# Patient Record
Sex: FEMALE | Race: WHITE | NOT HISPANIC OR LATINO | Employment: OTHER | URBAN - METROPOLITAN AREA
[De-identification: names, ages, dates, MRNs, and addresses within clinical notes are randomized per-mention and may not be internally consistent; named-entity substitution may affect disease eponyms.]

---

## 2022-08-02 DIAGNOSIS — D75.839 THROMBOCYTOSIS: ICD-10-CM

## 2022-08-02 DIAGNOSIS — D50.0 ANEMIA DUE TO CHRONIC BLOOD LOSS: Primary | ICD-10-CM

## 2022-08-05 DIAGNOSIS — D50.0 ANEMIA DUE TO CHRONIC BLOOD LOSS: Primary | ICD-10-CM

## 2022-08-05 NOTE — PROGRESS NOTES
Subjective:       Patient ID: Ashley Calvillo is a 76 y.o. female.    Chief Complaint: Consult (Anemia, Thrombocytosis )      Diagnosis:   - Anemia   - thrombocytosis     Current Treatment:     Treatment History:      HPI    Ashley Calvillo 76 y.o. female with PMH of HTN, Hypothyroidism, CVA, Depression, OA, Tonsil cancer s/p tonsillectomy and CXRT in 2007, now being referred here for evaluation of anemia and thrombocytosis.     According to the pt, she has previously taken iron tablets for anemia when young but denies being told lately of any blood disorders. She had hysterectomy done in the past. Denies any blood or dark color bowel movements. She has not had colonoscopy in long time. Denies any weight loss.     Interval History:     Past Medical History:   Diagnosis Date    CVA (cerebral vascular accident)     HTN (hypertension)     Hypothyroidism, unspecified     Squamous cell carcinoma in situ       Past Surgical History:   Procedure Laterality Date    HYSTERECTOMY      TONSILLECTOMY      TOTAL SHOULDER ARTHROPLASTY       Social History     Socioeconomic History    Marital status: Single   Tobacco Use    Smoking status: Current Some Day Smoker     Types: Cigarettes    Smokeless tobacco: Never Used    Tobacco comment: patient stated that she smokes on some days.    Substance and Sexual Activity    Alcohol use: Yes     Comment: wine, occasionally    Drug use: Never      Family History   Problem Relation Age of Onset    Leukemia Mother     Stroke Mother     Alzheimer's disease Father     Leukemia Sister       Review of patient's allergies indicates:   Allergen Reactions    Oxycodone-aspirin       Review of Systems   Constitutional: Negative.  Negative for activity change, chills, fatigue and fever.   HENT: Negative.    Eyes: Negative.    Respiratory: Negative for cough and shortness of breath.    Cardiovascular: Negative for chest pain and leg swelling.   Gastrointestinal: Negative for abdominal  pain, anal bleeding, blood in stool, change in bowel habit, constipation, diarrhea, nausea, vomiting and change in bowel habit.   Endocrine: Negative.    Genitourinary: Negative.    Musculoskeletal: Positive for arthralgias. Negative for myalgias.   Integumentary:  Negative.   Allergic/Immunologic: Negative.    Neurological: Negative for light-headedness, numbness and headaches.   Hematological: Negative.    Psychiatric/Behavioral: Negative.          Objective:     Vitals:    08/10/22 1057   BP: (!) 178/74   Pulse:    Resp:    Temp:         Physical Exam  Constitutional:       Appearance: She is well-developed.   HENT:      Head: Normocephalic and atraumatic.   Eyes:      Conjunctiva/sclera: Conjunctivae normal.   Cardiovascular:      Rate and Rhythm: Normal rate and regular rhythm.      Heart sounds: Normal heart sounds.   Pulmonary:      Effort: Pulmonary effort is normal. No respiratory distress.      Breath sounds: Normal breath sounds. No wheezing.   Abdominal:      General: Bowel sounds are normal. There is no distension.      Palpations: Abdomen is soft.      Tenderness: There is no abdominal tenderness.   Musculoskeletal:         General: Normal range of motion.      Cervical back: Normal range of motion and neck supple.   Skin:     General: Skin is warm.   Neurological:      Mental Status: She is alert and oriented to person, place, and time.      Cranial Nerves: No cranial nerve deficit.         LABS AND IMAGING REVIEWED IN EPIC    - 7/28/22: H/H 8.0/27.5, MCV 72, Plt 546  - 7/22/22: .9, T4 6.5,      Assessment:             Plan:     Microcytic anemia  - 7/28/22: H/H 8.0/27.5, MCV 72, Plt 546  - Workup ordered , initial workup with iron deficiency Iron 13, ferritin 18.58  - Vit B12 436, folate 11.6  - No evidence of hemolysis with , Haptoglobin 228  - could be due to malnutrition or GIB   - Start with Ferrous sulfate 325 mg daily   - Will treat with IV iron if cant tolerate or no improvement  with oral iron   - GI referral placed for colonoscopy   - RTC in 4 weeks for follow up     Thrombocytosis  - could be due to CIIC   - If no improvement with iron treatment, then will have workup done to rule out MPN.     Hypothyroidism  - 7/22/22: .9, T4 6.5,    - c/w levothyroxine and follow up with PCP     HTN:  - uncontrolled   - f/u PCP       The patient was seen, interviewed and examined. Pertinent lab and radiology studies were reviewed.     Nkechi Garcia MD  Hematology / Oncology

## 2022-08-10 ENCOUNTER — OFFICE VISIT (OUTPATIENT)
Dept: HEMATOLOGY/ONCOLOGY | Facility: CLINIC | Age: 77
End: 2022-08-10
Payer: MEDICARE

## 2022-08-10 VITALS
BODY MASS INDEX: 25.07 KG/M2 | RESPIRATION RATE: 18 BRPM | SYSTOLIC BLOOD PRESSURE: 178 MMHG | OXYGEN SATURATION: 99 % | WEIGHT: 136.25 LBS | TEMPERATURE: 98 F | DIASTOLIC BLOOD PRESSURE: 74 MMHG | HEART RATE: 77 BPM | HEIGHT: 62 IN

## 2022-08-10 DIAGNOSIS — D75.839 THROMBOCYTOSIS: ICD-10-CM

## 2022-08-10 DIAGNOSIS — E03.9 ACQUIRED HYPOTHYROIDISM: ICD-10-CM

## 2022-08-10 DIAGNOSIS — I10 HYPERTENSION, UNSPECIFIED TYPE: ICD-10-CM

## 2022-08-10 DIAGNOSIS — D50.0 ANEMIA DUE TO CHRONIC BLOOD LOSS: ICD-10-CM

## 2022-08-10 DIAGNOSIS — D50.9 IRON DEFICIENCY ANEMIA, UNSPECIFIED IRON DEFICIENCY ANEMIA TYPE: Primary | ICD-10-CM

## 2022-08-10 PROCEDURE — 3077F PR MOST RECENT SYSTOLIC BLOOD PRESSURE >= 140 MM HG: ICD-10-PCS | Mod: CPTII,S$GLB,, | Performed by: INTERNAL MEDICINE

## 2022-08-10 PROCEDURE — 3077F SYST BP >= 140 MM HG: CPT | Mod: CPTII,S$GLB,, | Performed by: INTERNAL MEDICINE

## 2022-08-10 PROCEDURE — 99999 PR PBB SHADOW E&M-EST. PATIENT-LVL V: ICD-10-PCS | Mod: PBBFAC,,, | Performed by: INTERNAL MEDICINE

## 2022-08-10 PROCEDURE — 3078F DIAST BP <80 MM HG: CPT | Mod: CPTII,S$GLB,, | Performed by: INTERNAL MEDICINE

## 2022-08-10 PROCEDURE — 99999 PR PBB SHADOW E&M-EST. PATIENT-LVL V: CPT | Mod: PBBFAC,,, | Performed by: INTERNAL MEDICINE

## 2022-08-10 PROCEDURE — 1159F PR MEDICATION LIST DOCUMENTED IN MEDICAL RECORD: ICD-10-PCS | Mod: CPTII,S$GLB,, | Performed by: INTERNAL MEDICINE

## 2022-08-10 PROCEDURE — 99204 OFFICE O/P NEW MOD 45 MIN: CPT | Mod: S$GLB,,, | Performed by: INTERNAL MEDICINE

## 2022-08-10 PROCEDURE — 1101F PT FALLS ASSESS-DOCD LE1/YR: CPT | Mod: CPTII,S$GLB,, | Performed by: INTERNAL MEDICINE

## 2022-08-10 PROCEDURE — 3288F PR FALLS RISK ASSESSMENT DOCUMENTED: ICD-10-PCS | Mod: CPTII,S$GLB,, | Performed by: INTERNAL MEDICINE

## 2022-08-10 PROCEDURE — 1101F PR PT FALLS ASSESS DOC 0-1 FALLS W/OUT INJ PAST YR: ICD-10-PCS | Mod: CPTII,S$GLB,, | Performed by: INTERNAL MEDICINE

## 2022-08-10 PROCEDURE — 1126F AMNT PAIN NOTED NONE PRSNT: CPT | Mod: CPTII,S$GLB,, | Performed by: INTERNAL MEDICINE

## 2022-08-10 PROCEDURE — 3078F PR MOST RECENT DIASTOLIC BLOOD PRESSURE < 80 MM HG: ICD-10-PCS | Mod: CPTII,S$GLB,, | Performed by: INTERNAL MEDICINE

## 2022-08-10 PROCEDURE — 1159F MED LIST DOCD IN RCRD: CPT | Mod: CPTII,S$GLB,, | Performed by: INTERNAL MEDICINE

## 2022-08-10 PROCEDURE — 1126F PR PAIN SEVERITY QUANTIFIED, NO PAIN PRESENT: ICD-10-PCS | Mod: CPTII,S$GLB,, | Performed by: INTERNAL MEDICINE

## 2022-08-10 PROCEDURE — 3288F FALL RISK ASSESSMENT DOCD: CPT | Mod: CPTII,S$GLB,, | Performed by: INTERNAL MEDICINE

## 2022-08-10 PROCEDURE — 99204 PR OFFICE/OUTPT VISIT, NEW, LEVL IV, 45-59 MIN: ICD-10-PCS | Mod: S$GLB,,, | Performed by: INTERNAL MEDICINE

## 2022-08-10 RX ORDER — AMLODIPINE BESYLATE 5 MG/1
5 TABLET ORAL DAILY
COMMUNITY
Start: 2022-08-08 | End: 2022-11-07

## 2022-08-10 RX ORDER — LEVOTHYROXINE SODIUM 100 UG/1
100 TABLET ORAL EVERY MORNING
Status: ON HOLD | COMMUNITY
Start: 2022-08-08 | End: 2022-11-15 | Stop reason: HOSPADM

## 2022-08-10 RX ORDER — CITALOPRAM 40 MG/1
40 TABLET, FILM COATED ORAL DAILY
COMMUNITY
Start: 2022-05-17

## 2022-08-10 RX ORDER — SIMVASTATIN 10 MG/1
10 TABLET, FILM COATED ORAL DAILY
COMMUNITY
Start: 2022-08-08

## 2022-08-10 RX ORDER — FERROUS SULFATE 325(65) MG
325 TABLET ORAL
Qty: 90 TABLET | Refills: 3 | Status: SHIPPED | OUTPATIENT
Start: 2022-08-10 | End: 2022-11-07

## 2022-08-10 RX ORDER — FLUTICASONE PROPIONATE 50 MCG
SPRAY, SUSPENSION (ML) NASAL
COMMUNITY
Start: 2022-06-21 | End: 2022-11-07

## 2022-08-10 RX ORDER — ARIPIPRAZOLE 5 MG/1
5 TABLET ORAL DAILY
COMMUNITY
Start: 2022-08-08 | End: 2022-11-07

## 2022-08-10 RX ORDER — TRAMADOL HYDROCHLORIDE 50 MG/1
TABLET ORAL
COMMUNITY
Start: 2022-07-28

## 2022-08-22 ENCOUNTER — HOSPITAL ENCOUNTER (INPATIENT)
Facility: HOSPITAL | Age: 77
LOS: 2 days | Discharge: HOME | DRG: 178 | End: 2022-08-24
Attending: FAMILY MEDICINE | Admitting: FAMILY MEDICINE
Payer: MEDICARE

## 2022-08-22 DIAGNOSIS — U07.1 COVID-19: ICD-10-CM

## 2022-08-22 DIAGNOSIS — E87.6 HYPOKALEMIA: ICD-10-CM

## 2022-08-22 DIAGNOSIS — D50.9 IRON DEFICIENCY ANEMIA, UNSPECIFIED IRON DEFICIENCY ANEMIA TYPE: Primary | ICD-10-CM

## 2022-08-22 LAB
ALBUMIN SERPL-MCNC: 3.6 GM/DL (ref 3.4–4.8)
ALBUMIN/GLOB SERPL: 1.2 RATIO (ref 1.1–2)
ALP SERPL-CCNC: 71 UNIT/L (ref 40–150)
ALT SERPL-CCNC: 15 UNIT/L (ref 0–55)
ANISOCYTOSIS BLD QL SMEAR: SLIGHT
AST SERPL-CCNC: 25 UNIT/L (ref 5–34)
BASOPHILS # BLD AUTO: 0.01 X10(3)/MCL (ref 0–0.2)
BASOPHILS NFR BLD AUTO: 0.2 %
BILIRUBIN DIRECT+TOT PNL SERPL-MCNC: 0.3 MG/DL
BUN SERPL-MCNC: 28 MG/DL (ref 9.8–20.1)
CALCIUM SERPL-MCNC: 9.6 MG/DL (ref 8.4–10.2)
CHLORIDE SERPL-SCNC: 105 MMOL/L (ref 98–107)
CO2 SERPL-SCNC: 24 MMOL/L (ref 23–31)
CREAT SERPL-MCNC: 1.58 MG/DL (ref 0.55–1.02)
D DIMER PPP IA.FEU-MCNC: 0.82 UG/ML FEU (ref 0–0.5)
EOSINOPHIL # BLD AUTO: 0.03 X10(3)/MCL (ref 0–0.9)
EOSINOPHIL NFR BLD AUTO: 0.6 %
ERYTHROCYTE [DISTWIDTH] IN BLOOD BY AUTOMATED COUNT: 20 % (ref 11.5–17)
FERRITIN SERPL-MCNC: 29.16 NG/ML (ref 4.63–204)
GFR SERPLBLD CREATININE-BSD FMLA CKD-EPI: 34 MLS/MIN/1.73/M2
GLOBULIN SER-MCNC: 2.9 GM/DL (ref 2.4–3.5)
GLUCOSE SERPL-MCNC: 86 MG/DL (ref 82–115)
HCT VFR BLD AUTO: 29.1 % (ref 37–47)
HGB BLD-MCNC: 8.6 GM/DL (ref 12–16)
HYPOCHROMIA BLD QL SMEAR: SLIGHT
IMM GRANULOCYTES # BLD AUTO: 0.02 X10(3)/MCL (ref 0–0.04)
IMM GRANULOCYTES NFR BLD AUTO: 0.4 %
LYMPHOCYTES # BLD AUTO: 0.99 X10(3)/MCL (ref 0.6–4.6)
LYMPHOCYTES NFR BLD AUTO: 18.2 %
MCH RBC QN AUTO: 19.7 PG (ref 27–31)
MCHC RBC AUTO-ENTMCNC: 29.6 MG/DL (ref 33–36)
MCV RBC AUTO: 66.7 FL (ref 80–94)
MICROCYTES BLD QL SMEAR: SLIGHT
MONOCYTES # BLD AUTO: 0.5 X10(3)/MCL (ref 0.1–1.3)
MONOCYTES NFR BLD AUTO: 9.2 %
NEUTROPHILS # BLD AUTO: 3.9 X10(3)/MCL (ref 2.1–9.2)
NEUTROPHILS NFR BLD AUTO: 71.4 %
PLATELET # BLD AUTO: 268 X10(3)/MCL (ref 130–400)
PLATELET # BLD EST: ADEQUATE 10*3/UL
PMV BLD AUTO: 9.5 FL (ref 7.4–10.4)
POTASSIUM SERPL-SCNC: 3.7 MMOL/L (ref 3.5–5.1)
PROT SERPL-MCNC: 6.5 GM/DL (ref 5.8–7.6)
RBC # BLD AUTO: 4.36 X10(6)/MCL (ref 4.2–5.4)
RBC MORPH BLD: ABNORMAL
SODIUM SERPL-SCNC: 141 MMOL/L (ref 136–145)
TSH SERPL-ACNC: 0.66 UIU/ML (ref 0.35–4.94)
WBC # SPEC AUTO: 5.4 X10(3)/MCL (ref 4.5–11.5)

## 2022-08-22 PROCEDURE — 11000001 HC ACUTE MED/SURG PRIVATE ROOM

## 2022-08-22 PROCEDURE — 84443 ASSAY THYROID STIM HORMONE: CPT | Performed by: FAMILY MEDICINE

## 2022-08-22 PROCEDURE — 25000003 PHARM REV CODE 250: Performed by: FAMILY MEDICINE

## 2022-08-22 PROCEDURE — 82728 ASSAY OF FERRITIN: CPT | Performed by: FAMILY MEDICINE

## 2022-08-22 PROCEDURE — 85025 COMPLETE CBC W/AUTO DIFF WBC: CPT | Performed by: FAMILY MEDICINE

## 2022-08-22 PROCEDURE — 36415 COLL VENOUS BLD VENIPUNCTURE: CPT | Performed by: FAMILY MEDICINE

## 2022-08-22 PROCEDURE — 25000242 PHARM REV CODE 250 ALT 637 W/ HCPCS: Performed by: FAMILY MEDICINE

## 2022-08-22 PROCEDURE — 80053 COMPREHEN METABOLIC PANEL: CPT | Performed by: FAMILY MEDICINE

## 2022-08-22 PROCEDURE — 85379 FIBRIN DEGRADATION QUANT: CPT | Performed by: FAMILY MEDICINE

## 2022-08-22 PROCEDURE — 63600175 PHARM REV CODE 636 W HCPCS: Mod: TB | Performed by: FAMILY MEDICINE

## 2022-08-22 PROCEDURE — 94640 AIRWAY INHALATION TREATMENT: CPT

## 2022-08-22 PROCEDURE — 27000221 HC OXYGEN, UP TO 24 HOURS

## 2022-08-22 PROCEDURE — 94761 N-INVAS EAR/PLS OXIMETRY MLT: CPT

## 2022-08-22 RX ORDER — TRAMADOL HYDROCHLORIDE 50 MG/1
50 TABLET ORAL DAILY PRN
Status: DISCONTINUED | OUTPATIENT
Start: 2022-08-22 | End: 2022-08-24 | Stop reason: HOSPADM

## 2022-08-22 RX ORDER — ARIPIPRAZOLE 5 MG/1
5 TABLET ORAL DAILY
Status: DISCONTINUED | OUTPATIENT
Start: 2022-08-23 | End: 2022-08-24 | Stop reason: HOSPADM

## 2022-08-22 RX ORDER — TALC
6 POWDER (GRAM) TOPICAL NIGHTLY PRN
Status: DISCONTINUED | OUTPATIENT
Start: 2022-08-22 | End: 2022-08-24 | Stop reason: HOSPADM

## 2022-08-22 RX ORDER — FLUTICASONE PROPIONATE 50 MCG
1 SPRAY, SUSPENSION (ML) NASAL DAILY
Status: DISCONTINUED | OUTPATIENT
Start: 2022-08-23 | End: 2022-08-24 | Stop reason: HOSPADM

## 2022-08-22 RX ORDER — SODIUM CHLORIDE 9 MG/ML
INJECTION, SOLUTION INTRAVENOUS CONTINUOUS
Status: DISCONTINUED | OUTPATIENT
Start: 2022-08-22 | End: 2022-08-24 | Stop reason: HOSPADM

## 2022-08-22 RX ORDER — DEXAMETHASONE SODIUM PHOSPHATE 4 MG/ML
4 INJECTION, SOLUTION INTRA-ARTICULAR; INTRALESIONAL; INTRAMUSCULAR; INTRAVENOUS; SOFT TISSUE EVERY 12 HOURS
Status: DISCONTINUED | OUTPATIENT
Start: 2022-08-22 | End: 2022-08-24 | Stop reason: HOSPADM

## 2022-08-22 RX ORDER — ONDANSETRON 4 MG/1
8 TABLET, ORALLY DISINTEGRATING ORAL EVERY 8 HOURS PRN
Status: DISCONTINUED | OUTPATIENT
Start: 2022-08-22 | End: 2022-08-24 | Stop reason: HOSPADM

## 2022-08-22 RX ORDER — ACETAMINOPHEN 325 MG/1
650 TABLET ORAL EVERY 4 HOURS PRN
Status: DISCONTINUED | OUTPATIENT
Start: 2022-08-22 | End: 2022-08-24 | Stop reason: HOSPADM

## 2022-08-22 RX ORDER — SODIUM CHLORIDE 0.9 % (FLUSH) 0.9 %
10 SYRINGE (ML) INJECTION EVERY 12 HOURS PRN
Status: DISCONTINUED | OUTPATIENT
Start: 2022-08-22 | End: 2022-08-24 | Stop reason: HOSPADM

## 2022-08-22 RX ORDER — ENOXAPARIN SODIUM 100 MG/ML
40 INJECTION SUBCUTANEOUS EVERY 24 HOURS
Status: DISCONTINUED | OUTPATIENT
Start: 2022-08-22 | End: 2022-08-24 | Stop reason: HOSPADM

## 2022-08-22 RX ORDER — IPRATROPIUM BROMIDE AND ALBUTEROL SULFATE 2.5; .5 MG/3ML; MG/3ML
3 SOLUTION RESPIRATORY (INHALATION)
Status: DISCONTINUED | OUTPATIENT
Start: 2022-08-22 | End: 2022-08-24 | Stop reason: HOSPADM

## 2022-08-22 RX ORDER — AMLODIPINE BESYLATE 5 MG/1
5 TABLET ORAL DAILY
Status: DISCONTINUED | OUTPATIENT
Start: 2022-08-23 | End: 2022-08-24 | Stop reason: HOSPADM

## 2022-08-22 RX ORDER — LEVOTHYROXINE SODIUM 100 UG/1
100 TABLET ORAL EVERY MORNING
Status: DISCONTINUED | OUTPATIENT
Start: 2022-08-23 | End: 2022-08-24 | Stop reason: HOSPADM

## 2022-08-22 RX ORDER — CITALOPRAM 20 MG/1
40 TABLET, FILM COATED ORAL DAILY
Status: DISCONTINUED | OUTPATIENT
Start: 2022-08-23 | End: 2022-08-24 | Stop reason: HOSPADM

## 2022-08-22 RX ADMIN — IPRATROPIUM BROMIDE AND ALBUTEROL SULFATE 3 ML: .5; 3 SOLUTION RESPIRATORY (INHALATION) at 08:08

## 2022-08-22 RX ADMIN — DEXAMETHASONE SODIUM PHOSPHATE 4 MG: 4 INJECTION, SOLUTION INTRA-ARTICULAR; INTRALESIONAL; INTRAMUSCULAR; INTRAVENOUS; SOFT TISSUE at 09:08

## 2022-08-22 RX ADMIN — SODIUM CHLORIDE: 9 INJECTION, SOLUTION INTRAVENOUS at 06:08

## 2022-08-22 RX ADMIN — ENOXAPARIN SODIUM 40 MG: 100 INJECTION SUBCUTANEOUS at 09:08

## 2022-08-22 RX ADMIN — REMDESIVIR 200 MG: 100 INJECTION, POWDER, LYOPHILIZED, FOR SOLUTION INTRAVENOUS at 09:08

## 2022-08-22 RX ADMIN — AZITHROMYCIN 500 MG: 500 INJECTION, POWDER, LYOPHILIZED, FOR SOLUTION INTRAVENOUS at 06:08

## 2022-08-22 NOTE — SUBJECTIVE & OBJECTIVE
Past Medical History:   Diagnosis Date    CVA (cerebral vascular accident)     HTN (hypertension)     Hypothyroidism, unspecified     Squamous cell carcinoma in situ        Past Surgical History:   Procedure Laterality Date    HYSTERECTOMY      TONSILLECTOMY      TOTAL SHOULDER ARTHROPLASTY         Review of patient's allergies indicates:   Allergen Reactions    Oxycodone-aspirin        No current facility-administered medications on file prior to encounter.     Current Outpatient Medications on File Prior to Encounter   Medication Sig    amLODIPine (NORVASC) 5 MG tablet Take 5 mg by mouth once daily.    ARIPiprazole (ABILIFY) 5 MG Tab Take 5 mg by mouth once daily.    citalopram (CELEXA) 40 MG tablet Take 40 mg by mouth once daily.    ferrous sulfate (FEOSOL) 325 mg (65 mg iron) Tab tablet Take 1 tablet (325 mg total) by mouth daily with breakfast.    fluticasone propionate (FLONASE) 50 mcg/actuation nasal spray ONE SPRAY IN EACH NOSTRIL ONCE DAILY    levothyroxine (SYNTHROID) 100 MCG tablet Take 100 mcg by mouth every morning.    simvastatin (ZOCOR) 10 MG tablet Take 10 mg by mouth once daily.    traMADoL (ULTRAM) 50 mg tablet TAKE ONE TABLET BY MOUTH ONCE DAILY AS NEEDED PAIN MAY CAUSE DROWSINESS     Family History       Problem Relation (Age of Onset)    Alzheimer's disease Father    Leukemia Mother, Sister    Stroke Mother          Tobacco Use    Smoking status: Current Some Day Smoker     Types: Cigarettes    Smokeless tobacco: Never Used    Tobacco comment: patient stated that she smokes on some days.    Substance and Sexual Activity    Alcohol use: Yes     Comment: wine, occasionally    Drug use: Never    Sexual activity: Not on file     Review of Systems   Constitutional:  Negative for fever.   HENT:  Negative for ear pain.    Respiratory:  Positive for cough and shortness of breath.    Cardiovascular:  Negative for chest pain.   Genitourinary:  Negative for dysuria and hematuria.   Musculoskeletal:   Negative for myalgias.   Skin:  Negative for rash.   Neurological:  Positive for weakness. Negative for headaches.   Psychiatric/Behavioral:  Negative for confusion.    Objective:     Vital Signs (Most Recent):    Vital Signs (24h Range):           There is no height or weight on file to calculate BMI.    Physical Exam  Constitutional:       Appearance: She is ill-appearing.   HENT:      Head: Normocephalic and atraumatic.   Eyes:      Extraocular Movements: Extraocular movements intact.      Pupils: Pupils are equal, round, and reactive to light.   Cardiovascular:      Rate and Rhythm: Normal rate and regular rhythm.   Pulmonary:      Effort: Respiratory distress present.      Breath sounds: Wheezing and rhonchi present.   Abdominal:      General: Abdomen is flat. Bowel sounds are normal.      Palpations: Abdomen is soft.      Tenderness: There is no abdominal tenderness.   Musculoskeletal:         General: Normal range of motion.      Cervical back: Normal range of motion and neck supple.   Skin:     General: Skin is warm and dry.   Neurological:      General: No focal deficit present.      Mental Status: She is alert.   Psychiatric:         Mood and Affect: Mood normal.         Behavior: Behavior normal.         CRANIAL NERVES     CN III, IV, VI   Pupils are equal, round, and reactive to light.     Significant Labs: All pertinent labs within the past 24 hours have been reviewed.  pending    Significant Imaging:  pending

## 2022-08-22 NOTE — H&P
[facility] Medicine  History & Physical    Patient Name: Ashley Calivllo  MRN: [id number]  Patient Class: IP- Inpatient  Admission Date: 8/22/2022  Attending Physician: Leeanna De Leon MD   Primary Care Provider: Leeanna De Leon MD         Patient information was obtained from patient and relative(s).     Subjective:     Principal Problem:COVID-19    Chief Complaint:   Chief Complaint   Patient presents with    Cough        HPI: 77yo F with PMHx of HTN, hypothyroidism, h/o CVA presented to the office with one week of upper respiratory symptoms. Daughter states weakness and cough became worse over the weekend. Patient evaluated in office, significant inspiratory and expiratory wheezing, hypoxia at 90%, rapid COVID positive.    Direct admission for IVF, antibiotics, antivirals and O2.       Past Medical History:   Diagnosis Date    CVA (cerebral vascular accident)     HTN (hypertension)     Hypothyroidism, unspecified     Squamous cell carcinoma in situ        Past Surgical History:   Procedure Laterality Date    HYSTERECTOMY      TONSILLECTOMY      TOTAL SHOULDER ARTHROPLASTY         Review of patient's allergies indicates:   Allergen Reactions    Oxycodone-aspirin        No current facility-administered medications on file prior to encounter.     Current Outpatient Medications on File Prior to Encounter   Medication Sig    amLODIPine (NORVASC) 5 MG tablet Take 5 mg by mouth once daily.    ARIPiprazole (ABILIFY) 5 MG Tab Take 5 mg by mouth once daily.    citalopram (CELEXA) 40 MG tablet Take 40 mg by mouth once daily.    ferrous sulfate (FEOSOL) 325 mg (65 mg iron) Tab tablet Take 1 tablet (325 mg total) by mouth daily with breakfast.    fluticasone propionate (FLONASE) 50 mcg/actuation nasal spray ONE SPRAY IN EACH NOSTRIL ONCE DAILY    levothyroxine (SYNTHROID) 100 MCG tablet Take 100 mcg by mouth every morning.    simvastatin (ZOCOR) 10 MG tablet  Take 10 mg by mouth once daily.    traMADoL (ULTRAM) 50 mg tablet TAKE ONE TABLET BY MOUTH ONCE DAILY AS NEEDED PAIN MAY CAUSE DROWSINESS     Family History       Problem Relation (Age of Onset)    Alzheimer's disease Father    Leukemia Mother, Sister    Stroke Mother          Tobacco Use    Smoking status: Current Some Day Smoker     Types: Cigarettes    Smokeless tobacco: Never Used    Tobacco comment: patient stated that she smokes on some days.    Substance and Sexual Activity    Alcohol use: Yes     Comment: wine, occasionally    Drug use: Never    Sexual activity: Not on file     Review of Systems   Constitutional:  Negative for fever.   HENT:  Negative for ear pain.    Respiratory:  Positive for cough and shortness of breath.    Cardiovascular:  Negative for chest pain.   Genitourinary:  Negative for dysuria and hematuria.   Musculoskeletal:  Negative for myalgias.   Skin:  Negative for rash.   Neurological:  Positive for weakness. Negative for headaches.   Psychiatric/Behavioral:  Negative for confusion.    Objective:     Vital Signs (Most Recent):    Vital Signs (24h Range):           There is no height or weight on file to calculate BMI.    Physical Exam  Constitutional:       Appearance: She is ill-appearing.   HENT:      Head: Normocephalic and atraumatic.   Eyes:      Extraocular Movements: Extraocular movements intact.      Pupils: Pupils are equal, round, and reactive to light.   Cardiovascular:      Rate and Rhythm: Normal rate and regular rhythm.   Pulmonary:      Effort: Respiratory distress present.      Breath sounds: Wheezing and rhonchi present.   Abdominal:      General: Abdomen is flat. Bowel sounds are normal.      Palpations: Abdomen is soft.      Tenderness: There is no abdominal tenderness.   Musculoskeletal:         General: Normal range of motion.      Cervical back: Normal range of motion and neck supple.   Skin:     General: Skin is warm and dry.   Neurological:      General:  No focal deficit present.      Mental Status: She is alert.   Psychiatric:         Mood and Affect: Mood normal.         Behavior: Behavior normal.         CRANIAL NERVES     CN III, IV, VI   Pupils are equal, round, and reactive to light.     Significant Labs: All pertinent labs within the past 24 hours have been reviewed.  pending    Significant Imaging:  pending    Assessment/Plan:     * COVID-19  Supplemental O2 PRN  CXR  F/u d-dimer for potential CT chest; continue lovenox  Remdesivir, dexamethasone  duonebs  azithromycin    VTE Risk Mitigation (From admission, onward)         Ordered     enoxaparin injection 40 mg  Daily         08/22/22 1821     IP VTE HIGH RISK PATIENT  Once         08/22/22 1821     Place sequential compression device  Until discontinued         08/22/22 1821                   Leeanna De Leon MD  Department of Hospital Medicine   [facility]

## 2022-08-22 NOTE — HPI
77yo F with PMHx of HTN, hypothyroidism, h/o CVA presented to the office with one week of upper respiratory symptoms. Daughter states weakness and cough became worse over the weekend. Patient evaluated in office, significant inspiratory and expiratory wheezing, hypoxia at 90%, rapid COVID positive.    Direct admission for IVF, antibiotics, antivirals and O2.

## 2022-08-22 NOTE — ASSESSMENT & PLAN NOTE
Supplemental O2 PRN  CXR  F/u d-dimer for potential CT chest; continue lovenox  Remdesivir, dexamethasone  duonebs  azithromycin

## 2022-08-23 LAB
ALBUMIN SERPL-MCNC: 3.5 GM/DL (ref 3.4–4.8)
ALBUMIN/GLOB SERPL: 1.4 RATIO (ref 1.1–2)
ALP SERPL-CCNC: 63 UNIT/L (ref 40–150)
ALT SERPL-CCNC: 14 UNIT/L (ref 0–55)
AST SERPL-CCNC: 25 UNIT/L (ref 5–34)
BASOPHILS # BLD AUTO: 0.01 X10(3)/MCL (ref 0–0.2)
BASOPHILS NFR BLD AUTO: 0.2 %
BILIRUBIN DIRECT+TOT PNL SERPL-MCNC: 0.2 MG/DL
BUN SERPL-MCNC: 23 MG/DL (ref 9.8–20.1)
CALCIUM SERPL-MCNC: 8.8 MG/DL (ref 8.4–10.2)
CHLORIDE SERPL-SCNC: 106 MMOL/L (ref 98–107)
CO2 SERPL-SCNC: 19 MMOL/L (ref 23–31)
CREAT SERPL-MCNC: 1.08 MG/DL (ref 0.55–1.02)
EOSINOPHIL # BLD AUTO: 0 X10(3)/MCL (ref 0–0.9)
EOSINOPHIL NFR BLD AUTO: 0 %
ERYTHROCYTE [DISTWIDTH] IN BLOOD BY AUTOMATED COUNT: 19.7 % (ref 11.5–17)
GFR SERPLBLD CREATININE-BSD FMLA CKD-EPI: 54 MLS/MIN/1.73/M2
GLOBULIN SER-MCNC: 2.5 GM/DL (ref 2.4–3.5)
GLUCOSE SERPL-MCNC: 146 MG/DL (ref 82–115)
HCT VFR BLD AUTO: 26.6 % (ref 37–47)
HGB BLD-MCNC: 7.9 GM/DL (ref 12–16)
IMM GRANULOCYTES # BLD AUTO: 0.03 X10(3)/MCL (ref 0–0.04)
IMM GRANULOCYTES NFR BLD AUTO: 0.7 %
LYMPHOCYTES # BLD AUTO: 0.33 X10(3)/MCL (ref 0.6–4.6)
LYMPHOCYTES NFR BLD AUTO: 8.2 %
MCH RBC QN AUTO: 19.6 PG (ref 27–31)
MCHC RBC AUTO-ENTMCNC: 29.7 MG/DL (ref 33–36)
MCV RBC AUTO: 66 FL (ref 80–94)
MONOCYTES # BLD AUTO: 0.07 X10(3)/MCL (ref 0.1–1.3)
MONOCYTES NFR BLD AUTO: 1.7 %
NEUTROPHILS # BLD AUTO: 3.6 X10(3)/MCL (ref 2.1–9.2)
NEUTROPHILS NFR BLD AUTO: 89.2 %
PLATELET # BLD AUTO: 260 X10(3)/MCL (ref 130–400)
PMV BLD AUTO: 9.8 FL (ref 7.4–10.4)
POTASSIUM SERPL-SCNC: 3.5 MMOL/L (ref 3.5–5.1)
PROT SERPL-MCNC: 6 GM/DL (ref 5.8–7.6)
RBC # BLD AUTO: 4.03 X10(6)/MCL (ref 4.2–5.4)
SODIUM SERPL-SCNC: 140 MMOL/L (ref 136–145)
WBC # SPEC AUTO: 4 X10(3)/MCL (ref 4.5–11.5)

## 2022-08-23 PROCEDURE — 25000003 PHARM REV CODE 250: Performed by: FAMILY MEDICINE

## 2022-08-23 PROCEDURE — 96375 TX/PRO/DX INJ NEW DRUG ADDON: CPT

## 2022-08-23 PROCEDURE — 96372 THER/PROPH/DIAG INJ SC/IM: CPT | Performed by: FAMILY MEDICINE

## 2022-08-23 PROCEDURE — 36415 COLL VENOUS BLD VENIPUNCTURE: CPT | Performed by: FAMILY MEDICINE

## 2022-08-23 PROCEDURE — G0378 HOSPITAL OBSERVATION PER HR: HCPCS

## 2022-08-23 PROCEDURE — 63600175 PHARM REV CODE 636 W HCPCS: Performed by: FAMILY MEDICINE

## 2022-08-23 PROCEDURE — 96365 THER/PROPH/DIAG IV INF INIT: CPT

## 2022-08-23 PROCEDURE — 25000242 PHARM REV CODE 250 ALT 637 W/ HCPCS: Performed by: FAMILY MEDICINE

## 2022-08-23 PROCEDURE — 94761 N-INVAS EAR/PLS OXIMETRY MLT: CPT

## 2022-08-23 PROCEDURE — 94640 AIRWAY INHALATION TREATMENT: CPT | Mod: XB

## 2022-08-23 PROCEDURE — 11000001 HC ACUTE MED/SURG PRIVATE ROOM

## 2022-08-23 PROCEDURE — 85025 COMPLETE CBC W/AUTO DIFF WBC: CPT | Performed by: FAMILY MEDICINE

## 2022-08-23 PROCEDURE — 80053 COMPREHEN METABOLIC PANEL: CPT | Performed by: FAMILY MEDICINE

## 2022-08-23 PROCEDURE — 27000221 HC OXYGEN, UP TO 24 HOURS

## 2022-08-23 RX ADMIN — DEXAMETHASONE SODIUM PHOSPHATE 4 MG: 4 INJECTION, SOLUTION INTRA-ARTICULAR; INTRALESIONAL; INTRAMUSCULAR; INTRAVENOUS; SOFT TISSUE at 09:08

## 2022-08-23 RX ADMIN — ENOXAPARIN SODIUM 40 MG: 100 INJECTION SUBCUTANEOUS at 04:08

## 2022-08-23 RX ADMIN — REMDESIVIR 100 MG: 100 INJECTION, POWDER, LYOPHILIZED, FOR SOLUTION INTRAVENOUS at 08:08

## 2022-08-23 RX ADMIN — IPRATROPIUM BROMIDE AND ALBUTEROL SULFATE 3 ML: .5; 3 SOLUTION RESPIRATORY (INHALATION) at 07:08

## 2022-08-23 RX ADMIN — LEVOTHYROXINE SODIUM 100 MCG: 100 TABLET ORAL at 09:08

## 2022-08-23 RX ADMIN — IPRATROPIUM BROMIDE AND ALBUTEROL SULFATE 3 ML: .5; 3 SOLUTION RESPIRATORY (INHALATION) at 01:08

## 2022-08-23 RX ADMIN — AMLODIPINE BESYLATE 5 MG: 5 TABLET ORAL at 09:08

## 2022-08-23 RX ADMIN — CITALOPRAM HYDROBROMIDE 40 MG: 20 TABLET ORAL at 09:08

## 2022-08-23 RX ADMIN — DEXAMETHASONE SODIUM PHOSPHATE 4 MG: 4 INJECTION, SOLUTION INTRA-ARTICULAR; INTRALESIONAL; INTRAMUSCULAR; INTRAVENOUS; SOFT TISSUE at 08:08

## 2022-08-23 RX ADMIN — FLUTICASONE PROPIONATE 50 MCG: 50 SPRAY, METERED NASAL at 09:08

## 2022-08-23 RX ADMIN — ARIPIPRAZOLE 5 MG: 5 TABLET ORAL at 09:08

## 2022-08-23 RX ADMIN — AZITHROMYCIN 500 MG: 500 INJECTION, POWDER, LYOPHILIZED, FOR SOLUTION INTRAVENOUS at 06:08

## 2022-08-23 NOTE — HOSPITAL COURSE
Hospital Day #2  No acute events overnight. Patient doing well on supplemental O2. Reports continued cough with more sputum today. HARDEEP improving with IVF. Anemia stable, will continue to monitor.    Hospital Day #3  Patient off O2 this AM, has removed IV. Breathing well, less coughing. AM labs stable - anemia noted, will defer transfusion at this time as patient is asymptomatic. Hypokalemia - will start po potassium. Recheck labs outpatient.

## 2022-08-23 NOTE — PROGRESS NOTES
[facility] Medicine  Progress Note    Patient Name: Ashley Calvillo  MRN: [id number]  Patient Class: IP- Inpatient   Admission Date: 8/22/2022  Length of Stay: 1 days  Attending Physician: Leeanna De Leon MD  Primary Care Provider: Leeanna De Leon MD        Subjective:     Principal Problem:COVID-19        HPI:  77yo F with PMHx of HTN, hypothyroidism, h/o CVA presented to the office with one week of upper respiratory symptoms. Daughter states weakness and cough became worse over the weekend. Patient evaluated in office, significant inspiratory and expiratory wheezing, hypoxia at 90%, rapid COVID positive.    Direct admission for IVF, antibiotics, antivirals and O2.       Overview/Hospital Course:  Hospital Day #2  No acute events overnight. Patient doing well on supplemental O2. Reports continued cough with more sputum today. HARDEEP improving with IVF. Anemia stable, will continue to monitor.      Interval History: see hospital course    Review of Systems   Constitutional:  Negative for fever.   HENT:  Negative for ear pain.    Respiratory:  Positive for cough and shortness of breath.    Cardiovascular:  Negative for chest pain.   Genitourinary:  Negative for dysuria and hematuria.   Musculoskeletal:  Negative for myalgias.   Skin:  Negative for rash.   Neurological:  Positive for weakness. Negative for headaches.   Psychiatric/Behavioral:  Negative for confusion.    Objective:     Vital Signs (Most Recent):  Temp: 97.5 °F (36.4 °C) (08/23/22 0500)  Pulse: 75 (08/23/22 0717)  Resp: 20 (08/23/22 0717)  BP: 139/62 (08/23/22 0500)  SpO2: 98 % (08/23/22 0717) Vital Signs (24h Range):  Temp:  [96.3 °F (35.7 °C)-98.4 °F (36.9 °C)] 97.5 °F (36.4 °C)  Pulse:  [68-82] 75  Resp:  [16-22] 20  SpO2:  [96 %-99 %] 98 %  BP: (132-141)/(58-73) 139/62     Weight: 62.6 kg (138 lb)  Body mass index is 25.24 kg/m².    Intake/Output Summary (Last 24 hours) at 8/23/2022 0747  Last data  filed at 8/23/2022 0500  Gross per 24 hour   Intake --   Output 700 ml   Net -700 ml      Physical Exam  Constitutional:       General: She is not in acute distress.  HENT:      Head: Normocephalic and atraumatic.   Eyes:      Extraocular Movements: Extraocular movements intact.      Pupils: Pupils are equal, round, and reactive to light.   Cardiovascular:      Rate and Rhythm: Normal rate and regular rhythm.   Pulmonary:      Breath sounds: Wheezing and rhonchi present.   Abdominal:      General: Abdomen is flat. Bowel sounds are normal.      Palpations: Abdomen is soft.      Tenderness: There is no abdominal tenderness.   Genitourinary:     Comments: Purewik cath in place, clear yellow urine  Musculoskeletal:         General: Normal range of motion.      Cervical back: Normal range of motion and neck supple.   Skin:     General: Skin is warm and dry.   Neurological:      General: No focal deficit present.      Mental Status: She is alert.   Psychiatric:         Mood and Affect: Mood normal.         Behavior: Behavior normal.       Significant Labs: All pertinent labs within the past 24 hours have been reviewed.  Recent Lab Results         08/23/22  0444   08/22/22  1832        Albumin/Globulin Ratio 1.4   1.2       Albumin 3.5   3.6       Alkaline Phosphatase 63   71       ALT 14   15       Aniso   Slight       AST 25   25       Baso # 0.01   0.01       Basophil % 0.2   0.2       BILIRUBIN TOTAL 0.2   0.3       BUN 23.0   28.0       Calcium 8.8   9.6       Chloride 106   105       CO2 19   24       Creatinine 1.08   1.58       D-Dimer   0.82  Comment: D-dimer values of less than 0.5ug/mL FEU in adult patients with a clinically low pre-test probability of developing a DVT yield  a 99% negative predictive value.  D-dimer increases naturally with age, therefore the negative predictive value in patients older than 80 is 21 - 31%.    D-Dimer testing at [facility] and [facility] is  used as an aid in the diagnosis of VTE/PE. The D-Dimer test must be used with one or more additional tests such as imaging studies to evaluate VTE/PE       eGFR 54   34       Eos # 0.00   0.03       Eosinophil % 0.0   0.6       Ferritin   29.16       Globulin, Total 2.5   2.9       Glucose 146   86       Hematocrit 26.6   29.1       Hemoglobin 7.9   8.6       Hypo   Slight       Immature Grans (Abs) 0.03   0.02       Immature Granulocytes 0.7   0.4       Lymph # 0.33   0.99       LYMPH % 8.2   18.2       MCH 19.6   19.7       MCHC 29.7   29.6       MCV 66.0   66.7       Microcyte   Slight       Mono # 0.07   0.50       Mono % 1.7   9.2       MPV 9.8   9.5       Neut # 3.6   3.9       Neut % 89.2   71.4       Platelet Estimate   Adequate       Platelets 260   268       Potassium 3.5   3.7       PROTEIN TOTAL 6.0   6.5       RBC 4.03   4.36       RBC Morph   Abnormal       RDW 19.7   20.0       Sodium 140   141       Thyroid Stimulating Hormone   0.6642       WBC 4.0   5.4               Significant Imaging: I have reviewed all pertinent imaging results/findings within the past 24 hours.    CXR final results pending      Assessment/Plan:      * COVID-19  Supplemental O2, wean as tolerated  D-dimer elevated,  continue lovenox  Remdesivir, dexamethasone  duonebs  Azithromycin  Continue IVF - dehydration improving      VTE Risk Mitigation (From admission, onward)         Ordered     enoxaparin injection 40 mg  Daily         08/22/22 1821     IP VTE HIGH RISK PATIENT  Once         08/22/22 1821     Place sequential compression device  Until discontinued         08/22/22 1821                Discharge Planning   PHIL:      Code Status: Full Code   Is the patient medically ready for discharge?:     Reason for patient still in hospital (select all that apply): Treatment                     Leeanna De Leon MD  Department of Hospital Medicine   [facility]

## 2022-08-23 NOTE — SUBJECTIVE & OBJECTIVE
Interval History: see hospital course    Review of Systems   Constitutional:  Negative for fever.   HENT:  Negative for ear pain.    Respiratory:  Positive for cough and shortness of breath.    Cardiovascular:  Negative for chest pain.   Genitourinary:  Negative for dysuria and hematuria.   Musculoskeletal:  Negative for myalgias.   Skin:  Negative for rash.   Neurological:  Positive for weakness. Negative for headaches.   Psychiatric/Behavioral:  Negative for confusion.    Objective:     Vital Signs (Most Recent):  Temp: 97.5 °F (36.4 °C) (08/23/22 0500)  Pulse: 75 (08/23/22 0717)  Resp: 20 (08/23/22 0717)  BP: 139/62 (08/23/22 0500)  SpO2: 98 % (08/23/22 0717) Vital Signs (24h Range):  Temp:  [96.3 °F (35.7 °C)-98.4 °F (36.9 °C)] 97.5 °F (36.4 °C)  Pulse:  [68-82] 75  Resp:  [16-22] 20  SpO2:  [96 %-99 %] 98 %  BP: (132-141)/(58-73) 139/62     Weight: 62.6 kg (138 lb)  Body mass index is 25.24 kg/m².    Intake/Output Summary (Last 24 hours) at 8/23/2022 0747  Last data filed at 8/23/2022 0500  Gross per 24 hour   Intake --   Output 700 ml   Net -700 ml      Physical Exam  Constitutional:       General: She is not in acute distress.  HENT:      Head: Normocephalic and atraumatic.   Eyes:      Extraocular Movements: Extraocular movements intact.      Pupils: Pupils are equal, round, and reactive to light.   Cardiovascular:      Rate and Rhythm: Normal rate and regular rhythm.   Pulmonary:      Breath sounds: Wheezing and rhonchi present.   Abdominal:      General: Abdomen is flat. Bowel sounds are normal.      Palpations: Abdomen is soft.      Tenderness: There is no abdominal tenderness.   Genitourinary:     Comments: Purewik cath in place, clear yellow urine  Musculoskeletal:         General: Normal range of motion.      Cervical back: Normal range of motion and neck supple.   Skin:     General: Skin is warm and dry.   Neurological:      General: No focal deficit present.      Mental Status: She is alert.    Psychiatric:         Mood and Affect: Mood normal.         Behavior: Behavior normal.       Significant Labs: All pertinent labs within the past 24 hours have been reviewed.  Recent Lab Results         08/23/22  0444   08/22/22  1832        Albumin/Globulin Ratio 1.4   1.2       Albumin 3.5   3.6       Alkaline Phosphatase 63   71       ALT 14   15       Aniso   Slight       AST 25   25       Baso # 0.01   0.01       Basophil % 0.2   0.2       BILIRUBIN TOTAL 0.2   0.3       BUN 23.0   28.0       Calcium 8.8   9.6       Chloride 106   105       CO2 19   24       Creatinine 1.08   1.58       D-Dimer   0.82  Comment: D-dimer values of less than 0.5ug/mL FEU in adult patients with a clinically low pre-test probability of developing a DVT yield  a 99% negative predictive value.  D-dimer increases naturally with age, therefore the negative predictive value in patients older than 80 is 21 - 31%.    D-Dimer testing at [facility] and [facility] is used as an aid in the diagnosis of VTE/PE. The D-Dimer test must be used with one or more additional tests such as imaging studies to evaluate VTE/PE       eGFR 54   34       Eos # 0.00   0.03       Eosinophil % 0.0   0.6       Ferritin   29.16       Globulin, Total 2.5   2.9       Glucose 146   86       Hematocrit 26.6   29.1       Hemoglobin 7.9   8.6       Hypo   Slight       Immature Grans (Abs) 0.03   0.02       Immature Granulocytes 0.7   0.4       Lymph # 0.33   0.99       LYMPH % 8.2   18.2       MCH 19.6   19.7       MCHC 29.7   29.6       MCV 66.0   66.7       Microcyte   Slight       Mono # 0.07   0.50       Mono % 1.7   9.2       MPV 9.8   9.5       Neut # 3.6   3.9       Neut % 89.2   71.4       Platelet Estimate   Adequate       Platelets 260   268       Potassium 3.5   3.7       PROTEIN TOTAL 6.0   6.5       RBC 4.03   4.36       RBC Morph   Abnormal       RDW 19.7   20.0       Sodium 140   141       Thyroid Stimulating Hormone    0.6642       WBC 4.0   5.4               Significant Imaging: I have reviewed all pertinent imaging results/findings within the past 24 hours.    CXR final results pending   0

## 2022-08-23 NOTE — ASSESSMENT & PLAN NOTE
Supplemental O2, wean as tolerated  D-dimer elevated,  continue lovenox  Remdesivir, dexamethasone  duonebs  Azithromycin  Continue IVF - dehydration improving

## 2022-08-24 VITALS
WEIGHT: 138.88 LBS | HEIGHT: 62 IN | OXYGEN SATURATION: 96 % | HEART RATE: 77 BPM | TEMPERATURE: 98 F | RESPIRATION RATE: 18 BRPM | DIASTOLIC BLOOD PRESSURE: 68 MMHG | SYSTOLIC BLOOD PRESSURE: 142 MMHG | BODY MASS INDEX: 25.55 KG/M2

## 2022-08-24 PROBLEM — E87.6 HYPOKALEMIA: Status: ACTIVE | Noted: 2022-08-24

## 2022-08-24 LAB
ALBUMIN SERPL-MCNC: 3.4 GM/DL (ref 3.4–4.8)
ALBUMIN/GLOB SERPL: 1.5 RATIO (ref 1.1–2)
ALP SERPL-CCNC: 62 UNIT/L (ref 40–150)
ALT SERPL-CCNC: 13 UNIT/L (ref 0–55)
ANISOCYTOSIS BLD QL SMEAR: ABNORMAL
AST SERPL-CCNC: 21 UNIT/L (ref 5–34)
BASOPHILS # BLD AUTO: 0.01 X10(3)/MCL (ref 0–0.2)
BASOPHILS NFR BLD AUTO: 0.2 %
BILIRUBIN DIRECT+TOT PNL SERPL-MCNC: 0.3 MG/DL
BUN SERPL-MCNC: 24 MG/DL (ref 9.8–20.1)
CALCIUM SERPL-MCNC: 9.2 MG/DL (ref 8.4–10.2)
CHLORIDE SERPL-SCNC: 108 MMOL/L (ref 98–107)
CO2 SERPL-SCNC: 20 MMOL/L (ref 23–31)
CREAT SERPL-MCNC: 0.86 MG/DL (ref 0.55–1.02)
EOSINOPHIL # BLD AUTO: 0 X10(3)/MCL (ref 0–0.9)
EOSINOPHIL NFR BLD AUTO: 0 %
ERYTHROCYTE [DISTWIDTH] IN BLOOD BY AUTOMATED COUNT: 20.5 % (ref 11.5–17)
GFR SERPLBLD CREATININE-BSD FMLA CKD-EPI: >60 MLS/MIN/1.73/M2
GLOBULIN SER-MCNC: 2.2 GM/DL (ref 2.4–3.5)
GLUCOSE SERPL-MCNC: 128 MG/DL (ref 82–115)
HCT VFR BLD AUTO: 23.1 % (ref 37–47)
HGB BLD-MCNC: 7.2 GM/DL (ref 12–16)
IMM GRANULOCYTES # BLD AUTO: 0.05 X10(3)/MCL (ref 0–0.04)
IMM GRANULOCYTES NFR BLD AUTO: 0.8 %
LYMPHOCYTES # BLD AUTO: 0.4 X10(3)/MCL (ref 0.6–4.6)
LYMPHOCYTES NFR BLD AUTO: 6.3 %
MCH RBC QN AUTO: 19.9 PG (ref 27–31)
MCHC RBC AUTO-ENTMCNC: 31.2 MG/DL (ref 33–36)
MCV RBC AUTO: 64 FL (ref 80–94)
MICROCYTES BLD QL SMEAR: SLIGHT
MONOCYTES # BLD AUTO: 0.19 X10(3)/MCL (ref 0.1–1.3)
MONOCYTES NFR BLD AUTO: 3 %
NEUTROPHILS # BLD AUTO: 5.7 X10(3)/MCL (ref 2.1–9.2)
NEUTROPHILS NFR BLD AUTO: 89.7 %
PLATELET # BLD AUTO: 270 X10(3)/MCL (ref 130–400)
PLATELET # BLD EST: ADEQUATE 10*3/UL
PMV BLD AUTO: 9.5 FL (ref 7.4–10.4)
POTASSIUM SERPL-SCNC: 3.3 MMOL/L (ref 3.5–5.1)
PROT SERPL-MCNC: 5.6 GM/DL (ref 5.8–7.6)
RBC # BLD AUTO: 3.61 X10(6)/MCL (ref 4.2–5.4)
RBC MORPH BLD: ABNORMAL
SODIUM SERPL-SCNC: 140 MMOL/L (ref 136–145)
WBC # SPEC AUTO: 6.4 X10(3)/MCL (ref 4.5–11.5)

## 2022-08-24 PROCEDURE — 25000003 PHARM REV CODE 250: Performed by: FAMILY MEDICINE

## 2022-08-24 PROCEDURE — 27000221 HC OXYGEN, UP TO 24 HOURS

## 2022-08-24 PROCEDURE — 80053 COMPREHEN METABOLIC PANEL: CPT | Performed by: FAMILY MEDICINE

## 2022-08-24 PROCEDURE — 36415 COLL VENOUS BLD VENIPUNCTURE: CPT | Performed by: FAMILY MEDICINE

## 2022-08-24 PROCEDURE — 94640 AIRWAY INHALATION TREATMENT: CPT

## 2022-08-24 PROCEDURE — 85025 COMPLETE CBC W/AUTO DIFF WBC: CPT | Performed by: FAMILY MEDICINE

## 2022-08-24 PROCEDURE — G0378 HOSPITAL OBSERVATION PER HR: HCPCS

## 2022-08-24 PROCEDURE — 94761 N-INVAS EAR/PLS OXIMETRY MLT: CPT

## 2022-08-24 PROCEDURE — 25000242 PHARM REV CODE 250 ALT 637 W/ HCPCS: Performed by: FAMILY MEDICINE

## 2022-08-24 RX ORDER — POTASSIUM CHLORIDE 20 MEQ/1
40 TABLET, EXTENDED RELEASE ORAL 2 TIMES DAILY
Status: DISCONTINUED | OUTPATIENT
Start: 2022-08-24 | End: 2022-08-24 | Stop reason: HOSPADM

## 2022-08-24 RX ORDER — AZITHROMYCIN 250 MG/1
250 TABLET, FILM COATED ORAL DAILY
Qty: 4 TABLET | Refills: 0 | Status: SHIPPED | OUTPATIENT
Start: 2022-08-24 | End: 2022-08-28

## 2022-08-24 RX ORDER — IPRATROPIUM BROMIDE AND ALBUTEROL SULFATE 2.5; .5 MG/3ML; MG/3ML
3 SOLUTION RESPIRATORY (INHALATION)
Qty: 270 ML | Refills: 0 | Status: ON HOLD | OUTPATIENT
Start: 2022-08-24 | End: 2022-11-15 | Stop reason: HOSPADM

## 2022-08-24 RX ORDER — POTASSIUM CHLORIDE 20 MEQ/1
40 TABLET, EXTENDED RELEASE ORAL DAILY
Qty: 14 TABLET | Refills: 0 | Status: SHIPPED | OUTPATIENT
Start: 2022-08-24 | End: 2022-08-31

## 2022-08-24 RX ADMIN — ARIPIPRAZOLE 5 MG: 5 TABLET ORAL at 09:08

## 2022-08-24 RX ADMIN — IPRATROPIUM BROMIDE AND ALBUTEROL SULFATE 3 ML: .5; 3 SOLUTION RESPIRATORY (INHALATION) at 07:08

## 2022-08-24 RX ADMIN — AMLODIPINE BESYLATE 5 MG: 5 TABLET ORAL at 09:08

## 2022-08-24 RX ADMIN — SODIUM CHLORIDE: 9 INJECTION, SOLUTION INTRAVENOUS at 06:08

## 2022-08-24 RX ADMIN — POTASSIUM CHLORIDE 40 MEQ: 1500 TABLET, EXTENDED RELEASE ORAL at 09:08

## 2022-08-24 RX ADMIN — LEVOTHYROXINE SODIUM 100 MCG: 100 TABLET ORAL at 06:08

## 2022-08-24 RX ADMIN — CITALOPRAM HYDROBROMIDE 40 MG: 20 TABLET ORAL at 09:08

## 2022-08-24 NOTE — PLAN OF CARE
Spoke to nurse Domingo and pt is not on any O2 anymore and O2 sats are normal.  Spoke to pt daughter and informed her of pt d/c home today and that Domingo will call her when pt is ready.

## 2022-08-24 NOTE — DISCHARGE SUMMARY
[facility]  Hospital Medicine  Discharge Summary      Patient Name: Ashley Calvillo  MRN: [id number]  Patient Class: OP- Observation  Admission Date: 8/22/2022  Hospital Length of Stay: 1 days  Discharge Date and Time:  08/24/2022 8:04 AM  Attending Physician: Leeanna De Leon MD   Discharging Provider: Leeanna De Leon MD  Primary Care Provider: Leeanna De Leon MD      HPI:   77yo F with PMHx of HTN, hypothyroidism, h/o CVA presented to the office with one week of upper respiratory symptoms. Daughter states weakness and cough became worse over the weekend. Patient evaluated in office, significant inspiratory and expiratory wheezing, hypoxia at 90%, rapid COVID positive.    Direct admission for IVF, antibiotics, antivirals and O2.       * No surgery found *      Hospital Course:   Hospital Day #2  No acute events overnight. Patient doing well on supplemental O2. Reports continued cough with more sputum today. HARDEEP improving with IVF. Anemia stable, will continue to monitor.    Hospital Day #3  Patient off O2 this AM, has removed IV. Breathing well, less coughing. AM labs stable - anemia noted, will defer transfusion at this time as patient is asymptomatic. Hypokalemia - will start po potassium. Recheck labs outpatient.        Vitals:    08/24/22 0736   BP:    Pulse: 72   Resp: 16   Temp:        Physical Exam   Constitutional: alert & oriented, no acute distress  HENT:   Head: Normocephalic and atraumatic.   Eyes: Conjunctivae normal and EOM are normal. Pupils are equal, round, and reactive to light.   Neck: Normal range of motion. Neck supple.   Cardiovascular: Normal rate, regular rhythm, normal heart sounds   Pulmonary/Chest: CTAB, nonlabored respiration  Abdominal: Soft, nontender, bowel sounds normal  Neurological: nonfocal  Skin: warm, dry intact  Psychiatric: normal mood and affect, cooperative          Goals of Care Treatment Preferences:  Code Status: Full  Code      Consults:   Consults (From admission, onward)        Status Ordering Provider     Inpatient consult to Social Work/Case Management  Once        Provider:  (Not yet assigned)    LEEANNA Trejo          * COVID-19  Continue duonebs & azithromycin  Aspirin allergy noted  Complete quarantine at home    Hypokalemia  PO potassium replacement  Recheck CMP in 1 week      Iron deficiency anemia  Recheck CBC in 1 week        Final Active Diagnoses:    Diagnosis Date Noted POA    PRINCIPAL PROBLEM:  COVID-19 [U07.1] 08/22/2022 Yes    Hypokalemia [E87.6] 08/24/2022 No    Iron deficiency anemia [D50.9] 08/10/2022 Yes      Problems Resolved During this Admission:       Discharged Condition: stable    Disposition: Home or Self Care    Follow Up:   Follow-up Information     Leeanna De Leon MD Follow up in 1 week(s).    Specialty: Family Medicine  Why: with labs  Contact information:  [address]  [facility] [address]  [phone]                       Patient Instructions:      CBC auto differential   Standing Status: Future Standing Exp. Date: 10/23/23     Comprehensive metabolic panel   Standing Status: Future Standing Exp. Date: 10/23/23     Diet Adult Regular     Activity as tolerated       Significant Diagnostic Studies: Labs: All labs within the past 24 hours have been reviewed    Laboratory:  Recent Results (from the past 24 hour(s))   Comprehensive Metabolic Panel (CMP)    Collection Time: 08/24/22  5:13 AM   Result Value Ref Range    Sodium Level 140 136 - 145 mmol/L    Potassium Level 3.3 (L) 3.5 - 5.1 mmol/L    Chloride 108 (H) 98 - 107 mmol/L    Carbon Dioxide 20 (L) 23 - 31 mmol/L    Glucose Level 128 (H) 82 - 115 mg/dL    Blood Urea Nitrogen 24.0 (H) 9.8 - 20.1 mg/dL    Creatinine 0.86 0.55 - 1.02 mg/dL    Calcium Level Total 9.2 8.4 - 10.2 mg/dL    Protein Total 5.6 (L) 5.8 - 7.6 gm/dL    Albumin Level 3.4 3.4 - 4.8 gm/dL    Globulin 2.2 (L) 2.4 -  3.5 gm/dL    Albumin/Globulin Ratio 1.5 1.1 - 2.0 ratio    Bilirubin Total 0.3 <=1.5 mg/dL    Alkaline Phosphatase 62 40 - 150 unit/L    Alanine Aminotransferase 13 0 - 55 unit/L    Aspartate Aminotransferase 21 5 - 34 unit/L    eGFR >60 mls/min/1.73/m2   CBC with Differential    Collection Time: 08/24/22  5:13 AM   Result Value Ref Range    WBC 6.4 4.5 - 11.5 x10(3)/mcL    RBC 3.61 (L) 4.20 - 5.40 x10(6)/mcL    Hgb 7.2 (L) 12.0 - 16.0 gm/dL    Hct 23.1 (L) 37.0 - 47.0 %    MCV 64.0 (L) 80.0 - 94.0 fL    MCH 19.9 (L) 27.0 - 31.0 pg    MCHC 31.2 (L) 33.0 - 36.0 mg/dL    RDW 20.5 (H) 11.5 - 17.0 %    Platelet 270 130 - 400 x10(3)/mcL    MPV 9.5 7.4 - 10.4 fL    Neut % 89.7 %    Lymph % 6.3 %    Mono % 3.0 %    Eos % 0.0 %    Basophil % 0.2 %    Lymph # 0.40 (L) 0.6 - 4.6 x10(3)/mcL    Neut # 5.7 2.1 - 9.2 x10(3)/mcL    Mono # 0.19 0.1 - 1.3 x10(3)/mcL    Eos # 0.00 0 - 0.9 x10(3)/mcL    Baso # 0.01 0 - 0.2 x10(3)/mcL    IG# 0.05 (H) 0 - 0.04 x10(3)/mcL    IG% 0.8 %   Blood Smear Microscopic Exam    Collection Time: 08/24/22  5:13 AM   Result Value Ref Range    RBC Morph Abnormal (A) Normal    Anisocyte 1+ (A) (none)    Microcyte Slight (A) (none)    Platelet Est Adequate Normal, Adequate        Diagnostic Results:  X-Ray Chest PA And Lateral    Result Date: 8/23/2022  EXAMINATION: XR CHEST PA AND LATERAL CLINICAL HISTORY: COVID 19;, . COMPARISON: None available FINDINGS: PA/AP and lateral views reveal the heart to be borderline enlarged.  The trachea is to the right of midline.  Atherosclerosis is seen within the aorta.  No definite infiltrate or effusion is seen.  A left shoulder prosthetic is identified.  Bony structures are osteopenic.  Degenerative changes and curvature are noted to the thoracic spine.     1. No active cardiopulmonary disease identified Electronically signed by: Kevin Vasquez Date:    08/23/2022 Time:    09:30         Pending Diagnostic Studies:     None         Medications:  Reconciled Home  Medications:      Medication List      START taking these medications    albuterol-ipratropium 2.5 mg-0.5 mg/3 mL nebulizer solution  Commonly known as: DUO-NEB  Take 3 mLs by nebulization every 6 (six) hours while awake. Rescue     azithromycin 250 MG tablet  Commonly known as: Z-BRANDON  Take 1 tablet (250 mg total) by mouth once daily. for 4 days     potassium chloride SA 20 MEQ tablet  Commonly known as: K-DUR,KLOR-CON  Take 2 tablets (40 mEq total) by mouth once daily. for 7 doses        CONTINUE taking these medications    amLODIPine 5 MG tablet  Commonly known as: NORVASC  Take 5 mg by mouth once daily.     ARIPiprazole 5 MG Tab  Commonly known as: ABILIFY  Take 5 mg by mouth once daily.     citalopram 40 MG tablet  Commonly known as: CeleXA  Take 40 mg by mouth once daily.     ferrous sulfate 325 mg (65 mg iron) Tab tablet  Commonly known as: FEOSOL  Take 1 tablet (325 mg total) by mouth daily with breakfast.     fluticasone propionate 50 mcg/actuation nasal spray  Commonly known as: FLONASE  ONE SPRAY IN EACH NOSTRIL ONCE DAILY     levothyroxine 100 MCG tablet  Commonly known as: SYNTHROID  Take 100 mcg by mouth every morning.     simvastatin 10 MG tablet  Commonly known as: ZOCOR  Take 10 mg by mouth once daily.     traMADoL 50 mg tablet  Commonly known as: ULTRAM  TAKE ONE TABLET BY MOUTH ONCE DAILY AS NEEDED PAIN MAY CAUSE DROWSINESS            Indwelling Lines/Drains at time of discharge:   Lines/Drains/Airways     None                 Time spent on the discharge of patient: 30 minutes         Leeanna De Leon MD  Department of Hospital Medicine  [facility]

## 2022-09-06 DIAGNOSIS — D50.9 IRON DEFICIENCY ANEMIA, UNSPECIFIED IRON DEFICIENCY ANEMIA TYPE: Primary | ICD-10-CM

## 2022-11-07 ENCOUNTER — HOSPITAL ENCOUNTER (EMERGENCY)
Facility: HOSPITAL | Age: 77
Discharge: HOME | End: 2022-11-07
Attending: INTERNAL MEDICINE
Payer: MEDICARE

## 2022-11-07 VITALS
SYSTOLIC BLOOD PRESSURE: 169 MMHG | DIASTOLIC BLOOD PRESSURE: 80 MMHG | HEART RATE: 60 BPM | WEIGHT: 162 LBS | BODY MASS INDEX: 29.81 KG/M2 | RESPIRATION RATE: 16 BRPM | TEMPERATURE: 99 F | HEIGHT: 62 IN | OXYGEN SATURATION: 96 %

## 2022-11-07 DIAGNOSIS — R53.1 WEAKNESS: ICD-10-CM

## 2022-11-07 DIAGNOSIS — N30.00 ACUTE CYSTITIS WITHOUT HEMATURIA: Primary | ICD-10-CM

## 2022-11-07 LAB
ALBUMIN SERPL-MCNC: 3.2 GM/DL (ref 3.4–4.8)
ALBUMIN/GLOB SERPL: 1.2 RATIO (ref 1.1–2)
ALP SERPL-CCNC: 66 UNIT/L (ref 40–150)
ALT SERPL-CCNC: 9 UNIT/L (ref 0–55)
APPEARANCE UR: ABNORMAL
AST SERPL-CCNC: 14 UNIT/L (ref 5–34)
BACTERIA #/AREA URNS AUTO: ABNORMAL /HPF
BASOPHILS # BLD AUTO: 0.08 X10(3)/MCL (ref 0–0.2)
BASOPHILS NFR BLD AUTO: 1 %
BILIRUB UR QL STRIP.AUTO: NEGATIVE MG/DL
BILIRUBIN DIRECT+TOT PNL SERPL-MCNC: 0.3 MG/DL
BUN SERPL-MCNC: 11 MG/DL (ref 9.8–20.1)
CALCIUM SERPL-MCNC: 9.2 MG/DL (ref 8.4–10.2)
CHLORIDE SERPL-SCNC: 106 MMOL/L (ref 98–107)
CO2 SERPL-SCNC: 26 MMOL/L (ref 23–31)
COLOR UR AUTO: YELLOW
CREAT SERPL-MCNC: 1.06 MG/DL (ref 0.55–1.02)
EOSINOPHIL # BLD AUTO: 0.25 X10(3)/MCL (ref 0–0.9)
EOSINOPHIL NFR BLD AUTO: 3.2 %
ERYTHROCYTE [DISTWIDTH] IN BLOOD BY AUTOMATED COUNT: 23.9 % (ref 11.5–17)
GFR SERPLBLD CREATININE-BSD FMLA CKD-EPI: 55 MLS/MIN/1.73/M2
GLOBULIN SER-MCNC: 2.7 GM/DL (ref 2.4–3.5)
GLUCOSE SERPL-MCNC: 87 MG/DL (ref 82–115)
GLUCOSE UR QL STRIP.AUTO: NEGATIVE MG/DL
HCT VFR BLD AUTO: 28.2 % (ref 37–47)
HGB BLD-MCNC: 8.7 GM/DL (ref 12–16)
IMM GRANULOCYTES # BLD AUTO: 0.03 X10(3)/MCL (ref 0–0.04)
IMM GRANULOCYTES NFR BLD AUTO: 0.4 %
KETONES UR QL STRIP.AUTO: ABNORMAL MG/DL
LEUKOCYTE ESTERASE UR QL STRIP.AUTO: ABNORMAL UNIT/L
LYMPHOCYTES # BLD AUTO: 1.04 X10(3)/MCL (ref 0.6–4.6)
LYMPHOCYTES NFR BLD AUTO: 13.4 %
MCH RBC QN AUTO: 22.4 PG (ref 27–31)
MCHC RBC AUTO-ENTMCNC: 30.9 MG/DL (ref 33–36)
MCV RBC AUTO: 72.7 FL (ref 80–94)
MONOCYTES # BLD AUTO: 0.59 X10(3)/MCL (ref 0.1–1.3)
MONOCYTES NFR BLD AUTO: 7.6 %
NEUTROPHILS # BLD AUTO: 5.8 X10(3)/MCL (ref 2.1–9.2)
NEUTROPHILS NFR BLD AUTO: 74.4 %
NITRITE UR QL STRIP.AUTO: NEGATIVE
PH UR STRIP.AUTO: 7 [PH]
PLATELET # BLD AUTO: 467 X10(3)/MCL (ref 130–400)
PMV BLD AUTO: 8.9 FL (ref 7.4–10.4)
POTASSIUM SERPL-SCNC: 3.5 MMOL/L (ref 3.5–5.1)
PROT SERPL-MCNC: 5.9 GM/DL (ref 5.8–7.6)
PROT UR QL STRIP.AUTO: NEGATIVE MG/DL
RBC # BLD AUTO: 3.88 X10(6)/MCL (ref 4.2–5.4)
RBC #/AREA URNS AUTO: ABNORMAL /HPF
RBC UR QL AUTO: NEGATIVE UNIT/L
SODIUM SERPL-SCNC: 139 MMOL/L (ref 136–145)
SP GR UR STRIP.AUTO: 1.02
SQUAMOUS #/AREA URNS AUTO: ABNORMAL /HPF
UROBILINOGEN UR STRIP-ACNC: 2 MG/DL
WBC # SPEC AUTO: 7.8 X10(3)/MCL (ref 4.5–11.5)
WBC #/AREA URNS AUTO: ABNORMAL /HPF

## 2022-11-07 PROCEDURE — 80053 COMPREHEN METABOLIC PANEL: CPT | Performed by: INTERNAL MEDICINE

## 2022-11-07 PROCEDURE — 99285 EMERGENCY DEPT VISIT HI MDM: CPT | Mod: 25

## 2022-11-07 PROCEDURE — 85025 COMPLETE CBC W/AUTO DIFF WBC: CPT | Performed by: INTERNAL MEDICINE

## 2022-11-07 PROCEDURE — 81001 URINALYSIS AUTO W/SCOPE: CPT | Performed by: INTERNAL MEDICINE

## 2022-11-07 PROCEDURE — 81003 URINALYSIS AUTO W/O SCOPE: CPT | Performed by: INTERNAL MEDICINE

## 2022-11-07 RX ORDER — NITROFURANTOIN 25; 75 MG/1; MG/1
100 CAPSULE ORAL 2 TIMES DAILY
Qty: 20 CAPSULE | Refills: 0 | Status: ON HOLD | OUTPATIENT
Start: 2022-11-07 | End: 2022-11-15 | Stop reason: HOSPADM

## 2022-11-07 RX ORDER — MELOXICAM 15 MG/1
15 TABLET ORAL DAILY
Status: ON HOLD | COMMUNITY
Start: 2022-09-22 | End: 2022-11-15 | Stop reason: HOSPADM

## 2022-11-07 NOTE — ED PROVIDER NOTES
11/07/2022         11:38 AM    Source of History:  History obtained from the patient.       Chief complaint:  From Nurse Triage:  Leg Pain (Brought per [employer] for bilateral leg pain)    HPI:  Ashley Calvillo is a 76 y.o. female presenting with Leg Pain (Brought per [employer] for bilateral leg pain)       Patient with complaint of the weakness in bilateral lower extremities for the last 3 weeks or so getting worse over the last 2-3 days, patient says that the she had COVID-19 infection last month, and then she developed weakness in lower extremities and is gradually getting worse so she talked to her family doctor who recommended for her to come to the emergency room to get checked.    Review of Systems   Constitutional symptoms:  No Fever. No Chills    Skin symptoms:  No Rash.    Eye symptoms:  No Visual disturbance reported.   ENMT symptoms:  No Sore throat,    Respiratory symptoms:  No Shortness of Breath, no Cough, no Wheezing.    Cardiovascular symptoms:  No Chest Pain, No Palpitations, No Tachycardia.    Gastrointestinal symptoms:  No Abdominal Pain, No Nausea, No Vomiting, No Diarrhea, No Constipation.    Genitourinary symptoms:  No Dysuria,    Musculoskeletal symptoms:  No Back pain,    Neurologic symptoms:  No Headache, No Dizziness.  Bilateral lower extremity weakness   Psychiatric symptoms:  No Anxiety, No Depression, No Substance Abuse.              Additional review of systems information: Patient Denies Any Other Complaints.  All Other Systems Reviewed With Patient And Negative.    ALLEGIES:  Review of patient's allergies indicates:   Allergen Reactions    Oxycodone-aspirin        MEDICINE LIST:  No current facility-administered medications for this encounter.    Current Outpatient Medications:     albuterol-ipratropium (DUO-NEB) 2.5 mg-0.5 mg/3 mL nebulizer solution, Take 3 mLs by nebulization every 6 (six) hours while awake. Rescue, Disp: 270 mL, Rfl: 0    citalopram (CELEXA) 40 MG tablet, Take 40  mg by mouth once daily., Disp: , Rfl:     levothyroxine (SYNTHROID) 100 MCG tablet, Take 100 mcg by mouth every morning., Disp: , Rfl:     meloxicam (MOBIC) 15 MG tablet, Take 15 mg by mouth once daily., Disp: , Rfl:     nitrofurantoin, macrocrystal-monohydrate, (MACROBID) 100 MG capsule, Take 1 capsule (100 mg total) by mouth 2 (two) times daily. for 10 days, Disp: 20 capsule, Rfl: 0    simvastatin (ZOCOR) 10 MG tablet, Take 10 mg by mouth once daily., Disp: , Rfl:     traMADoL (ULTRAM) 50 mg tablet, TAKE ONE TABLET BY MOUTH ONCE DAILY AS NEEDED PAIN MAY CAUSE DROWSINESS, Disp: , Rfl:     PMH:  As per HPI and below:    Reviewed and updated in chart.    PAST MEDICAL HISTORY:  Past Medical History:   Diagnosis Date    CVA (cerebral vascular accident)     Depression     HTN (hypertension)     Hypothyroidism, unspecified     Squamous cell carcinoma in situ     Tonsillar cancer         PAST SURGICAL HISTORY:  Past Surgical History:   Procedure Laterality Date    HYSTERECTOMY      TONSILLECTOMY      TOTAL SHOULDER ARTHROPLASTY         SOCIAL HISTORY:  Social History     Tobacco Use    Smoking status: Some Days     Types: Cigarettes    Smokeless tobacco: Never    Tobacco comments:     patient stated that she smokes on some days.    Substance Use Topics    Alcohol use: Yes     Comment: wine, occasionally    Drug use: Never       FAMILY HISTORY:  Family History   Problem Relation Age of Onset    Cancer Mother     Leukemia Mother     Stroke Mother     Alzheimer's disease Father     Cancer Sister     Leukemia Sister         PROBLEM LIST:  Patient Active Problem List   Diagnosis    Iron deficiency anemia    Thrombocytosis    HTN (hypertension)    Acquired hypothyroidism    COVID-19    Hypokalemia        PHYSICAL EXAM:      ED Triage Vitals [11/07/22 1115]   BP (!) 176/87   Pulse 73   Resp 16   Temp 98.5 °F (36.9 °C)   SpO2 99 %        Vital Signs: Reviewed As In Chart.  General:  Alert, No Cardiorespiratory Distress Noted.    Skin: Normal For Ethnic Origin  Eye:  Extraocular Movements Are Intact.   ENT: Mucus membranes are moist.   Cardiovascular:  Regular Rate And Rhythm, No Murmur, No Pedal Edema.    Respiratory:  Respirations Nonlabored, No Respiratory Distress, Good Bilateral Air Entry, No Rales, No Rhonchi.    Musculoskeletal:  No Gross Deformity Noted.   Gastrointestinal:  Soft, Non Distended, Non Tenderness, Normal Bowel Sounds.    Neurological:  Alert And Oriented To Person, Place, Time, And Situation, Normal Motor Observed, Normal Speech Observed.  NIH is 0, VAN negative  Psychiatric:  Cooperative, Appropriate Mood & Affect.    INITIAL IMPRESSION/ DIFFERENTIAL DX:      MEDICAL DECISION MAKING:      Reviewed Nurses Note. Reviewed Vital Signs.     Reviewed Pertinent old records, History and updated as necessary.    76 y.o. female with Leg Pain (Brought per [employer] for bilateral leg pain)    Patient has bilateral lower extremity weakness, which apparently got exaggerated after she had a COVID-19 infection, I am going to do workup on her and I will also do a CT scan of the lumbar spine and decide further.    ED WORKUP AND COURSE:  ED ORDERS:  Orders Placed This Encounter   Procedures    CT Lumbar Spine Without Contrast    X-Ray Chest 1 View    CBC auto differential    Comprehensive metabolic panel    Urinalysis, Reflex to Urine Culture Urine, Clean Catch    CBC with Differential    Urinalysis, Microscopic       Medications - No data to display             ED LABS ORDERED AND REVIEWED:  Admission on 11/07/2022, Discharged on 11/07/2022   Component Date Value Ref Range Status    Sodium Level 11/07/2022 139  136 - 145 mmol/L Final    Potassium Level 11/07/2022 3.5  3.5 - 5.1 mmol/L Final    Chloride 11/07/2022 106  98 - 107 mmol/L Final    Carbon Dioxide 11/07/2022 26  23 - 31 mmol/L Final    Glucose Level 11/07/2022 87  82 - 115 mg/dL Final    Blood Urea Nitrogen 11/07/2022 11.0  9.8 - 20.1 mg/dL Final    Creatinine 11/07/2022  1.06 (H)  0.55 - 1.02 mg/dL Final    Calcium Level Total 11/07/2022 9.2  8.4 - 10.2 mg/dL Final    Protein Total 11/07/2022 5.9  5.8 - 7.6 gm/dL Final    Albumin Level 11/07/2022 3.2 (L)  3.4 - 4.8 gm/dL Final    Globulin 11/07/2022 2.7  2.4 - 3.5 gm/dL Final    Albumin/Globulin Ratio 11/07/2022 1.2  1.1 - 2.0 ratio Final    Bilirubin Total 11/07/2022 0.3  <=1.5 mg/dL Final    Alkaline Phosphatase 11/07/2022 66  40 - 150 unit/L Final    Alanine Aminotransferase 11/07/2022 9  0 - 55 unit/L Final    Aspartate Aminotransferase 11/07/2022 14  5 - 34 unit/L Final    eGFR 11/07/2022 55  mls/min/1.73/m2 Final    Color, UA 11/07/2022 Yellow  Yellow, Light-Yellow, Dark Yellow, Alina, Straw Final    Appearance, UA 11/07/2022 Cloudy (A)  Clear Final    Specific Gravity, UA 11/07/2022 1.020   Final    pH, UA 11/07/2022 7.0  5.0 - 8.5 Final    Protein, UA 11/07/2022 Negative  Negative mg/dL Final    Glucose, UA 11/07/2022 Negative  Negative, Normal mg/dL Final    Ketones, UA 11/07/2022 Trace (A)  Negative mg/dL Final    Blood, UA 11/07/2022 Negative  Negative unit/L Final    Bilirubin, UA 11/07/2022 Negative  Negative mg/dL Final    Urobilinogen, UA 11/07/2022 2.0 (A)  0.2, 1.0, Normal mg/dL Final    Nitrites, UA 11/07/2022 Negative  Negative Final    Leukocyte Esterase, UA 11/07/2022 Trace (A)  Negative unit/L Final    WBC 11/07/2022 7.8  4.5 - 11.5 x10(3)/mcL Final    RBC 11/07/2022 3.88 (L)  4.20 - 5.40 x10(6)/mcL Final    Hgb 11/07/2022 8.7 (L)  12.0 - 16.0 gm/dL Final    Hct 11/07/2022 28.2 (L)  37.0 - 47.0 % Final    MCV 11/07/2022 72.7 (L)  80.0 - 94.0 fL Final    MCH 11/07/2022 22.4 (L)  27.0 - 31.0 pg Final    MCHC 11/07/2022 30.9 (L)  33.0 - 36.0 mg/dL Final    RDW 11/07/2022 23.9 (H)  11.5 - 17.0 % Final    Platelet 11/07/2022 467 (H)  130 - 400 x10(3)/mcL Final    MPV 11/07/2022 8.9  7.4 - 10.4 fL Final    Neut % 11/07/2022 74.4  % Final    Lymph % 11/07/2022 13.4  % Final    Mono % 11/07/2022 7.6  % Final    Eos %  11/07/2022 3.2  % Final    Basophil % 11/07/2022 1.0  % Final    Lymph # 11/07/2022 1.04  0.6 - 4.6 x10(3)/mcL Final    Neut # 11/07/2022 5.8  2.1 - 9.2 x10(3)/mcL Final    Mono # 11/07/2022 0.59  0.1 - 1.3 x10(3)/mcL Final    Eos # 11/07/2022 0.25  0 - 0.9 x10(3)/mcL Final    Baso # 11/07/2022 0.08  0 - 0.2 x10(3)/mcL Final    IG# 11/07/2022 0.03  0 - 0.04 x10(3)/mcL Final    IG% 11/07/2022 0.4  % Final    Bacteria, UA 11/07/2022 4+ (A)  None Seen, Rare, Occasional /HPF Final    RBC, UA 11/07/2022 0-2  None Seen, 0-2, 3-5, 0-5, >100 /HPF Final    WBC, UA 11/07/2022 6-10 (A)  None Seen, 0-2, 3-5, 0-5 /HPF Final    Squamous Epithelial Cells, UA 11/07/2022 Few (A)  None Seen, Rare, Occasional, Occ /HPF Final       RADIOLOGY STUDIES ORDERED AND REVIEWED:  Imaging Results              X-Ray Chest 1 View (Final result)  Result time 11/07/22 12:49:35      Final result by Kevin Vasquez MD (11/07/22 12:49:35)                   Impression:      1. Borderline cardiomegaly  2. Atherosclerosis  3. Thoracic spondylosis, scoliosis, and osteopenia      Electronically signed by: Kevin Vasquez  Date:    11/07/2022  Time:    12:49               Narrative:    EXAMINATION:  XR CHEST 1 VIEW    CLINICAL HISTORY:  , Weakness.    COMPARISON:  08/23/2022    FINDINGS:  An AP view or more reveals the heart to be borderline enlarged.  The trachea is midline.  Atherosclerosis is seen within the aorta.  No definite infiltrate or effusion is seen.  Degenerative changes and curvature are noted to the thoracic spine.  Bony structures are osteopenic.  A left shoulder prosthetic is present.                                       CT Lumbar Spine Without Contrast (Final result)  Result time 11/07/22 12:08:40      Final result by Kevin Vasquez MD (11/07/22 12:08:40)                   Impression:      1. No acute osseous defect identified  2. Mild-to-moderate encroachment into the central spinal canal and neural foramina at the lower lumbar spine as  described.  3. Lumbar spondylosis  4. Osteopenia  5. Atherosclerosis with suspect chronic intimal elevation and subintimal thrombus formation at the abdominal aorta  6. Transitional lumbar vertebral body  7. MR examination would allow further evaluation if clinically imdicated      Electronically signed by: Kevin Vasquez  Date:    11/07/2022  Time:    12:08               Narrative:    EXAMINATION:  CT LUMBAR SPINE WITHOUT CONTRAST    CLINICAL HISTORY:  , Spinal stenosis, lumbar;Decrease Ability to walk with legs;    TECHNIQUE,:  PATIENT RADIATION DOSE: DLP(mGycm) 501    As per PQRS measures, all CT scans at this facility used dose modulation, iterative reconstruction, and/or weight based dose adjustment when appropriate to reduce radiation dose to as low as reasonably achievable.    COMPARISON:  None available    FINDINGS:  Serial axial images were obtained of the lumbar spine with the administration of intravenous contrast.  Additional coronal and sagittal images were performed.  Both soft tissue and bone windows were obtained. Vertebral body height and alignment grossly intact.  No fracture or subluxation is seen.  A transitional lumbar vertebral body is identified with lumbarization of S1.  There is no loss of integrity to the bony spinal canal.  No abnormal paraspinal mass is identified without the administration of IV contrast.  Atherosclerosis is seen within the aorta and branching vessels.  There is suspect chronic intimal elevation and subintimal thrombus formation at the abdominal aorta.  Bony structures are osteopenic.  There is vacuum phenomena at the sacroiliac joints bilaterally.  A fecal bolus is present within the rectum.  The bladder is distended with fluid.    L3-4: Mild encroachment into the neural foramina bilaterally secondary to mild posterior bulge, mild posterior osteophyte formation, and facet hypertrophic changes    L4-5: Moderate encroachment into the central spinal canal and mild to  moderate encroachment into the neural foramina bilaterally secondary to mild posterior bulge, mild posterior osteophyte formation, facet hypertrophic changes, and ligament hypertrophy.    L5-S1: Mild encroachment into the neural foramina bilaterally secondary to mild posterior bulge, mild posterior osteophyte formation, and facet hypertrophic changes                                      ED COURSE AND REEVALUATIONS:  Vitals:    11/07/22 1430   BP: (!) 169/80   Pulse: 60   Resp: 16   Temp:        PROCEDURES PERFORMED IN ED:  Procedures    ED Course as of 11/07/22 1807   Mon Nov 07, 2022   1359 Patient has some anemia and also has a urinary tract infection, I will put her on antibiotic to let her go home and I advised her that she must talk to her family doctor for further workup and management as needed.  She probably can get some physical therapy to see if that improves her ambulation problem. [GQ]      ED Course User Index  [GQ] Albert Gonzalez MD              DIAGNOSTIC IMPRESSION:      1. Acute cystitis without hematuria    2. Weakness         ED Disposition Condition    Discharge Stable               Medication List        START taking these medications      nitrofurantoin (macrocrystal-monohydrate) 100 MG capsule  Commonly known as: MACROBID  Take 1 capsule (100 mg total) by mouth 2 (two) times daily. for 10 days            CONTINUE taking these medications      albuterol-ipratropium 2.5 mg-0.5 mg/3 mL nebulizer solution  Commonly known as: DUO-NEB  Take 3 mLs by nebulization every 6 (six) hours while awake. Rescue     citalopram 40 MG tablet  Commonly known as: CeleXA     levothyroxine 100 MCG tablet  Commonly known as: SYNTHROID     meloxicam 15 MG tablet  Commonly known as: MOBIC     simvastatin 10 MG tablet  Commonly known as: ZOCOR     traMADoL 50 mg tablet  Commonly known as: ULTRAM               Where to Get Your Medications        These medications were sent to [facility] - [facility] -  [address] [address]      Phone: [phone]   nitrofurantoin (macrocrystal-monohydrate) 100 MG capsule           Follow-up Information       Leeanna De Leon MD In 2 days.    Specialty: Family Medicine  Contact information:  [address]  [facility] [address]  [phone]                              ED Prescriptions       Medication Sig Dispense Start Date End Date Auth. Provider    nitrofurantoin, macrocrystal-monohydrate, (MACROBID) 100 MG capsule Take 1 capsule (100 mg total) by mouth 2 (two) times daily. for 10 days 20 capsule 11/7/2022 11/17/2022 Albert Gonzalez MD          Follow-up Information       Follow up With Specialties Details Why Contact Info    Leeanna De Leon MD Family Medicine In 2 days  [address] [facility] [address]  [phone]                 Albert Gonzalez MD  11/07/22 4945

## 2022-11-10 ENCOUNTER — HOSPITAL ENCOUNTER (OUTPATIENT)
Facility: HOSPITAL | Age: 77
Discharge: NURSING, REHAB OR LONG-TERM CARE | End: 2022-11-15
Attending: FAMILY MEDICINE | Admitting: FAMILY MEDICINE
Payer: MEDICARE

## 2022-11-10 DIAGNOSIS — R29.898 RIGHT LEG WEAKNESS: ICD-10-CM

## 2022-11-10 PROBLEM — U07.1 COVID-19: Status: RESOLVED | Noted: 2022-08-22 | Resolved: 2022-11-10

## 2022-11-10 PROBLEM — R32 URINARY INCONTINENCE: Status: ACTIVE | Noted: 2022-11-10

## 2022-11-10 PROBLEM — R09.89 SUSPECTED CEREBROVASCULAR ACCIDENT (CVA): Status: ACTIVE | Noted: 2022-11-10

## 2022-11-10 LAB
APPEARANCE UR: CLEAR
BILIRUB UR QL STRIP.AUTO: NEGATIVE MG/DL
COLOR UR AUTO: YELLOW
GLUCOSE UR QL STRIP.AUTO: NEGATIVE MG/DL
KETONES UR QL STRIP.AUTO: NEGATIVE MG/DL
LEUKOCYTE ESTERASE UR QL STRIP.AUTO: NEGATIVE UNIT/L
NITRITE UR QL STRIP.AUTO: NEGATIVE
PH UR STRIP.AUTO: 6 [PH]
PROT UR QL STRIP.AUTO: NEGATIVE MG/DL
RBC UR QL AUTO: NEGATIVE UNIT/L
SP GR UR STRIP.AUTO: 1.01
TSH SERPL-ACNC: 9.09 UIU/ML (ref 0.35–4.94)
UROBILINOGEN UR STRIP-ACNC: 0.2 MG/DL

## 2022-11-10 PROCEDURE — A9577 INJ MULTIHANCE: HCPCS | Performed by: FAMILY MEDICINE

## 2022-11-10 PROCEDURE — 84443 ASSAY THYROID STIM HORMONE: CPT | Performed by: FAMILY MEDICINE

## 2022-11-10 PROCEDURE — 25000003 PHARM REV CODE 250: Performed by: FAMILY MEDICINE

## 2022-11-10 PROCEDURE — G0379 DIRECT REFER HOSPITAL OBSERV: HCPCS | Mod: CS

## 2022-11-10 PROCEDURE — 36415 COLL VENOUS BLD VENIPUNCTURE: CPT | Performed by: FAMILY MEDICINE

## 2022-11-10 PROCEDURE — 11000001 HC ACUTE MED/SURG PRIVATE ROOM

## 2022-11-10 PROCEDURE — G0378 HOSPITAL OBSERVATION PER HR: HCPCS

## 2022-11-10 PROCEDURE — 25500020 PHARM REV CODE 255: Performed by: FAMILY MEDICINE

## 2022-11-10 PROCEDURE — 81003 URINALYSIS AUTO W/O SCOPE: CPT | Performed by: FAMILY MEDICINE

## 2022-11-10 RX ORDER — SODIUM CHLORIDE 0.9 % (FLUSH) 0.9 %
10 SYRINGE (ML) INJECTION EVERY 12 HOURS PRN
Status: DISCONTINUED | OUTPATIENT
Start: 2022-11-10 | End: 2022-11-15 | Stop reason: HOSPADM

## 2022-11-10 RX ORDER — TRAMADOL HYDROCHLORIDE 50 MG/1
50 TABLET ORAL DAILY PRN
Status: DISCONTINUED | OUTPATIENT
Start: 2022-11-10 | End: 2022-11-15 | Stop reason: HOSPADM

## 2022-11-10 RX ORDER — AMLODIPINE BESYLATE 5 MG/1
5 TABLET ORAL DAILY
Status: ON HOLD | COMMUNITY
End: 2022-11-15 | Stop reason: HOSPADM

## 2022-11-10 RX ORDER — LEVOTHYROXINE SODIUM 100 UG/1
100 TABLET ORAL EVERY MORNING
Status: DISCONTINUED | OUTPATIENT
Start: 2022-11-10 | End: 2022-11-10

## 2022-11-10 RX ORDER — CITALOPRAM 20 MG/1
40 TABLET, FILM COATED ORAL DAILY
Status: DISCONTINUED | OUTPATIENT
Start: 2022-11-10 | End: 2022-11-15 | Stop reason: HOSPADM

## 2022-11-10 RX ORDER — ATORVASTATIN CALCIUM 10 MG/1
10 TABLET, FILM COATED ORAL DAILY
Status: DISCONTINUED | OUTPATIENT
Start: 2022-11-10 | End: 2022-11-15 | Stop reason: HOSPADM

## 2022-11-10 RX ORDER — GLUCAGON 1 MG
1 KIT INJECTION
Status: DISCONTINUED | OUTPATIENT
Start: 2022-11-10 | End: 2022-11-15 | Stop reason: HOSPADM

## 2022-11-10 RX ORDER — ARIPIPRAZOLE 5 MG/1
5 TABLET ORAL DAILY
Status: ON HOLD | COMMUNITY
End: 2022-11-15 | Stop reason: HOSPADM

## 2022-11-10 RX ADMIN — GADOBENATE DIMEGLUMINE 10 ML: 529 INJECTION, SOLUTION INTRAVENOUS at 04:11

## 2022-11-10 RX ADMIN — CITALOPRAM HYDROBROMIDE 40 MG: 20 TABLET ORAL at 05:11

## 2022-11-10 RX ADMIN — ATORVASTATIN CALCIUM 10 MG: 10 TABLET, FILM COATED ORAL at 05:11

## 2022-11-10 NOTE — SUBJECTIVE & OBJECTIVE
Past Medical History:   Diagnosis Date    CVA (cerebral vascular accident)     Depression     HTN (hypertension)     Hypothyroidism, unspecified     Squamous cell carcinoma in situ     Tonsillar cancer        Past Surgical History:   Procedure Laterality Date    HYSTERECTOMY      TONSILLECTOMY      TOTAL SHOULDER ARTHROPLASTY         Review of patient's allergies indicates:   Allergen Reactions    Oxycodone        No current facility-administered medications on file prior to encounter.     Current Outpatient Medications on File Prior to Encounter   Medication Sig    amLODIPine (NORVASC) 5 MG tablet Take 5 mg by mouth once daily.    ARIPiprazole (ABILIFY) 5 MG Tab Take 5 mg by mouth once daily.    citalopram (CELEXA) 40 MG tablet Take 40 mg by mouth once daily.    levothyroxine (SYNTHROID) 100 MCG tablet Take 100 mcg by mouth every morning.    meloxicam (MOBIC) 15 MG tablet Take 15 mg by mouth once daily.    nitrofurantoin, macrocrystal-monohydrate, (MACROBID) 100 MG capsule Take 1 capsule (100 mg total) by mouth 2 (two) times daily. for 10 days    simvastatin (ZOCOR) 10 MG tablet Take 10 mg by mouth once daily.    traMADoL (ULTRAM) 50 mg tablet TAKE ONE TABLET BY MOUTH ONCE DAILY AS NEEDED PAIN MAY CAUSE DROWSINESS    albuterol-ipratropium (DUO-NEB) 2.5 mg-0.5 mg/3 mL nebulizer solution Take 3 mLs by nebulization every 6 (six) hours while awake. Rescue (Patient taking differently: Take 3 mLs by nebulization every 6 (six) hours as needed. Rescue)     Family History       Problem Relation (Age of Onset)    Alzheimer's disease Father    Cancer Mother, Sister    Leukemia Mother, Sister    Stroke Mother          Tobacco Use    Smoking status: Some Days     Types: Cigarettes    Smokeless tobacco: Never    Tobacco comments:     patient stated that she smokes on some days.    Substance and Sexual Activity    Alcohol use: Yes     Comment: wine, occasionally    Drug use: Never    Sexual activity: Not on file     Review of  Systems   Constitutional:  Negative for chills and fever.   HENT:  Negative for ear pain.    Respiratory:  Negative for cough and shortness of breath.    Cardiovascular:  Negative for chest pain.   Genitourinary:  Negative for dysuria and hematuria.   Musculoskeletal:  Negative for myalgias.   Skin:  Negative for rash.   Neurological:  Positive for weakness. Negative for headaches.   Psychiatric/Behavioral:  Negative for confusion.    Objective:     Vital Signs (Most Recent):  Temp: 98 °F (36.7 °C) (11/10/22 1421)  Pulse: 73 (11/10/22 1421)  Resp: 18 (11/10/22 1421)  BP: (!) 87/53 (11/10/22 1421)  SpO2: 98 % (11/10/22 1421)   Vital Signs (24h Range):  Temp:  [98 °F (36.7 °C)] 98 °F (36.7 °C)  Pulse:  [73] 73  Resp:  [18] 18  SpO2:  [98 %] 98 %  BP: (87)/(53) 87/53     Weight: 73.5 kg (162 lb 0.6 oz)  Body mass index is 29.64 kg/m².    Physical Exam  Constitutional:       General: She is not in acute distress.  HENT:      Head: Normocephalic and atraumatic.   Eyes:      Extraocular Movements: Extraocular movements intact.      Pupils: Pupils are equal, round, and reactive to light.   Cardiovascular:      Rate and Rhythm: Normal rate and regular rhythm.   Pulmonary:      Effort: Pulmonary effort is normal.      Breath sounds: Normal breath sounds.   Abdominal:      General: Abdomen is flat. Bowel sounds are normal.      Palpations: Abdomen is soft.      Tenderness: There is no abdominal tenderness.   Musculoskeletal:      Cervical back: Normal range of motion and neck supple.      Comments: RLE weakness   Skin:     General: Skin is warm and dry.   Neurological:      General: No focal deficit present.      Mental Status: She is alert.   Psychiatric:         Mood and Affect: Mood normal.         Behavior: Behavior normal.         CRANIAL NERVES     CN III, IV, VI   Pupils are equal, round, and reactive to light.     Significant Labs: All pertinent labs within the past 24 hours have been reviewed.  Recent Lab Results          11/10/22  1416        Thyroid Stimulating Hormone 9.0895               Significant Imaging: I have reviewed all pertinent imaging results/findings within the past 24 hours.

## 2022-11-10 NOTE — H&P
[facility] Surgical [facility] Medicine  History & Physical    Patient Name: Ashley Calvillo  MRN: [id number]  Patient Class: IP- Inpatient  Admission Date: 11/10/2022  Attending Physician: Leeanna De Leon MD   Primary Care Provider: Leeanna De Leon MD         Patient information was obtained from patient, relative(s) and ER records.     Subjective:     Principal Problem:Suspected cerebrovascular accident (CVA)    Chief Complaint: No chief complaint on file.       HPI: 75yo F with PMHx of HTN, hypothyroidism, h/o TIA/CVA reports severe headache 2-3 weeks ago causing her to wake from sleep, with fall while trying to get out of bed to go to the bathroom. Since this event, patient has had significant lower extremity weakness requiring full assistance with transfers from bed to chair to toilet. This is a severe decline from her baseline. She was evaluated in ED 11/7/22 with abnormal CT lumbar spine, followed up in clinic today. Direct admission for MRI brain, MRI lumbar spine and abdominal aortic US. She will benefit from skilled nursing/rehab placement to increase mobility and strength.       Past Medical History:   Diagnosis Date    CVA (cerebral vascular accident)     Depression     HTN (hypertension)     Hypothyroidism, unspecified     Squamous cell carcinoma in situ     Tonsillar cancer        Past Surgical History:   Procedure Laterality Date    HYSTERECTOMY      TONSILLECTOMY      TOTAL SHOULDER ARTHROPLASTY         Review of patient's allergies indicates:   Allergen Reactions    Oxycodone        No current facility-administered medications on file prior to encounter.     Current Outpatient Medications on File Prior to Encounter   Medication Sig    amLODIPine (NORVASC) 5 MG tablet Take 5 mg by mouth once daily.    ARIPiprazole (ABILIFY) 5 MG Tab Take 5 mg by mouth once daily.    citalopram (CELEXA) 40 MG tablet Take 40 mg by mouth once daily.    levothyroxine  (SYNTHROID) 100 MCG tablet Take 100 mcg by mouth every morning.    meloxicam (MOBIC) 15 MG tablet Take 15 mg by mouth once daily.    nitrofurantoin, macrocrystal-monohydrate, (MACROBID) 100 MG capsule Take 1 capsule (100 mg total) by mouth 2 (two) times daily. for 10 days    simvastatin (ZOCOR) 10 MG tablet Take 10 mg by mouth once daily.    traMADoL (ULTRAM) 50 mg tablet TAKE ONE TABLET BY MOUTH ONCE DAILY AS NEEDED PAIN MAY CAUSE DROWSINESS    albuterol-ipratropium (DUO-NEB) 2.5 mg-0.5 mg/3 mL nebulizer solution Take 3 mLs by nebulization every 6 (six) hours while awake. Rescue (Patient taking differently: Take 3 mLs by nebulization every 6 (six) hours as needed. Rescue)     Family History       Problem Relation (Age of Onset)    Alzheimer's disease Father    Cancer Mother, Sister    Leukemia Mother, Sister    Stroke Mother          Tobacco Use    Smoking status: Some Days     Types: Cigarettes    Smokeless tobacco: Never    Tobacco comments:     patient stated that she smokes on some days.    Substance and Sexual Activity    Alcohol use: Yes     Comment: wine, occasionally    Drug use: Never    Sexual activity: Not on file     Review of Systems   Constitutional:  Negative for chills and fever.   HENT:  Negative for ear pain.    Respiratory:  Negative for cough and shortness of breath.    Cardiovascular:  Negative for chest pain.   Genitourinary:  Negative for dysuria and hematuria.   Musculoskeletal:  Negative for myalgias.   Skin:  Negative for rash.   Neurological:  Positive for weakness. Negative for headaches.   Psychiatric/Behavioral:  Negative for confusion.    Objective:     Vital Signs (Most Recent):  Temp: 98 °F (36.7 °C) (11/10/22 1421)  Pulse: 73 (11/10/22 1421)  Resp: 18 (11/10/22 1421)  BP: (!) 87/53 (11/10/22 1421)  SpO2: 98 % (11/10/22 1421)   Vital Signs (24h Range):  Temp:  [98 °F (36.7 °C)] 98 °F (36.7 °C)  Pulse:  [73] 73  Resp:  [18] 18  SpO2:  [98 %] 98 %  BP: (87)/(53) 87/53      Weight: 73.5 kg (162 lb 0.6 oz)  Body mass index is 29.64 kg/m².    Physical Exam  Constitutional:       General: She is not in acute distress.  HENT:      Head: Normocephalic and atraumatic.   Eyes:      Extraocular Movements: Extraocular movements intact.      Pupils: Pupils are equal, round, and reactive to light.   Cardiovascular:      Rate and Rhythm: Normal rate and regular rhythm.   Pulmonary:      Effort: Pulmonary effort is normal.      Breath sounds: Normal breath sounds.   Abdominal:      General: Abdomen is flat. Bowel sounds are normal.      Palpations: Abdomen is soft.      Tenderness: There is no abdominal tenderness.   Musculoskeletal:      Cervical back: Normal range of motion and neck supple.      Comments: RLE weakness   Skin:     General: Skin is warm and dry.   Neurological:      General: No focal deficit present.      Mental Status: She is alert.   Psychiatric:         Mood and Affect: Mood normal.         Behavior: Behavior normal.         CRANIAL NERVES     CN III, IV, VI   Pupils are equal, round, and reactive to light.     Significant Labs: All pertinent labs within the past 24 hours have been reviewed.  Recent Lab Results         11/10/22  1416        Thyroid Stimulating Hormone 9.0895               Significant Imaging: I have reviewed all pertinent imaging results/findings within the past 24 hours.    Assessment/Plan:     * Suspected cerebrovascular accident (CVA)  MRI brain pending      Weakness of right lower extremity  MRI lumbar spine pending  PT eval/treat  Case management consult      Acquired hypothyroidism  Increase levothyroxine      HTN (hypertension)  monitor      VTE Risk Mitigation (From admission, onward)         Ordered     IP VTE LOW RISK PATIENT  Once         11/10/22 1345     Place sequential compression device  Until discontinued         11/10/22 1345                   Leeanna De Leon MD  Department of Hospital Medicine   [facility]

## 2022-11-10 NOTE — HPI
75yo F with PMHx of HTN, hypothyroidism, h/o TIA/CVA reports severe headache 2-3 weeks ago causing her to wake from sleep, with fall while trying to get out of bed to go to the bathroom. Since this event, patient has had significant lower extremity weakness requiring full assistance with transfers from bed to chair to toilet. This is a severe decline from her baseline. She was evaluated in ED 11/7/22 with abnormal CT lumbar spine, followed up in clinic today. Direct admission for MRI brain, MRI lumbar spine and abdominal aortic US. She will benefit from skilled nursing/rehab placement to increase mobility and strength.

## 2022-11-11 PROBLEM — N17.9 AKI (ACUTE KIDNEY INJURY): Status: ACTIVE | Noted: 2022-11-11

## 2022-11-11 LAB
ALBUMIN SERPL-MCNC: 2.9 GM/DL (ref 3.4–4.8)
ALBUMIN/GLOB SERPL: 1.2 RATIO (ref 1.1–2)
ALP SERPL-CCNC: 61 UNIT/L (ref 40–150)
ALT SERPL-CCNC: 5 UNIT/L (ref 0–55)
AST SERPL-CCNC: 13 UNIT/L (ref 5–34)
BASOPHILS # BLD AUTO: 0.02 X10(3)/MCL (ref 0–0.2)
BASOPHILS NFR BLD AUTO: 0.3 %
BILIRUBIN DIRECT+TOT PNL SERPL-MCNC: 0.2 MG/DL
BUN SERPL-MCNC: 20 MG/DL (ref 9.8–20.1)
CALCIUM SERPL-MCNC: 9 MG/DL (ref 8.4–10.2)
CHLORIDE SERPL-SCNC: 102 MMOL/L (ref 98–107)
CO2 SERPL-SCNC: 27 MMOL/L (ref 23–31)
CREAT SERPL-MCNC: 1.15 MG/DL (ref 0.55–1.02)
EOSINOPHIL # BLD AUTO: 0.11 X10(3)/MCL (ref 0–0.9)
EOSINOPHIL NFR BLD AUTO: 1.5 %
ERYTHROCYTE [DISTWIDTH] IN BLOOD BY AUTOMATED COUNT: 24.1 % (ref 11.5–17)
GFR SERPLBLD CREATININE-BSD FMLA CKD-EPI: 50 MLS/MIN/1.73/M2
GLOBULIN SER-MCNC: 2.5 GM/DL (ref 2.4–3.5)
GLUCOSE SERPL-MCNC: 93 MG/DL (ref 82–115)
HCT VFR BLD AUTO: 26.4 % (ref 37–47)
HGB BLD-MCNC: 9.2 GM/DL (ref 12–16)
IMM GRANULOCYTES # BLD AUTO: 0.03 X10(3)/MCL (ref 0–0.04)
IMM GRANULOCYTES NFR BLD AUTO: 0.4 %
LYMPHOCYTES # BLD AUTO: 1.44 X10(3)/MCL (ref 0.6–4.6)
LYMPHOCYTES NFR BLD AUTO: 20.3 %
MCH RBC QN AUTO: 25.7 PG (ref 27–31)
MCHC RBC AUTO-ENTMCNC: 34.8 MG/DL (ref 33–36)
MCV RBC AUTO: 73.7 FL (ref 80–94)
MONOCYTES # BLD AUTO: 0.56 X10(3)/MCL (ref 0.1–1.3)
MONOCYTES NFR BLD AUTO: 7.9 %
NEUTROPHILS # BLD AUTO: 5 X10(3)/MCL (ref 2.1–9.2)
NEUTROPHILS NFR BLD AUTO: 69.6 %
PLATELET # BLD AUTO: 375 X10(3)/MCL (ref 130–400)
PMV BLD AUTO: 9 FL (ref 7.4–10.4)
POTASSIUM SERPL-SCNC: 3.5 MMOL/L (ref 3.5–5.1)
PROT SERPL-MCNC: 5.4 GM/DL (ref 5.8–7.6)
RBC # BLD AUTO: 3.58 X10(6)/MCL (ref 4.2–5.4)
SODIUM SERPL-SCNC: 137 MMOL/L (ref 136–145)
WBC # SPEC AUTO: 7.1 X10(3)/MCL (ref 4.5–11.5)

## 2022-11-11 PROCEDURE — 96360 HYDRATION IV INFUSION INIT: CPT

## 2022-11-11 PROCEDURE — 36415 COLL VENOUS BLD VENIPUNCTURE: CPT | Performed by: FAMILY MEDICINE

## 2022-11-11 PROCEDURE — 97161 PT EVAL LOW COMPLEX 20 MIN: CPT

## 2022-11-11 PROCEDURE — 25000003 PHARM REV CODE 250: Performed by: FAMILY MEDICINE

## 2022-11-11 PROCEDURE — 85025 COMPLETE CBC W/AUTO DIFF WBC: CPT | Performed by: FAMILY MEDICINE

## 2022-11-11 PROCEDURE — 96361 HYDRATE IV INFUSION ADD-ON: CPT

## 2022-11-11 PROCEDURE — 80053 COMPREHEN METABOLIC PANEL: CPT | Performed by: FAMILY MEDICINE

## 2022-11-11 PROCEDURE — G0378 HOSPITAL OBSERVATION PER HR: HCPCS

## 2022-11-11 RX ORDER — SODIUM CHLORIDE 9 MG/ML
INJECTION, SOLUTION INTRAVENOUS CONTINUOUS
Status: DISCONTINUED | OUTPATIENT
Start: 2022-11-11 | End: 2022-11-12

## 2022-11-11 RX ADMIN — SODIUM CHLORIDE: 9 INJECTION, SOLUTION INTRAVENOUS at 08:11

## 2022-11-11 RX ADMIN — CITALOPRAM HYDROBROMIDE 40 MG: 20 TABLET ORAL at 09:11

## 2022-11-11 RX ADMIN — ATORVASTATIN CALCIUM 10 MG: 10 TABLET, FILM COATED ORAL at 09:11

## 2022-11-11 RX ADMIN — LEVOTHYROXINE SODIUM 125 MCG: 0.1 TABLET ORAL at 06:11

## 2022-11-11 NOTE — PROGRESS NOTES
[facility]  [facility] Medicine  Progress Note    Patient Name: Ashley Calvillo  MRN: [id number]  Patient Class: IP- Inpatient   Admission Date: 11/10/2022  Length of Stay: 1 days  Attending Physician: Leeanna De Leon MD  Primary Care Provider: Leeanna De Leon MD        Subjective:     Principal Problem:Suspected cerebrovascular accident (CVA)        HPI:  77yo F with PMHx of HTN, hypothyroidism, h/o TIA/CVA reports severe headache 2-3 weeks ago causing her to wake from sleep, with fall while trying to get out of bed to go to the bathroom. Since this event, patient has had significant lower extremity weakness requiring full assistance with transfers from bed to chair to toilet. This is a severe decline from her baseline. She was evaluated in ED 11/7/22 with abnormal CT lumbar spine, followed up in clinic today. Direct admission for MRI brain, MRI lumbar spine and abdominal aortic US. She will benefit from skilled nursing/rehab placement to increase mobility and strength.       Overview/Hospital Course:  Hospital Day #2  No acute events overnight. Imaging reports pending. Mild HARDEEP noted in labs - IVF started. Pending PT eval, Case Management referral for SNF.       Interval History: see hospital course    Review of Systems   Constitutional:  Negative for chills and fever.   HENT:  Negative for ear pain.    Respiratory:  Negative for cough and shortness of breath.    Cardiovascular:  Negative for chest pain.   Genitourinary:  Negative for dysuria and hematuria.   Musculoskeletal:  Negative for myalgias.   Skin:  Negative for rash.   Neurological:  Positive for weakness. Negative for headaches.   Psychiatric/Behavioral:  Negative for confusion.    Objective:     Vital Signs (Most Recent):  Temp: 98.7 °F (37.1 °C) (11/11/22 0730)  Pulse: 63 (11/11/22 0730)  Resp: 18 (11/10/22 1421)  BP: 126/70 (11/11/22 0730)  SpO2: 97 % (11/11/22 0730) Vital Signs (24h Range):  Temp:  [98 °F  (36.7 °C)-98.7 °F (37.1 °C)] 98.7 °F (37.1 °C)  Pulse:  [63-73] 63  Resp:  [18] 18  SpO2:  [95 %-98 %] 97 %  BP: ()/(53-80) 126/70     Weight: 73.5 kg (162 lb 0.6 oz)  Body mass index is 29.64 kg/m².    Intake/Output Summary (Last 24 hours) at 11/11/2022 1226  Last data filed at 11/11/2022 0616  Gross per 24 hour   Intake 75 ml   Output 300 ml   Net -225 ml      Physical Exam  Constitutional:       General: She is not in acute distress.  HENT:      Head: Normocephalic and atraumatic.   Eyes:      Extraocular Movements: Extraocular movements intact.      Pupils: Pupils are equal, round, and reactive to light.   Cardiovascular:      Rate and Rhythm: Normal rate and regular rhythm.   Pulmonary:      Effort: Pulmonary effort is normal.      Breath sounds: Normal breath sounds.   Abdominal:      General: Abdomen is flat. Bowel sounds are normal.      Palpations: Abdomen is soft.      Tenderness: There is no abdominal tenderness.   Musculoskeletal:      Cervical back: Normal range of motion and neck supple.      Comments: RLE weakness   Skin:     General: Skin is warm and dry.   Neurological:      General: No focal deficit present.      Mental Status: She is alert.   Psychiatric:         Mood and Affect: Mood normal.         Behavior: Behavior normal.       Significant Labs: All pertinent labs within the past 24 hours have been reviewed.  Recent Lab Results         11/11/22  0601   11/11/22  0600   11/10/22  2113   11/10/22  1416        Albumin/Globulin Ratio 1.2             Albumin 2.9             Alkaline Phosphatase 61             ALT 5             Appearance, UA     Clear         AST 13             Baso #   0.02           Basophil %   0.3           BILIRUBIN TOTAL 0.2             Bilirubin, UA     Negative         BUN 20.0             Calcium 9.0             Chloride 102             CO2 27             Color, UA     Yellow         Creatinine 1.15             eGFR 50             Eos #   0.11           Eosinophil %    1.5           Globulin, Total 2.5             Glucose 93             Glucose, UA     Negative         Hematocrit   26.4           Hemoglobin   9.2           Immature Grans (Abs)   0.03           Immature Granulocytes   0.4           Ketones, UA     Negative         Leukocytes, UA     Negative         Lymph #   1.44           LYMPH %   20.3           MCH   25.7           MCHC   34.8           MCV   73.7           Mono #   0.56           Mono %   7.9           MPV   9.0           Neut #   5.0           Neut %   69.6           NITRITE UA     Negative         Occult Blood UA     Negative         pH, UA     6.0         Platelets   375           Potassium 3.5             PROTEIN TOTAL 5.4             Protein, UA     Negative         RBC   3.58           RDW   24.1           Sodium 137             Specific Gravity,UA     1.015         Thyroid Stimulating Hormone       9.0895       Urobilinogen, UA     0.2         WBC   7.1                   Significant Imaging: I have reviewed all pertinent imaging results/findings within the past 24 hours.      Assessment/Plan:      * Suspected cerebrovascular accident (CVA)  MRI brain pending      HARDEEP (acute kidney injury)  IVF      Urinary incontinence  purewick      Weakness of right lower extremity  MRI lumbar spine pending  PT eval/treat  Case management consult      Acquired hypothyroidism  Increase levothyroxine      HTN (hypertension)  monitor        VTE Risk Mitigation (From admission, onward)         Ordered     IP VTE LOW RISK PATIENT  Once         11/10/22 1345     Place sequential compression device  Until discontinued         11/10/22 1345                Discharge Planning   PHIL:      Code Status: Full Code   Is the patient medically ready for discharge?:     Reason for patient still in hospital (select all that apply): Treatment, PT / OT recommendations and Pending disposition                     Leeanna De Leon MD  Department of Hospital Medicine   [facility]

## 2022-11-11 NOTE — PLAN OF CARE
Met w/pt.  She uses rolator at home.  She is unable to get in her tub to bathe herself because no help.  She has a grandson that lives w/her.  She lives in [facility] here in [place].  She would like to go to SNF at [facility].  Referral sent and messaged ALDEN Riddle/zach coordinator at [facility] for 142.

## 2022-11-11 NOTE — PLAN OF CARE
ISAIAH explained and signature obtained by Starr Fournier, Patient Access Rep. Document loaded in

## 2022-11-11 NOTE — SUBJECTIVE & OBJECTIVE
Interval History: see hospital course    Review of Systems   Constitutional:  Negative for chills and fever.   HENT:  Negative for ear pain.    Respiratory:  Negative for cough and shortness of breath.    Cardiovascular:  Negative for chest pain.   Genitourinary:  Negative for dysuria and hematuria.   Musculoskeletal:  Negative for myalgias.   Skin:  Negative for rash.   Neurological:  Positive for weakness. Negative for headaches.   Psychiatric/Behavioral:  Negative for confusion.    Objective:     Vital Signs (Most Recent):  Temp: 98.7 °F (37.1 °C) (11/11/22 0730)  Pulse: 63 (11/11/22 0730)  Resp: 18 (11/10/22 1421)  BP: 126/70 (11/11/22 0730)  SpO2: 97 % (11/11/22 0730) Vital Signs (24h Range):  Temp:  [98 °F (36.7 °C)-98.7 °F (37.1 °C)] 98.7 °F (37.1 °C)  Pulse:  [63-73] 63  Resp:  [18] 18  SpO2:  [95 %-98 %] 97 %  BP: ()/(53-80) 126/70     Weight: 73.5 kg (162 lb 0.6 oz)  Body mass index is 29.64 kg/m².    Intake/Output Summary (Last 24 hours) at 11/11/2022 1226  Last data filed at 11/11/2022 0616  Gross per 24 hour   Intake 75 ml   Output 300 ml   Net -225 ml      Physical Exam  Constitutional:       General: She is not in acute distress.  HENT:      Head: Normocephalic and atraumatic.   Eyes:      Extraocular Movements: Extraocular movements intact.      Pupils: Pupils are equal, round, and reactive to light.   Cardiovascular:      Rate and Rhythm: Normal rate and regular rhythm.   Pulmonary:      Effort: Pulmonary effort is normal.      Breath sounds: Normal breath sounds.   Abdominal:      General: Abdomen is flat. Bowel sounds are normal.      Palpations: Abdomen is soft.      Tenderness: There is no abdominal tenderness.   Musculoskeletal:      Cervical back: Normal range of motion and neck supple.      Comments: RLE weakness   Skin:     General: Skin is warm and dry.   Neurological:      General: No focal deficit present.      Mental Status: She is alert.   Psychiatric:         Mood and Affect:  Mood normal.         Behavior: Behavior normal.       Significant Labs: All pertinent labs within the past 24 hours have been reviewed.  Recent Lab Results         11/11/22  0601   11/11/22  0600   11/10/22  2113   11/10/22  1416        Albumin/Globulin Ratio 1.2             Albumin 2.9             Alkaline Phosphatase 61             ALT 5             Appearance, UA     Clear         AST 13             Baso #   0.02           Basophil %   0.3           BILIRUBIN TOTAL 0.2             Bilirubin, UA     Negative         BUN 20.0             Calcium 9.0             Chloride 102             CO2 27             Color, UA     Yellow         Creatinine 1.15             eGFR 50             Eos #   0.11           Eosinophil %   1.5           Globulin, Total 2.5             Glucose 93             Glucose, UA     Negative         Hematocrit   26.4           Hemoglobin   9.2           Immature Grans (Abs)   0.03           Immature Granulocytes   0.4           Ketones, UA     Negative         Leukocytes, UA     Negative         Lymph #   1.44           LYMPH %   20.3           MCH   25.7           MCHC   34.8           MCV   73.7           Mono #   0.56           Mono %   7.9           MPV   9.0           Neut #   5.0           Neut %   69.6           NITRITE UA     Negative         Occult Blood UA     Negative         pH, UA     6.0         Platelets   375           Potassium 3.5             PROTEIN TOTAL 5.4             Protein, UA     Negative         RBC   3.58           RDW   24.1           Sodium 137             Specific Gravity,UA     1.015         Thyroid Stimulating Hormone       9.0895       Urobilinogen, UA     0.2         WBC   7.1                   Significant Imaging: I have reviewed all pertinent imaging results/findings within the past 24 hours.

## 2022-11-11 NOTE — PLAN OF CARE
Problem: Adult Inpatient Plan of Care  Goal: Plan of Care Review  11/11/2022 0522 by Liv Meredith LPN  Outcome: Ongoing, Progressing  11/11/2022 0234 by Liv Meredith LPN  Outcome: Ongoing, Progressing  Goal: Patient-Specific Goal (Individualized)  11/11/2022 0522 by Liv Meredith LPN  Outcome: Ongoing, Progressing  11/11/2022 0234 by Liv Meredith LPN  Outcome: Ongoing, Progressing  Goal: Absence of Hospital-Acquired Illness or Injury  11/11/2022 0522 by Liv Meredith LPN  Outcome: Ongoing, Progressing  11/11/2022 0234 by Liv Meredith LPN  Outcome: Ongoing, Progressing  Goal: Optimal Comfort and Wellbeing  11/11/2022 0522 by Liv Meredith LPN  Outcome: Ongoing, Progressing  11/11/2022 0234 by Liv Meredith LPN  Outcome: Ongoing, Progressing  Goal: Readiness for Transition of Care  11/11/2022 0522 by Liv Meredith LPN  Outcome: Ongoing, Progressing  11/11/2022 0234 by Liv Meredith LPN  Outcome: Ongoing, Progressing

## 2022-11-11 NOTE — HOSPITAL COURSE
Hospital Day #2  No acute events overnight. Imaging reports pending. Mild HARDEEP noted in labs - IVF started. Pending PT eval, Case Management referral for SNF.     Hospital Day #3  No acute events overnight. Mild anemia, mild hypokalemia, HARDEEP resolved. Will DC fluid and replace electrolytes. No acute stroke on MRI brain, MRI lumbar spine with disc bulging, mild to moderate canal stenosis, bilateral foraminal stenosis. PT has evaluated patient and agrees with global weakness, unsafe to return to home independently. Case Management has started referral to SNF at [facility].     Hospital Day #4  No acute events overnight. Anemia stable. Hypokalemia resolved. Continue PT, pending SNF placement    Hospital Day #5   No acute events overnight. Continue PT, pending SNF placement.     Hospital Day #6  No acute events overnight. Patient reports abdominal cramping relieved by BM. No shortness of breath or pain otherwise. Continue PT, DC with SNF placement available. Monitor O2 & BP. Patient highly motivated to go to rehab to get home independently.

## 2022-11-11 NOTE — PT/OT/SLP EVAL
Physical Therapy Evaluation    Patient Name:  Ashley Calvillo   MRN:  [id number]    Recommendations:     Discharge Recommendations:  nursing facility, skilled   Discharge Equipment Recommendations: none   Barriers to discharge: None    Assessment:     Ashley Calvillo is a 76 y.o. female admitted with a medical diagnosis of Suspected cerebrovascular accident (CVA).  She presents with the following impairments/functional limitations:  weakness, impaired endurance, gait instability, impaired balance, impaired functional mobility, decreased safety awareness.    Pt found up in chair. She is from home where she lives alone and PLOF was mod I with a rollator. Today, she had great difficulty with all mobility tasks. She demonstrated significant retropulsion with standing tasks which required Max A from therapist to maintain upright standing balance. She also required Max A to transfer and required verbal cues to step over to bed from chair. During strength assessment she was globally weak, but did seem to be slightly weaker on the R. She was unsafe to attempt ambulation at this time due to poor balance. She is unsafe to return home at this time. She would benefit from further skilled therapy at a SNF in order to improve balance, increase strength, increase independence and decrease risk of falls.     Rehab Prognosis: Good; patient would benefit from acute skilled PT services to address these deficits and reach maximum level of function.    Recent Surgery: * No surgery found *      Plan:     During this hospitalization, patient to be seen daily to address the identified rehab impairments via gait training, therapeutic activities, therapeutic exercises and progress toward the following goals:    Plan of Care Expires:  12/09/22    Subjective     Chief Complaint: fear of falling  Patient/Family Comments/goals: to receive additional therapy before returning home  Pain/Comfort:  Pain Rating 1: 0/10    Patients cultural, spiritual,  [employer] conflicts given the current situation:      Living Environment:  Pt lives at home alone  Prior to admission, patients level of function was Mod I.  Equipment used at home: rollator.      Objective:     Communicated with nurse prior to session.  Patient found up in chair with    upon PT entry to room.    General Precautions: Standard, fall   Orthopedic Precautions:    Braces:    Respiratory Status: Room air    Exams:  RUE Strength: Deficits: 3-/5  LUE Strength: Deficits: 3/5  RLE Strength: Deficits: 3-/5  LLE Strength: Deficits: 3/5    Functional Mobility:  Bed Mobility:     Sit to Supine: contact guard assistance  Transfers:     Sit to Stand:  maximal assistance with hand-held assist  Chair to mat: maximal assistance with  hand-held assist  using  Step Transfer  Balance: Max A static standing HHA      AM-PAC 6 CLICK MOBILITY  Total Score:        Treatment & Education:  See above    Patient left HOB elevated with all lines intact and call button in reach.    GOALS:   Multidisciplinary Problems       Physical Therapy Goals          Problem: Physical Therapy    Goal Priority Disciplines Outcome Goal Variances Interventions   Physical Therapy Goal     PT, PT/OT Ongoing, Progressing     Description: Goals to be met by: 22     Patient will increase functional independence with mobility by performin. Supine to sit with Stand-by Assistance  2. Sit to stand transfer with Contact Guard Assistance  3. Gait  x 100 feet with Contact Guard Assistance using Rolling Walker.                          History:     Past Medical History:   Diagnosis Date    CVA (cerebral vascular accident)     Depression     HTN (hypertension)     Hypothyroidism, unspecified     Squamous cell carcinoma in situ     Tonsillar cancer        Past Surgical History:   Procedure Laterality Date    HYSTERECTOMY      TONSILLECTOMY      TOTAL SHOULDER ARTHROPLASTY         Time Tracking:     PT Received On: 22  PT Start Time: 1118      PT Stop Time: 1143  PT Total Time (min): 25 min     Billable Minutes: Evaluation 25 11/11/2022

## 2022-11-12 PROBLEM — R09.89 SUSPECTED CEREBROVASCULAR ACCIDENT (CVA): Status: RESOLVED | Noted: 2022-11-10 | Resolved: 2022-11-12

## 2022-11-12 PROBLEM — R53.1 WEAKNESS: Status: ACTIVE | Noted: 2022-11-12

## 2022-11-12 LAB
ALBUMIN SERPL-MCNC: 2.7 GM/DL (ref 3.4–4.8)
ALBUMIN/GLOB SERPL: 1.1 RATIO (ref 1.1–2)
ALP SERPL-CCNC: 51 UNIT/L (ref 40–150)
ALT SERPL-CCNC: 5 UNIT/L (ref 0–55)
ANISOCYTOSIS BLD QL SMEAR: ABNORMAL
AST SERPL-CCNC: 10 UNIT/L (ref 5–34)
BASOPHILS # BLD AUTO: 0.05 X10(3)/MCL (ref 0–0.2)
BASOPHILS NFR BLD AUTO: 0.7 %
BILIRUBIN DIRECT+TOT PNL SERPL-MCNC: 0.2 MG/DL
BUN SERPL-MCNC: 17 MG/DL (ref 9.8–20.1)
CALCIUM SERPL-MCNC: 8.5 MG/DL (ref 8.4–10.2)
CHLORIDE SERPL-SCNC: 109 MMOL/L (ref 98–107)
CO2 SERPL-SCNC: 23 MMOL/L (ref 23–31)
CREAT SERPL-MCNC: 0.81 MG/DL (ref 0.55–1.02)
EOSINOPHIL # BLD AUTO: 0.13 X10(3)/MCL (ref 0–0.9)
EOSINOPHIL NFR BLD AUTO: 1.7 %
ERYTHROCYTE [DISTWIDTH] IN BLOOD BY AUTOMATED COUNT: 22.8 % (ref 11.5–17)
GFR SERPLBLD CREATININE-BSD FMLA CKD-EPI: >60 MLS/MIN/1.73/M2
GLOBULIN SER-MCNC: 2.5 GM/DL (ref 2.4–3.5)
GLUCOSE SERPL-MCNC: 91 MG/DL (ref 82–115)
HCT VFR BLD AUTO: 24.8 % (ref 37–47)
HGB BLD-MCNC: 7.4 GM/DL (ref 12–16)
HYPOCHROMIA BLD QL SMEAR: SLIGHT
IMM GRANULOCYTES # BLD AUTO: 0.02 X10(3)/MCL (ref 0–0.04)
IMM GRANULOCYTES NFR BLD AUTO: 0.3 %
LYMPHOCYTES # BLD AUTO: 1.36 X10(3)/MCL (ref 0.6–4.6)
LYMPHOCYTES NFR BLD AUTO: 18.1 %
MCH RBC QN AUTO: 22 PG (ref 27–31)
MCHC RBC AUTO-ENTMCNC: 29.8 MG/DL (ref 33–36)
MCV RBC AUTO: 73.8 FL (ref 80–94)
MICROCYTES BLD QL SMEAR: SLIGHT
MONOCYTES # BLD AUTO: 0.52 X10(3)/MCL (ref 0.1–1.3)
MONOCYTES NFR BLD AUTO: 6.9 %
NEUTROPHILS # BLD AUTO: 5.4 X10(3)/MCL (ref 2.1–9.2)
NEUTROPHILS NFR BLD AUTO: 72.3 %
PLATELET # BLD AUTO: 374 X10(3)/MCL (ref 130–400)
PLATELET # BLD EST: ADEQUATE 10*3/UL
PMV BLD AUTO: 9.4 FL (ref 7.4–10.4)
POTASSIUM SERPL-SCNC: 3.4 MMOL/L (ref 3.5–5.1)
PROT SERPL-MCNC: 5.2 GM/DL (ref 5.8–7.6)
RBC # BLD AUTO: 3.36 X10(6)/MCL (ref 4.2–5.4)
RBC MORPH BLD: ABNORMAL
SODIUM SERPL-SCNC: 138 MMOL/L (ref 136–145)
WBC # SPEC AUTO: 7.5 X10(3)/MCL (ref 4.5–11.5)

## 2022-11-12 PROCEDURE — 97530 THERAPEUTIC ACTIVITIES: CPT

## 2022-11-12 PROCEDURE — 80053 COMPREHEN METABOLIC PANEL: CPT | Performed by: FAMILY MEDICINE

## 2022-11-12 PROCEDURE — 25000003 PHARM REV CODE 250: Performed by: FAMILY MEDICINE

## 2022-11-12 PROCEDURE — 85025 COMPLETE CBC W/AUTO DIFF WBC: CPT | Performed by: FAMILY MEDICINE

## 2022-11-12 PROCEDURE — 96361 HYDRATE IV INFUSION ADD-ON: CPT

## 2022-11-12 PROCEDURE — 36415 COLL VENOUS BLD VENIPUNCTURE: CPT | Performed by: FAMILY MEDICINE

## 2022-11-12 PROCEDURE — G0378 HOSPITAL OBSERVATION PER HR: HCPCS

## 2022-11-12 RX ORDER — POTASSIUM CHLORIDE 20 MEQ/1
40 TABLET, EXTENDED RELEASE ORAL ONCE
Status: COMPLETED | OUTPATIENT
Start: 2022-11-12 | End: 2022-11-12

## 2022-11-12 RX ADMIN — ATORVASTATIN CALCIUM 10 MG: 10 TABLET, FILM COATED ORAL at 08:11

## 2022-11-12 RX ADMIN — CITALOPRAM HYDROBROMIDE 40 MG: 20 TABLET ORAL at 08:11

## 2022-11-12 RX ADMIN — LEVOTHYROXINE SODIUM 125 MCG: 0.1 TABLET ORAL at 06:11

## 2022-11-12 RX ADMIN — SODIUM CHLORIDE: 9 INJECTION, SOLUTION INTRAVENOUS at 01:11

## 2022-11-12 RX ADMIN — POTASSIUM CHLORIDE 40 MEQ: 1500 TABLET, EXTENDED RELEASE ORAL at 11:11

## 2022-11-12 NOTE — PT/OT/SLP PROGRESS
Physical Therapy Treatment    Patient Name:  Ashley Calvillo   MRN:  [id number]    Recommendations:     Discharge Recommendations:  nursing facility, skilled   Discharge Equipment Recommendations: none   Barriers to discharge: None    Assessment:     Ashley Calvillo is a 76 y.o. female admitted with a medical diagnosis of Suspected cerebrovascular accident (CVA).  She presents with the following impairments/functional limitations:  weakness, gait instability, impaired functional mobility, impaired balance, impaired endurance.    Pt was able to stand at EOB x 8 mins requiring mod assist to maintain standing position as she had a tendency to lean backwards. Pt required cueing to try and facilitate weight through her RLE while standing.     Rehab Prognosis: Good; patient would benefit from acute skilled PT services to address these deficits and reach maximum level of function.    Recent Surgery: * No surgery found *      Plan:     During this hospitalization, patient to be seen daily to address the identified rehab impairments via gait training, therapeutic activities, therapeutic exercises and progress toward the following goals:    Plan of Care Expires:  12/09/22    Subjective     Chief Complaint: fear of falling   Patient/Family Comments/goals: to improve her strength and mobility   Pain/Comfort:  Pain Rating 1: 0/10      Objective:     Communicated with patient prior to session.  Patient found HOB elevated with   upon PT entry to room.     General Precautions: Standard, fall   Orthopedic Precautions:    Braces:    Respiratory Status: Room air     Functional Mobility:  Bed Mobility:     Supine to Sit: contact guard assistance  Sit to Supine: contact guard assistance  Transfers:     Sit to Stand:  moderate assistance with 4 wheeled walker  Balance: static supported stand x 8 mins at EOB x mod assist       AM-PAC 6 CLICK MOBILITY          Treatment & Education:  Seated: Aleyad palomo, ankle pumps x 20 reps.     Patient  left HOB elevated with call button in reach..    GOALS:   Multidisciplinary Problems       Physical Therapy Goals          Problem: Physical Therapy    Goal Priority Disciplines Outcome Goal Variances Interventions   Physical Therapy Goal     PT, PT/OT Ongoing, Progressing     Description: Goals to be met by: 22     Patient will increase functional independence with mobility by performin. Supine to sit with Stand-by Assistance  2. Sit to stand transfer with Contact Guard Assistance  3. Gait  x 100 feet with Contact Guard Assistance using Rolling Walker.                          Time Tracking:     PT Received On: 22  PT Start Time: 1000     PT Stop Time: 1015  PT Total Time (min): 15 min     Billable Minutes: Therapeutic Activity 15 mins     Treatment Type: Treatment  PT/PTA: PT           2022

## 2022-11-12 NOTE — PLAN OF CARE
Problem: Adult Inpatient Plan of Care  Goal: Plan of Care Review  Outcome: Ongoing, Progressing  Goal: Patient-Specific Goal (Individualized)  Outcome: Ongoing, Progressing  Goal: Absence of Hospital-Acquired Illness or Injury  Outcome: Ongoing, Progressing  Goal: Optimal Comfort and Wellbeing  Outcome: Ongoing, Progressing  Goal: Readiness for Transition of Care  Outcome: Ongoing, Progressing     Problem: Fluid and Electrolyte Imbalance (Acute Kidney Injury/Impairment)  Goal: Fluid and Electrolyte Balance  Outcome: Ongoing, Progressing     Problem: Oral Intake Inadequate (Acute Kidney Injury/Impairment)  Goal: Optimal Nutrition Intake  Outcome: Ongoing, Progressing     Problem: Renal Function Impairment (Acute Kidney Injury/Impairment)  Goal: Effective Renal Function  Outcome: Ongoing, Progressing     Problem: Balance Impairment (Functional Deficit)  Goal: Improved Balance and Postural Control  Outcome: Ongoing, Progressing     Problem: Muscle Strength Impairment (Functional Deficit)  Goal: Improved Muscle Strength  Outcome: Ongoing, Progressing

## 2022-11-12 NOTE — PROGRESS NOTES
[facility] - Medical Surgical Unit  [facility] Medicine  Progress Note    Patient Name: Ashley Calvillo  MRN: [id number]  Patient Class: OP- Observation   Admission Date: 11/10/2022  Length of Stay: 1 days  Attending Physician: Leeanna De Leon MD  Primary Care Provider: Leeanna De Leon MD        Subjective:     Principal Problem:Suspected cerebrovascular accident (CVA)        HPI:  77yo F with PMHx of HTN, hypothyroidism, h/o TIA/CVA reports severe headache 2-3 weeks ago causing her to wake from sleep, with fall while trying to get out of bed to go to the bathroom. Since this event, patient has had significant lower extremity weakness requiring full assistance with transfers from bed to chair to toilet. This is a severe decline from her baseline. She was evaluated in ED 11/7/22 with abnormal CT lumbar spine, followed up in clinic today. Direct admission for MRI brain, MRI lumbar spine and abdominal aortic US. She will benefit from skilled nursing/rehab placement to increase mobility and strength.       Overview/Hospital Course:  Hospital Day #2  No acute events overnight. Imaging reports pending. Mild HARDEEP noted in labs - IVF started. Pending PT eval, Case Management referral for SNF.     Hospital Day #3  No acute events overnight. Mild anemia, mild hypokalemia, HARDEEP resolved. Will DC fluid and replace electrolytes. No acute stroke on MRI brain, MRI lumbar spine with disc bulging, mild to moderate canal stenosis, bilateral foraminal stenosis. PT has evaluated patient and agrees with global weakness, unsafe to return to home independently. Case Management has started referral to SNF at [facility].       Interval History: see hospital course    Review of Systems   Constitutional:  Negative for chills and fever.   HENT:  Negative for ear pain.    Respiratory:  Negative for cough and shortness of breath.    Cardiovascular:  Negative for chest pain.   Genitourinary:  Negative for dysuria and hematuria.    Musculoskeletal:  Negative for myalgias.   Skin:  Negative for rash.   Neurological:  Positive for weakness. Negative for headaches.   Psychiatric/Behavioral:  Negative for confusion.    Objective:     Vital Signs (Most Recent):  Temp: 98.6 °F (37 °C) (11/12/22 0457)  Pulse: 68 (11/12/22 0457)  Resp: 20 (11/11/22 1600)  BP: (!) 147/79 (11/12/22 0457)  SpO2: (!) 93 % (11/12/22 0457)   Vital Signs (24h Range):  Temp:  [98.4 °F (36.9 °C)-98.8 °F (37.1 °C)] 98.6 °F (37 °C)  Pulse:  [61-68] 68  Resp:  [20] 20  SpO2:  [93 %-96 %] 93 %  BP: (111-170)/(59-84) 147/79     Weight: 73.5 kg (162 lb 0.6 oz)  Body mass index is 29.64 kg/m².    Intake/Output Summary (Last 24 hours) at 11/12/2022 0903  Last data filed at 11/12/2022 0350  Gross per 24 hour   Intake --   Output 1500 ml   Net -1500 ml      Physical Exam  Constitutional:       General: She is not in acute distress.  HENT:      Head: Normocephalic and atraumatic.   Eyes:      Extraocular Movements: Extraocular movements intact.      Pupils: Pupils are equal, round, and reactive to light.   Cardiovascular:      Rate and Rhythm: Normal rate and regular rhythm.   Pulmonary:      Effort: Pulmonary effort is normal.      Breath sounds: Normal breath sounds.   Abdominal:      General: Abdomen is flat. Bowel sounds are normal.      Palpations: Abdomen is soft.      Tenderness: There is no abdominal tenderness.   Musculoskeletal:      Cervical back: Normal range of motion and neck supple.      Comments: Global weakness, worse in RLE   Skin:     General: Skin is warm and dry.   Neurological:      General: No focal deficit present.      Mental Status: She is alert.   Psychiatric:         Mood and Affect: Mood normal.         Behavior: Behavior normal.       Significant Labs: All pertinent labs within the past 24 hours have been reviewed.  Recent Lab Results         11/12/22  0535        Albumin/Globulin Ratio 1.1       Albumin 2.7       Alkaline Phosphatase 51       ALT 5        Aniso 2+       AST 10       Baso # 0.05       Basophil % 0.7       BILIRUBIN TOTAL 0.2       BUN 17.0       Calcium 8.5       Chloride 109       CO2 23       Creatinine 0.81       eGFR >60       Eos # 0.13       Eosinophil % 1.7       Globulin, Total 2.5       Glucose 91       Hematocrit 24.8       Hemoglobin 7.4       Hypo Slight       Immature Grans (Abs) 0.02       Immature Granulocytes 0.3       Lymph # 1.36       LYMPH % 18.1       MCH 22.0       MCHC 29.8       MCV 73.8       Microcyte Slight       Mono # 0.52       Mono % 6.9       MPV 9.4       Neut # 5.4       Neut % 72.3       Platelet Estimate Adequate       Platelets 374       Potassium 3.4       PROTEIN TOTAL 5.2       RBC 3.36       RBC Morph Abnormal       RDW 22.8       Sodium 138       WBC 7.5               Significant Imaging: I have reviewed all pertinent imaging results/findings within the past 24 hours.    Diagnostic Results:  MRI Brain W WO Contrast    Result Date: 11/11/2022  EXAMINATION: MRI BRAIN W WO CONTRAST CLINICAL HISTORY: Neuro deficit, acute, stroke suspected; TECHNIQUE: Routine unenhanced brain MRI. COMPARISON: CT 16 February 2017. FINDINGS: Diffusion imaging is negative for acute infarct.  Moderate to severe patchy increased T2 and FLAIR signal in the cerebral white matter is nonspecific but most commonly associated with chronic small vessel ischemic changes.  Subcentimeter focus of susceptibility artifact left periventricular white matter image 16 series 12 could relate to a remote microhemorrhage or cavernoma.  No convincing enhancement here after contrast.  No significant abnormality of the midline structures.  There is moderate generalized atrophy. Signal voids are visible in the intracranial internal carotid arteries bilaterally and in the basilar artery implying gross patency.     No acute intracranial findings. Electronically signed by: Olvin Patel Date:    11/11/2022 Time:    12:43    MRI Lumbar Spine W WO Cont    Result  Date: 11/11/2022  EXAMINATION: MRI LUMBAR SPINE W WO CONTRAST CLINICAL HISTORY: Low back pain, cauda equina syndrome suspected; TECHNIQUE: Multiplanar, multisequence MR images were acquired from the thoracolumbar junction to the sacrum without and with the administration of contrast. COMPARISON: CT 7 November 2022 FINDINGS: Moderately motion degraded scan. There is a transitional lumbosacral vertebra which will be considered S1.  There is no significant alignment abnormality.  No loss of vertebral body height.  No significant marrow signal abnormality is evident.  The conus is unremarkable ending at L1-L2. At L1-L2 and L2-L3 there are mild degenerative changes without significant spinal canal or neuroforaminal narrowing. At L3-L4 mild disc bulging with posterior element hypertrophy.  Mild spinal canal narrowing.  Mild bilateral neuroforaminal narrowing. At L4-L5 mild disc bulging with more prominent posterior element hypertrophy.  There is mild-to-moderate spinal canal narrowing.  There is moderate bilateral neuroforaminal narrowing worst on the right. At L5-S1 mild disc bulging with posterior element hypertrophy.  Mild spinal canal narrowing.  Moderate bilateral neuroforaminal narrowing worse on the right. Postcontrast images are significantly motion degraded but no gross pathologic enhancement is evident.     Motion degraded scan with degenerative changes of the lumbar spine as discussed. Electronically signed by: Olvin Patel Date:    11/11/2022 Time:    12:58    US Abdominal Aorta    Result Date: 11/11/2022  EXAMINATION: US ABDOMINAL AORTA CLINICAL HISTORY: chronic thrombus; COMPARISON: None FINDINGS: Transabdominal scanning targeting the aorta.  The abdominal aorta measures up to 1.6 cm proximally, 1.5 cm in its midportion and 1.3 cm distally.  There is moderate atherosclerotic disease.     Negative for abdominal aortic aneurysm. Electronically signed by: Olvin Patel Date:    11/11/2022 Time:    08:35            Assessment/Plan:      Weakness  Continue PT  Pending SNF placement      HARDEEP (acute kidney injury)  resolved      Urinary incontinence  purewick      Hypokalemia  PO KCl      Acquired hypothyroidism  Increase levothyroxine  Recheck TSH in 2-4 weeks      HTN (hypertension)  monitor      Iron deficiency anemia    Monitor  Potential dilutional component      VTE Risk Mitigation (From admission, onward)         Ordered     IP VTE LOW RISK PATIENT  Once         11/10/22 1345     Place sequential compression device  Until discontinued         11/10/22 1345                Discharge Planning   PHIL:      Code Status: Full Code   Is the patient medically ready for discharge?:     Reason for patient still in hospital (select all that apply): Pending disposition  Discharge Plan A: Skilled Nursing Facility                  Leeanna De Leon MD  Department of Hospital Medicine   [facility] - [facility]

## 2022-11-13 LAB
ALBUMIN SERPL-MCNC: 2.9 GM/DL (ref 3.4–4.8)
ALBUMIN/GLOB SERPL: 1.1 RATIO (ref 1.1–2)
ALP SERPL-CCNC: 51 UNIT/L (ref 40–150)
ALT SERPL-CCNC: 5 UNIT/L (ref 0–55)
AST SERPL-CCNC: 13 UNIT/L (ref 5–34)
BASOPHILS # BLD AUTO: 0.07 X10(3)/MCL (ref 0–0.2)
BASOPHILS NFR BLD AUTO: 0.8 %
BILIRUBIN DIRECT+TOT PNL SERPL-MCNC: 0.2 MG/DL
BUN SERPL-MCNC: 16 MG/DL (ref 9.8–20.1)
CALCIUM SERPL-MCNC: 8.9 MG/DL (ref 8.4–10.2)
CHLORIDE SERPL-SCNC: 106 MMOL/L (ref 98–107)
CO2 SERPL-SCNC: 23 MMOL/L (ref 23–31)
CREAT SERPL-MCNC: 0.79 MG/DL (ref 0.55–1.02)
EOSINOPHIL # BLD AUTO: 0.17 X10(3)/MCL (ref 0–0.9)
EOSINOPHIL NFR BLD AUTO: 1.8 %
ERYTHROCYTE [DISTWIDTH] IN BLOOD BY AUTOMATED COUNT: 23.4 % (ref 11.5–17)
GFR SERPLBLD CREATININE-BSD FMLA CKD-EPI: >60 MLS/MIN/1.73/M2
GLOBULIN SER-MCNC: 2.6 GM/DL (ref 2.4–3.5)
GLUCOSE SERPL-MCNC: 91 MG/DL (ref 82–115)
HCT VFR BLD AUTO: 25.9 % (ref 37–47)
HGB BLD-MCNC: 7.6 GM/DL (ref 12–16)
IMM GRANULOCYTES # BLD AUTO: 0.03 X10(3)/MCL (ref 0–0.04)
IMM GRANULOCYTES NFR BLD AUTO: 0.3 %
LYMPHOCYTES # BLD AUTO: 1.27 X10(3)/MCL (ref 0.6–4.6)
LYMPHOCYTES NFR BLD AUTO: 13.8 %
MCH RBC QN AUTO: 22.1 PG (ref 27–31)
MCHC RBC AUTO-ENTMCNC: 29.3 MG/DL (ref 33–36)
MCV RBC AUTO: 75.3 FL (ref 80–94)
MONOCYTES # BLD AUTO: 0.7 X10(3)/MCL (ref 0.1–1.3)
MONOCYTES NFR BLD AUTO: 7.6 %
NEUTROPHILS # BLD AUTO: 7 X10(3)/MCL (ref 2.1–9.2)
NEUTROPHILS NFR BLD AUTO: 75.7 %
PLATELET # BLD AUTO: 373 X10(3)/MCL (ref 130–400)
PMV BLD AUTO: 9.2 FL (ref 7.4–10.4)
POTASSIUM SERPL-SCNC: 4.1 MMOL/L (ref 3.5–5.1)
PROT SERPL-MCNC: 5.5 GM/DL (ref 5.8–7.6)
RBC # BLD AUTO: 3.44 X10(6)/MCL (ref 4.2–5.4)
SODIUM SERPL-SCNC: 136 MMOL/L (ref 136–145)
WBC # SPEC AUTO: 9.2 X10(3)/MCL (ref 4.5–11.5)

## 2022-11-13 PROCEDURE — G0378 HOSPITAL OBSERVATION PER HR: HCPCS

## 2022-11-13 PROCEDURE — 25000003 PHARM REV CODE 250: Performed by: FAMILY MEDICINE

## 2022-11-13 PROCEDURE — 85025 COMPLETE CBC W/AUTO DIFF WBC: CPT | Performed by: FAMILY MEDICINE

## 2022-11-13 PROCEDURE — 80053 COMPREHEN METABOLIC PANEL: CPT | Performed by: FAMILY MEDICINE

## 2022-11-13 PROCEDURE — 97530 THERAPEUTIC ACTIVITIES: CPT

## 2022-11-13 PROCEDURE — 36415 COLL VENOUS BLD VENIPUNCTURE: CPT | Performed by: FAMILY MEDICINE

## 2022-11-13 RX ADMIN — LEVOTHYROXINE SODIUM 125 MCG: 0.1 TABLET ORAL at 06:11

## 2022-11-13 RX ADMIN — CITALOPRAM HYDROBROMIDE 40 MG: 20 TABLET ORAL at 09:11

## 2022-11-13 RX ADMIN — ATORVASTATIN CALCIUM 10 MG: 10 TABLET, FILM COATED ORAL at 09:11

## 2022-11-13 NOTE — PROGRESS NOTES
[facility]  [facility] Medicine  Progress Note    Patient Name: Ashley Calvillo  MRN: [id number]  Patient Class: OP- Observation   Admission Date: 11/10/2022  Length of Stay: 1 days  Attending Physician: Leeanna De Leon MD  Primary Care Provider: Leeanna De Leon MD        Subjective:     Principal Problem:Suspected cerebrovascular accident (CVA)        HPI:  77yo F with PMHx of HTN, hypothyroidism, h/o TIA/CVA reports severe headache 2-3 weeks ago causing her to wake from sleep, with fall while trying to get out of bed to go to the bathroom. Since this event, patient has had significant lower extremity weakness requiring full assistance with transfers from bed to chair to toilet. This is a severe decline from her baseline. She was evaluated in ED 11/7/22 with abnormal CT lumbar spine, followed up in clinic today. Direct admission for MRI brain, MRI lumbar spine and abdominal aortic US. She will benefit from skilled nursing/rehab placement to increase mobility and strength.       Overview/Hospital Course:  Hospital Day #2  No acute events overnight. Imaging reports pending. Mild HARDEEP noted in labs - IVF started. Pending PT eval, Case Management referral for SNF.     Hospital Day #3  No acute events overnight. Mild anemia, mild hypokalemia, HARDEEP resolved. Will DC fluid and replace electrolytes. No acute stroke on MRI brain, MRI lumbar spine with disc bulging, mild to moderate canal stenosis, bilateral foraminal stenosis. PT has evaluated patient and agrees with global weakness, unsafe to return to home independently. Case Management has started referral to SNF at [facility].     Hospital Day #4  No acute events overnight. Anemia stable. Hypokalemia resolved. Continue PT, pending SNF placement      Interval History: see hospital course    Review of Systems   Constitutional:  Negative for chills and fever.   HENT:  Negative for ear pain.    Respiratory:  Negative for cough and  shortness of breath.    Cardiovascular:  Negative for chest pain.   Genitourinary:  Negative for dysuria and hematuria.   Musculoskeletal:  Negative for myalgias.   Skin:  Negative for rash.   Neurological:  Positive for weakness. Negative for headaches.   Psychiatric/Behavioral:  Negative for confusion.    Objective:     Vital Signs (Most Recent):  Temp: 98.5 °F (36.9 °C) (11/13/22 0418)  Pulse: 75 (11/13/22 0822)  Resp: 18 (11/12/22 2328)  BP: (!) 170/82 (11/13/22 0822)  SpO2: 95 % (11/13/22 0822)   Vital Signs (24h Range):  Temp:  [97.5 °F (36.4 °C)-99.4 °F (37.4 °C)] 98.5 °F (36.9 °C)  Pulse:  [60-75] 75  Resp:  [18-20] 18  SpO2:  [95 %-96 %] 95 %  BP: (131-174)/(62-82) 170/82     Weight: 73.5 kg (162 lb 0.6 oz)  Body mass index is 29.64 kg/m².    Intake/Output Summary (Last 24 hours) at 11/13/2022 1002  Last data filed at 11/13/2022 0449  Gross per 24 hour   Intake 120 ml   Output 2001 ml   Net -1881 ml      Physical Exam  Constitutional:       General: She is not in acute distress.  HENT:      Head: Normocephalic and atraumatic.   Eyes:      Extraocular Movements: Extraocular movements intact.      Pupils: Pupils are equal, round, and reactive to light.   Cardiovascular:      Rate and Rhythm: Normal rate and regular rhythm.   Pulmonary:      Effort: Pulmonary effort is normal.      Breath sounds: Normal breath sounds.   Abdominal:      General: Abdomen is flat. Bowel sounds are normal.      Palpations: Abdomen is soft.      Tenderness: There is no abdominal tenderness.   Musculoskeletal:      Cervical back: Normal range of motion and neck supple.      Comments: Global weakness, worse in RLE   Skin:     General: Skin is warm and dry.   Neurological:      General: No focal deficit present.      Mental Status: She is alert.   Psychiatric:         Mood and Affect: Mood normal.         Behavior: Behavior normal.       Significant Labs: All pertinent labs within the past 24 hours have been reviewed.  Recent Lab  Results         11/13/22  0608        Albumin/Globulin Ratio 1.1       Albumin 2.9       Alkaline Phosphatase 51       ALT 5       AST 13       Baso # 0.07       Basophil % 0.8       BILIRUBIN TOTAL 0.2       BUN 16.0       Calcium 8.9       Chloride 106       CO2 23       Creatinine 0.79       eGFR >60       Eos # 0.17       Eosinophil % 1.8       Globulin, Total 2.6       Glucose 91       Hematocrit 25.9       Hemoglobin 7.6       Immature Grans (Abs) 0.03       Immature Granulocytes 0.3       Lymph # 1.27       LYMPH % 13.8       MCH 22.1       MCHC 29.3       MCV 75.3       Mono # 0.70       Mono % 7.6       MPV 9.2       Neut # 7.0       Neut % 75.7       Platelets 373       Potassium 4.1       PROTEIN TOTAL 5.5       RBC 3.44       RDW 23.4       Sodium 136       WBC 9.2               Significant Imaging: I have reviewed all pertinent imaging results/findings within the past 24 hours.      Assessment/Plan:      Weakness  Continue PT  Pending SNF placement      HARDEEP (acute kidney injury)  resolved      Urinary incontinence  purewick      Hypokalemia  resolved      Acquired hypothyroidism  Increase levothyroxine  Recheck TSH in 2-4 weeks      HTN (hypertension)  monitor    Iron deficiency anemia    Monitor   stable  Potential dilutional component      VTE Risk Mitigation (From admission, onward)         Ordered     IP VTE LOW RISK PATIENT  Once         11/10/22 1345     Place sequential compression device  Until discontinued         11/10/22 1345                Discharge Planning   PHIL:      Code Status: Full Code   Is the patient medically ready for discharge?:     Reason for patient still in hospital (select all that apply): Pending disposition  Discharge Plan A: Skilled Nursing Facility                  Leeanna De Leon MD  Department of Hospital Medicine   [facility]

## 2022-11-13 NOTE — SUBJECTIVE & OBJECTIVE
Interval History: see hospital course    Review of Systems   Constitutional:  Negative for chills and fever.   HENT:  Negative for ear pain.    Respiratory:  Negative for cough and shortness of breath.    Cardiovascular:  Negative for chest pain.   Genitourinary:  Negative for dysuria and hematuria.   Musculoskeletal:  Negative for myalgias.   Skin:  Negative for rash.   Neurological:  Positive for weakness. Negative for headaches.   Psychiatric/Behavioral:  Negative for confusion.    Objective:     Vital Signs (Most Recent):  Temp: 98.5 °F (36.9 °C) (11/13/22 0418)  Pulse: 75 (11/13/22 0822)  Resp: 18 (11/12/22 2328)  BP: (!) 170/82 (11/13/22 0822)  SpO2: 95 % (11/13/22 0822)   Vital Signs (24h Range):  Temp:  [97.5 °F (36.4 °C)-99.4 °F (37.4 °C)] 98.5 °F (36.9 °C)  Pulse:  [60-75] 75  Resp:  [18-20] 18  SpO2:  [95 %-96 %] 95 %  BP: (131-174)/(62-82) 170/82     Weight: 73.5 kg (162 lb 0.6 oz)  Body mass index is 29.64 kg/m².    Intake/Output Summary (Last 24 hours) at 11/13/2022 1002  Last data filed at 11/13/2022 0449  Gross per 24 hour   Intake 120 ml   Output 2001 ml   Net -1881 ml      Physical Exam  Constitutional:       General: She is not in acute distress.  HENT:      Head: Normocephalic and atraumatic.   Eyes:      Extraocular Movements: Extraocular movements intact.      Pupils: Pupils are equal, round, and reactive to light.   Cardiovascular:      Rate and Rhythm: Normal rate and regular rhythm.   Pulmonary:      Effort: Pulmonary effort is normal.      Breath sounds: Normal breath sounds.   Abdominal:      General: Abdomen is flat. Bowel sounds are normal.      Palpations: Abdomen is soft.      Tenderness: There is no abdominal tenderness.   Musculoskeletal:      Cervical back: Normal range of motion and neck supple.      Comments: Global weakness, worse in RLE   Skin:     General: Skin is warm and dry.   Neurological:      General: No focal deficit present.      Mental Status: She is alert.    Psychiatric:         Mood and Affect: Mood normal.         Behavior: Behavior normal.       Significant Labs: All pertinent labs within the past 24 hours have been reviewed.  Recent Lab Results         11/13/22  0608        Albumin/Globulin Ratio 1.1       Albumin 2.9       Alkaline Phosphatase 51       ALT 5       AST 13       Baso # 0.07       Basophil % 0.8       BILIRUBIN TOTAL 0.2       BUN 16.0       Calcium 8.9       Chloride 106       CO2 23       Creatinine 0.79       eGFR >60       Eos # 0.17       Eosinophil % 1.8       Globulin, Total 2.6       Glucose 91       Hematocrit 25.9       Hemoglobin 7.6       Immature Grans (Abs) 0.03       Immature Granulocytes 0.3       Lymph # 1.27       LYMPH % 13.8       MCH 22.1       MCHC 29.3       MCV 75.3       Mono # 0.70       Mono % 7.6       MPV 9.2       Neut # 7.0       Neut % 75.7       Platelets 373       Potassium 4.1       PROTEIN TOTAL 5.5       RBC 3.44       RDW 23.4       Sodium 136       WBC 9.2               Significant Imaging: I have reviewed all pertinent imaging results/findings within the past 24 hours.

## 2022-11-13 NOTE — PT/OT/SLP PROGRESS
Physical Therapy Treatment    Patient Name:  Ashley Calvillo   MRN:  [id number]    Recommendations:     Discharge Recommendations:  nursing facility, skilled   Discharge Equipment Recommendations: none   Barriers to discharge: None    Assessment:     Ashley Calvillo is a 76 y.o. female admitted with a medical diagnosis of Suspected cerebrovascular accident (CVA).  She presents with the following impairments/functional limitations:  weakness, impaired functional mobility, impaired balance, impaired endurance.    Pt had difficulty standing upright with 4 wheeled walker requiring mod assist at EOB x approx 8 mins.Pt required cueing to lean forward and to distribute weight through her RLE.     Rehab Prognosis: Good; patient would benefit from acute skilled PT services to address these deficits and reach maximum level of function.    Recent Surgery: * No surgery found *      Plan:     During this hospitalization, patient to be seen daily to address the identified rehab impairments via gait training, therapeutic activities, therapeutic exercises and progress toward the following goals:    Plan of Care Expires:  12/09/22    Subjective     Chief Complaint: fear of falling  Patient/Family Comments/goals: get stronger and improve her balance   Pain/Comfort:  Pain Rating 1: 0/10      Objective:     Communicated with patient prior to session.  Patient found HOB elevated with PureWick upon PT entry to room.     General Precautions: Standard, fall   Orthopedic Precautions:    Braces:    Respiratory Status: Room air     Functional Mobility:  Bed Mobility:     Supine to Sit: contact guard assistance  Sit to Supine: contact guard assistance  Transfers:     Sit to Stand:  moderate assistance with 4 wheeled walker  Balance: supported stand x 8 mins using 4 wheeled walker x mod assist.      AM-PAC 6 CLICK MOBILITY          Treatment & Education:  Seated therex: marches, LAQs, ankle pumps x 20 reps each at EOB.     Patient left HOB elevated  with all lines intact and call button in reach..    GOALS:   Multidisciplinary Problems       Physical Therapy Goals          Problem: Physical Therapy    Goal Priority Disciplines Outcome Goal Variances Interventions   Physical Therapy Goal     PT, PT/OT Ongoing, Progressing     Description: Goals to be met by: 22     Patient will increase functional independence with mobility by performin. Supine to sit with Stand-by Assistance  2. Sit to stand transfer with Contact Guard Assistance  3. Gait  x 100 feet with Contact Guard Assistance using Rolling Walker.                          Time Tracking:     PT Received On: 22  PT Start Time: 905     PT Stop Time: 920  PT Total Time (min): 15 min     Billable Minutes: Therapeutic Activity 15 mins    Treatment Type: Treatment  PT/PTA: PT           2022

## 2022-11-14 LAB — SARS-COV-2 RDRP RESP QL NAA+PROBE: NEGATIVE

## 2022-11-14 PROCEDURE — 97530 THERAPEUTIC ACTIVITIES: CPT

## 2022-11-14 PROCEDURE — 94761 N-INVAS EAR/PLS OXIMETRY MLT: CPT

## 2022-11-14 PROCEDURE — 25000003 PHARM REV CODE 250: Performed by: FAMILY MEDICINE

## 2022-11-14 PROCEDURE — 97165 OT EVAL LOW COMPLEX 30 MIN: CPT

## 2022-11-14 PROCEDURE — G0378 HOSPITAL OBSERVATION PER HR: HCPCS

## 2022-11-14 PROCEDURE — 87635 SARS-COV-2 COVID-19 AMP PRB: CPT | Performed by: FAMILY MEDICINE

## 2022-11-14 RX ORDER — FERROUS GLUCONATE 324(37.5)
324 TABLET ORAL
Status: DISCONTINUED | OUTPATIENT
Start: 2022-11-14 | End: 2022-11-15 | Stop reason: HOSPADM

## 2022-11-14 RX ADMIN — CITALOPRAM HYDROBROMIDE 40 MG: 20 TABLET ORAL at 09:11

## 2022-11-14 RX ADMIN — TRAMADOL HYDROCHLORIDE 50 MG: 50 TABLET, COATED ORAL at 08:11

## 2022-11-14 RX ADMIN — ATORVASTATIN CALCIUM 10 MG: 10 TABLET, FILM COATED ORAL at 09:11

## 2022-11-14 RX ADMIN — LEVOTHYROXINE SODIUM 125 MCG: 0.1 TABLET ORAL at 06:11

## 2022-11-14 RX ADMIN — FERROUS GLUCONATE 324 MG: 324 TABLET ORAL at 09:11

## 2022-11-14 NOTE — PLAN OF CARE
[facility].  Order placed for COVID to be done, for possible d/c tomorrow.  [facility] waiting for insurance auth.

## 2022-11-14 NOTE — PLAN OF CARE
Problem: Adult Inpatient Plan of Care  Goal: Plan of Care Review  Outcome: Ongoing, Progressing  Goal: Patient-Specific Goal (Individualized)  Outcome: Ongoing, Progressing  Goal: Absence of Hospital-Acquired Illness or Injury  Outcome: Ongoing, Progressing  Goal: Optimal Comfort and Wellbeing  Outcome: Ongoing, Progressing  Goal: Readiness for Transition of Care  Outcome: Ongoing, Progressing     Problem: Fluid and Electrolyte Imbalance (Acute Kidney Injury/Impairment)  Goal: Fluid and Electrolyte Balance  Outcome: Ongoing, Progressing     Problem: Oral Intake Inadequate (Acute Kidney Injury/Impairment)  Goal: Optimal Nutrition Intake  Outcome: Ongoing, Progressing     Problem: Renal Function Impairment (Acute Kidney Injury/Impairment)  Goal: Effective Renal Function  Outcome: Ongoing, Progressing     Problem: Balance Impairment (Functional Deficit)  Goal: Improved Balance and Postural Control  Outcome: Ongoing, Progressing     Problem: Muscle Strength Impairment (Functional Deficit)  Goal: Improved Muscle Strength  Outcome: Ongoing, Progressing     Problem: Skin Injury Risk Increased  Goal: Skin Health and Integrity  Outcome: Ongoing, Progressing     Problem: Adult Inpatient Plan of Care  Goal: Plan of Care Review  Outcome: Ongoing, Progressing  Goal: Patient-Specific Goal (Individualized)  Outcome: Ongoing, Progressing  Goal: Absence of Hospital-Acquired Illness or Injury  Outcome: Ongoing, Progressing  Goal: Optimal Comfort and Wellbeing  Outcome: Ongoing, Progressing  Goal: Readiness for Transition of Care  Outcome: Ongoing, Progressing     Problem: Fluid and Electrolyte Imbalance (Acute Kidney Injury/Impairment)  Goal: Fluid and Electrolyte Balance  Outcome: Ongoing, Progressing     Problem: Oral Intake Inadequate (Acute Kidney Injury/Impairment)  Goal: Optimal Nutrition Intake  Outcome: Ongoing, Progressing     Problem: Renal Function Impairment (Acute Kidney Injury/Impairment)  Goal: Effective Renal  Function  Outcome: Ongoing, Progressing     Problem: Balance Impairment (Functional Deficit)  Goal: Improved Balance and Postural Control  Outcome: Ongoing, Progressing     Problem: Muscle Strength Impairment (Functional Deficit)  Goal: Improved Muscle Strength  Outcome: Ongoing, Progressing     Problem: Skin Injury Risk Increased  Goal: Skin Health and Integrity  Outcome: Ongoing, Progressing

## 2022-11-14 NOTE — PT/OT/SLP EVAL
"Occupational Therapy   Evaluation    Name: Ashley Calvillo  MRN: [id number]  Admitting Diagnosis:  Suspected cerebrovascular accident (CVA)  Recent Surgery: * No surgery found *      Recommendations:     Discharge Recommendations: nursing facility, skilled, rehabilitation facility  Discharge Equipment Recommendations:     Barriers to discharge:       Assessment:     Ashley Calvillo is a 76 y.o. female with a medical diagnosis of Suspected cerebrovascular accident (CVA).  She presents with performance deficits affecting function: weakness, impaired endurance, impaired self care skills.    Pt is a motivated lady to returning home when able. She lived at home with grandson who she states "does not or can not help me much". Pt was (I) with all aspects of ADLs and home management skills and house hold activities. Pt was ambulating with RW. Currently she is much weaker and having a harder time completing ADLs. She is unable to care for herself at home (I)ly a this time. She fatigues very easily and noted decreased balance with t/fs. She would benefit form OT at SNF or rehab setting to reach maximal potential and (I) with ADLs.     Rehab Prognosis: Good; patient would benefit from acute skilled OT services to address these deficits and reach maximum level of function.       Plan:     Patient to be seen 3 x/week to address the above listed problems via    Plan of Care Expires:    Plan of Care Reviewed with: patient    Subjective     Chief Complaint: weakness and difficulty with ADLs  Patient/Family Comments/goals: to gain strength and return home at (I) level.    Occupational Profile:  Living Environment: lives at home with grandson who does not help her with ADLs  Previous level of function: (I) with ADLs and home management skills; (-) driving    Equipment Used at Home:  walker, rolling      Pain/Comfort:  Pain Rating Post-Intervention 1: 0/10    Patients cultural, spiritual, Tenriism conflicts given the current situation:  "     Objective:     Communicated with: nursing prior to session.  Patient found HOB elevated with   upon OT entry to room.    General Precautions: Standard,     Orthopedic Precautions:    Braces:    Respiratory Status: Room air    Occupational Performance:    Bed Mobility:    Patient completed Rolling/Turning to Left with  modified independence  Patient completed Rolling/Turning to Right with modified independence  Patient completed Supine to Sit with stand by assistance  Patient completed Sit to Supine with contact guard assistance    Functional Mobility/Transfers:  Patient completed Sit <> Stand Transfer with minimum assistance  with  hand-held assist   Patient completed Bed <> Chair Transfer using Stand Pivot technique with minimum assistance with hand-held assist  Decreased balance with t/fs.       Physical Exam:  Upper Extremity Strength:    -       Right Upper Extremity: Deficits: 3/5  -       Left Upper Extremity: Deficits: 3/5    AMPAC 6 Click ADL:  AMPAC Total Score: 16    Treatment & Education:  See above; pt would benefit well from further therapy to increase (I) with ADLs    Patient left HOB elevated with all lines intact and call button in reach    GOALS:   Multidisciplinary Problems       Occupational Therapy Goals          Problem: Occupational Therapy    Goal Priority Disciplines Outcome Interventions   Occupational Therapy Goal     OT, PT/OT Ongoing, Progressing    Description: Goals to be met by: 11/28/22     Patient will increase functional independence with ADLs by performing:    UE Dressing with Set-up Assistance.  Toilet transfer to bedside commode with Contact Guard Assistance.  Increased functional strength to 4/5 for increased (I) with ADLs.                         History:     Past Medical History:   Diagnosis Date    CVA (cerebral vascular accident)     Depression     HTN (hypertension)     Hypothyroidism, unspecified     Squamous cell carcinoma in situ     Tonsillar cancer          Past  Surgical History:   Procedure Laterality Date    HYSTERECTOMY      TONSILLECTOMY      TOTAL SHOULDER ARTHROPLASTY         Time Tracking:     OT Date of Treatment: 11/14/22  OT Start Time: 1445  OT Stop Time: 1515  OT Total Time (min): 30 min    Billable Minutes:Evaluation 30    11/14/2022

## 2022-11-14 NOTE — PROGRESS NOTES
[facility] - [facility] Unit  [facility] Medicine  Progress Note    Patient Name: Ashley Calvillo  MRN: [id number]  Patient Class: OP- Observation   Admission Date: 11/10/2022  Length of Stay: 1 days  Attending Physician: Leeanna De Leon MD  Primary Care Provider: Leeanna De Leon MD        Subjective:     Principal Problem:Suspected cerebrovascular accident (CVA)        HPI:  77yo F with PMHx of HTN, hypothyroidism, h/o TIA/CVA reports severe headache 2-3 weeks ago causing her to wake from sleep, with fall while trying to get out of bed to go to the bathroom. Since this event, patient has had significant lower extremity weakness requiring full assistance with transfers from bed to chair to toilet. This is a severe decline from her baseline. She was evaluated in ED 11/7/22 with abnormal CT lumbar spine, followed up in clinic today. Direct admission for MRI brain, MRI lumbar spine and abdominal aortic US. She will benefit from skilled nursing/rehab placement to increase mobility and strength.       Overview/Hospital Course:  Hospital Day #2  No acute events overnight. Imaging reports pending. Mild HARDEEP noted in labs - IVF started. Pending PT eval, Case Management referral for SNF.     Hospital Day #3  No acute events overnight. Mild anemia, mild hypokalemia, HARDEEP resolved. Will DC fluid and replace electrolytes. No acute stroke on MRI brain, MRI lumbar spine with disc bulging, mild to moderate canal stenosis, bilateral foraminal stenosis. PT has evaluated patient and agrees with global weakness, unsafe to return to home independently. Case Management has started referral to SNF at [facility].     Hospital Day #4  No acute events overnight. Anemia stable. Hypokalemia resolved. Continue PT, pending SNF placement    Hospital Day #5   No acute events overnight. Continue PT, pending SNF placement.       Interval History: see hospital course    Review of Systems   Constitutional:  Negative for chills and  fever.   HENT:  Negative for ear pain.    Respiratory:  Negative for cough and shortness of breath.    Cardiovascular:  Negative for chest pain.   Genitourinary:  Negative for dysuria and hematuria.   Musculoskeletal:  Negative for myalgias.   Skin:  Negative for rash.   Neurological:  Positive for weakness. Negative for headaches.   Psychiatric/Behavioral:  Negative for confusion.    Objective:     Vital Signs (Most Recent):  Temp: 99 °F (37.2 °C) (11/14/22 0407)  Pulse: 66 (11/14/22 0407)  Resp: 18 (11/14/22 0003)  BP: (!) 159/74 (11/14/22 0407)  SpO2: (!) 94 % (11/14/22 0407)   Vital Signs (24h Range):  Temp:  [98.1 °F (36.7 °C)-99.1 °F (37.3 °C)] 99 °F (37.2 °C)  Pulse:  [66-75] 66  Resp:  [18-20] 18  SpO2:  [94 %-97 %] 94 %  BP: (125-176)/(74-84) 159/74     Weight: 73.5 kg (162 lb 0.6 oz)  Body mass index is 29.64 kg/m².    Intake/Output Summary (Last 24 hours) at 11/14/2022 0747  Last data filed at 11/13/2022 1834  Gross per 24 hour   Intake 240 ml   Output 325 ml   Net -85 ml      Physical Exam  Constitutional:       General: She is not in acute distress.  HENT:      Head: Normocephalic and atraumatic.   Eyes:      Extraocular Movements: Extraocular movements intact.      Pupils: Pupils are equal, round, and reactive to light.   Cardiovascular:      Rate and Rhythm: Normal rate and regular rhythm.   Pulmonary:      Effort: Pulmonary effort is normal.      Breath sounds: Normal breath sounds.   Abdominal:      General: Abdomen is flat. Bowel sounds are normal.      Palpations: Abdomen is soft.      Tenderness: There is no abdominal tenderness.   Musculoskeletal:      Cervical back: Normal range of motion and neck supple.      Comments: Global weakness, worse in RLE   Skin:     General: Skin is warm and dry.   Neurological:      General: No focal deficit present.      Mental Status: She is alert.   Psychiatric:         Mood and Affect: Mood normal.         Behavior: Behavior normal.       Significant Labs: All  pertinent labs within the past 24 hours have been reviewed.  None    Significant Imaging: I have reviewed all pertinent imaging results/findings within the past 24 hours.  none      Assessment/Plan:      Weakness  Continue PT  Pending SNF placement      HARDEEP (acute kidney injury)  resolved      Urinary incontinence  purewick      Hypokalemia  resolved      Acquired hypothyroidism  continue levothyroxine 125mcg  Recheck TSH in 2-4 weeks      HTN (hypertension)  monitor    Iron deficiency anemia  Stable  PO iron supplement    VTE Risk Mitigation (From admission, onward)         Ordered     IP VTE LOW RISK PATIENT  Once         11/10/22 1345     Place sequential compression device  Until discontinued         11/10/22 1345                Discharge Planning   PHIL:      Code Status: Full Code   Is the patient medically ready for discharge?:     Reason for patient still in hospital (select all that apply): Treatment  Discharge Plan A: Skilled Nursing Facility                  Leeanna De Leon MD  Department of Hospital Medicine   [facility] Surgical [facility]

## 2022-11-15 VITALS
BODY MASS INDEX: 29.82 KG/M2 | SYSTOLIC BLOOD PRESSURE: 95 MMHG | HEIGHT: 62 IN | WEIGHT: 162.06 LBS | DIASTOLIC BLOOD PRESSURE: 60 MMHG | OXYGEN SATURATION: 96 % | HEART RATE: 74 BPM | TEMPERATURE: 98 F | RESPIRATION RATE: 20 BRPM

## 2022-11-15 PROCEDURE — G0008 ADMIN INFLUENZA VIRUS VAC: HCPCS | Performed by: FAMILY MEDICINE

## 2022-11-15 PROCEDURE — G0378 HOSPITAL OBSERVATION PER HR: HCPCS

## 2022-11-15 PROCEDURE — 63600175 PHARM REV CODE 636 W HCPCS: Performed by: FAMILY MEDICINE

## 2022-11-15 PROCEDURE — 90471 IMMUNIZATION ADMIN: CPT | Performed by: FAMILY MEDICINE

## 2022-11-15 PROCEDURE — 90694 VACC AIIV4 NO PRSRV 0.5ML IM: CPT | Performed by: FAMILY MEDICINE

## 2022-11-15 PROCEDURE — 97110 THERAPEUTIC EXERCISES: CPT

## 2022-11-15 PROCEDURE — 25000003 PHARM REV CODE 250: Performed by: FAMILY MEDICINE

## 2022-11-15 RX ORDER — LEVOTHYROXINE SODIUM 125 UG/1
125 TABLET ORAL EVERY MORNING
Qty: 30 TABLET | Refills: 11 | Status: SHIPPED | OUTPATIENT
Start: 2022-11-15 | End: 2024-01-24

## 2022-11-15 RX ORDER — FERROUS GLUCONATE 324(37.5)
324 TABLET ORAL
Qty: 30 TABLET | Refills: 11 | Status: SHIPPED | OUTPATIENT
Start: 2022-11-15 | End: 2024-01-14

## 2022-11-15 RX ADMIN — CITALOPRAM HYDROBROMIDE 40 MG: 20 TABLET ORAL at 09:11

## 2022-11-15 RX ADMIN — INFLUENZA A VIRUS A/VICTORIA/2570/2019 IVR-215 (H1N1) ANTIGEN (FORMALDEHYDE INACTIVATED), INFLUENZA A VIRUS A/DARWIN/6/2021 IVR-227 (H3N2) ANTIGEN (FORMALDEHYDE INACTIVATED), INFLUENZA B VIRUS B/AUSTRIA/1359417/2021 BVR-26 ANTIGEN (FORMALDEHYDE INACTIVATED), INFLUENZA B VIRUS B/PHUKET/3073/2013 BVR-1B ANTIGEN (FORMALDEHYDE INACTIVATED) 0.5 ML: 15; 15; 15; 15 INJECTION, SUSPENSION INTRAMUSCULAR at 03:11

## 2022-11-15 RX ADMIN — LEVOTHYROXINE SODIUM 125 MCG: 0.1 TABLET ORAL at 09:11

## 2022-11-15 RX ADMIN — ATORVASTATIN CALCIUM 10 MG: 10 TABLET, FILM COATED ORAL at 09:11

## 2022-11-15 RX ADMIN — FERROUS GLUCONATE 324 MG: 324 TABLET ORAL at 09:11

## 2022-11-15 NOTE — ASSESSMENT & PLAN NOTE
Monitor - no medications at this time, working on strength to allow patient to use bedside commode

## 2022-11-15 NOTE — DISCHARGE SUMMARY
[facility] - Medical Surgical Unit  Hospital Medicine  Discharge Summary      Patient Name: Ashley Calvillo  MRN: [id number]  ADALBERTO: [id number]  Patient Class: OP- Observation  Admission Date: 11/10/2022  Hospital Length of Stay: 1 days  Discharge Date and Time:  11/15/2022 7:54 AM  Attending Physician: Leeanna De Leon MD   Discharging Provider: Leeanna De Leon MD  Primary Care Provider: Leeanna De Leon MD    Primary Care Team: Networked reference to record PCT     HPI:   77yo F with PMHx of HTN, hypothyroidism, h/o TIA/CVA reports severe headache 2-3 weeks ago causing her to wake from sleep, with fall while trying to get out of bed to go to the bathroom. Since this event, patient has had significant lower extremity weakness requiring full assistance with transfers from bed to chair to toilet. This is a severe decline from her baseline. She was evaluated in ED 11/7/22 with abnormal CT lumbar spine, followed up in clinic today. Direct admission for MRI brain, MRI lumbar spine and abdominal aortic US. She will benefit from skilled nursing/rehab placement to increase mobility and strength.       * No surgery found *      Hospital Course:   Hospital Day #2  No acute events overnight. Imaging reports pending. Mild HARDEEP noted in labs - IVF started. Pending PT eval, Case Management referral for SNF.     Hospital Day #3  No acute events overnight. Mild anemia, mild hypokalemia, HARDEEP resolved. Will DC fluid and replace electrolytes. No acute stroke on MRI brain, MRI lumbar spine with disc bulging, mild to moderate canal stenosis, bilateral foraminal stenosis. PT has evaluated patient and agrees with global weakness, unsafe to return to home independently. Case Management has started referral to SNF at [facility].     Hospital Day #4  No acute events overnight. Anemia stable. Hypokalemia resolved. Continue PT, pending SNF placement    Hospital Day #5   No acute events overnight. Continue PT, pending SNF  placement.     Hospital Day #6  No acute events overnight. Patient reports abdominal cramping relieved by BM. No shortness of breath or pain otherwise. Continue PT, DC with SNF placement available. Monitor O2 & BP. Patient highly motivated to go to rehab to get home independently.      Vitals:    11/15/22 0500   BP: (!) 147/52   Pulse: 69   Resp: 20   Temp: 98.5 °F (36.9 °C)       Physical Exam   Constitutional: alert & oriented, no acute distress  HENT:   Head: Normocephalic and atraumatic.   Eyes: Conjunctivae normal and EOM are normal. Pupils are equal, round, and reactive to light.   Neck: Normal range of motion. Neck supple.   Cardiovascular: Normal rate, regular rhythm, normal heart sounds   Pulmonary/Chest: CTAB, nonlabored respiration  Abdominal: Soft, nontender, bowel sounds normal  Neurological: nonfocal  Skin: warm, dry intact  Psychiatric: normal mood and affect, cooperative            Goals of Care Treatment Preferences:  Code Status: Full Code      Consults:   Consults (From admission, onward)        Status Ordering Provider     Inpatient consult to Social Work/Case Management  Once        Provider:  (Not yet assigned)    Acknowledged DOLORES VILLA          Weakness  Continue PT/OT  Pending SNF placement      Urinary incontinence  Monitor - no medications at this time, working on strength to allow patient to use bedside commode      Hypokalemia  resolved      Acquired hypothyroidism  continue levothyroxine 125mcg  Recheck TSH in 2-4 weeks      HTN (hypertension)  Monitor  No current medications      Iron deficiency anemia  Stable  PO iron supplement  Add stool softener if necessary      Final Active Diagnoses:    Diagnosis Date Noted POA    Weakness [R53.1] 11/12/2022 Yes    HARDEEP (acute kidney injury) [N17.9] 11/11/2022 Yes    Urinary incontinence [R32] 11/10/2022 Yes    Hypokalemia [E87.6] 08/24/2022 Yes    HTN (hypertension) [I10] 08/10/2022 Yes    Acquired hypothyroidism [E03.9]  08/10/2022 Yes    Iron deficiency anemia [D50.9] 08/10/2022 Yes      Problems Resolved During this Admission:    Diagnosis Date Noted Date Resolved POA    PRINCIPAL PROBLEM:  Suspected cerebrovascular accident (CVA) [R09.89] 11/10/2022 11/12/2022 Yes       Discharged Condition: stable    Disposition: Skilled Nursing Facility    Follow Up:   Follow-up Information     Leeanna De Leon MD Follow up in 1 week(s).    Specialty: Family Medicine  Why: upon DC from SNF  Contact information:  [address]  [facility] [place] [address]  [phone]                       Patient Instructions:   No discharge procedures on file.    Significant Diagnostic Studies: Labs: All labs within the past 24 hours have been reviewed    Pending Diagnostic Studies:     None         Medications:  Reconciled Home Medications:      Medication List      START taking these medications    ferrous gluconate 324 mg (37.5 mg iron) Tab tablet  Take 1 tablet (324 mg total) by mouth daily with breakfast.        CHANGE how you take these medications    levothyroxine 125 MCG tablet  Commonly known as: SYNTHROID  Take 1 tablet (125 mcg total) by mouth every morning.  What changed:   · medication strength  · how much to take        CONTINUE taking these medications    citalopram 40 MG tablet  Commonly known as: CeleXA  Take 40 mg by mouth once daily.     simvastatin 10 MG tablet  Commonly known as: ZOCOR  Take 10 mg by mouth once daily.     traMADoL 50 mg tablet  Commonly known as: ULTRAM  TAKE ONE TABLET BY MOUTH ONCE DAILY AS NEEDED PAIN MAY CAUSE DROWSINESS        STOP taking these medications    albuterol-ipratropium 2.5 mg-0.5 mg/3 mL nebulizer solution  Commonly known as: DUO-NEB     amLODIPine 5 MG tablet  Commonly known as: NORVASC     ARIPiprazole 5 MG Tab  Commonly known as: ABILIFY     meloxicam 15 MG tablet  Commonly known as: MOBIC     nitrofurantoin (macrocrystal-monohydrate) 100 MG capsule  Commonly known as:  MACROBID            Indwelling Lines/Drains at time of discharge:   Lines/Drains/Airways     None                 Time spent on the discharge of patient: 30 minutes         Leeanna De Leon MD  Department of Hospital Medicine  [facility]

## 2022-11-15 NOTE — PT/OT/SLP PROGRESS
Occupational Therapy   Treatment    Name: Ashley Calvillo  MRN: [id number]  Admitting Diagnosis:  Suspected cerebrovascular accident (CVA)       Recommendations:     Discharge Recommendations: nursing facility, skilled, rehabilitation facility  Discharge Equipment Recommendations:     Barriers to discharge:       Assessment:     Ashley Calvillo is a 76 y.o. female with a medical diagnosis of Suspected cerebrovascular accident (CVA).  She presents with performance deficits affecting function are weakness, impaired endurance, impaired self care skills.   Pt good motivation to tasks; requires min tactile assist and verbal cueing to initiate tasks today. T/f EOB to chair with mod/maxA X1 and good ability to support self through BLE. BUE AROM WFL while seated in chair.     Rehab Prognosis:  Good; patient would benefit from acute skilled OT services to address these deficits and reach maximum level of function.       Plan:     Patient to be seen 3 x/week to address the above listed problems via    Plan of Care Expires:    Plan of Care Reviewed with: patient    Subjective     Pain/Comfort:       Objective:     Communicated with: nursing prior to session.  Patient found HOB elevated with   upon OT entry to room.    General Precautions: Standard,     Orthopedic Precautions:    Braces:    Respiratory Status:  supplemental O2     Occupational Performance:     Bed Mobility:    Patient completed Rolling/Turning to Right with stand by assistance     Functional Mobility/Transfers:  Patient completed Bed <> Chair Transfer using Stand Pivot technique with moderate assistance and maximal assistance with hand-held assist      [facility] 6 Click ADL:      Treatment & Education:  See above    Patient left up in chair with all lines intact and call button in reach    GOALS:   Multidisciplinary Problems       Occupational Therapy Goals          Problem: Occupational Therapy    Goal Priority Disciplines Outcome Interventions   Occupational Therapy  Goal     OT, PT/OT Ongoing, Progressing    Description: Goals to be met by: 11/28/22     Patient will increase functional independence with ADLs by performing:    UE Dressing with Set-up Assistance.  Toilet transfer to bedside commode with Contact Guard Assistance.  Increased functional strength to 4/5 for increased (I) with ADLs.                         Time Tracking:     OT Date of Treatment: 11/15/22  OT Start Time: 1045  OT Stop Time: 1100  OT Total Time (min): 15 min    Billable Minutes:Therapeutic Exercise 15    OT/VALDEMAR: OT          11/15/2022

## 2022-11-15 NOTE — PLAN OF CARE
D/c info sent to [facility] and pt is d/c today.  Nurse will call report and their van will come pick pt up.

## 2023-02-13 PROBLEM — N17.9 AKI (ACUTE KIDNEY INJURY): Status: RESOLVED | Noted: 2022-11-11 | Resolved: 2023-02-13

## 2023-04-23 ENCOUNTER — LAB REQUISITION (OUTPATIENT)
Dept: LAB | Facility: HOSPITAL | Age: 78
End: 2023-04-23
Payer: MEDICARE

## 2023-04-23 DIAGNOSIS — N39.0 URINARY TRACT INFECTION, SITE NOT SPECIFIED: ICD-10-CM

## 2023-04-23 LAB
APPEARANCE UR: ABNORMAL
BACTERIA #/AREA URNS AUTO: ABNORMAL /HPF
BILIRUB UR QL STRIP.AUTO: NEGATIVE MG/DL
COLOR UR AUTO: YELLOW
GLUCOSE UR QL STRIP.AUTO: NEGATIVE MG/DL
KETONES UR QL STRIP.AUTO: NEGATIVE MG/DL
LEUKOCYTE ESTERASE UR QL STRIP.AUTO: ABNORMAL UNIT/L
NITRITE UR QL STRIP.AUTO: NEGATIVE
PH UR STRIP.AUTO: 5.5 [PH]
PROT UR QL STRIP.AUTO: ABNORMAL MG/DL
RBC #/AREA URNS AUTO: ABNORMAL /HPF
RBC UR QL AUTO: ABNORMAL UNIT/L
SP GR UR STRIP.AUTO: >=1.03
SQUAMOUS #/AREA URNS AUTO: ABNORMAL /HPF
UROBILINOGEN UR STRIP-ACNC: 0.2 MG/DL
WBC #/AREA URNS AUTO: ABNORMAL /HPF

## 2023-04-23 PROCEDURE — 87077 CULTURE AEROBIC IDENTIFY: CPT | Performed by: FAMILY MEDICINE

## 2023-04-23 PROCEDURE — 81001 URINALYSIS AUTO W/SCOPE: CPT | Performed by: FAMILY MEDICINE

## 2023-04-26 LAB — BACTERIA UR CULT: ABNORMAL

## 2024-01-12 DIAGNOSIS — D64.9 ANEMIA: Primary | ICD-10-CM

## 2024-01-14 ENCOUNTER — HOSPITAL ENCOUNTER (EMERGENCY)
Facility: HOSPITAL | Age: 79
Discharge: OTHER INSTITUTION | End: 2024-01-15
Attending: EMERGENCY MEDICINE
Payer: MEDICARE

## 2024-01-14 DIAGNOSIS — Z46.59 ENCOUNTER FOR NASOGASTRIC (NG) TUBE PLACEMENT: ICD-10-CM

## 2024-01-14 DIAGNOSIS — K56.2 SIGMOID VOLVULUS: Primary | ICD-10-CM

## 2024-01-14 LAB
ABS NEUT CALC (OHS): 20.99 X10(3)/MCL (ref 2.1–9.2)
ALBUMIN SERPL-MCNC: 4.3 G/DL (ref 3.4–4.8)
ALBUMIN/GLOB SERPL: 1 RATIO (ref 1.1–2)
ALP SERPL-CCNC: 93 UNIT/L (ref 40–150)
ALT SERPL-CCNC: 15 UNIT/L (ref 0–55)
APPEARANCE UR: CLEAR
AST SERPL-CCNC: 25 UNIT/L (ref 5–34)
BACTERIA #/AREA URNS AUTO: ABNORMAL /HPF
BILIRUB SERPL-MCNC: 0.7 MG/DL
BILIRUB UR QL STRIP.AUTO: NEGATIVE
BUN SERPL-MCNC: 41 MG/DL (ref 9.8–20.1)
CALCIUM SERPL-MCNC: 10.2 MG/DL (ref 8.4–10.2)
CHLORIDE SERPL-SCNC: 100 MMOL/L (ref 98–107)
CO2 SERPL-SCNC: 20 MMOL/L (ref 23–31)
COLOR UR AUTO: YELLOW
CREAT SERPL-MCNC: 1.36 MG/DL (ref 0.55–1.02)
ERYTHROCYTE [DISTWIDTH] IN BLOOD BY AUTOMATED COUNT: 14 % (ref 11.5–17)
FLUAV AG UPPER RESP QL IA.RAPID: NOT DETECTED
FLUBV AG UPPER RESP QL IA.RAPID: NOT DETECTED
GFR SERPLBLD CREATININE-BSD FMLA CKD-EPI: 40 MLS/MIN/1.73/M2
GLOBULIN SER-MCNC: 4.1 GM/DL (ref 2.4–3.5)
GLUCOSE SERPL-MCNC: 168 MG/DL (ref 82–115)
GLUCOSE UR QL STRIP.AUTO: NEGATIVE
HCT VFR BLD AUTO: 49 % (ref 37–47)
HGB BLD-MCNC: 16.7 G/DL (ref 12–16)
KETONES UR QL STRIP.AUTO: ABNORMAL
LACTATE SERPL-SCNC: 1.4 MMOL/L (ref 0.5–2.2)
LEUKOCYTE ESTERASE UR QL STRIP.AUTO: NEGATIVE
LIPASE SERPL-CCNC: 22 U/L
LYMPHOCYTES NFR BLD MANUAL: 1.15 X10(3)/MCL
LYMPHOCYTES NFR BLD MANUAL: 5 % (ref 13–40)
MAGNESIUM SERPL-MCNC: 3.3 MG/DL (ref 1.6–2.6)
MCH RBC QN AUTO: 29.1 PG (ref 27–31)
MCHC RBC AUTO-ENTMCNC: 34.1 G/DL (ref 33–36)
MCV RBC AUTO: 85.5 FL (ref 80–94)
MONOCYTES NFR BLD MANUAL: 0.92 X10(3)/MCL (ref 0.1–1.3)
MONOCYTES NFR BLD MANUAL: 4 % (ref 2–11)
NEUTROPHILS NFR BLD MANUAL: 91 % (ref 47–80)
NITRITE UR QL STRIP.AUTO: NEGATIVE
PH UR STRIP.AUTO: 6 [PH]
PLATELET # BLD AUTO: 388 X10(3)/MCL (ref 130–400)
PLATELET # BLD EST: NORMAL 10*3/UL
PMV BLD AUTO: 9.6 FL (ref 7.4–10.4)
POTASSIUM SERPL-SCNC: 3.5 MMOL/L (ref 3.5–5.1)
PROT SERPL-MCNC: 8.4 GM/DL (ref 5.8–7.6)
PROT UR QL STRIP.AUTO: ABNORMAL
RBC # BLD AUTO: 5.73 X10(6)/MCL (ref 4.2–5.4)
RBC #/AREA URNS AUTO: ABNORMAL /HPF
RBC MORPH BLD: NORMAL
RBC UR QL AUTO: ABNORMAL
SARS-COV-2 RNA RESP QL NAA+PROBE: NOT DETECTED
SODIUM SERPL-SCNC: 137 MMOL/L (ref 136–145)
SP GR UR STRIP.AUTO: 1.02 (ref 1–1.03)
SQUAMOUS #/AREA URNS AUTO: ABNORMAL /HPF
UROBILINOGEN UR STRIP-ACNC: 0.2
WBC # SPEC AUTO: 23.07 X10(3)/MCL (ref 4.5–11.5)
WBC #/AREA URNS AUTO: ABNORMAL /HPF

## 2024-01-14 PROCEDURE — 0240U COVID/FLU A&B PCR: CPT | Performed by: NURSE PRACTITIONER

## 2024-01-14 PROCEDURE — 25500020 PHARM REV CODE 255: Performed by: NURSE PRACTITIONER

## 2024-01-14 PROCEDURE — 25000003 PHARM REV CODE 250: Performed by: NURSE PRACTITIONER

## 2024-01-14 PROCEDURE — 96376 TX/PRO/DX INJ SAME DRUG ADON: CPT

## 2024-01-14 PROCEDURE — 96365 THER/PROPH/DIAG IV INF INIT: CPT | Mod: 59

## 2024-01-14 PROCEDURE — 80053 COMPREHEN METABOLIC PANEL: CPT | Performed by: NURSE PRACTITIONER

## 2024-01-14 PROCEDURE — 83690 ASSAY OF LIPASE: CPT | Performed by: NURSE PRACTITIONER

## 2024-01-14 PROCEDURE — 83735 ASSAY OF MAGNESIUM: CPT | Performed by: NURSE PRACTITIONER

## 2024-01-14 PROCEDURE — 96375 TX/PRO/DX INJ NEW DRUG ADDON: CPT

## 2024-01-14 PROCEDURE — 63600175 PHARM REV CODE 636 W HCPCS: Performed by: NURSE PRACTITIONER

## 2024-01-14 PROCEDURE — 99285 EMERGENCY DEPT VISIT HI MDM: CPT | Mod: 25

## 2024-01-14 PROCEDURE — 85027 COMPLETE CBC AUTOMATED: CPT | Performed by: NURSE PRACTITIONER

## 2024-01-14 PROCEDURE — 63600175 PHARM REV CODE 636 W HCPCS: Performed by: EMERGENCY MEDICINE

## 2024-01-14 PROCEDURE — 81003 URINALYSIS AUTO W/O SCOPE: CPT | Performed by: NURSE PRACTITIONER

## 2024-01-14 PROCEDURE — 96361 HYDRATE IV INFUSION ADD-ON: CPT

## 2024-01-14 PROCEDURE — 83605 ASSAY OF LACTIC ACID: CPT | Performed by: NURSE PRACTITIONER

## 2024-01-14 PROCEDURE — 87040 BLOOD CULTURE FOR BACTERIA: CPT | Performed by: NURSE PRACTITIONER

## 2024-01-14 RX ORDER — ONDANSETRON HYDROCHLORIDE 2 MG/ML
4 INJECTION, SOLUTION INTRAVENOUS
Status: COMPLETED | OUTPATIENT
Start: 2024-01-14 | End: 2024-01-14

## 2024-01-14 RX ORDER — SODIUM CHLORIDE 9 MG/ML
1000 INJECTION, SOLUTION INTRAVENOUS
Status: COMPLETED | OUTPATIENT
Start: 2024-01-14 | End: 2024-01-14

## 2024-01-14 RX ORDER — SODIUM CHLORIDE 9 MG/ML
500 INJECTION, SOLUTION INTRAVENOUS
Status: COMPLETED | OUTPATIENT
Start: 2024-01-14 | End: 2024-01-14

## 2024-01-14 RX ORDER — HYDRALAZINE HYDROCHLORIDE 20 MG/ML
20 INJECTION INTRAMUSCULAR; INTRAVENOUS
Status: COMPLETED | OUTPATIENT
Start: 2024-01-14 | End: 2024-01-14

## 2024-01-14 RX ORDER — HYDROMORPHONE HYDROCHLORIDE 2 MG/ML
0.5 INJECTION, SOLUTION INTRAMUSCULAR; INTRAVENOUS; SUBCUTANEOUS
Status: COMPLETED | OUTPATIENT
Start: 2024-01-14 | End: 2024-01-14

## 2024-01-14 RX ORDER — HYDROMORPHONE HYDROCHLORIDE 2 MG/ML
1 INJECTION, SOLUTION INTRAMUSCULAR; INTRAVENOUS; SUBCUTANEOUS
Status: COMPLETED | OUTPATIENT
Start: 2024-01-14 | End: 2024-01-14

## 2024-01-14 RX ADMIN — ONDANSETRON 4 MG: 2 INJECTION INTRAMUSCULAR; INTRAVENOUS at 09:01

## 2024-01-14 RX ADMIN — HYDRALAZINE HYDROCHLORIDE 20 MG: 20 INJECTION INTRAMUSCULAR; INTRAVENOUS at 09:01

## 2024-01-14 RX ADMIN — HYDROMORPHONE HYDROCHLORIDE 0.5 MG: 2 INJECTION INTRAMUSCULAR; INTRAVENOUS; SUBCUTANEOUS at 09:01

## 2024-01-14 RX ADMIN — PIPERACILLIN SODIUM AND TAZOBACTAM SODIUM 4.5 G: 4; .5 INJECTION, POWDER, LYOPHILIZED, FOR SOLUTION INTRAVENOUS at 08:01

## 2024-01-14 RX ADMIN — SODIUM CHLORIDE 500 ML: 9 INJECTION, SOLUTION INTRAVENOUS at 06:01

## 2024-01-14 RX ADMIN — SODIUM CHLORIDE 1000 ML: 9 INJECTION, SOLUTION INTRAVENOUS at 09:01

## 2024-01-14 RX ADMIN — HYDROMORPHONE HYDROCHLORIDE 1 MG: 2 INJECTION, SOLUTION INTRAMUSCULAR; INTRAVENOUS; SUBCUTANEOUS at 10:01

## 2024-01-14 RX ADMIN — IOPAMIDOL 100 ML: 755 INJECTION, SOLUTION INTRAVENOUS at 07:01

## 2024-01-15 VITALS
BODY MASS INDEX: 27.6 KG/M2 | HEIGHT: 62 IN | SYSTOLIC BLOOD PRESSURE: 125 MMHG | TEMPERATURE: 98 F | HEART RATE: 84 BPM | OXYGEN SATURATION: 94 % | DIASTOLIC BLOOD PRESSURE: 80 MMHG | RESPIRATION RATE: 18 BRPM | WEIGHT: 150 LBS

## 2024-01-15 NOTE — ED NOTES
Report received and care of pt assumed. Pt resting quietly in bed in NAD. NG tube to right nare draining small amount thick greenish-brown material. O2 on per oxymask at 3l/min with O2 sat of 95%. Pt confused. Responds to name but cannot tell me where she is or why she is here. Hx alzheimer's per nursing home notes. Waiting on acceptance for transfer.

## 2024-01-15 NOTE — ED PROVIDER NOTES
Encounter Date: 1/14/2024       History     Chief Complaint   Patient presents with    Abdominal Pain     From [facility]. C/o abd pain x 3 days with n/v & constipation. Last BM unknown.     HPI  Review of patient's allergies indicates:   Allergen Reactions    Nsaids (non-steroidal anti-inflammatory drug)     Oxycodone      Past Medical History:   Diagnosis Date    CVA (cerebral vascular accident)     Depression     HTN (hypertension)     Hypothyroidism, unspecified     Squamous cell carcinoma in situ     Tonsillar cancer      Past Surgical History:   Procedure Laterality Date    HYSTERECTOMY      TONSILLECTOMY      TOTAL SHOULDER ARTHROPLASTY       Family History   Problem Relation Age of Onset    Cancer Mother     Leukemia Mother     Stroke Mother     Alzheimer's disease Father     Cancer Sister     Leukemia Sister      Social History     Tobacco Use    Smoking status: Some Days     Types: Cigarettes    Smokeless tobacco: Never    Tobacco comments:     patient stated that she smokes on some days.    Substance Use Topics    Alcohol use: Yes     Comment: wine, occasionally    Drug use: Never     Review of Systems    Physical Exam     Initial Vitals [01/14/24 1742]   BP Pulse Resp Temp SpO2   (!) 165/92 85 20 97.7 °F (36.5 °C) (!) 92 %      MAP       --         Physical Exam    ED Course   Procedures  Labs Reviewed   CBC WITH DIFFERENTIAL - Abnormal; Notable for the following components:       Result Value    WBC 23.07 (*)     RBC 5.73 (*)     Hgb 16.7 (*)     Hct 49.0 (*)     All other components within normal limits   COMPREHENSIVE METABOLIC PANEL - Abnormal; Notable for the following components:    Carbon Dioxide 20 (*)     Glucose Level 168 (*)     Blood Urea Nitrogen 41.0 (*)     Creatinine 1.36 (*)     Protein Total 8.4 (*)     Globulin 4.1 (*)     Albumin/Globulin Ratio 1.0 (*)     All other components within normal limits   MAGNESIUM - Abnormal; Notable for the following components:    Magnesium Level 3.30  (*)     All other components within normal limits   URINALYSIS, REFLEX TO URINE CULTURE - Abnormal; Notable for the following components:    Protein, UA 1+ (*)     Ketones, UA Trace (*)     Blood, UA 1+ (*)     All other components within normal limits   MANUAL DIFFERENTIAL - Abnormal; Notable for the following components:    Neutrophils % 91 (*)     Lymphs % 5 (*)     Neutrophils Abs Calc 20.9937 (*)     All other components within normal limits   URINALYSIS, MICROSCOPIC - Abnormal; Notable for the following components:    Squamous Epithelial Cells, UA Moderate (*)     All other components within normal limits   LIPASE - Normal   LACTIC ACID, PLASMA - Normal   COVID/FLU A&B PCR - Normal    Narrative:     The Xpert Xpress SARS-CoV-2/FLU/RSV plus is a rapid, multiplexed real-time PCR test intended for the simultaneous qualitative detection and differentiation of SARS-CoV-2, Influenza A, Influenza B, and respiratory syncytial virus (RSV) viral RNA in either nasopharyngeal swab or nasal swab specimens.         BLOOD CULTURE [facility]   BLOOD CULTURE [facility]          Imaging Results              X-Ray Chest 1 View (In process)    Procedure changed from X-Ray Abdomen AP 1 View (KUB)                    CT Abdomen Pelvis With IV Contrast NO Oral Contrast (Preliminary result)  Result time 01/14/24 19:57:11      Preliminary result by Tyler Pathak MD (01/14/24 19:57:11)                   Narrative:    START OF REPORT:  Technique: CT of the abdomen and pelvis was performed with axial images as well as sagittal and coronal reconstruction images with intravenous contrast.    Comparison: None available.    Clinical History: Abd pain.    Dosage Information: Automated Exposure Control was utilized 359.99 mGy.cm.    Findings:  Lines and Tubes: None.  Thorax:  Lungs: There is mild nonspecific dependent change at the lung bases.  Pleura: No effusions or thickening.  Heart: The heart size is within normal limits. Moderate coronary artery  calcification is seen.  Abdomen:  Abdominal Wall: No abdominal wall pathology is seen.  Liver: The liver appears unremarkable.  Biliary System: No intrahepatic or extrahepatic biliary duct dilatation is seen.  Gallbladder: The gallbladder appears unremarkable.  Pancreas: Mild pancreatic atrophy is seen. The pancreas appears otherwise unremarkable.  Spleen: The spleen appears unremarkable.  Adrenals: The adrenal glands appear unremarkable.  Kidneys: A few cysts are identified in the left kidney the largest of which measures 1.9 cm is on Image 78, Series 2 in the lower pole of the left kidney. The left kidney otherwise appears unremarkable with no stones or hydronephrosis identified. A single cyst measuring 11.3 mm is seen on Image 69, Series 2 in the mid pole of the right kidney. The right kidney otherwise appears unremarkable with no stones or hydronephrosis identified.  Aorta: There is moderate calcification of the abdominal aorta and its branches.  IVC: Unremarkable.  Bowel:  Esophagus: The visualized distal esophagus is mildly distended with gas and fluid but appears otherwise unremarkable.  Stomach: The stomach appears unremarkable.  Duodenum: Unremarkable appearing duodenum.  Small Bowel: The small bowel appears unremarkable.  Colon: There is moderate stool in the colon which could reflect an element of constipation. There is marked dilatation of the sigmoid colon, measuring up to 7.7 cm in diameter, with transition points identified at its proximal and distal segments, located in the same region and are noted to cross over each other (series 2, image 104). There is associated mild dilatation of the more proximal segments of the colon, measuring up to 5.2 cm in diameter.  Appendix: The appendix is not identified but no inflammatory changes are seen in the right lower quadrant to suggest appendicitis.  Peritoneum: No intraperitoneal free air or ascites is seen.    Pelvis:  Bladder: The bladder appears  unremarkable.  Female:  Uterus: The uterus is not identified.  Ovaries: No adnexal masses are seen.    Bony structures:  Dorsal Spine: There is moderate multilevel spondylosis of the visualized dorsal spine. The spinous process of the lower lumbar spine appear enlarged and are closely approximated, with associated sclerosis. This is consistent with Basstrup disease.    Notifications: The results were discussed prior to dictation with the patient's nurse (Layne) at 2024-01-14 19:55:17 CST.      Impression:  1. There is marked dilatation of the sigmoid colon, measuring up to 7.7 cm in diameter, with transition points identified at its proximal and distal segments, located in the same region and are noted to cross over each other (series 2, image 104). This is consistent with sigmoid volvulus. There is associated mild dilatation of the more proximal segments of the colon, measuring up to 5.2 cm in diameter.  2. The results were discussed prior to dictation with the patient's nurse (Layne) at 2024-01-14 19:55:17 CST.  3. Details and other findings as discussed above.                                         X-Ray Chest 1 View (In process)                      Medications   0.9%  NaCl infusion (0 mLs Intravenous Stopped 1/14/24 2132)   iopamidoL (ISOVUE-370) injection 100 mL (100 mLs Intravenous Given 1/14/24 1903)   piperacillin-tazobactam (ZOSYN) 4.5 g in dextrose 5 % in water (D5W) 100 mL IVPB (MB+) (0 g Intravenous Stopped 1/14/24 2113)   0.9%  NaCl infusion (1,000 mLs Intravenous New Bag 1/14/24 2148)   HYDROmorphone (PF) injection 0.5 mg (0.5 mg Intravenous Given 1/14/24 2143)   ondansetron injection 4 mg (4 mg Intravenous Given 1/14/24 2143)   hydrALAZINE injection 20 mg (20 mg Intravenous Given 1/14/24 2143)     Medical Decision Making  This is Dr. Cat and I spoke with Dr. Pickens at [facility] at 11:45 p.m. and he accepted the patient in transfer.    Amount and/or Complexity of Data  Reviewed  Labs: ordered.  Radiology: ordered.    Risk  Prescription drug management.              Attending Attestation:     Physician Attestation Statement for NP/PA:   I personally made/approved the management plan and take responsibility for the patient management.    Other NP/PA Attestation Additions:    History of Present Illness: Abdominal pain for a few days.   Physical Exam: Distended abdomen, TTP.   Medical Decision Making: Patient with sigmoid volvulus.  No beds available at this facility.  Transfer request has been entered.                                    Clinical Impression:  Final diagnoses:  [Z46.59] Encounter for nasogastric (NG) tube placement  [K56.2] Sigmoid volvulus (Primary)          ED Disposition Condition    Transfer to Another Facility Stable                Olvin Cat MD  01/14/24 8918       Olvin Cat MD  01/14/24 2598

## 2024-01-15 NOTE — ED PROVIDER NOTES
Encounter Date: 1/14/2024       History     Chief Complaint   Patient presents with    Abdominal Pain     From [facility]. C/o abd pain x 3 days with n/v & constipation. Last BM unknown.     See MDM    The history is provided by the patient. No  was used.     Review of patient's allergies indicates:   Allergen Reactions    Nsaids (non-steroidal anti-inflammatory drug)     Oxycodone      Past Medical History:   Diagnosis Date    CVA (cerebral vascular accident)     Depression     HTN (hypertension)     Hypothyroidism, unspecified     Squamous cell carcinoma in situ     Tonsillar cancer      Past Surgical History:   Procedure Laterality Date    HYSTERECTOMY      TONSILLECTOMY      TOTAL SHOULDER ARTHROPLASTY       Family History   Problem Relation Age of Onset    Cancer Mother     Leukemia Mother     Stroke Mother     Alzheimer's disease Father     Cancer Sister     Leukemia Sister      Social History     Tobacco Use    Smoking status: Some Days     Types: Cigarettes    Smokeless tobacco: Never    Tobacco comments:     patient stated that she smokes on some days.    Substance Use Topics    Alcohol use: Yes     Comment: wine, occasionally    Drug use: Never     Review of Systems   Gastrointestinal:  Positive for abdominal pain, constipation and nausea. Negative for vomiting.   All other systems reviewed and are negative.      Physical Exam     Initial Vitals [01/14/24 1742]   BP Pulse Resp Temp SpO2   (!) 165/92 85 20 97.7 °F (36.5 °C) (!) 92 %      MAP       --         Physical Exam    Nursing note and vitals reviewed.  Constitutional: She appears well-developed and well-nourished.   Eyes: Conjunctivae are normal.   Cardiovascular:  Normal rate and regular rhythm.           Pulmonary/Chest: Breath sounds normal. No respiratory distress.   Abdominal: She exhibits distension. There is abdominal tenderness (bilateral lower abdominal).     Neurological: She is alert and oriented to person, place, and  time.   Skin: Skin is warm and dry.   Psychiatric: She has a normal mood and affect.         ED Course   Procedures  Labs Reviewed   CBC WITH DIFFERENTIAL - Abnormal; Notable for the following components:       Result Value    WBC 23.07 (*)     RBC 5.73 (*)     Hgb 16.7 (*)     Hct 49.0 (*)     All other components within normal limits   COMPREHENSIVE METABOLIC PANEL - Abnormal; Notable for the following components:    Carbon Dioxide 20 (*)     Glucose Level 168 (*)     Blood Urea Nitrogen 41.0 (*)     Creatinine 1.36 (*)     Protein Total 8.4 (*)     Globulin 4.1 (*)     Albumin/Globulin Ratio 1.0 (*)     All other components within normal limits   MAGNESIUM - Abnormal; Notable for the following components:    Magnesium Level 3.30 (*)     All other components within normal limits   URINALYSIS, REFLEX TO URINE CULTURE - Abnormal; Notable for the following components:    Protein, UA 1+ (*)     Ketones, UA Trace (*)     Blood, UA 1+ (*)     All other components within normal limits   MANUAL DIFFERENTIAL - Abnormal; Notable for the following components:    Neutrophils % 91 (*)     Lymphs % 5 (*)     Neutrophils Abs Calc 20.9937 (*)     All other components within normal limits   URINALYSIS, MICROSCOPIC - Abnormal; Notable for the following components:    Squamous Epithelial Cells, UA Moderate (*)     All other components within normal limits   LIPASE - Normal   LACTIC ACID, PLASMA - Normal   COVID/FLU A&B PCR - Normal    Narrative:     The Xpert Xpress SARS-CoV-2/FLU/RSV plus is a rapid, multiplexed real-time PCR test intended for the simultaneous qualitative detection and differentiation of SARS-CoV-2, Influenza A, Influenza B, and respiratory syncytial virus (RSV) viral RNA in either nasopharyngeal swab or nasal swab specimens.         BLOOD CULTURE [facility]   BLOOD CULTURE [facility]          Imaging Results              X-Ray Chest 1 View (In process)    Procedure changed from X-Ray Abdomen AP 1 View (KUB)                     CT Abdomen Pelvis With IV Contrast NO Oral Contrast (Preliminary result)  Result time 01/14/24 19:57:11      Preliminary result by Tyler Pathak MD (01/14/24 19:57:11)                   Narrative:    START OF REPORT:  Technique: CT of the abdomen and pelvis was performed with axial images as well as sagittal and coronal reconstruction images with intravenous contrast.    Comparison: None available.    Clinical History: Abd pain.    Dosage Information: Automated Exposure Control was utilized 359.99 mGy.cm.    Findings:  Lines and Tubes: None.  Thorax:  Lungs: There is mild nonspecific dependent change at the lung bases.  Pleura: No effusions or thickening.  Heart: The heart size is within normal limits. Moderate coronary artery calcification is seen.  Abdomen:  Abdominal Wall: No abdominal wall pathology is seen.  Liver: The liver appears unremarkable.  Biliary System: No intrahepatic or extrahepatic biliary duct dilatation is seen.  Gallbladder: The gallbladder appears unremarkable.  Pancreas: Mild pancreatic atrophy is seen. The pancreas appears otherwise unremarkable.  Spleen: The spleen appears unremarkable.  Adrenals: The adrenal glands appear unremarkable.  Kidneys: A few cysts are identified in the left kidney the largest of which measures 1.9 cm is on Image 78, Series 2 in the lower pole of the left kidney. The left kidney otherwise appears unremarkable with no stones or hydronephrosis identified. A single cyst measuring 11.3 mm is seen on Image 69, Series 2 in the mid pole of the right kidney. The right kidney otherwise appears unremarkable with no stones or hydronephrosis identified.  Aorta: There is moderate calcification of the abdominal aorta and its branches.  IVC: Unremarkable.  Bowel:  Esophagus: The visualized distal esophagus is mildly distended with gas and fluid but appears otherwise unremarkable.  Stomach: The stomach appears unremarkable.  Duodenum: Unremarkable appearing duodenum.  Small  Bowel: The small bowel appears unremarkable.  Colon: There is moderate stool in the colon which could reflect an element of constipation. There is marked dilatation of the sigmoid colon, measuring up to 7.7 cm in diameter, with transition points identified at its proximal and distal segments, located in the same region and are noted to cross over each other (series 2, image 104). There is associated mild dilatation of the more proximal segments of the colon, measuring up to 5.2 cm in diameter.  Appendix: The appendix is not identified but no inflammatory changes are seen in the right lower quadrant to suggest appendicitis.  Peritoneum: No intraperitoneal free air or ascites is seen.    Pelvis:  Bladder: The bladder appears unremarkable.  Female:  Uterus: The uterus is not identified.  Ovaries: No adnexal masses are seen.    Bony structures:  Dorsal Spine: There is moderate multilevel spondylosis of the visualized dorsal spine. The spinous process of the lower lumbar spine appear enlarged and are closely approximated, with associated sclerosis. This is consistent with Basstrup disease.    Notifications: The results were discussed prior to dictation with the patient's nurse (Layne) at 2024-01-14 19:55:17 CST.      Impression:  1. There is marked dilatation of the sigmoid colon, measuring up to 7.7 cm in diameter, with transition points identified at its proximal and distal segments, located in the same region and are noted to cross over each other (series 2, image 104). This is consistent with sigmoid volvulus. There is associated mild dilatation of the more proximal segments of the colon, measuring up to 5.2 cm in diameter.  2. The results were discussed prior to dictation with the patient's nurse (Layne) at 2024-01-14 19:55:17 CST.  3. Details and other findings as discussed above.                                         X-Ray Chest 1 View (In process)                      Medications   0.9%  NaCl infusion (0 mLs  Intravenous Stopped 1/14/24 2132)   iopamidoL (ISOVUE-370) injection 100 mL (100 mLs Intravenous Given 1/14/24 1903)   piperacillin-tazobactam (ZOSYN) 4.5 g in dextrose 5 % in water (D5W) 100 mL IVPB (MB+) (0 g Intravenous Stopped 1/14/24 2113)   0.9%  NaCl infusion (1,000 mLs Intravenous New Bag 1/14/24 2148)   HYDROmorphone (PF) injection 0.5 mg (0.5 mg Intravenous Given 1/14/24 2143)   ondansetron injection 4 mg (4 mg Intravenous Given 1/14/24 2143)   hydrALAZINE injection 20 mg (20 mg Intravenous Given 1/14/24 2143)     Medical Decision Making  79 y/o female presents from [facility] for abdominal pain for the past 2-3 days with nausea. No BM in 2 days. She does admit to passing some gas. Feels pain more bilateral lower abdominal area. Denies any known fever.     Labs show 23 wbc with shift. Lactate neg. Cultures drawn. Creatinine and bun mildly elevated. Anion gap 17. Co2 20. K normal.     CT shows sigmoid vulvulus. Discussed with dr. REESE Rudd. NG tube, antibiotics. Unfortunately we have no open beds here so we will need to transfer out for surgery. Pfc order entered. Awaiting transfer location. Bp noted to be elevated, medications given for this along with pain meds.     Amount and/or Complexity of Data Reviewed  Labs: ordered. Decision-making details documented in ED Course.  Radiology: ordered. Decision-making details documented in ED Course.  Discussion of management or test interpretation with external provider(s): Discussed with dr. Cat, he had face to face with patient.     Risk  Prescription drug management.      Additional MDM:   Differential Diagnosis:   Other: The following diagnoses were also considered and will be evaluated: UTI, colitis and bowel obstruction.                                   Clinical Impression:  Final diagnoses:  [Z46.59] Encounter for nasogastric (NG) tube placement  [K56.2] Sigmoid volvulus (Primary)          ED Disposition Condition    Transfer to Another  Facility Stable                Layne Amos, [facility]  01/14/24 3834

## 2024-01-20 LAB
BACTERIA BLD CULT: NORMAL
BACTERIA BLD CULT: NORMAL

## 2024-01-24 ENCOUNTER — HOSPITAL ENCOUNTER (EMERGENCY)
Facility: HOSPITAL | Age: 79
Discharge: HOME | End: 2024-01-24
Attending: EMERGENCY MEDICINE
Payer: MEDICARE

## 2024-01-24 VITALS
DIASTOLIC BLOOD PRESSURE: 79 MMHG | HEART RATE: 71 BPM | BODY MASS INDEX: 26.68 KG/M2 | RESPIRATION RATE: 16 BRPM | WEIGHT: 145 LBS | OXYGEN SATURATION: 98 % | SYSTOLIC BLOOD PRESSURE: 128 MMHG | TEMPERATURE: 98 F | HEIGHT: 62 IN

## 2024-01-24 DIAGNOSIS — G45.9 TIA (TRANSIENT ISCHEMIC ATTACK): Primary | ICD-10-CM

## 2024-01-24 LAB
ABORH RETYPE: NORMAL
ALBUMIN SERPL-MCNC: 3.5 G/DL (ref 3.4–4.8)
ALBUMIN/GLOB SERPL: 1.1 RATIO (ref 1.1–2)
ALP SERPL-CCNC: 67 UNIT/L (ref 40–150)
ALT SERPL-CCNC: 9 UNIT/L (ref 0–55)
APTT PPP: 29.6 SECONDS (ref 24.6–37.2)
AST SERPL-CCNC: 13 UNIT/L (ref 5–34)
BASOPHILS # BLD AUTO: 0.07 X10(3)/MCL
BASOPHILS NFR BLD AUTO: 0.7 %
BILIRUB SERPL-MCNC: 0.2 MG/DL
BUN SERPL-MCNC: 17 MG/DL (ref 9.8–20.1)
CALCIUM SERPL-MCNC: 9.3 MG/DL (ref 8.4–10.2)
CHLORIDE SERPL-SCNC: 103 MMOL/L (ref 98–107)
CO2 SERPL-SCNC: 27 MMOL/L (ref 23–31)
CREAT SERPL-MCNC: 1.19 MG/DL (ref 0.55–1.02)
EOSINOPHIL # BLD AUTO: 0.23 X10(3)/MCL (ref 0–0.9)
EOSINOPHIL NFR BLD AUTO: 2.4 %
ERYTHROCYTE [DISTWIDTH] IN BLOOD BY AUTOMATED COUNT: 14.2 % (ref 11.5–17)
FLUAV AG UPPER RESP QL IA.RAPID: NOT DETECTED
FLUBV AG UPPER RESP QL IA.RAPID: NOT DETECTED
GFR SERPLBLD CREATININE-BSD FMLA CKD-EPI: 47 MLS/MIN/1.73/M2
GLOBULIN SER-MCNC: 3.2 GM/DL (ref 2.4–3.5)
GLUCOSE SERPL-MCNC: 99 MG/DL (ref 82–115)
GROUP & RH: NORMAL
HCT VFR BLD AUTO: 40.5 % (ref 37–47)
HGB BLD-MCNC: 13.6 G/DL (ref 12–16)
IMM GRANULOCYTES # BLD AUTO: 0.08 X10(3)/MCL (ref 0–0.04)
IMM GRANULOCYTES NFR BLD AUTO: 0.8 %
INDIRECT COOMBS: NORMAL
INR PPP: 1
LACTATE SERPL-SCNC: 1.5 MMOL/L (ref 0.5–2.2)
LIPASE SERPL-CCNC: 40 U/L
LYMPHOCYTES # BLD AUTO: 1.37 X10(3)/MCL (ref 0.6–4.6)
LYMPHOCYTES NFR BLD AUTO: 14.1 %
MAGNESIUM SERPL-MCNC: 2.2 MG/DL (ref 1.6–2.6)
MCH RBC QN AUTO: 29.7 PG (ref 27–31)
MCHC RBC AUTO-ENTMCNC: 33.6 G/DL (ref 33–36)
MCV RBC AUTO: 88.4 FL (ref 80–94)
MONOCYTES # BLD AUTO: 0.97 X10(3)/MCL (ref 0.1–1.3)
MONOCYTES NFR BLD AUTO: 10 %
NEUTROPHILS # BLD AUTO: 6.97 X10(3)/MCL (ref 2.1–9.2)
NEUTROPHILS NFR BLD AUTO: 72 %
PLATELET # BLD AUTO: 361 X10(3)/MCL (ref 130–400)
PMV BLD AUTO: 9.7 FL (ref 7.4–10.4)
POTASSIUM SERPL-SCNC: 3.4 MMOL/L (ref 3.5–5.1)
PROT SERPL-MCNC: 6.7 GM/DL (ref 5.8–7.6)
PROTHROMBIN TIME: 13.3 SECONDS (ref 12.5–14.5)
RBC # BLD AUTO: 4.58 X10(6)/MCL (ref 4.2–5.4)
RSV A 5' UTR RNA NPH QL NAA+PROBE: NOT DETECTED
SARS-COV-2 RNA RESP QL NAA+PROBE: NOT DETECTED
SODIUM SERPL-SCNC: 138 MMOL/L (ref 136–145)
SPECIMEN OUTDATE: NORMAL
TROPONIN I SERPL-MCNC: 0.02 NG/ML (ref 0–0.04)
TSH SERPL-ACNC: 3.6 UIU/ML (ref 0.35–4.94)
WBC # SPEC AUTO: 9.69 X10(3)/MCL (ref 4.5–11.5)

## 2024-01-24 PROCEDURE — 83605 ASSAY OF LACTIC ACID: CPT | Performed by: EMERGENCY MEDICINE

## 2024-01-24 PROCEDURE — 80053 COMPREHEN METABOLIC PANEL: CPT | Performed by: EMERGENCY MEDICINE

## 2024-01-24 PROCEDURE — 99285 EMERGENCY DEPT VISIT HI MDM: CPT | Mod: 25

## 2024-01-24 PROCEDURE — 93010 ELECTROCARDIOGRAM REPORT: CPT | Mod: ,,, | Performed by: INTERNAL MEDICINE

## 2024-01-24 PROCEDURE — 25000003 PHARM REV CODE 250: Performed by: EMERGENCY MEDICINE

## 2024-01-24 PROCEDURE — 83735 ASSAY OF MAGNESIUM: CPT | Performed by: EMERGENCY MEDICINE

## 2024-01-24 PROCEDURE — 84443 ASSAY THYROID STIM HORMONE: CPT | Performed by: EMERGENCY MEDICINE

## 2024-01-24 PROCEDURE — 93005 ELECTROCARDIOGRAM TRACING: CPT

## 2024-01-24 PROCEDURE — 86850 RBC ANTIBODY SCREEN: CPT | Performed by: EMERGENCY MEDICINE

## 2024-01-24 PROCEDURE — 85025 COMPLETE CBC W/AUTO DIFF WBC: CPT | Performed by: EMERGENCY MEDICINE

## 2024-01-24 PROCEDURE — 85730 THROMBOPLASTIN TIME PARTIAL: CPT | Performed by: EMERGENCY MEDICINE

## 2024-01-24 PROCEDURE — 83690 ASSAY OF LIPASE: CPT | Performed by: EMERGENCY MEDICINE

## 2024-01-24 PROCEDURE — 96360 HYDRATION IV INFUSION INIT: CPT

## 2024-01-24 PROCEDURE — 0241U COVID/RSV/FLU A&B PCR: CPT | Performed by: EMERGENCY MEDICINE

## 2024-01-24 PROCEDURE — 84484 ASSAY OF TROPONIN QUANT: CPT | Performed by: EMERGENCY MEDICINE

## 2024-01-24 PROCEDURE — 85610 PROTHROMBIN TIME: CPT | Performed by: EMERGENCY MEDICINE

## 2024-01-24 RX ORDER — CLOPIDOGREL BISULFATE 75 MG/1
75 TABLET ORAL
Status: COMPLETED | OUTPATIENT
Start: 2024-01-24 | End: 2024-01-24

## 2024-01-24 RX ORDER — LISINOPRIL 5 MG/1
TABLET ORAL
COMMUNITY
Start: 2024-01-18

## 2024-01-24 RX ORDER — AMLODIPINE BESYLATE 5 MG/1
TABLET ORAL
COMMUNITY
Start: 2024-01-18

## 2024-01-24 RX ORDER — DOCUSATE SODIUM 100 MG/1
CAPSULE, LIQUID FILLED ORAL
COMMUNITY

## 2024-01-24 RX ORDER — ARIPIPRAZOLE 5 MG/1
TABLET ORAL
COMMUNITY

## 2024-01-24 RX ORDER — SODIUM CHLORIDE 9 MG/ML
1000 INJECTION, SOLUTION INTRAVENOUS
Status: COMPLETED | OUTPATIENT
Start: 2024-01-24 | End: 2024-01-24

## 2024-01-24 RX ORDER — CLOPIDOGREL BISULFATE 75 MG/1
75 TABLET ORAL DAILY
Qty: 30 TABLET | Refills: 0 | Status: SHIPPED | OUTPATIENT
Start: 2024-01-24 | End: 2024-02-23

## 2024-01-24 RX ADMIN — SODIUM CHLORIDE 1000 ML: 9 INJECTION, SOLUTION INTRAVENOUS at 05:01

## 2024-01-24 RX ADMIN — CLOPIDOGREL BISULFATE 75 MG: 75 TABLET ORAL at 05:01

## 2024-01-24 NOTE — DISCHARGE INSTRUCTIONS
Maximum fall precautions please    Prescription for Plavix 75 mg q.h.s. she has received her 1st dose tonight already    Continue previous medications    Follow-up with her primary care doctor as needed

## 2024-01-24 NOTE — ED PROVIDER NOTES
Encounter Date: 2024       History     Chief Complaint   Patient presents with    Extremity Weakness     [facility] for [facility] arm weakness starting an hour ago which has since resolved. Also reports headache.     Ms. Calvillo comes emergency department she says maybe an hour maybe an hour half ago she reached for coughing right-hand did not want to work right she said it just would not hold the cup of coffee right she did not drop it she can pick it up she did not have any trouble with her speech did not have any trouble with her thought she did not have any trouble moving her leg it was just the arm in the hand that did not want to function right knee dot did not last long but she called the people at the [facility] home said she thought she was having a stroke so they sent her to the emergency department for evaluation.  All of her symptoms have completely resolved she has no complaints she does not hurt she says she has not on any anticoagulation for this prior stroke she had that affected her left side it did get better .  She did tell me that the stroke affected her right-sided to also.    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  She has had COVID flu and pneumonia vaccines she says you know the nursing home gives all those she does not remember her last tetanus booster.  MediPort placement and removed it appears hysterectomy and tonsillectomy adenoidectomy.   3 para 3 no miscarriages she states Dr. Leeanna valentine follows her at the [facility].  She is  she lives at [facility].  Her mother  she had leukemia dad  he had dementia.  She said she is allergic to nonsteroidal anti-inflammatory drugs and oxycodone.      Review of  patient's allergies indicates:   Allergen Reactions    Nsaids (non-steroidal anti-inflammatory drug)     Oxycodone      Past Medical History:   Diagnosis Date    CVA (cerebral vascular accident)     Depression     HTN (hypertension)     Hypothyroidism, unspecified     Squamous cell carcinoma in situ     Tonsillar cancer      Past Surgical History:   Procedure Laterality Date    HYSTERECTOMY      TONSILLECTOMY      TOTAL SHOULDER ARTHROPLASTY       Family History   Problem Relation Age of Onset    Cancer Mother     Leukemia Mother     Stroke Mother     Alzheimer's disease Father     Cancer Sister     Leukemia Sister      Social History     Tobacco Use    Smoking status: Some Days     Types: Cigarettes    Smokeless tobacco: Never    Tobacco comments:     patient stated that she smokes on some days.    Substance Use Topics    Alcohol use: Yes     Comment: wine, occasionally    Drug use: Never     Review of Systems   Constitutional: Negative.  Negative for chills and fever.        Aside from the right hand they did not want to work for a brief time she has no other complaints or problems she feels fine she is in good spirits   HENT:  Positive for dental problem, tinnitus and voice change.    Eyes: Negative.    Respiratory: Negative.     Cardiovascular: Negative.    Gastrointestinal: Negative.    Endocrine: Negative.    Genitourinary: Negative.  Negative for difficulty urinating and urgency.   Musculoskeletal:  Negative for arthralgias, back pain, gait problem, joint swelling, myalgias, neck pain and neck stiffness.   Skin:  Negative for rash.   Allergic/Immunologic: Negative.    Neurological:  Positive for weakness (Right-hand did not want to function when she reached that cup of coffee an hour to an hour and a half ago it is working fine now the entire arm just seem not to cooperate). Negative for dizziness, tremors, seizures, syncope, facial asymmetry, speech difficulty, light-headedness, numbness and headaches.    Hematological:  Positive for adenopathy. Does not bruise/bleed easily.   Psychiatric/Behavioral:  Negative for behavioral problems and confusion.    All other systems reviewed and are negative.      Physical Exam     Initial Vitals [01/24/24 1601]   BP Pulse Resp Temp SpO2   (!) 157/78 63 18 97.7 °F (36.5 °C) 97 %      MAP       --         Physical Exam    Nursing note and vitals reviewed.  Constitutional: She appears well-developed and well-nourished. No distress.   Very happy well-spoken clear voice answers all questions appropriately she knows which hospital she is at.  She gives correct answers to the questions   HENT:   Head: Normocephalic and atraumatic.   Right Ear: Tympanic membrane and external ear normal.   Left Ear: Tympanic membrane and external ear normal.   Nose: Nose normal.   Mouth/Throat: Oropharynx is clear and moist and mucous membranes are normal.   DenturesAre present top and bottom moist oral mucosa is noted   Eyes: EOM are normal. Pupils are equal, round, and reactive to light.   Neck: Neck supple. No thyromegaly present. No tracheal deviation present. No JVD present.   I do not appreciate carotid thrills or bruits on either side   Normal range of motion.  Cardiovascular:  Normal rate, regular rhythm, normal heart sounds and intact distal pulses.     Exam reveals no gallop and no friction rub.       No murmur heard.  Pulmonary/Chest: Breath sounds normal. No stridor. No respiratory distress. She has no wheezes. She has no rhonchi. She has no rales. She exhibits no tenderness.   Abdominal: Abdomen is soft. Bowel sounds are normal. She exhibits no distension and no mass. There is no abdominal tenderness.   Genitourinary:    Genitourinary Comments: She has no CVA tenderness     Musculoskeletal:         General: No tenderness or edema. Normal range of motion.      Cervical back: Normal range of motion and neck supple.     Lymphadenopathy:     She has no cervical adenopathy.   Neurological: She  is alert and oriented to person, place, and time. She has normal strength and normal reflexes. No cranial nerve deficit. GCS score is 15. GCS eye subscore is 4. GCS verbal subscore is 5. GCS motor subscore is 6.   Finger-to-nose is intact briskly with both upper extremities with no trouble.  Both legs have symmetrical strength.  They can both be lifting held off the bed there is no weakness noted upper extremities or lower extremities.  No paresthesias no numbness no tingling noted no facial asymmetry speech is clear and goal oriented.  Tongue protrudes midline  there is no pronator drift on either side.   Skin: Skin is warm and dry. Capillary refill takes less than 2 seconds. No rash noted.   Psychiatric: She has a normal mood and affect. Her behavior is normal. Judgment and thought content normal.         ED Course   Procedures  Labs Reviewed   COMPREHENSIVE METABOLIC PANEL - Abnormal; Notable for the following components:       Result Value    Potassium Level 3.4 (*)     Creatinine 1.19 (*)     All other components within normal limits   CBC WITH DIFFERENTIAL - Abnormal; Notable for the following components:    IG# 0.08 (*)     All other components within normal limits   APTT - Normal   LACTIC ACID, PLASMA - Normal   LIPASE - Normal   MAGNESIUM - Normal   PROTIME-INR - Normal   TROPONIN I - Normal   TSH - Normal   CBC W/ AUTO DIFFERENTIAL    Narrative:     The following orders were created for panel order CBC auto differential.  Procedure                               Abnormality         Status                     ---------                               -----------         ------                     CBC with Differential[[id number]]       Abnormal            Final result                 Please view results for these tests on the individual orders.   COVID/RSV/FLU A&B PCR   URINALYSIS, REFLEX TO URINE CULTURE   TYPE & SCREEN   ABORH RETYPE     EKG Readings: (Independently Interpreted)   Initial Reading: No STEMI.  Previous EKG Date: None here to review. Rhythm: Normal Sinus Rhythm. Heart Rate: 61. Other Findings: Prolonged QT Interval.   EKG is done at 4:23 p.m. normal sinus rhythm with nonspecific T-wave changes prolonged QT interval.  QT is 475 milliseconds.       Imaging Results              CT Head Without Contrast (Final result)  Result time 01/24/24 17:20:53      Final result by Palomo Borjas MD (01/24/24 17:20:53)                   Impression:      1. No acute intracranial abnormality identified.  Consider MRI for further evaluation if acute infarct is suspected.  2. Mucosal thickening and aerated secretions in the right maxillary antrum concerning for sinusitis.      Electronically signed by: Palomo Borjas  Date:    01/24/2024  Time:    17:20               Narrative:    EXAMINATION:  CT HEAD WITHOUT CONTRAST    CLINICAL HISTORY:  Neuro deficit, acute, stroke suspected;    TECHNIQUE:  Low dose axial CT images obtained throughout the head without intravenous contrast. Sagittal and coronal reconstructions were performed.    COMPARISON:  MRI 11/10/2022    FINDINGS:  Intracranial compartment:    Prominence of the ventricles and sulci compatible with generalized cerebral volume loss.  No hydrocephalus.  No extra-axial blood or fluid collections.    Hypoattenuation throughout the supratentorial white matter most commonly reflects chronic microvascular ischemic change.  Small hypoattenuating foci in the bilateral basal ganglia and left thalamus likely reflect remote lacunar type infarcts.  No acute parenchymal hemorrhage or evolving major vascular distribution infarct.  No intracranial mass effect or midline shift.    Skull/extracranial contents (limited evaluation): No fracture. Mastoid effusions.  Mucosal thickening and aerated secretions in the right maxillary antrum.                                       Medications   clopidogreL tablet 75 mg (has no administration in time range)   0.9%  NaCl infusion (1,000 mLs  Intravenous New Bag 1/24/24 1701)     Medical Decision Making    Ms. Calvillo comes emergency department she says maybe an hour maybe an hour half ago she reached for coughing right-hand did not want to work right she said it just would not hold the cup of coffee right she did not drop it she can pick it up she did not have any trouble with her speech did not have any trouble with her thought she did not have any trouble moving her leg it was just the arm in the hand that did not want to function right knee dot did not last long but she called the people at the nursing home said she thought she was having a stroke so they sent her to the emergency department for evaluation.  All of her symptoms have completely resolved she has no complaints she does not hurt she says she has not on any anticoagulation for this prior stroke she had that affected her left side it did get better .  She did tell me that the stroke affected her right-sided to also.      Amount and/or Complexity of Data Reviewed  Independent Historian: EMS     Details: Nursing home record  External Data Reviewed: notes.     Details: Patient was treated for volvulus on the 14th of this month it was resolved with apparently colonoscope.    Labs: ordered. Decision-making details documented in ED Course.  Radiology: ordered and independent interpretation performed.     Details: Brain CT without contrast is ordered  ECG/medicine tests: ordered and independent interpretation performed. Decision-making details documented in ED Course.     Details: No atrial fibrillation benign EKG  Discussion of management or test interpretation with external provider(s): DDX  TIA, small hemorrhagic event, ischemic stroke hemorrhagic stroke unknown discoordination of right arm focal seizure type event electrolyte disturbance    Risk  Prescription drug management.  Risk Details: MDM  Problems addressed  Co-morbidities and/or factors adding to the complexity or risk for the patient:   DNR status advanced age.  Previous history of stroke affecting left side recent volvulus sigmoid volvulus resolve with colonoscopies   Acute problem/illness or progression/exacerbation of chronic problem with potential threat to life/bodily dysfunction?:  Dysfunction of right arm briefly resolved  Differential diagnoses/problems considered: see above     Amount and/or Complexity of Data Reviewed  Independent Historian: EMS (see above for summary)  External Data Reviewed: notes from previous ED visits, nursing home records, and prescription medications  (see above for summary)  Risk and benefits of testing: discussed   Labs: Labs: ordered and reviewed  Radiology:Radiology: ordered and independent interpretation performed (see above or ED course)  ECG/medicine tests:Radiology: ordered and independent interpretation performed (see above or ED course)  discussed with primary care physician    Risk  Diagnosis or treatment significantly impacted by social determinants of health: none   Shared decision making     Critical Care  none     Critical Care  Total time providing critical care: 0 minutes               ED Course as of 01/24/24 1742   Wed Jan 24, 2024   1642 Patient tells me she is not on any form of anticoagulation at this point if she can not take the anti-inflammatory drugs I suppose we could use Plavix I am going to speak with Dr. Leeanna De Leon attending physician if possible regarding this if her workup returned benign      CBC comes back 13.6 and 40.5 hemoglobin hematocrit white count 9.69.  Platelet count 361 K [DM]   1649 I did have a chance to speak to her primary care doctor.  We discussed Plavix she felt that they would be appropriate to give the patient Plavix if her workup returned benign in her symptoms had not reoccurred.  She told me that she has minimum fall risk and she thought it would be worthwhile using the Plavix 1 recorded fall in the past but no frequent falls recently.  We did talk  about her recent volvulus that had to be fixed with the colonoscope and possible surgical fixation to tack the end of the colon down sigmoid volvulus but we can always stop the Plavix prior to surgery [DM]   1654 Lipase is only 40 magnesium is 2.2.  Potassium minimally low at 3.4 sodium 138 creatinine 1.19 otherwise Chem 24 looks normal.  Brain CT is pending PT INR is pending lactic acid troponin TSH all that is pending [DM]   1715 TSH 3.6 PT T 29.6 PT INR 13.31  We are.0 lipase 40 magnesium 2.2 troponin 0.016  We are waiting for a CT brain to be interpreted.  After my discussion with her doctor I feel we will give her 75 mg Plavix here and asked her to take 75 mg of Plavix q.h.s. [DM]   1734 The CT brain has returned negative.  I will allow the patient to go home we can always call [facility] when if any of these other tests come back positive.  Patient is ready for discharge I told her we spoke with Dr. brent valentine and we are going to start this Plavix medication to help prevent TIAs recurrent strokes it is not an anti-inflammatory drug it is not aspirin [DM]   1734 Ms. Calvillo daughter is in the room now.  I spoke to her as I spoke to the patient.  I think she can safely go home we will start her on the Plavix we can call [facility] if [DM]      ED Course User Index  [DM] Kevin Grubbs MD                             Clinical Impression:  Final diagnoses:  [G45.9] TIA (transient ischemic attack) (Primary)          ED Disposition Condition    Discharge Stable          ED Prescriptions       Medication Sig Dispense Start Date End Date Auth. Provider    clopidogreL (PLAVIX) 75 mg tablet Take 1 tablet (75 mg total) by mouth once daily. Q.h.s. 30 tablet 1/24/2024 2/23/2024 Kevin Grubbs MD          Follow-up Information       Follow up With Specialties Details Why Contact Info    Leeanna De Leon MD Family Medicine In 3 days As needed [address]  [facility]  [address]  [phone]               Kevin Grubbs MD  01/24/24 4965

## 2024-07-09 ENCOUNTER — LAB REQUISITION (OUTPATIENT)
Dept: LAB | Facility: HOSPITAL | Age: 79
End: 2024-07-09
Payer: MEDICARE

## 2024-07-09 DIAGNOSIS — U07.1 COVID-19: ICD-10-CM

## 2024-07-09 LAB
FLUAV AG UPPER RESP QL IA.RAPID: NOT DETECTED
FLUBV AG UPPER RESP QL IA.RAPID: NOT DETECTED
SARS-COV-2 RNA RESP QL NAA+PROBE: DETECTED

## 2024-07-09 PROCEDURE — 0240U COVID/FLU A&B PCR: CPT | Performed by: FAMILY MEDICINE

## 2024-07-26 ENCOUNTER — ANESTHESIA EVENT (OUTPATIENT)
Dept: SURGERY | Facility: HOSPITAL | Age: 79
End: 2024-07-26
Payer: MEDICARE

## 2024-07-26 ENCOUNTER — HOSPITAL ENCOUNTER (INPATIENT)
Facility: HOSPITAL | Age: 79
LOS: 6 days | Discharge: NURSING, REHAB OR LONG-TERM CARE | DRG: 330 | End: 2024-08-01
Attending: INTERNAL MEDICINE | Admitting: INTERNAL MEDICINE
Payer: MEDICARE

## 2024-07-26 ENCOUNTER — ANESTHESIA (OUTPATIENT)
Dept: SURGERY | Facility: HOSPITAL | Age: 79
End: 2024-07-26
Payer: MEDICARE

## 2024-07-26 DIAGNOSIS — K59.00 CONSTIPATION, UNSPECIFIED CONSTIPATION TYPE: ICD-10-CM

## 2024-07-26 DIAGNOSIS — K56.2 SIGMOID VOLVULUS: Primary | ICD-10-CM

## 2024-07-26 DIAGNOSIS — R07.9 CHEST PAIN: ICD-10-CM

## 2024-07-26 LAB
ALBUMIN SERPL-MCNC: 4.1 G/DL (ref 3.4–4.8)
ALBUMIN/GLOB SERPL: 1.3 RATIO (ref 1.1–2)
ALP SERPL-CCNC: 60 UNIT/L (ref 40–150)
ALT SERPL-CCNC: 11 UNIT/L (ref 0–55)
ANION GAP SERPL CALC-SCNC: 13 MEQ/L
AST SERPL-CCNC: 15 UNIT/L (ref 5–34)
BASOPHILS # BLD AUTO: 0.03 X10(3)/MCL
BASOPHILS NFR BLD AUTO: 0.2 %
BILIRUB SERPL-MCNC: 0.6 MG/DL
BUN SERPL-MCNC: 31 MG/DL (ref 9.8–20.1)
CALCIUM SERPL-MCNC: 10 MG/DL (ref 8.4–10.2)
CHLORIDE SERPL-SCNC: 107 MMOL/L (ref 98–107)
CO2 SERPL-SCNC: 20 MMOL/L (ref 23–31)
CREAT SERPL-MCNC: 0.94 MG/DL (ref 0.55–1.02)
CREAT/UREA NIT SERPL: 33
EOSINOPHIL # BLD AUTO: 0.03 X10(3)/MCL (ref 0–0.9)
EOSINOPHIL NFR BLD AUTO: 0.2 %
ERYTHROCYTE [DISTWIDTH] IN BLOOD BY AUTOMATED COUNT: 14.2 % (ref 11.5–17)
GFR SERPLBLD CREATININE-BSD FMLA CKD-EPI: >60 ML/MIN/1.73/M2
GLOBULIN SER-MCNC: 3.2 GM/DL (ref 2.4–3.5)
GLUCOSE SERPL-MCNC: 117 MG/DL (ref 82–115)
HCT VFR BLD AUTO: 37.1 % (ref 37–47)
HGB BLD-MCNC: 12.8 G/DL (ref 12–16)
IMM GRANULOCYTES # BLD AUTO: 0.09 X10(3)/MCL (ref 0–0.04)
IMM GRANULOCYTES NFR BLD AUTO: 0.5 %
LYMPHOCYTES # BLD AUTO: 1.02 X10(3)/MCL (ref 0.6–4.6)
LYMPHOCYTES NFR BLD AUTO: 6 %
MCH RBC QN AUTO: 29 PG (ref 27–31)
MCHC RBC AUTO-ENTMCNC: 34.5 G/DL (ref 33–36)
MCV RBC AUTO: 84.1 FL (ref 80–94)
MONOCYTES # BLD AUTO: 1.02 X10(3)/MCL (ref 0.1–1.3)
MONOCYTES NFR BLD AUTO: 6 %
NEUTROPHILS # BLD AUTO: 14.92 X10(3)/MCL (ref 2.1–9.2)
NEUTROPHILS NFR BLD AUTO: 87.1 %
PLATELET # BLD AUTO: 439 X10(3)/MCL (ref 130–400)
PMV BLD AUTO: 9.3 FL (ref 7.4–10.4)
POTASSIUM SERPL-SCNC: 3.4 MMOL/L (ref 3.5–5.1)
PROT SERPL-MCNC: 7.3 GM/DL (ref 5.8–7.6)
RBC # BLD AUTO: 4.41 X10(6)/MCL (ref 4.2–5.4)
SODIUM SERPL-SCNC: 140 MMOL/L (ref 136–145)
WBC # BLD AUTO: 17.11 X10(3)/MCL (ref 4.5–11.5)

## 2024-07-26 PROCEDURE — 11000001 HC ACUTE MED/SURG PRIVATE ROOM

## 2024-07-26 PROCEDURE — 80053 COMPREHEN METABOLIC PANEL: CPT | Performed by: NURSE PRACTITIONER

## 2024-07-26 PROCEDURE — 45337 SIGMOIDOSCOPY & DECOMPRESS: CPT | Performed by: SURGERY

## 2024-07-26 PROCEDURE — 37000008 HC ANESTHESIA 1ST 15 MINUTES: Performed by: SURGERY

## 2024-07-26 PROCEDURE — 25500020 PHARM REV CODE 255: Performed by: NURSE PRACTITIONER

## 2024-07-26 PROCEDURE — 99285 EMERGENCY DEPT VISIT HI MDM: CPT | Mod: 25

## 2024-07-26 PROCEDURE — 25000003 PHARM REV CODE 250: Performed by: NURSE ANESTHETIST, CERTIFIED REGISTERED

## 2024-07-26 PROCEDURE — 94761 N-INVAS EAR/PLS OXIMETRY MLT: CPT

## 2024-07-26 PROCEDURE — 37000009 HC ANESTHESIA EA ADD 15 MINS: Performed by: SURGERY

## 2024-07-26 PROCEDURE — 85025 COMPLETE CBC W/AUTO DIFF WBC: CPT | Performed by: NURSE PRACTITIONER

## 2024-07-26 PROCEDURE — 63600175 PHARM REV CODE 636 W HCPCS: Performed by: INTERNAL MEDICINE

## 2024-07-26 PROCEDURE — 63600175 PHARM REV CODE 636 W HCPCS: Performed by: NURSE ANESTHETIST, CERTIFIED REGISTERED

## 2024-07-26 PROCEDURE — 25000003 PHARM REV CODE 250: Performed by: INTERNAL MEDICINE

## 2024-07-26 PROCEDURE — 0D9N8ZZ DRAINAGE OF SIGMOID COLON, VIA NATURAL OR ARTIFICIAL OPENING ENDOSCOPIC: ICD-10-PCS | Performed by: SURGERY

## 2024-07-26 RX ORDER — SODIUM CHLORIDE, SODIUM LACTATE, POTASSIUM CHLORIDE, CALCIUM CHLORIDE 600; 310; 30; 20 MG/100ML; MG/100ML; MG/100ML; MG/100ML
INJECTION, SOLUTION INTRAVENOUS CONTINUOUS
Status: DISCONTINUED | OUTPATIENT
Start: 2024-07-26 | End: 2024-08-01 | Stop reason: HOSPADM

## 2024-07-26 RX ORDER — ATORVASTATIN CALCIUM 10 MG/1
10 TABLET, FILM COATED ORAL DAILY
Status: DISCONTINUED | OUTPATIENT
Start: 2024-07-27 | End: 2024-08-01 | Stop reason: HOSPADM

## 2024-07-26 RX ORDER — QUINIDINE GLUCONATE 324 MG
240 TABLET, EXTENDED RELEASE ORAL 2 TIMES DAILY
Status: ON HOLD | COMMUNITY
End: 2024-08-01 | Stop reason: HOSPADM

## 2024-07-26 RX ORDER — AMLODIPINE BESYLATE 5 MG/1
5 TABLET ORAL 2 TIMES DAILY
Status: DISCONTINUED | OUTPATIENT
Start: 2024-07-26 | End: 2024-08-01 | Stop reason: HOSPADM

## 2024-07-26 RX ORDER — FERROUS SULFATE 325(65) MG
325 TABLET, DELAYED RELEASE (ENTERIC COATED) ORAL 2 TIMES DAILY
Status: DISCONTINUED | OUTPATIENT
Start: 2024-07-26 | End: 2024-07-27

## 2024-07-26 RX ORDER — SODIUM CHLORIDE 0.9 % (FLUSH) 0.9 %
10 SYRINGE (ML) INJECTION EVERY 12 HOURS PRN
Status: DISCONTINUED | OUTPATIENT
Start: 2024-07-26 | End: 2024-08-01 | Stop reason: HOSPADM

## 2024-07-26 RX ORDER — TALC
6 POWDER (GRAM) TOPICAL NIGHTLY PRN
Status: DISCONTINUED | OUTPATIENT
Start: 2024-07-26 | End: 2024-08-01 | Stop reason: HOSPADM

## 2024-07-26 RX ORDER — HYDRALAZINE HYDROCHLORIDE 20 MG/ML
10 INJECTION INTRAMUSCULAR; INTRAVENOUS EVERY 6 HOURS PRN
Status: DISCONTINUED | OUTPATIENT
Start: 2024-07-26 | End: 2024-08-01 | Stop reason: HOSPADM

## 2024-07-26 RX ORDER — LEVOTHYROXINE SODIUM 112 UG/1
112 TABLET ORAL
Status: DISCONTINUED | OUTPATIENT
Start: 2024-07-27 | End: 2024-08-01 | Stop reason: HOSPADM

## 2024-07-26 RX ORDER — ACETAMINOPHEN 325 MG/1
650 TABLET ORAL EVERY 4 HOURS PRN
COMMUNITY

## 2024-07-26 RX ORDER — POTASSIUM CHLORIDE 7.45 MG/ML
10 INJECTION INTRAVENOUS
Status: COMPLETED | OUTPATIENT
Start: 2024-07-26 | End: 2024-07-27

## 2024-07-26 RX ORDER — IOPAMIDOL 755 MG/ML
100 INJECTION, SOLUTION INTRAVASCULAR
Status: COMPLETED | OUTPATIENT
Start: 2024-07-26 | End: 2024-07-26

## 2024-07-26 RX ORDER — LEVOTHYROXINE SODIUM 112 UG/1
112 TABLET ORAL
COMMUNITY

## 2024-07-26 RX ORDER — MICONAZOLE NITRATE 2 G/100G
POWDER TOPICAL 2 TIMES DAILY
Status: DISCONTINUED | OUTPATIENT
Start: 2024-07-26 | End: 2024-08-01 | Stop reason: HOSPADM

## 2024-07-26 RX ORDER — ONDANSETRON HYDROCHLORIDE 2 MG/ML
4 INJECTION, SOLUTION INTRAVENOUS EVERY 8 HOURS PRN
Status: DISCONTINUED | OUTPATIENT
Start: 2024-07-26 | End: 2024-07-28

## 2024-07-26 RX ORDER — SODIUM CHLORIDE 9 MG/ML
INJECTION, SOLUTION INTRAVENOUS CONTINUOUS
Status: DISCONTINUED | OUTPATIENT
Start: 2024-07-26 | End: 2024-07-26

## 2024-07-26 RX ORDER — ACETAMINOPHEN 325 MG/1
650 TABLET ORAL EVERY 4 HOURS PRN
Status: DISCONTINUED | OUTPATIENT
Start: 2024-07-26 | End: 2024-08-01 | Stop reason: HOSPADM

## 2024-07-26 RX ORDER — SODIUM CHLORIDE 0.9 % (FLUSH) 0.9 %
10 SYRINGE (ML) INJECTION
Status: DISCONTINUED | OUTPATIENT
Start: 2024-07-26 | End: 2024-08-01 | Stop reason: HOSPADM

## 2024-07-26 RX ORDER — LIDOCAINE HYDROCHLORIDE 20 MG/ML
INJECTION, SOLUTION EPIDURAL; INFILTRATION; INTRACAUDAL; PERINEURAL
Status: DISCONTINUED | OUTPATIENT
Start: 2024-07-26 | End: 2024-07-26

## 2024-07-26 RX ORDER — NALOXONE HCL 0.4 MG/ML
0.02 VIAL (ML) INJECTION
Status: DISCONTINUED | OUTPATIENT
Start: 2024-07-26 | End: 2024-07-28

## 2024-07-26 RX ORDER — LISINOPRIL 5 MG/1
5 TABLET ORAL 2 TIMES DAILY
Status: DISCONTINUED | OUTPATIENT
Start: 2024-07-26 | End: 2024-08-01 | Stop reason: HOSPADM

## 2024-07-26 RX ORDER — PROPOFOL 10 MG/ML
VIAL (ML) INTRAVENOUS
Status: DISCONTINUED | OUTPATIENT
Start: 2024-07-26 | End: 2024-07-26

## 2024-07-26 RX ORDER — CITALOPRAM 20 MG/1
40 TABLET, FILM COATED ORAL DAILY
Status: DISCONTINUED | OUTPATIENT
Start: 2024-07-27 | End: 2024-08-01 | Stop reason: HOSPADM

## 2024-07-26 RX ORDER — EPHEDRINE SULFATE 50 MG/ML
INJECTION, SOLUTION INTRAVENOUS
Status: DISCONTINUED | OUTPATIENT
Start: 2024-07-26 | End: 2024-07-26

## 2024-07-26 RX ORDER — SYRING-NEEDL,DISP,INSUL,0.3 ML 29 G X1/2"
296 SYRINGE, EMPTY DISPOSABLE MISCELLANEOUS ONCE
Status: COMPLETED | OUTPATIENT
Start: 2024-07-27 | End: 2024-07-27

## 2024-07-26 RX ORDER — MORPHINE SULFATE 4 MG/ML
2 INJECTION, SOLUTION INTRAMUSCULAR; INTRAVENOUS EVERY 4 HOURS PRN
Status: DISCONTINUED | OUTPATIENT
Start: 2024-07-26 | End: 2024-07-28

## 2024-07-26 RX ADMIN — PROPOFOL 50 MG: 10 INJECTION, EMULSION INTRAVENOUS at 07:07

## 2024-07-26 RX ADMIN — PROPOFOL 50 MG: 10 INJECTION, EMULSION INTRAVENOUS at 08:07

## 2024-07-26 RX ADMIN — LIDOCAINE HYDROCHLORIDE 60 MG: 20 INJECTION, SOLUTION EPIDURAL; INFILTRATION; INTRACAUDAL; PERINEURAL at 07:07

## 2024-07-26 RX ADMIN — EPHEDRINE SULFATE 20 MG: 50 INJECTION INTRAVENOUS at 07:07

## 2024-07-26 RX ADMIN — IOPAMIDOL 100 ML: 755 INJECTION, SOLUTION INTRAVENOUS at 05:07

## 2024-07-26 RX ADMIN — POTASSIUM CHLORIDE 10 MEQ: 7.46 INJECTION, SOLUTION INTRAVENOUS at 10:07

## 2024-07-26 RX ADMIN — MICONAZOLE NITRATE: 20 POWDER TOPICAL at 10:07

## 2024-07-26 RX ADMIN — SODIUM CHLORIDE, POTASSIUM CHLORIDE, SODIUM LACTATE AND CALCIUM CHLORIDE: 600; 310; 30; 20 INJECTION, SOLUTION INTRAVENOUS at 09:07

## 2024-07-26 RX ADMIN — SODIUM CHLORIDE, POTASSIUM CHLORIDE, SODIUM LACTATE AND CALCIUM CHLORIDE: 600; 310; 30; 20 INJECTION, SOLUTION INTRAVENOUS at 07:07

## 2024-07-26 RX ADMIN — FERROUS SULFATE TAB EC 325 MG (65 MG FE EQUIVALENT) 325 MG: 325 (65 FE) TABLET DELAYED RESPONSE at 10:07

## 2024-07-27 LAB
ALBUMIN SERPL-MCNC: 3.5 G/DL (ref 3.4–4.8)
ALBUMIN/GLOB SERPL: 1.3 RATIO (ref 1.1–2)
ALP SERPL-CCNC: 55 UNIT/L (ref 40–150)
ALT SERPL-CCNC: 8 UNIT/L (ref 0–55)
ANION GAP SERPL CALC-SCNC: 11 MEQ/L
AST SERPL-CCNC: 11 UNIT/L (ref 5–34)
BASOPHILS # BLD AUTO: 0.06 X10(3)/MCL
BASOPHILS NFR BLD AUTO: 0.4 %
BILIRUB SERPL-MCNC: 0.5 MG/DL
BILIRUB UR QL STRIP.AUTO: ABNORMAL
BUN SERPL-MCNC: 30 MG/DL (ref 9.8–20.1)
CALCIUM SERPL-MCNC: 9.2 MG/DL (ref 8.4–10.2)
CHLORIDE SERPL-SCNC: 106 MMOL/L (ref 98–107)
CLARITY UR: CLEAR
CO2 SERPL-SCNC: 22 MMOL/L (ref 23–31)
COLOR UR AUTO: YELLOW
CREAT SERPL-MCNC: 0.81 MG/DL (ref 0.55–1.02)
CREAT/UREA NIT SERPL: 37
EOSINOPHIL # BLD AUTO: 0.07 X10(3)/MCL (ref 0–0.9)
EOSINOPHIL NFR BLD AUTO: 0.5 %
ERYTHROCYTE [DISTWIDTH] IN BLOOD BY AUTOMATED COUNT: 14.2 % (ref 11.5–17)
GFR SERPLBLD CREATININE-BSD FMLA CKD-EPI: >60 ML/MIN/1.73/M2
GLOBULIN SER-MCNC: 2.7 GM/DL (ref 2.4–3.5)
GLUCOSE SERPL-MCNC: 80 MG/DL (ref 82–115)
GLUCOSE UR QL STRIP: NEGATIVE
HCT VFR BLD AUTO: 33.9 % (ref 37–47)
HGB BLD-MCNC: 11.4 G/DL (ref 12–16)
HGB UR QL STRIP: NEGATIVE
IMM GRANULOCYTES # BLD AUTO: 0.08 X10(3)/MCL (ref 0–0.04)
IMM GRANULOCYTES NFR BLD AUTO: 0.6 %
KETONES UR QL STRIP: ABNORMAL
LEUKOCYTE ESTERASE UR QL STRIP: NEGATIVE
LYMPHOCYTES # BLD AUTO: 1.54 X10(3)/MCL (ref 0.6–4.6)
LYMPHOCYTES NFR BLD AUTO: 11 %
MCH RBC QN AUTO: 28.7 PG (ref 27–31)
MCHC RBC AUTO-ENTMCNC: 33.6 G/DL (ref 33–36)
MCV RBC AUTO: 85.4 FL (ref 80–94)
MONOCYTES # BLD AUTO: 1.36 X10(3)/MCL (ref 0.1–1.3)
MONOCYTES NFR BLD AUTO: 9.7 %
NEUTROPHILS # BLD AUTO: 10.85 X10(3)/MCL (ref 2.1–9.2)
NEUTROPHILS NFR BLD AUTO: 77.8 %
NITRITE UR QL STRIP: NEGATIVE
PH UR STRIP: 5.5 [PH]
PLATELET # BLD AUTO: 376 X10(3)/MCL (ref 130–400)
PMV BLD AUTO: 9.8 FL (ref 7.4–10.4)
POTASSIUM SERPL-SCNC: 3 MMOL/L (ref 3.5–5.1)
PROT SERPL-MCNC: 6.2 GM/DL (ref 5.8–7.6)
PROT UR QL STRIP: NEGATIVE
RBC # BLD AUTO: 3.97 X10(6)/MCL (ref 4.2–5.4)
SODIUM SERPL-SCNC: 139 MMOL/L (ref 136–145)
SP GR UR STRIP.AUTO: 1.01 (ref 1–1.03)
UROBILINOGEN UR STRIP-ACNC: 1
WBC # BLD AUTO: 13.96 X10(3)/MCL (ref 4.5–11.5)

## 2024-07-27 PROCEDURE — 94761 N-INVAS EAR/PLS OXIMETRY MLT: CPT

## 2024-07-27 PROCEDURE — 51798 US URINE CAPACITY MEASURE: CPT

## 2024-07-27 PROCEDURE — 36415 COLL VENOUS BLD VENIPUNCTURE: CPT | Performed by: INTERNAL MEDICINE

## 2024-07-27 PROCEDURE — 80053 COMPREHEN METABOLIC PANEL: CPT | Performed by: INTERNAL MEDICINE

## 2024-07-27 PROCEDURE — 25000003 PHARM REV CODE 250: Performed by: INTERNAL MEDICINE

## 2024-07-27 PROCEDURE — 25000003 PHARM REV CODE 250: Performed by: SURGERY

## 2024-07-27 PROCEDURE — 63600175 PHARM REV CODE 636 W HCPCS: Performed by: INTERNAL MEDICINE

## 2024-07-27 PROCEDURE — 11000001 HC ACUTE MED/SURG PRIVATE ROOM

## 2024-07-27 PROCEDURE — 85025 COMPLETE CBC W/AUTO DIFF WBC: CPT | Performed by: INTERNAL MEDICINE

## 2024-07-27 PROCEDURE — 81003 URINALYSIS AUTO W/O SCOPE: CPT | Performed by: NURSE PRACTITIONER

## 2024-07-27 RX ORDER — LANOLIN ALCOHOL/MO/W.PET/CERES
1 CREAM (GRAM) TOPICAL 2 TIMES DAILY
Status: DISCONTINUED | OUTPATIENT
Start: 2024-07-27 | End: 2024-08-01 | Stop reason: HOSPADM

## 2024-07-27 RX ADMIN — ATORVASTATIN CALCIUM 10 MG: 10 TABLET, FILM COATED ORAL at 08:07

## 2024-07-27 RX ADMIN — Medication 6 MG: at 08:07

## 2024-07-27 RX ADMIN — FERROUS SULFATE TAB 325 MG (65 MG ELEMENTAL FE) 1 EACH: 325 (65 FE) TAB at 08:07

## 2024-07-27 RX ADMIN — LEVOTHYROXINE SODIUM 112 MCG: 112 TABLET ORAL at 06:07

## 2024-07-27 RX ADMIN — ACETAMINOPHEN 650 MG: 325 TABLET, FILM COATED ORAL at 08:07

## 2024-07-27 RX ADMIN — AMLODIPINE BESYLATE 5 MG: 5 TABLET ORAL at 08:07

## 2024-07-27 RX ADMIN — POTASSIUM CHLORIDE 10 MEQ: 7.46 INJECTION, SOLUTION INTRAVENOUS at 12:07

## 2024-07-27 RX ADMIN — MICONAZOLE NITRATE: 20 POWDER TOPICAL at 08:07

## 2024-07-27 RX ADMIN — MAGNESIUM CITRATE 296 ML: 1.75 LIQUID ORAL at 12:07

## 2024-07-27 RX ADMIN — LISINOPRIL 5 MG: 5 TABLET ORAL at 08:07

## 2024-07-27 RX ADMIN — CITALOPRAM HYDROBROMIDE 40 MG: 20 TABLET ORAL at 08:07

## 2024-07-27 RX ADMIN — SODIUM CHLORIDE, POTASSIUM CHLORIDE, SODIUM LACTATE AND CALCIUM CHLORIDE: 600; 310; 30; 20 INJECTION, SOLUTION INTRAVENOUS at 08:07

## 2024-07-27 NOTE — ANESTHESIA POSTPROCEDURE EVALUATION
Anesthesia Post Evaluation    Patient: Ashley Calvillo    Procedure(s) Performed: Procedure(s) (LRB):  SIGMOIDOSCOPY, FLEXIBLE (N/A)    Final Anesthesia Type: general      Patient participation: Yes- Able to Participate  Level of consciousness: awake and alert and oriented  Post-procedure vital signs: reviewed and stable  Pain management: adequate  Airway patency: patent    PONV status at discharge: No PONV  Anesthetic complications: no      Cardiovascular status: stable  Respiratory status: unassisted, spontaneous ventilation and room air  Hydration status: euvolemic  Follow-up not needed.              Vitals Value Taken Time   /92 07/26/24 1932   Temp 36.8 °C (98.3 °F) 07/26/24 1609   Pulse 86 07/26/24 1932   Resp 16 07/26/24 1932   SpO2 96 % 07/26/24 1932         No case tracking events are documented in the log.      Pain/Trino Score: No data recorded

## 2024-07-27 NOTE — ANESTHESIA PREPROCEDURE EVALUATION
07/26/2024  Ashley Calvillo is a 78 y.o., female.      Pre-op Assessment    I have reviewed the Patient Summary Reports.     I have reviewed the Nursing Notes. I have reviewed the NPO Status.   I have reviewed the Medications.     Review of Systems  Anesthesia Hx:  No problems with previous Anesthesia   History of prior surgery of interest to airway management or planning:  Previous anesthesia: General        Denies Family Hx of Anesthesia complications.    Denies Personal Hx of Anesthesia complications.                    Social:  Non-Smoker       Hematology/Oncology:  Hematology Normal                       --  Cancer in past history:              radiation and surgery       EENT/Dental:  EENT/Dental Normal           Cardiovascular:     Hypertension                                        Pulmonary:  Pulmonary Normal                       Renal/:  Renal/ Normal                 Hepatic/GI:  Hepatic/GI Normal       volvulus          Musculoskeletal:  Musculoskeletal Normal                Neurological:   CVA, no residual symptoms                                    Endocrine:   Hypothyroidism          Dermatological:  Skin Normal    Psych:  Psychiatric Normal                    Physical Exam  General: Well nourished, Cooperative, Alert and Oriented    Airway:  Mallampati: II   Mouth Opening: Normal  TM Distance: Normal  Tongue: Normal  Neck ROM: Normal ROM    Dental:  Dentures        Anesthesia Plan  Type of Anesthesia, risks & benefits discussed:    Anesthesia Type: Gen Natural Airway  Intra-op Monitoring Plan: Standard ASA Monitors  Induction:  IV  Informed Consent: Informed consent signed with the Patient and all parties understand the risks and agree with anesthesia plan.  All questions answered.   ASA Score: 2 Emergent  Day of Surgery Review of History & Physical: I have interviewed and examined the  patient. I have reviewed the patient's H&P dated: There are no significant changes.     Ready For Surgery From Anesthesia Perspective.     .

## 2024-07-28 ENCOUNTER — ANESTHESIA (OUTPATIENT)
Dept: SURGERY | Facility: HOSPITAL | Age: 79
End: 2024-07-28
Payer: MEDICARE

## 2024-07-28 ENCOUNTER — ANESTHESIA EVENT (OUTPATIENT)
Dept: SURGERY | Facility: HOSPITAL | Age: 79
End: 2024-07-28
Payer: MEDICARE

## 2024-07-28 LAB
ANION GAP SERPL CALC-SCNC: 11 MEQ/L
BASOPHILS # BLD AUTO: 0.09 X10(3)/MCL
BASOPHILS NFR BLD AUTO: 0.8 %
BUN SERPL-MCNC: 21 MG/DL (ref 9.8–20.1)
CALCIUM SERPL-MCNC: 9 MG/DL (ref 8.4–10.2)
CHLORIDE SERPL-SCNC: 104 MMOL/L (ref 98–107)
CO2 SERPL-SCNC: 23 MMOL/L (ref 23–31)
CREAT SERPL-MCNC: 0.74 MG/DL (ref 0.55–1.02)
CREAT/UREA NIT SERPL: 28
EOSINOPHIL # BLD AUTO: 0.1 X10(3)/MCL (ref 0–0.9)
EOSINOPHIL NFR BLD AUTO: 0.8 %
ERYTHROCYTE [DISTWIDTH] IN BLOOD BY AUTOMATED COUNT: 14.4 % (ref 11.5–17)
GFR SERPLBLD CREATININE-BSD FMLA CKD-EPI: >60 ML/MIN/1.73/M2
GLUCOSE SERPL-MCNC: 71 MG/DL (ref 82–115)
GROUP & RH: NORMAL
HCT VFR BLD AUTO: 35.9 % (ref 37–47)
HGB BLD-MCNC: 12 G/DL (ref 12–16)
IMM GRANULOCYTES # BLD AUTO: 0.05 X10(3)/MCL (ref 0–0.04)
IMM GRANULOCYTES NFR BLD AUTO: 0.4 %
INDIRECT COOMBS: NORMAL
LYMPHOCYTES # BLD AUTO: 1.29 X10(3)/MCL (ref 0.6–4.6)
LYMPHOCYTES NFR BLD AUTO: 10.9 %
MCH RBC QN AUTO: 28.7 PG (ref 27–31)
MCHC RBC AUTO-ENTMCNC: 33.4 G/DL (ref 33–36)
MCV RBC AUTO: 85.9 FL (ref 80–94)
MONOCYTES # BLD AUTO: 0.99 X10(3)/MCL (ref 0.1–1.3)
MONOCYTES NFR BLD AUTO: 8.3 %
NEUTROPHILS # BLD AUTO: 9.34 X10(3)/MCL (ref 2.1–9.2)
NEUTROPHILS NFR BLD AUTO: 78.8 %
PLATELET # BLD AUTO: 374 X10(3)/MCL (ref 130–400)
PMV BLD AUTO: 9.7 FL (ref 7.4–10.4)
POTASSIUM SERPL-SCNC: 3.8 MMOL/L (ref 3.5–5.1)
RBC # BLD AUTO: 4.18 X10(6)/MCL (ref 4.2–5.4)
SODIUM SERPL-SCNC: 138 MMOL/L (ref 136–145)
SPECIMEN OUTDATE: NORMAL
WBC # BLD AUTO: 11.86 X10(3)/MCL (ref 4.5–11.5)

## 2024-07-28 PROCEDURE — 85025 COMPLETE CBC W/AUTO DIFF WBC: CPT | Performed by: INTERNAL MEDICINE

## 2024-07-28 PROCEDURE — 37000008 HC ANESTHESIA 1ST 15 MINUTES: Performed by: SURGERY

## 2024-07-28 PROCEDURE — 94761 N-INVAS EAR/PLS OXIMETRY MLT: CPT

## 2024-07-28 PROCEDURE — 88302 TISSUE EXAM BY PATHOLOGIST: CPT | Performed by: SURGERY

## 2024-07-28 PROCEDURE — 63600175 PHARM REV CODE 636 W HCPCS: Performed by: NURSE ANESTHETIST, CERTIFIED REGISTERED

## 2024-07-28 PROCEDURE — 36000709 HC OR TIME LEV III EA ADD 15 MIN: Performed by: SURGERY

## 2024-07-28 PROCEDURE — 63600175 PHARM REV CODE 636 W HCPCS: Performed by: INTERNAL MEDICINE

## 2024-07-28 PROCEDURE — 86923 COMPATIBILITY TEST ELECTRIC: CPT | Mod: 91 | Performed by: SURGERY

## 2024-07-28 PROCEDURE — 71000033 HC RECOVERY, INTIAL HOUR: Performed by: SURGERY

## 2024-07-28 PROCEDURE — D9220A PRA ANESTHESIA: Mod: ,,, | Performed by: NURSE ANESTHETIST, CERTIFIED REGISTERED

## 2024-07-28 PROCEDURE — 21400001 HC TELEMETRY ROOM

## 2024-07-28 PROCEDURE — 0DTN0ZZ RESECTION OF SIGMOID COLON, OPEN APPROACH: ICD-10-PCS | Performed by: SURGERY

## 2024-07-28 PROCEDURE — 36000708 HC OR TIME LEV III 1ST 15 MIN: Performed by: SURGERY

## 2024-07-28 PROCEDURE — 0D1M0Z4 BYPASS DESCENDING COLON TO CUTANEOUS, OPEN APPROACH: ICD-10-PCS | Performed by: SURGERY

## 2024-07-28 PROCEDURE — 36415 COLL VENOUS BLD VENIPUNCTURE: CPT | Performed by: INTERNAL MEDICINE

## 2024-07-28 PROCEDURE — 25000003 PHARM REV CODE 250: Performed by: NURSE ANESTHETIST, CERTIFIED REGISTERED

## 2024-07-28 PROCEDURE — 27201423 OPTIME MED/SURG SUP & DEVICES STERILE SUPPLY: Performed by: SURGERY

## 2024-07-28 PROCEDURE — 25000003 PHARM REV CODE 250: Performed by: INTERNAL MEDICINE

## 2024-07-28 PROCEDURE — 99900035 HC TECH TIME PER 15 MIN (STAT)

## 2024-07-28 PROCEDURE — 63600175 PHARM REV CODE 636 W HCPCS: Performed by: SURGERY

## 2024-07-28 PROCEDURE — 88307 TISSUE EXAM BY PATHOLOGIST: CPT

## 2024-07-28 PROCEDURE — 80048 BASIC METABOLIC PNL TOTAL CA: CPT | Performed by: INTERNAL MEDICINE

## 2024-07-28 PROCEDURE — 37000009 HC ANESTHESIA EA ADD 15 MINS: Performed by: SURGERY

## 2024-07-28 PROCEDURE — 86850 RBC ANTIBODY SCREEN: CPT | Performed by: NURSE ANESTHETIST, CERTIFIED REGISTERED

## 2024-07-28 PROCEDURE — 86900 BLOOD TYPING SEROLOGIC ABO: CPT | Performed by: NURSE ANESTHETIST, CERTIFIED REGISTERED

## 2024-07-28 PROCEDURE — 25000003 PHARM REV CODE 250: Performed by: SURGERY

## 2024-07-28 PROCEDURE — 11000001 HC ACUTE MED/SURG PRIVATE ROOM

## 2024-07-28 RX ORDER — MUPIROCIN 20 MG/G
OINTMENT TOPICAL 2 TIMES DAILY
Status: COMPLETED | OUTPATIENT
Start: 2024-07-28 | End: 2024-07-30

## 2024-07-28 RX ORDER — LIDOCAINE HYDROCHLORIDE 20 MG/ML
INJECTION, SOLUTION EPIDURAL; INFILTRATION; INTRACAUDAL; PERINEURAL
Status: DISCONTINUED | OUTPATIENT
Start: 2024-07-28 | End: 2024-07-28

## 2024-07-28 RX ORDER — HYDROMORPHONE HYDROCHLORIDE 2 MG/ML
INJECTION, SOLUTION INTRAMUSCULAR; INTRAVENOUS; SUBCUTANEOUS
Status: DISCONTINUED | OUTPATIENT
Start: 2024-07-28 | End: 2024-07-28

## 2024-07-28 RX ORDER — ONDANSETRON HYDROCHLORIDE 2 MG/ML
4 INJECTION, SOLUTION INTRAVENOUS EVERY 6 HOURS PRN
Status: DISCONTINUED | OUTPATIENT
Start: 2024-07-28 | End: 2024-07-28

## 2024-07-28 RX ORDER — NALOXONE HCL 0.4 MG/ML
0.02 VIAL (ML) INJECTION
Status: DISCONTINUED | OUTPATIENT
Start: 2024-07-28 | End: 2024-08-01 | Stop reason: HOSPADM

## 2024-07-28 RX ORDER — ONDANSETRON HYDROCHLORIDE 2 MG/ML
4 INJECTION, SOLUTION INTRAVENOUS DAILY PRN
Status: DISCONTINUED | OUTPATIENT
Start: 2024-07-28 | End: 2024-07-28

## 2024-07-28 RX ORDER — EPHEDRINE SULFATE 50 MG/ML
INJECTION, SOLUTION INTRAVENOUS
Status: DISCONTINUED | OUTPATIENT
Start: 2024-07-28 | End: 2024-07-28

## 2024-07-28 RX ORDER — PHENYLEPHRINE HYDROCHLORIDE 10 MG/ML
INJECTION INTRAVENOUS
Status: DISCONTINUED | OUTPATIENT
Start: 2024-07-28 | End: 2024-07-28

## 2024-07-28 RX ORDER — HYDROCODONE BITARTRATE AND ACETAMINOPHEN 5; 325 MG/1; MG/1
1 TABLET ORAL EVERY 4 HOURS PRN
Status: DISCONTINUED | OUTPATIENT
Start: 2024-07-28 | End: 2024-08-01 | Stop reason: HOSPADM

## 2024-07-28 RX ORDER — ONDANSETRON 4 MG/1
8 TABLET, ORALLY DISINTEGRATING ORAL EVERY 8 HOURS PRN
Status: DISCONTINUED | OUTPATIENT
Start: 2024-07-28 | End: 2024-08-01 | Stop reason: HOSPADM

## 2024-07-28 RX ORDER — BUPIVACAINE HYDROCHLORIDE 5 MG/ML
INJECTION, SOLUTION EPIDURAL; INTRACAUDAL
Status: DISCONTINUED | OUTPATIENT
Start: 2024-07-28 | End: 2024-07-28 | Stop reason: HOSPADM

## 2024-07-28 RX ORDER — MUPIROCIN 20 MG/G
OINTMENT TOPICAL 2 TIMES DAILY
Status: DISCONTINUED | OUTPATIENT
Start: 2024-07-28 | End: 2024-07-28

## 2024-07-28 RX ORDER — HYDROMORPHONE HYDROCHLORIDE 2 MG/ML
0.5 INJECTION, SOLUTION INTRAMUSCULAR; INTRAVENOUS; SUBCUTANEOUS EVERY 6 HOURS PRN
Status: DISCONTINUED | OUTPATIENT
Start: 2024-07-28 | End: 2024-08-01 | Stop reason: HOSPADM

## 2024-07-28 RX ORDER — ACETAMINOPHEN 10 MG/ML
1000 INJECTION, SOLUTION INTRAVENOUS EVERY 8 HOURS
Status: COMPLETED | OUTPATIENT
Start: 2024-07-28 | End: 2024-07-29

## 2024-07-28 RX ORDER — ONDANSETRON HYDROCHLORIDE 2 MG/ML
INJECTION, SOLUTION INTRAVENOUS
Status: DISCONTINUED | OUTPATIENT
Start: 2024-07-28 | End: 2024-07-28

## 2024-07-28 RX ORDER — ROCURONIUM BROMIDE 10 MG/ML
INJECTION, SOLUTION INTRAVENOUS
Status: DISCONTINUED | OUTPATIENT
Start: 2024-07-28 | End: 2024-07-28

## 2024-07-28 RX ORDER — FENTANYL CITRATE 50 UG/ML
INJECTION, SOLUTION INTRAMUSCULAR; INTRAVENOUS
Status: DISCONTINUED | OUTPATIENT
Start: 2024-07-28 | End: 2024-07-28

## 2024-07-28 RX ORDER — PROPOFOL 10 MG/ML
VIAL (ML) INTRAVENOUS
Status: DISCONTINUED | OUTPATIENT
Start: 2024-07-28 | End: 2024-07-28

## 2024-07-28 RX ORDER — HYDROMORPHONE HYDROCHLORIDE 2 MG/ML
0.2 INJECTION, SOLUTION INTRAMUSCULAR; INTRAVENOUS; SUBCUTANEOUS EVERY 5 MIN PRN
Status: DISCONTINUED | OUTPATIENT
Start: 2024-07-28 | End: 2024-07-28

## 2024-07-28 RX ORDER — DEXAMETHASONE SODIUM PHOSPHATE 4 MG/ML
INJECTION, SOLUTION INTRA-ARTICULAR; INTRALESIONAL; INTRAMUSCULAR; INTRAVENOUS; SOFT TISSUE
Status: DISCONTINUED | OUTPATIENT
Start: 2024-07-28 | End: 2024-07-28

## 2024-07-28 RX ORDER — PROMETHAZINE HYDROCHLORIDE 12.5 MG/1
25 TABLET ORAL EVERY 6 HOURS PRN
Status: DISCONTINUED | OUTPATIENT
Start: 2024-07-28 | End: 2024-08-01 | Stop reason: HOSPADM

## 2024-07-28 RX ADMIN — ROCURONIUM BROMIDE 20 MG: 10 INJECTION, SOLUTION INTRAVENOUS at 06:07

## 2024-07-28 RX ADMIN — LEVOTHYROXINE SODIUM 112 MCG: 112 TABLET ORAL at 06:07

## 2024-07-28 RX ADMIN — SODIUM CHLORIDE, POTASSIUM CHLORIDE, SODIUM LACTATE AND CALCIUM CHLORIDE: 600; 310; 30; 20 INJECTION, SOLUTION INTRAVENOUS at 05:07

## 2024-07-28 RX ADMIN — MICONAZOLE NITRATE: 20 POWDER TOPICAL at 09:07

## 2024-07-28 RX ADMIN — PIPERACILLIN AND TAZOBACTAM 4.5 G: 3; .375 INJECTION, POWDER, LYOPHILIZED, FOR SOLUTION INTRAVENOUS; PARENTERAL at 05:07

## 2024-07-28 RX ADMIN — LIDOCAINE HYDROCHLORIDE 80 MG: 20 INJECTION, SOLUTION EPIDURAL; INFILTRATION; INTRACAUDAL; PERINEURAL at 03:07

## 2024-07-28 RX ADMIN — PIPERACILLIN AND TAZOBACTAM 4.5 G: 3; .375 INJECTION, POWDER, LYOPHILIZED, FOR SOLUTION INTRAVENOUS; PARENTERAL at 03:07

## 2024-07-28 RX ADMIN — SODIUM CHLORIDE, POTASSIUM CHLORIDE, SODIUM LACTATE AND CALCIUM CHLORIDE: 600; 310; 30; 20 INJECTION, SOLUTION INTRAVENOUS at 03:07

## 2024-07-28 RX ADMIN — PHENYLEPHRINE HYDROCHLORIDE 100 MCG: 10 INJECTION INTRAVENOUS at 06:07

## 2024-07-28 RX ADMIN — PHENYLEPHRINE HYDROCHLORIDE 100 MCG: 10 INJECTION INTRAVENOUS at 05:07

## 2024-07-28 RX ADMIN — SODIUM CHLORIDE, POTASSIUM CHLORIDE, SODIUM LACTATE AND CALCIUM CHLORIDE: 600; 310; 30; 20 INJECTION, SOLUTION INTRAVENOUS at 06:07

## 2024-07-28 RX ADMIN — FENTANYL CITRATE 50 MCG: 50 INJECTION, SOLUTION INTRAMUSCULAR; INTRAVENOUS at 03:07

## 2024-07-28 RX ADMIN — MORPHINE SULFATE 2 MG: 4 INJECTION, SOLUTION INTRAMUSCULAR; INTRAVENOUS at 02:07

## 2024-07-28 RX ADMIN — PHENYLEPHRINE HYDROCHLORIDE 200 MCG: 10 INJECTION INTRAVENOUS at 03:07

## 2024-07-28 RX ADMIN — PHENYLEPHRINE HYDROCHLORIDE 100 MCG: 10 INJECTION INTRAVENOUS at 04:07

## 2024-07-28 RX ADMIN — EPHEDRINE SULFATE 25 MG: 50 INJECTION INTRAVENOUS at 03:07

## 2024-07-28 RX ADMIN — FENTANYL CITRATE 50 MCG: 50 INJECTION, SOLUTION INTRAMUSCULAR; INTRAVENOUS at 04:07

## 2024-07-28 RX ADMIN — HYDROMORPHONE HYDROCHLORIDE 0.5 MG: 2 INJECTION INTRAMUSCULAR; INTRAVENOUS; SUBCUTANEOUS at 06:07

## 2024-07-28 RX ADMIN — PROPOFOL 80 MG: 10 INJECTION, EMULSION INTRAVENOUS at 03:07

## 2024-07-28 RX ADMIN — PHENYLEPHRINE HYDROCHLORIDE 200 MCG: 10 INJECTION INTRAVENOUS at 06:07

## 2024-07-28 RX ADMIN — DEXAMETHASONE SODIUM PHOSPHATE 4 MG: 4 INJECTION, SOLUTION INTRA-ARTICULAR; INTRALESIONAL; INTRAMUSCULAR; INTRAVENOUS; SOFT TISSUE at 04:07

## 2024-07-28 RX ADMIN — ACETAMINOPHEN 1000 MG: 10 INJECTION, SOLUTION INTRAVENOUS at 09:07

## 2024-07-28 RX ADMIN — SODIUM CHLORIDE, POTASSIUM CHLORIDE, SODIUM LACTATE AND CALCIUM CHLORIDE: 600; 310; 30; 20 INJECTION, SOLUTION INTRAVENOUS at 12:07

## 2024-07-28 RX ADMIN — ROCURONIUM BROMIDE 20 MG: 10 INJECTION, SOLUTION INTRAVENOUS at 05:07

## 2024-07-28 RX ADMIN — EPHEDRINE SULFATE 25 MG: 50 INJECTION INTRAVENOUS at 06:07

## 2024-07-28 RX ADMIN — ROCURONIUM BROMIDE 50 MG: 10 INJECTION, SOLUTION INTRAVENOUS at 03:07

## 2024-07-28 RX ADMIN — SUGAMMADEX 60 MG: 100 INJECTION, SOLUTION INTRAVENOUS at 07:07

## 2024-07-28 RX ADMIN — ONDANSETRON 4 MG: 2 INJECTION INTRAMUSCULAR; INTRAVENOUS at 04:07

## 2024-07-28 RX ADMIN — MUPIROCIN 1 G: 20 OINTMENT TOPICAL at 09:07

## 2024-07-28 RX ADMIN — ROCURONIUM BROMIDE 20 MG: 10 INJECTION, SOLUTION INTRAVENOUS at 04:07

## 2024-07-28 RX ADMIN — ROCURONIUM BROMIDE 10 MG: 10 INJECTION, SOLUTION INTRAVENOUS at 06:07

## 2024-07-28 RX ADMIN — SUGAMMADEX 140 MG: 100 INJECTION, SOLUTION INTRAVENOUS at 06:07

## 2024-07-28 NOTE — ANESTHESIA PROCEDURE NOTES
Intubation    Date/Time: 7/28/2024 3:32 PM    Performed by: Pietro Ch CRNA  Authorized by: Pietro Ch CRNA    Intubation:     Induction:  Intravenous    Intubated:  Postinduction    Mask Ventilation:  Easy mask    Attempts:  1    Attempted By:  CRNA    Method of Intubation:  Direct    Blade:  Willson 2    Laryngeal View Grade: Grade I - full view of cords      Difficult Airway Encountered?: No      Complications:  None    Airway Device:  Oral endotracheal tube    Airway Device Size:  7.5    Style/Cuff Inflation:  Cuffed (inflated to minimal occlusive pressure)    Tube secured:  20    Secured at:  The lips    Placement Verified By:  Capnometry and Colorimetric ETCO2 device    Complicating Factors:  None    Findings Post-Intubation:  BS equal bilateral and atraumatic/condition of teeth unchanged

## 2024-07-28 NOTE — ANESTHESIA PREPROCEDURE EVALUATION
07/28/2024  Ashley Calvillo is a 78 y.o., female.      Pre-op Assessment    I have reviewed the Patient Summary Reports.     I have reviewed the Nursing Notes. I have reviewed the NPO Status.   I have reviewed the Medications.     Review of Systems  Anesthesia Hx:  No problems with previous Anesthesia   History of prior surgery of interest to airway management or planning:          Denies Family Hx of Anesthesia complications.    Denies Personal Hx of Anesthesia complications.                    Hematology/Oncology:  Hematology Normal                       --  Cancer in past history:              radiation       EENT/Dental:  EENT/Dental Normal           Cardiovascular:     Hypertension                                        Pulmonary:  Pulmonary Normal                       Renal/:  Renal/ Normal                 Hepatic/GI:  Hepatic/GI Normal       volvulus          Musculoskeletal:  Musculoskeletal Normal                Neurological:   CVA, no residual symptoms                                    Endocrine:   Hypothyroidism          Dermatological:  Skin Normal    Psych:  Psychiatric History                  Physical Exam  General: Well nourished, Cooperative, Alert and Oriented    Airway:  Mallampati: II   Mouth Opening: Normal  TM Distance: Normal  Tongue: Normal  Neck ROM: Extension Decreased  Previous radiation to neck  Dental:  Intact        Anesthesia Plan  Type of Anesthesia, risks & benefits discussed:    Anesthesia Type: Gen ETT  Intra-op Monitoring Plan: Standard ASA Monitors  Post Op Pain Control Plan: multimodal analgesia  Induction:  IV  Airway Plan: Direct  Informed Consent: Informed consent signed with the Patient and all parties understand the risks and agree with anesthesia plan.  All questions answered. Patient consented to blood products? Yes  ASA Score: 2 Emergent  Day of Surgery  Review of History & Physical: I have interviewed and examined the patient. I have reviewed the patient's H&P dated: There are no significant changes.     Ready For Surgery From Anesthesia Perspective.     .

## 2024-07-29 LAB
ALBUMIN SERPL-MCNC: 3 G/DL (ref 3.4–4.8)
ALBUMIN/GLOB SERPL: 1.2 RATIO (ref 1.1–2)
ALP SERPL-CCNC: 47 UNIT/L (ref 40–150)
ALT SERPL-CCNC: 10 UNIT/L (ref 0–55)
ANION GAP SERPL CALC-SCNC: 14 MEQ/L
ANION GAP SERPL CALC-SCNC: 7 MEQ/L
AST SERPL-CCNC: 18 UNIT/L (ref 5–34)
BASOPHILS # BLD AUTO: 0.03 X10(3)/MCL
BASOPHILS NFR BLD AUTO: 0.1 %
BILIRUB SERPL-MCNC: 0.8 MG/DL
BUN SERPL-MCNC: 20 MG/DL (ref 9.8–20.1)
BUN SERPL-MCNC: 24 MG/DL (ref 9.8–20.1)
CALCIUM SERPL-MCNC: 8.2 MG/DL (ref 8.4–10.2)
CALCIUM SERPL-MCNC: 8.3 MG/DL (ref 8.4–10.2)
CHLORIDE SERPL-SCNC: 104 MMOL/L (ref 98–107)
CHLORIDE SERPL-SCNC: 109 MMOL/L (ref 98–107)
CO2 SERPL-SCNC: 19 MMOL/L (ref 23–31)
CO2 SERPL-SCNC: 20 MMOL/L (ref 23–31)
CREAT SERPL-MCNC: 1.09 MG/DL (ref 0.55–1.02)
CREAT SERPL-MCNC: 1.37 MG/DL (ref 0.55–1.02)
CREAT/UREA NIT SERPL: 18
CREAT/UREA NIT SERPL: 18
EOSINOPHIL # BLD AUTO: 0 X10(3)/MCL (ref 0–0.9)
EOSINOPHIL NFR BLD AUTO: 0 %
ERYTHROCYTE [DISTWIDTH] IN BLOOD BY AUTOMATED COUNT: 14.5 % (ref 11.5–17)
GFR SERPLBLD CREATININE-BSD FMLA CKD-EPI: 40 ML/MIN/1.73/M2
GFR SERPLBLD CREATININE-BSD FMLA CKD-EPI: 52 ML/MIN/1.73/M2
GLOBULIN SER-MCNC: 2.6 GM/DL (ref 2.4–3.5)
GLUCOSE SERPL-MCNC: 132 MG/DL (ref 82–115)
GLUCOSE SERPL-MCNC: 152 MG/DL (ref 82–115)
HCT VFR BLD AUTO: 38.5 % (ref 37–47)
HGB BLD-MCNC: 12.5 G/DL (ref 12–16)
IMM GRANULOCYTES # BLD AUTO: 0.11 X10(3)/MCL (ref 0–0.04)
IMM GRANULOCYTES NFR BLD AUTO: 0.5 %
LYMPHOCYTES # BLD AUTO: 0.51 X10(3)/MCL (ref 0.6–4.6)
LYMPHOCYTES NFR BLD AUTO: 2.3 %
MAGNESIUM SERPL-MCNC: 2.1 MG/DL (ref 1.6–2.6)
MCH RBC QN AUTO: 28.5 PG (ref 27–31)
MCHC RBC AUTO-ENTMCNC: 32.5 G/DL (ref 33–36)
MCV RBC AUTO: 87.7 FL (ref 80–94)
MONOCYTES # BLD AUTO: 1.46 X10(3)/MCL (ref 0.1–1.3)
MONOCYTES NFR BLD AUTO: 6.6 %
NEUTROPHILS # BLD AUTO: 19.92 X10(3)/MCL (ref 2.1–9.2)
NEUTROPHILS NFR BLD AUTO: 90.5 %
PHOSPHATE SERPL-MCNC: 2.9 MG/DL (ref 2.3–4.7)
PLATELET # BLD AUTO: 410 X10(3)/MCL (ref 130–400)
PMV BLD AUTO: 9.9 FL (ref 7.4–10.4)
POTASSIUM SERPL-SCNC: 4 MMOL/L (ref 3.5–5.1)
POTASSIUM SERPL-SCNC: 4.3 MMOL/L (ref 3.5–5.1)
PROT SERPL-MCNC: 5.6 GM/DL (ref 5.8–7.6)
RBC # BLD AUTO: 4.39 X10(6)/MCL (ref 4.2–5.4)
SODIUM SERPL-SCNC: 136 MMOL/L (ref 136–145)
SODIUM SERPL-SCNC: 137 MMOL/L (ref 136–145)
WBC # BLD AUTO: 22.03 X10(3)/MCL (ref 4.5–11.5)

## 2024-07-29 PROCEDURE — 94761 N-INVAS EAR/PLS OXIMETRY MLT: CPT

## 2024-07-29 PROCEDURE — 25000003 PHARM REV CODE 250: Performed by: SURGERY

## 2024-07-29 PROCEDURE — 94799 UNLISTED PULMONARY SVC/PX: CPT | Mod: XB

## 2024-07-29 PROCEDURE — 63600175 PHARM REV CODE 636 W HCPCS: Performed by: SURGERY

## 2024-07-29 PROCEDURE — 36415 COLL VENOUS BLD VENIPUNCTURE: CPT | Performed by: SURGERY

## 2024-07-29 PROCEDURE — 84100 ASSAY OF PHOSPHORUS: CPT | Performed by: SURGERY

## 2024-07-29 PROCEDURE — 63600175 PHARM REV CODE 636 W HCPCS: Performed by: INTERNAL MEDICINE

## 2024-07-29 PROCEDURE — 85025 COMPLETE CBC W/AUTO DIFF WBC: CPT | Performed by: SURGERY

## 2024-07-29 PROCEDURE — 83735 ASSAY OF MAGNESIUM: CPT | Performed by: SURGERY

## 2024-07-29 PROCEDURE — 27000221 HC OXYGEN, UP TO 24 HOURS

## 2024-07-29 PROCEDURE — 97162 PT EVAL MOD COMPLEX 30 MIN: CPT

## 2024-07-29 PROCEDURE — 21400001 HC TELEMETRY ROOM

## 2024-07-29 PROCEDURE — 80053 COMPREHEN METABOLIC PANEL: CPT | Performed by: SURGERY

## 2024-07-29 RX ADMIN — HYDROMORPHONE HYDROCHLORIDE 0.5 MG: 2 INJECTION, SOLUTION INTRAMUSCULAR; INTRAVENOUS; SUBCUTANEOUS at 10:07

## 2024-07-29 RX ADMIN — MUPIROCIN: 20 OINTMENT TOPICAL at 09:07

## 2024-07-29 RX ADMIN — SODIUM CHLORIDE, POTASSIUM CHLORIDE, SODIUM LACTATE AND CALCIUM CHLORIDE: 600; 310; 30; 20 INJECTION, SOLUTION INTRAVENOUS at 08:07

## 2024-07-29 RX ADMIN — ATORVASTATIN CALCIUM 10 MG: 10 TABLET, FILM COATED ORAL at 10:07

## 2024-07-29 RX ADMIN — SODIUM CHLORIDE 1000 ML: 9 INJECTION, SOLUTION INTRAVENOUS at 05:07

## 2024-07-29 RX ADMIN — HYDROMORPHONE HYDROCHLORIDE 0.5 MG: 2 INJECTION, SOLUTION INTRAMUSCULAR; INTRAVENOUS; SUBCUTANEOUS at 01:07

## 2024-07-29 RX ADMIN — LEVOTHYROXINE SODIUM 112 MCG: 112 TABLET ORAL at 05:07

## 2024-07-29 RX ADMIN — MICONAZOLE NITRATE: 20 POWDER TOPICAL at 09:07

## 2024-07-29 RX ADMIN — LISINOPRIL 5 MG: 5 TABLET ORAL at 08:07

## 2024-07-29 RX ADMIN — SODIUM CHLORIDE 1000 ML: 9 INJECTION, SOLUTION INTRAVENOUS at 09:07

## 2024-07-29 RX ADMIN — AMLODIPINE BESYLATE 5 MG: 5 TABLET ORAL at 08:07

## 2024-07-29 RX ADMIN — ACETAMINOPHEN 1000 MG: 10 INJECTION, SOLUTION INTRAVENOUS at 05:07

## 2024-07-29 RX ADMIN — MICONAZOLE NITRATE: 20 POWDER TOPICAL at 10:07

## 2024-07-29 RX ADMIN — MUPIROCIN 1 G: 20 OINTMENT TOPICAL at 09:07

## 2024-07-29 RX ADMIN — AMLODIPINE BESYLATE 5 MG: 5 TABLET ORAL at 10:07

## 2024-07-29 RX ADMIN — CITALOPRAM HYDROBROMIDE 40 MG: 20 TABLET ORAL at 10:07

## 2024-07-29 RX ADMIN — LISINOPRIL 5 MG: 5 TABLET ORAL at 10:07

## 2024-07-29 RX ADMIN — FERROUS SULFATE TAB 325 MG (65 MG ELEMENTAL FE) 1 EACH: 325 (65 FE) TAB at 10:07

## 2024-07-29 RX ADMIN — SODIUM CHLORIDE, POTASSIUM CHLORIDE, SODIUM LACTATE AND CALCIUM CHLORIDE: 600; 310; 30; 20 INJECTION, SOLUTION INTRAVENOUS at 06:07

## 2024-07-29 RX ADMIN — ACETAMINOPHEN 1000 MG: 10 INJECTION, SOLUTION INTRAVENOUS at 02:07

## 2024-07-29 RX ADMIN — FERROUS SULFATE TAB 325 MG (65 MG ELEMENTAL FE) 1 EACH: 325 (65 FE) TAB at 08:07

## 2024-07-29 NOTE — TRANSFER OF CARE
"Anesthesia Transfer of Care Note    Patient: Ashley Calvillo    Procedure(s) Performed: Procedure(s) (LRB):  COLECTOMY, SIGMOID, LAPAROSCOPIC (N/A)    Patient location: PACU    Anesthesia Type: general    Transport from OR: Transported from OR on room air with adequate spontaneous ventilation    Post pain: adequate analgesia    Post assessment: no apparent anesthetic complications    Post vital signs: stable    Level of consciousness: awake    Nausea/Vomiting: no nausea/vomiting    Complications: none    Transfer of care protocol was followed      Last vitals: Visit Vitals  BP (!) 146/98   Pulse 94   Temp 36 °C (96.8 °F) (Temporal)   Resp 16   Ht 5' 2" (1.575 m)   Wt 68 kg (149 lb 14.6 oz)   SpO2 99%   BMI 27.42 kg/m²     "

## 2024-07-29 NOTE — ANESTHESIA POSTPROCEDURE EVALUATION
Anesthesia Post Evaluation    Patient: Ashley Calvillo    Procedure(s) Performed: Procedure(s) (LRB):  COLECTOMY, SIGMOID, LAPAROSCOPIC (N/A)  CREATION, COLOSTOMY (N/A)    Final Anesthesia Type: general      Patient location during evaluation: PACU  Patient participation: Yes- Able to Participate  Level of consciousness: awake and alert and oriented  Post-procedure vital signs: reviewed and stable  Pain management: adequate  Airway patency: patent    PONV status at discharge: No PONV  Anesthetic complications: no      Cardiovascular status: stable  Respiratory status: unassisted, spontaneous ventilation and room air  Hydration status: euvolemic  Follow-up not needed.              Vitals Value Taken Time   BP 99/68 07/28/24 1957   Temp 36 °C (96.8 °F) 07/28/24 1922   Pulse 100 07/28/24 1959   Resp 15 07/28/24 1959   SpO2 94 % 07/28/24 1959   Vitals shown include unfiled device data.      Event Time   Out of Recovery 07/28/2024 20:02:19         Pain/Trino Score: Pain Rating Prior to Med Admin: 6 (7/28/2024  2:25 PM)  Pain Rating Post Med Admin: 0 (7/27/2024  9:27 PM)  Trino Score: 10 (7/28/2024  7:59 PM)

## 2024-07-30 LAB
ALBUMIN SERPL-MCNC: 2.9 G/DL (ref 3.4–4.8)
ALBUMIN/GLOB SERPL: 1.1 RATIO (ref 1.1–2)
ALP SERPL-CCNC: 47 UNIT/L (ref 40–150)
ALT SERPL-CCNC: 16 UNIT/L (ref 0–55)
ANION GAP SERPL CALC-SCNC: 10 MEQ/L
AST SERPL-CCNC: 47 UNIT/L (ref 5–34)
BASOPHILS # BLD AUTO: 0.03 X10(3)/MCL
BASOPHILS NFR BLD AUTO: 0.2 %
BILIRUB SERPL-MCNC: 0.4 MG/DL
BUN SERPL-MCNC: 12 MG/DL (ref 9.8–20.1)
CALCIUM SERPL-MCNC: 8.4 MG/DL (ref 8.4–10.2)
CHLORIDE SERPL-SCNC: 105 MMOL/L (ref 98–107)
CO2 SERPL-SCNC: 23 MMOL/L (ref 23–31)
CREAT SERPL-MCNC: 0.74 MG/DL (ref 0.55–1.02)
CREAT/UREA NIT SERPL: 16
EOSINOPHIL # BLD AUTO: 0.1 X10(3)/MCL (ref 0–0.9)
EOSINOPHIL NFR BLD AUTO: 0.7 %
ERYTHROCYTE [DISTWIDTH] IN BLOOD BY AUTOMATED COUNT: 14.6 % (ref 11.5–17)
GFR SERPLBLD CREATININE-BSD FMLA CKD-EPI: >60 ML/MIN/1.73/M2
GLOBULIN SER-MCNC: 2.6 GM/DL (ref 2.4–3.5)
GLUCOSE SERPL-MCNC: 77 MG/DL (ref 82–115)
HCT VFR BLD AUTO: 33.6 % (ref 37–47)
HGB BLD-MCNC: 11.3 G/DL (ref 12–16)
IMM GRANULOCYTES # BLD AUTO: 0.07 X10(3)/MCL (ref 0–0.04)
IMM GRANULOCYTES NFR BLD AUTO: 0.5 %
LYMPHOCYTES # BLD AUTO: 1.06 X10(3)/MCL (ref 0.6–4.6)
LYMPHOCYTES NFR BLD AUTO: 7.1 %
MAGNESIUM SERPL-MCNC: 2 MG/DL (ref 1.6–2.6)
MCH RBC QN AUTO: 28.8 PG (ref 27–31)
MCHC RBC AUTO-ENTMCNC: 33.6 G/DL (ref 33–36)
MCV RBC AUTO: 85.7 FL (ref 80–94)
MONOCYTES # BLD AUTO: 1.07 X10(3)/MCL (ref 0.1–1.3)
MONOCYTES NFR BLD AUTO: 7.2 %
NEUTROPHILS # BLD AUTO: 12.57 X10(3)/MCL (ref 2.1–9.2)
NEUTROPHILS NFR BLD AUTO: 84.3 %
PHOSPHATE SERPL-MCNC: 1.5 MG/DL (ref 2.3–4.7)
PLATELET # BLD AUTO: 348 X10(3)/MCL (ref 130–400)
PMV BLD AUTO: 10 FL (ref 7.4–10.4)
POTASSIUM SERPL-SCNC: 3.5 MMOL/L (ref 3.5–5.1)
PROT SERPL-MCNC: 5.5 GM/DL (ref 5.8–7.6)
PSYCHE PATHOLOGY RESULT: NORMAL
RBC # BLD AUTO: 3.92 X10(6)/MCL (ref 4.2–5.4)
SODIUM SERPL-SCNC: 138 MMOL/L (ref 136–145)
WBC # BLD AUTO: 14.9 X10(3)/MCL (ref 4.5–11.5)

## 2024-07-30 PROCEDURE — 97530 THERAPEUTIC ACTIVITIES: CPT

## 2024-07-30 PROCEDURE — 21400001 HC TELEMETRY ROOM

## 2024-07-30 PROCEDURE — 80053 COMPREHEN METABOLIC PANEL: CPT | Performed by: SURGERY

## 2024-07-30 PROCEDURE — 99900035 HC TECH TIME PER 15 MIN (STAT)

## 2024-07-30 PROCEDURE — 94799 UNLISTED PULMONARY SVC/PX: CPT | Mod: XB

## 2024-07-30 PROCEDURE — 83735 ASSAY OF MAGNESIUM: CPT | Performed by: SURGERY

## 2024-07-30 PROCEDURE — 84100 ASSAY OF PHOSPHORUS: CPT | Performed by: SURGERY

## 2024-07-30 PROCEDURE — 25000003 PHARM REV CODE 250: Performed by: SURGERY

## 2024-07-30 PROCEDURE — 36415 COLL VENOUS BLD VENIPUNCTURE: CPT | Performed by: SURGERY

## 2024-07-30 PROCEDURE — 85025 COMPLETE CBC W/AUTO DIFF WBC: CPT | Performed by: SURGERY

## 2024-07-30 PROCEDURE — 63600175 PHARM REV CODE 636 W HCPCS: Performed by: SURGERY

## 2024-07-30 PROCEDURE — 94761 N-INVAS EAR/PLS OXIMETRY MLT: CPT

## 2024-07-30 RX ADMIN — LEVOTHYROXINE SODIUM 112 MCG: 112 TABLET ORAL at 06:07

## 2024-07-30 RX ADMIN — CITALOPRAM HYDROBROMIDE 40 MG: 20 TABLET ORAL at 09:07

## 2024-07-30 RX ADMIN — LISINOPRIL 5 MG: 5 TABLET ORAL at 09:07

## 2024-07-30 RX ADMIN — FERROUS SULFATE TAB 325 MG (65 MG ELEMENTAL FE) 1 EACH: 325 (65 FE) TAB at 09:07

## 2024-07-30 RX ADMIN — POTASSIUM PHOSPHATE, MONOBASIC 500 MG: 500 TABLET, SOLUBLE ORAL at 04:07

## 2024-07-30 RX ADMIN — MUPIROCIN 1 G: 20 OINTMENT TOPICAL at 09:07

## 2024-07-30 RX ADMIN — POTASSIUM PHOSPHATE, MONOBASIC 500 MG: 500 TABLET, SOLUBLE ORAL at 09:07

## 2024-07-30 RX ADMIN — AMLODIPINE BESYLATE 5 MG: 5 TABLET ORAL at 09:07

## 2024-07-30 RX ADMIN — MICONAZOLE NITRATE: 20 POWDER TOPICAL at 09:07

## 2024-07-30 RX ADMIN — HYDROMORPHONE HYDROCHLORIDE 0.5 MG: 2 INJECTION, SOLUTION INTRAMUSCULAR; INTRAVENOUS; SUBCUTANEOUS at 09:07

## 2024-07-30 RX ADMIN — ATORVASTATIN CALCIUM 10 MG: 10 TABLET, FILM COATED ORAL at 09:07

## 2024-07-31 LAB
ALBUMIN SERPL-MCNC: 2.8 G/DL (ref 3.4–4.8)
ALBUMIN/GLOB SERPL: 1 RATIO (ref 1.1–2)
ALP SERPL-CCNC: 50 UNIT/L (ref 40–150)
ALT SERPL-CCNC: 17 UNIT/L (ref 0–55)
ANION GAP SERPL CALC-SCNC: 11 MEQ/L
AST SERPL-CCNC: 36 UNIT/L (ref 5–34)
BACTERIA #/AREA URNS AUTO: ABNORMAL /HPF
BASOPHILS # BLD AUTO: 0.04 X10(3)/MCL
BASOPHILS NFR BLD AUTO: 0.3 %
BILIRUB SERPL-MCNC: 0.4 MG/DL
BILIRUB UR QL STRIP.AUTO: NEGATIVE
BUN SERPL-MCNC: 11 MG/DL (ref 9.8–20.1)
CALCIUM SERPL-MCNC: 8.5 MG/DL (ref 8.4–10.2)
CHLORIDE SERPL-SCNC: 103 MMOL/L (ref 98–107)
CLARITY UR: CLEAR
CO2 SERPL-SCNC: 25 MMOL/L (ref 23–31)
COLOR UR AUTO: YELLOW
CREAT SERPL-MCNC: 0.72 MG/DL (ref 0.55–1.02)
CREAT/UREA NIT SERPL: 15
EOSINOPHIL # BLD AUTO: 0.22 X10(3)/MCL (ref 0–0.9)
EOSINOPHIL NFR BLD AUTO: 1.5 %
ERYTHROCYTE [DISTWIDTH] IN BLOOD BY AUTOMATED COUNT: 14.5 % (ref 11.5–17)
GFR SERPLBLD CREATININE-BSD FMLA CKD-EPI: >60 ML/MIN/1.73/M2
GLOBULIN SER-MCNC: 2.8 GM/DL (ref 2.4–3.5)
GLUCOSE SERPL-MCNC: 92 MG/DL (ref 82–115)
GLUCOSE UR QL STRIP: NEGATIVE
HCT VFR BLD AUTO: 33.8 % (ref 37–47)
HGB BLD-MCNC: 11 G/DL (ref 12–16)
HGB UR QL STRIP: ABNORMAL
IMM GRANULOCYTES # BLD AUTO: 0.1 X10(3)/MCL (ref 0–0.04)
IMM GRANULOCYTES NFR BLD AUTO: 0.7 %
KETONES UR QL STRIP: ABNORMAL
LEUKOCYTE ESTERASE UR QL STRIP: NEGATIVE
LYMPHOCYTES # BLD AUTO: 1.09 X10(3)/MCL (ref 0.6–4.6)
LYMPHOCYTES NFR BLD AUTO: 7.4 %
MAGNESIUM SERPL-MCNC: 1.8 MG/DL (ref 1.6–2.6)
MCH RBC QN AUTO: 28 PG (ref 27–31)
MCHC RBC AUTO-ENTMCNC: 32.5 G/DL (ref 33–36)
MCV RBC AUTO: 86 FL (ref 80–94)
MONOCYTES # BLD AUTO: 1.04 X10(3)/MCL (ref 0.1–1.3)
MONOCYTES NFR BLD AUTO: 7.1 %
NEUTROPHILS # BLD AUTO: 12.23 X10(3)/MCL (ref 2.1–9.2)
NEUTROPHILS NFR BLD AUTO: 83 %
NITRITE UR QL STRIP: NEGATIVE
PH UR STRIP: 8.5 [PH]
PHOSPHATE SERPL-MCNC: 2.3 MG/DL (ref 2.3–4.7)
PLATELET # BLD AUTO: 328 X10(3)/MCL (ref 130–400)
PMV BLD AUTO: 9.7 FL (ref 7.4–10.4)
POTASSIUM SERPL-SCNC: 3.6 MMOL/L (ref 3.5–5.1)
PROT SERPL-MCNC: 5.6 GM/DL (ref 5.8–7.6)
PROT UR QL STRIP: NEGATIVE
RBC # BLD AUTO: 3.93 X10(6)/MCL (ref 4.2–5.4)
RBC #/AREA URNS AUTO: ABNORMAL /HPF
SODIUM SERPL-SCNC: 139 MMOL/L (ref 136–145)
SP GR UR STRIP.AUTO: 1.01 (ref 1–1.03)
SQUAMOUS #/AREA URNS AUTO: ABNORMAL /HPF
UROBILINOGEN UR STRIP-ACNC: 0.2
WBC # BLD AUTO: 14.72 X10(3)/MCL (ref 4.5–11.5)
WBC #/AREA URNS AUTO: ABNORMAL /HPF

## 2024-07-31 PROCEDURE — 81001 URINALYSIS AUTO W/SCOPE: CPT | Performed by: SURGERY

## 2024-07-31 PROCEDURE — 80053 COMPREHEN METABOLIC PANEL: CPT | Performed by: SURGERY

## 2024-07-31 PROCEDURE — 63600175 PHARM REV CODE 636 W HCPCS: Performed by: INTERNAL MEDICINE

## 2024-07-31 PROCEDURE — 85025 COMPLETE CBC W/AUTO DIFF WBC: CPT | Performed by: SURGERY

## 2024-07-31 PROCEDURE — 25000003 PHARM REV CODE 250: Performed by: SURGERY

## 2024-07-31 PROCEDURE — 36415 COLL VENOUS BLD VENIPUNCTURE: CPT | Performed by: SURGERY

## 2024-07-31 PROCEDURE — 63600175 PHARM REV CODE 636 W HCPCS: Performed by: SURGERY

## 2024-07-31 PROCEDURE — 94799 UNLISTED PULMONARY SVC/PX: CPT

## 2024-07-31 PROCEDURE — 81003 URINALYSIS AUTO W/O SCOPE: CPT | Performed by: SURGERY

## 2024-07-31 PROCEDURE — 94761 N-INVAS EAR/PLS OXIMETRY MLT: CPT

## 2024-07-31 PROCEDURE — 87040 BLOOD CULTURE FOR BACTERIA: CPT | Performed by: SURGERY

## 2024-07-31 PROCEDURE — 97530 THERAPEUTIC ACTIVITIES: CPT

## 2024-07-31 PROCEDURE — 84100 ASSAY OF PHOSPHORUS: CPT | Performed by: SURGERY

## 2024-07-31 PROCEDURE — 83735 ASSAY OF MAGNESIUM: CPT | Performed by: SURGERY

## 2024-07-31 PROCEDURE — 21400001 HC TELEMETRY ROOM

## 2024-07-31 RX ADMIN — PIPERACILLIN AND TAZOBACTAM 4.5 G: 4; .5 INJECTION, POWDER, LYOPHILIZED, FOR SOLUTION INTRAVENOUS; PARENTERAL at 05:07

## 2024-07-31 RX ADMIN — AMLODIPINE BESYLATE 5 MG: 5 TABLET ORAL at 09:07

## 2024-07-31 RX ADMIN — FERROUS SULFATE TAB 325 MG (65 MG ELEMENTAL FE) 1 EACH: 325 (65 FE) TAB at 09:07

## 2024-07-31 RX ADMIN — CITALOPRAM HYDROBROMIDE 40 MG: 20 TABLET ORAL at 09:07

## 2024-07-31 RX ADMIN — SODIUM CHLORIDE, POTASSIUM CHLORIDE, SODIUM LACTATE AND CALCIUM CHLORIDE: 600; 310; 30; 20 INJECTION, SOLUTION INTRAVENOUS at 01:07

## 2024-07-31 RX ADMIN — LISINOPRIL 5 MG: 5 TABLET ORAL at 09:07

## 2024-07-31 RX ADMIN — HYDROCODONE BITARTRATE AND ACETAMINOPHEN 1 TABLET: 5; 325 TABLET ORAL at 05:07

## 2024-07-31 RX ADMIN — PIPERACILLIN AND TAZOBACTAM 4.5 G: 4; .5 INJECTION, POWDER, LYOPHILIZED, FOR SOLUTION INTRAVENOUS; PARENTERAL at 02:07

## 2024-07-31 RX ADMIN — HYDROCODONE BITARTRATE AND ACETAMINOPHEN 1 TABLET: 5; 325 TABLET ORAL at 12:07

## 2024-07-31 RX ADMIN — ATORVASTATIN CALCIUM 10 MG: 10 TABLET, FILM COATED ORAL at 09:07

## 2024-07-31 RX ADMIN — MICONAZOLE NITRATE: 20 POWDER TOPICAL at 09:07

## 2024-07-31 RX ADMIN — PIPERACILLIN AND TAZOBACTAM 4.5 G: 4; .5 INJECTION, POWDER, LYOPHILIZED, FOR SOLUTION INTRAVENOUS; PARENTERAL at 09:07

## 2024-07-31 RX ADMIN — LEVOTHYROXINE SODIUM 112 MCG: 112 TABLET ORAL at 05:07

## 2024-07-31 RX ADMIN — SODIUM CHLORIDE, POTASSIUM CHLORIDE, SODIUM LACTATE AND CALCIUM CHLORIDE: 600; 310; 30; 20 INJECTION, SOLUTION INTRAVENOUS at 12:07

## 2024-07-31 NOTE — H&P (VIEW-ONLY)
Feeling better   Sore this to abdominal cavity, but no abdominal pain  Got up to a chair today   Passing some flatus per stoma    Vital signs reviewed and okay  Alert and oriented x3  Abdomen soft, nontender throughout   Incision clean and intact with stoma edematous, productive of small amount of air, no stool   BENNY drain serosanguineous/benign     White blood count 14.7, hemoglobin 11, platelets 328   Sodium 139, potassium 3.6, BUN 11, creatinine 0.7  Total bilirubin 0.4   AST 36   ALT 17    78-year-old woman with recurrent, persistent sigmoid volvulus status post sigmoidectomy with colostomy creation.  Overall improving.  White blood count slowed to decline.  Needs to mobilize more.  Awaiting full bowel function.  Acute kidney injury due to prerenal azotemia is resolving.  Urine output was 1500 mL yesterday.  Plan for:   1. Out of bed as much as possible   2. Incentive spirometer  3. Drop IV fluids down to 50 mL/hour as she is taking an clears  4.  Continue current care

## 2024-08-01 VITALS
SYSTOLIC BLOOD PRESSURE: 124 MMHG | HEART RATE: 77 BPM | RESPIRATION RATE: 20 BRPM | WEIGHT: 149.94 LBS | TEMPERATURE: 98 F | OXYGEN SATURATION: 95 % | HEIGHT: 62 IN | BODY MASS INDEX: 27.59 KG/M2 | DIASTOLIC BLOOD PRESSURE: 67 MMHG

## 2024-08-01 LAB
ABO + RH BLD: NORMAL
ABO + RH BLD: NORMAL
ALBUMIN SERPL-MCNC: 2.6 G/DL (ref 3.4–4.8)
ALBUMIN/GLOB SERPL: 1 RATIO (ref 1.1–2)
ALP SERPL-CCNC: 50 UNIT/L (ref 40–150)
ALT SERPL-CCNC: 14 UNIT/L (ref 0–55)
ANION GAP SERPL CALC-SCNC: 4 MEQ/L
AST SERPL-CCNC: 26 UNIT/L (ref 5–34)
BASOPHILS # BLD AUTO: 0.06 X10(3)/MCL
BASOPHILS NFR BLD AUTO: 0.6 %
BILIRUB SERPL-MCNC: 0.5 MG/DL
BLD PROD TYP BPU: NORMAL
BLD PROD TYP BPU: NORMAL
BLOOD UNIT EXPIRATION DATE: NORMAL
BLOOD UNIT EXPIRATION DATE: NORMAL
BLOOD UNIT TYPE CODE: 7300
BLOOD UNIT TYPE CODE: 7300
BUN SERPL-MCNC: 10.4 MG/DL (ref 9.8–20.1)
CALCIUM SERPL-MCNC: 8.6 MG/DL (ref 8.4–10.2)
CHLORIDE SERPL-SCNC: 103 MMOL/L (ref 98–107)
CO2 SERPL-SCNC: 30 MMOL/L (ref 23–31)
CREAT SERPL-MCNC: 0.87 MG/DL (ref 0.55–1.02)
CREAT/UREA NIT SERPL: 12
CROSSMATCH INTERPRETATION: NORMAL
CROSSMATCH INTERPRETATION: NORMAL
DISPENSE STATUS: NORMAL
DISPENSE STATUS: NORMAL
EOSINOPHIL # BLD AUTO: 0.38 X10(3)/MCL (ref 0–0.9)
EOSINOPHIL NFR BLD AUTO: 3.6 %
ERYTHROCYTE [DISTWIDTH] IN BLOOD BY AUTOMATED COUNT: 14.6 % (ref 11.5–17)
GFR SERPLBLD CREATININE-BSD FMLA CKD-EPI: >60 ML/MIN/1.73/M2
GLOBULIN SER-MCNC: 2.7 GM/DL (ref 2.4–3.5)
GLUCOSE SERPL-MCNC: 95 MG/DL (ref 82–115)
HCT VFR BLD AUTO: 32.7 % (ref 37–47)
HGB BLD-MCNC: 10.7 G/DL (ref 12–16)
IMM GRANULOCYTES # BLD AUTO: 0.08 X10(3)/MCL (ref 0–0.04)
IMM GRANULOCYTES NFR BLD AUTO: 0.8 %
LYMPHOCYTES # BLD AUTO: 1.34 X10(3)/MCL (ref 0.6–4.6)
LYMPHOCYTES NFR BLD AUTO: 12.6 %
MAGNESIUM SERPL-MCNC: 1.9 MG/DL (ref 1.6–2.6)
MCH RBC QN AUTO: 28.5 PG (ref 27–31)
MCHC RBC AUTO-ENTMCNC: 32.7 G/DL (ref 33–36)
MCV RBC AUTO: 87 FL (ref 80–94)
MONOCYTES # BLD AUTO: 0.95 X10(3)/MCL (ref 0.1–1.3)
MONOCYTES NFR BLD AUTO: 8.9 %
NEUTROPHILS # BLD AUTO: 7.81 X10(3)/MCL (ref 2.1–9.2)
NEUTROPHILS NFR BLD AUTO: 73.5 %
PHOSPHATE SERPL-MCNC: 2.6 MG/DL (ref 2.3–4.7)
PLATELET # BLD AUTO: 348 X10(3)/MCL (ref 130–400)
PMV BLD AUTO: 9.6 FL (ref 7.4–10.4)
POTASSIUM SERPL-SCNC: 3.9 MMOL/L (ref 3.5–5.1)
PROT SERPL-MCNC: 5.3 GM/DL (ref 5.8–7.6)
RBC # BLD AUTO: 3.76 X10(6)/MCL (ref 4.2–5.4)
SODIUM SERPL-SCNC: 137 MMOL/L (ref 136–145)
UNIT NUMBER: NORMAL
UNIT NUMBER: NORMAL
WBC # BLD AUTO: 10.62 X10(3)/MCL (ref 4.5–11.5)

## 2024-08-01 PROCEDURE — 25000003 PHARM REV CODE 250: Performed by: SURGERY

## 2024-08-01 PROCEDURE — 63600175 PHARM REV CODE 636 W HCPCS: Performed by: SURGERY

## 2024-08-01 PROCEDURE — 94761 N-INVAS EAR/PLS OXIMETRY MLT: CPT

## 2024-08-01 PROCEDURE — 85025 COMPLETE CBC W/AUTO DIFF WBC: CPT | Performed by: SURGERY

## 2024-08-01 PROCEDURE — 83735 ASSAY OF MAGNESIUM: CPT | Performed by: SURGERY

## 2024-08-01 PROCEDURE — 94799 UNLISTED PULMONARY SVC/PX: CPT

## 2024-08-01 PROCEDURE — 84100 ASSAY OF PHOSPHORUS: CPT | Performed by: SURGERY

## 2024-08-01 PROCEDURE — 80053 COMPREHEN METABOLIC PANEL: CPT | Performed by: SURGERY

## 2024-08-01 PROCEDURE — 36415 COLL VENOUS BLD VENIPUNCTURE: CPT | Performed by: SURGERY

## 2024-08-01 RX ADMIN — PIPERACILLIN AND TAZOBACTAM 4.5 G: 4; .5 INJECTION, POWDER, LYOPHILIZED, FOR SOLUTION INTRAVENOUS; PARENTERAL at 05:08

## 2024-08-01 RX ADMIN — LISINOPRIL 5 MG: 5 TABLET ORAL at 09:08

## 2024-08-01 RX ADMIN — LEVOTHYROXINE SODIUM 112 MCG: 112 TABLET ORAL at 05:08

## 2024-08-01 RX ADMIN — MICONAZOLE NITRATE: 20 POWDER TOPICAL at 09:08

## 2024-08-01 RX ADMIN — PIPERACILLIN AND TAZOBACTAM 4.5 G: 4; .5 INJECTION, POWDER, LYOPHILIZED, FOR SOLUTION INTRAVENOUS; PARENTERAL at 02:08

## 2024-08-01 RX ADMIN — HYDROCODONE BITARTRATE AND ACETAMINOPHEN 1 TABLET: 5; 325 TABLET ORAL at 10:08

## 2024-08-01 RX ADMIN — AMLODIPINE BESYLATE 5 MG: 5 TABLET ORAL at 09:08

## 2024-08-01 RX ADMIN — CITALOPRAM HYDROBROMIDE 40 MG: 20 TABLET ORAL at 09:08

## 2024-08-01 RX ADMIN — SODIUM CHLORIDE, POTASSIUM CHLORIDE, SODIUM LACTATE AND CALCIUM CHLORIDE: 600; 310; 30; 20 INJECTION, SOLUTION INTRAVENOUS at 01:08

## 2024-08-01 RX ADMIN — ATORVASTATIN CALCIUM 10 MG: 10 TABLET, FILM COATED ORAL at 09:08

## 2024-08-01 RX ADMIN — FERROUS SULFATE TAB 325 MG (65 MG ELEMENTAL FE) 1 EACH: 325 (65 FE) TAB at 09:08

## 2024-08-05 LAB
BACTERIA BLD CULT: NORMAL
BACTERIA BLD CULT: NORMAL

## 2024-08-09 ENCOUNTER — HOSPITAL ENCOUNTER (EMERGENCY)
Facility: HOSPITAL | Age: 79
Discharge: NURSING, REHAB OR LONG-TERM CARE | End: 2024-08-09
Attending: STUDENT IN AN ORGANIZED HEALTH CARE EDUCATION/TRAINING PROGRAM
Payer: MEDICARE

## 2024-08-09 VITALS
HEART RATE: 67 BPM | OXYGEN SATURATION: 95 % | BODY MASS INDEX: 25.76 KG/M2 | DIASTOLIC BLOOD PRESSURE: 62 MMHG | RESPIRATION RATE: 17 BRPM | TEMPERATURE: 98 F | SYSTOLIC BLOOD PRESSURE: 109 MMHG | WEIGHT: 140 LBS | HEIGHT: 62 IN

## 2024-08-09 DIAGNOSIS — Z48.89 ENCOUNTER FOR POSTOPERATIVE WOUND CHECK: Primary | ICD-10-CM

## 2024-08-09 LAB
ALBUMIN SERPL-MCNC: 3.2 G/DL (ref 3.4–4.8)
ALBUMIN/GLOB SERPL: 1.1 RATIO (ref 1.1–2)
ALP SERPL-CCNC: 59 UNIT/L (ref 40–150)
ALT SERPL-CCNC: 9 UNIT/L (ref 0–55)
ANION GAP SERPL CALC-SCNC: 9 MEQ/L
AST SERPL-CCNC: 13 UNIT/L (ref 5–34)
BACTERIA #/AREA URNS AUTO: ABNORMAL /HPF
BASOPHILS # BLD AUTO: 0.05 X10(3)/MCL
BASOPHILS NFR BLD AUTO: 0.5 %
BILIRUB SERPL-MCNC: 0.3 MG/DL
BILIRUB UR QL STRIP.AUTO: NEGATIVE
BUN SERPL-MCNC: 17 MG/DL (ref 9.8–20.1)
CALCIUM SERPL-MCNC: 9 MG/DL (ref 8.4–10.2)
CHLORIDE SERPL-SCNC: 107 MMOL/L (ref 98–107)
CLARITY UR: CLEAR
CO2 SERPL-SCNC: 23 MMOL/L (ref 23–31)
COLOR UR AUTO: YELLOW
CREAT SERPL-MCNC: 0.9 MG/DL (ref 0.55–1.02)
CREAT/UREA NIT SERPL: 19
EOSINOPHIL # BLD AUTO: 0.2 X10(3)/MCL (ref 0–0.9)
EOSINOPHIL NFR BLD AUTO: 2 %
ERYTHROCYTE [DISTWIDTH] IN BLOOD BY AUTOMATED COUNT: 15.2 % (ref 11.5–17)
GFR SERPLBLD CREATININE-BSD FMLA CKD-EPI: >60 ML/MIN/1.73/M2
GLOBULIN SER-MCNC: 3 GM/DL (ref 2.4–3.5)
GLUCOSE SERPL-MCNC: 121 MG/DL (ref 82–115)
GLUCOSE UR QL STRIP: NEGATIVE
HCT VFR BLD AUTO: 34 % (ref 37–47)
HGB BLD-MCNC: 11 G/DL (ref 12–16)
HGB UR QL STRIP: NEGATIVE
IMM GRANULOCYTES # BLD AUTO: 0.07 X10(3)/MCL (ref 0–0.04)
IMM GRANULOCYTES NFR BLD AUTO: 0.7 %
KETONES UR QL STRIP: NEGATIVE
LACTATE SERPL-SCNC: 0.8 MMOL/L (ref 0.5–2.2)
LEUKOCYTE ESTERASE UR QL STRIP: ABNORMAL
LYMPHOCYTES # BLD AUTO: 1.02 X10(3)/MCL (ref 0.6–4.6)
LYMPHOCYTES NFR BLD AUTO: 10 %
MCH RBC QN AUTO: 28.4 PG (ref 27–31)
MCHC RBC AUTO-ENTMCNC: 32.4 G/DL (ref 33–36)
MCV RBC AUTO: 87.6 FL (ref 80–94)
MONOCYTES # BLD AUTO: 0.58 X10(3)/MCL (ref 0.1–1.3)
MONOCYTES NFR BLD AUTO: 5.7 %
NEUTROPHILS # BLD AUTO: 8.23 X10(3)/MCL (ref 2.1–9.2)
NEUTROPHILS NFR BLD AUTO: 81.1 %
NITRITE UR QL STRIP: NEGATIVE
PH UR STRIP: 6.5 [PH]
PLATELET # BLD AUTO: 398 X10(3)/MCL (ref 130–400)
PMV BLD AUTO: 9.4 FL (ref 7.4–10.4)
POTASSIUM SERPL-SCNC: 3.8 MMOL/L (ref 3.5–5.1)
PROT SERPL-MCNC: 6.2 GM/DL (ref 5.8–7.6)
PROT UR QL STRIP: NEGATIVE
RBC # BLD AUTO: 3.88 X10(6)/MCL (ref 4.2–5.4)
RBC #/AREA URNS AUTO: ABNORMAL /HPF
SODIUM SERPL-SCNC: 139 MMOL/L (ref 136–145)
SP GR UR STRIP.AUTO: 1.01 (ref 1–1.03)
SQUAMOUS #/AREA URNS AUTO: ABNORMAL /HPF
UROBILINOGEN UR STRIP-ACNC: 0.2
WBC # BLD AUTO: 10.15 X10(3)/MCL (ref 4.5–11.5)
WBC #/AREA URNS AUTO: ABNORMAL /HPF

## 2024-08-09 PROCEDURE — 99285 EMERGENCY DEPT VISIT HI MDM: CPT | Mod: 25

## 2024-08-09 PROCEDURE — 83605 ASSAY OF LACTIC ACID: CPT | Performed by: STUDENT IN AN ORGANIZED HEALTH CARE EDUCATION/TRAINING PROGRAM

## 2024-08-09 PROCEDURE — 87040 BLOOD CULTURE FOR BACTERIA: CPT | Performed by: STUDENT IN AN ORGANIZED HEALTH CARE EDUCATION/TRAINING PROGRAM

## 2024-08-09 PROCEDURE — 63600175 PHARM REV CODE 636 W HCPCS: Performed by: STUDENT IN AN ORGANIZED HEALTH CARE EDUCATION/TRAINING PROGRAM

## 2024-08-09 PROCEDURE — 85025 COMPLETE CBC W/AUTO DIFF WBC: CPT | Performed by: STUDENT IN AN ORGANIZED HEALTH CARE EDUCATION/TRAINING PROGRAM

## 2024-08-09 PROCEDURE — 96360 HYDRATION IV INFUSION INIT: CPT

## 2024-08-09 PROCEDURE — 25500020 PHARM REV CODE 255: Performed by: STUDENT IN AN ORGANIZED HEALTH CARE EDUCATION/TRAINING PROGRAM

## 2024-08-09 PROCEDURE — 81003 URINALYSIS AUTO W/O SCOPE: CPT | Performed by: STUDENT IN AN ORGANIZED HEALTH CARE EDUCATION/TRAINING PROGRAM

## 2024-08-09 PROCEDURE — 80053 COMPREHEN METABOLIC PANEL: CPT | Performed by: STUDENT IN AN ORGANIZED HEALTH CARE EDUCATION/TRAINING PROGRAM

## 2024-08-09 RX ORDER — IOPAMIDOL 755 MG/ML
100 INJECTION, SOLUTION INTRAVASCULAR
Status: COMPLETED | OUTPATIENT
Start: 2024-08-09 | End: 2024-08-09

## 2024-08-09 RX ADMIN — IOPAMIDOL 100 ML: 755 INJECTION, SOLUTION INTRAVENOUS at 11:08

## 2024-08-09 RX ADMIN — SODIUM CHLORIDE, POTASSIUM CHLORIDE, SODIUM LACTATE AND CALCIUM CHLORIDE 1000 ML: 600; 310; 30; 20 INJECTION, SOLUTION INTRAVENOUS at 10:08

## 2024-08-09 NOTE — ED PROVIDER NOTES
Encounter Date: 8/9/2024       History     Chief Complaint   Patient presents with    Drainage from Incision     Sent from [facility] for evaluation of surgical site redness from colostomy surgery     HPI    78-year-old female with a past medical history of a CVA, hypertension, hypothyroidism, tonsillar cancer, recent sigmoid volvulus with sigmoid colectomy and colostomy placement on 07/26 presents emergency department for concerns wound infection.  Nursing home states there was foul-smelling purulent discharge from the cervical site.  Patient states she feels fine.  Blood pressure in the 80s systolic upon arrival.  Denies any fevers.    Review of patient's allergies indicates:   Allergen Reactions    Nsaids (non-steroidal anti-inflammatory drug)     Oxycodone      Past Medical History:   Diagnosis Date    CVA (cerebral vascular accident)     Depression     HTN (hypertension)     Hypothyroidism, unspecified     Squamous cell carcinoma in situ     Tonsillar cancer      Past Surgical History:   Procedure Laterality Date    COLECTOMY, SIGMOID N/A 7/28/2024    Procedure: COLECTOMY, SIGMOID;  Surgeon: GENA [place] MD;  Location: [facility] OR;  Service: General;  Laterality: N/A;    COLOSTOMY N/A 7/28/2024    Procedure: CREATION, COLOSTOMY;  Surgeon: GENA Mat[place] MD;  Location: [facility] OR;  Service: General;  Laterality: N/A;    FLEXIBLE SIGMOIDOSCOPY N/A 7/26/2024    Procedure: SIGMOIDOSCOPY, FLEXIBLE;  Surgeon: GENA Matute MD;  Location: [facility];  Service: Endoscopy;  Laterality: N/A;  POST OP:DECOMPRESSION    HYSTERECTOMY      LAPAROSCOPY N/A 7/28/2024    Procedure: LAPAROSCOPY;  Surgeon: GENA Matute MD;  Location: [facility] OR;  Service: General;  Laterality: N/A;  massively distended sigmoid colon was identified Converted to open art 1620    TONSILLECTOMY      TOTAL SHOULDER ARTHROPLASTY       Family History   Problem Relation Name Age of Onset    Cancer Mother      Leukemia  Mother      Stroke Mother      Alzheimer's disease Father      Cancer Sister      Leukemia Sister       Social History     Tobacco Use    Smoking status: Former     Types: Cigarettes    Smokeless tobacco: Never    Tobacco comments:     patient stated that she smokes on some days.    Substance Use Topics    Alcohol use: Yes     Comment: wine, occasionally    Drug use: Never     Review of Systems   Constitutional:  Negative for fever.   Respiratory:  Negative for cough.    Cardiovascular:  Negative for chest pain.   Gastrointestinal:  Negative for abdominal pain, constipation, diarrhea, nausea and vomiting.   Skin:  Positive for wound.   Neurological:  Negative for headaches.   All other systems reviewed and are negative.      Physical Exam     Initial Vitals [08/09/24 0956]   BP Pulse Resp Temp SpO2   (!) 86/51 74 18 98.1 °F (36.7 °C) 97 %      MAP       --         Physical Exam    Nursing note and vitals reviewed.  Constitutional: She appears well-developed and well-nourished. No distress.   Cardiovascular:  Normal rate and regular rhythm.           Pulmonary/Chest: Breath sounds normal. No respiratory distress. She has no wheezes. She has no rhonchi. She has no rales.   Abdominal: Abdomen is soft. There is abdominal tenderness.   There is a colostomy in place.  Ventral abdominal surgical incision with bandage and staples in place.  Near where they umbilicus was there is mild drainage.  Generalized tenderness about the area There is no rebound and no guarding.   Musculoskeletal:         General: No tenderness. Normal range of motion.     Neurological: She is alert and oriented to person, place, and time. She has normal strength.   Skin: Skin is warm. Capillary refill takes less than 2 seconds.         ED Course   Procedures  Labs Reviewed   COMPREHENSIVE METABOLIC PANEL - Abnormal       Result Value    Sodium 139      Potassium 3.8      Chloride 107      CO2 23      Glucose 121 (*)     Blood Urea Nitrogen 17.0       Creatinine 0.90      Calcium 9.0      Protein Total 6.2      Albumin 3.2 (*)     Globulin 3.0      Albumin/Globulin Ratio 1.1      Bilirubin Total 0.3      ALP 59      ALT 9      AST 13      eGFR >60      Anion Gap 9.0      BUN/Creatinine Ratio 19     URINALYSIS, REFLEX TO URINE CULTURE - Abnormal    Color, UA Yellow      Appearance, UA Clear      Specific Gravity, UA 1.015      pH, UA 6.5      Protein, UA Negative      Glucose, UA Negative      Ketones, UA Negative      Blood, UA Negative      Bilirubin, UA Negative      Urobilinogen, UA 0.2      Nitrites, UA Negative      Leukocyte Esterase, UA 1+ (*)    CBC WITH DIFFERENTIAL - Abnormal    WBC 10.15      RBC 3.88 (*)     Hgb 11.0 (*)     Hct 34.0 (*)     MCV 87.6      MCH 28.4      MCHC 32.4 (*)     RDW 15.2      Platelet 398      MPV 9.4      Neut % 81.1      Lymph % 10.0      Mono % 5.7      Eos % 2.0      Basophil % 0.5      Lymph # 1.02      Neut # 8.23      Mono # 0.58      Eos # 0.20      Baso # 0.05      IG# 0.07 (*)     IG% 0.7     URINALYSIS, MICROSCOPIC - Abnormal    Bacteria, UA Few (*)     RBC, UA 0-2      WBC, UA 6-10 (*)     Squamous Epithelial Cells, UA Few (*)    LACTIC ACID, PLASMA - Normal    Lactic Acid Level 0.8     BLOOD CULTURE OLG   BLOOD CULTURE OLG   CBC W/ AUTO DIFFERENTIAL    Narrative:     The following orders were created for panel order CBC auto differential.  Procedure                               Abnormality         Status                     ---------                               -----------         ------                     CBC with Differential[[id number]]       Abnormal            Final result                 Please view results for these tests on the individual orders.          Imaging Results              CT Abdomen Pelvis With IV Contrast NO Oral Contrast (Final result)  Result time 08/09/24 13:02:53      Final result by Mario Mckeon MD (08/09/24 13:02:53)                   Narrative:    EXAMINATION  CT ABDOMEN PELVIS  WITH IV CONTRAST    CLINICAL HISTORY  Abdominal abscess/infection suspected;Abdominal pain, post-op;    TECHNIQUE  Post-contrast helical-acquisition CT images were obtained and multiplanar reformats accomplished by a CT technologist at a separate workstation, pushed to PACS for physician review.    CONTRAST  *IV: ISOVUE-370, 100 mL  *Enteric: none    COMPARISON  26 July 2024    FINDINGS  Images were reviewed in soft tissue, lung, and bone windows.    Exam quality: adequate for evaluation    Lines/tubes: none visualized    Chest: Stable heart chamber size. No pericardial effusion. No interval change of the visualized vasculature. No airspace consolidation or suspicious lung lesion. No worsening pleural effusion or evidence for loculation.    Hepatobiliary/Pancreas: No new or enlarging hepatic lesion. No suspicious findings of the gallbladder or biliary system. No acute process or suspicious interval change of the pancreas.    Spleen: No interval change.    Adrenal/: No new, enlarging, or otherwise suspicious adrenal or renal lesion. No interval development of findings to suggest obstructive uropathy. Urinary bladder and adjacent reproductive structures are unchanged.    Esophagus/GI tract: The included lower esophagus is unremarkable. There are interval changes of laparotomy with newly appreciated left lower quadrant colostomy.  Large parastomal hernia is present, containing a short segment of nondilated distal transverse colon.  Interval resection of the distal descending and sigmoid colon also noted, with no suspicious focal findings of the rectal stump.    Musculoskeletal: No interval change.    Other findings: No free fluid or air. No drainable collections. Aortoiliac vascular structures are similar in comparison. No development of pathologic antonio enlargement or necrotic adenopathy.    IMPRESSION  1. Interval distal partial colectomy, with no convincing acute intra-abdominopelvic abnormality.  2. Newly  visualized left lower quadrant colostomy with large peristomal hernia containing nonobstructed short segment distal transverse colon.  3. Remaining findings are without significant change in the relatively short interval.    RADIATION DOSE  Automated tube current modulation, weight-based exposure dosing, and/or iterative reconstruction technique utilized to reach lowest reasonably achievable exposure rate.    DLP: 432 mGy*cm      Electronically signed by: Mario Mckeon  Date:    08/09/2024  Time:    13:02                                     X-Ray Chest AP Portable (Final result)  Result time 08/09/24 10:33:08      Final result by Mario Mckeon MD (08/09/24 10:33:08)                   Narrative:    EXAMINATION  XR CHEST AP PORTABLE    CLINICAL HISTORY  Sepsis;    TECHNIQUE  A total of 1 frontal image(s) of the chest.    COMPARISON  14 January 2024    FINDINGS  Lines/tubes/devices: ECG leads overlie the imaged region.    Cardiomediastinal silhouette and central vascular structures are similar, with redemonstrated widespread aortic calcification.  The trachea is midline. There is no lobar consolidation, pleural effusion, or pneumothorax.    Regional osseous degenerative changes and partially visualized left shoulder arthroplasty components are similar to the prior.  No convincing acute osseous process.    IMPRESSION  No acute process or other adverse interval change.      Electronically signed by: Mario Mckeon  Date:    08/09/2024  Time:    10:33                                     Medications   lactated ringers bolus 1,000 mL (0 mLs Intravenous Stopped 8/9/24 1131)   iopamidoL (ISOVUE-370) injection 100 mL (100 mLs Intravenous Given 8/9/24 1142)     Medical Decision Making  Initial Assessment:   Possible surgical infection    Differential Diagnosis:   Judging by the patient's chief complaint and pertinent history, the patient has the following possible differential diagnoses, including but not limited to the  following.  Some of these are deemed to be lower likelihood and some more likely based on my physical exam and history combined with possible lab work and/or imaging studies.   Please see the pertinent studies, and refer to the HPI.  Some of these diagnoses will take further evaluation to fully rule out, perhaps as an outpatient and the patient was encouraged to follow up when discharged for more comprehensive evaluation.  Postsurgical infection, postsurgical abscess, wound dehiscence, wound eval,  as well as multiple other possible etiologies      Problems Addressed:  Encounter for postoperative wound check: self-limited or minor problem    Amount and/or Complexity of Data Reviewed  Labs: ordered. Decision-making details documented in ED Course.  Radiology: ordered. Decision-making details documented in ED Course.    Risk  Prescription drug management.               ED Course as of 08/09/24 1316   Fri Aug 09, 2024   1034 WBC: 10.15 [BS]   1037 Hemoglobin(!): 11.0 [BS]   1037 Hematocrit(!): 34.0 [BS]   1037 Platelet Count: 398 [BS]   1053 Lactic Acid Level: 0.8 [BS]   1054 Sodium: 139 [BS]   1054 Potassium: 3.8 [BS]   1054 Chloride: 107 [BS]   1054 CO2: 23 [BS]   1054 Glucose(!): 121 [BS]   1054 BUN: 17.0 [BS]   1054 Creatinine: 0.90 [BS]   1055 X-Ray Chest AP Portable  Lungs clear no consolidations [BS]   1309 CT of the abdomen and pelvis with no signs of postsurgical infection.  We will speak with the surgeon who did the surgery to have close follow-up [BS]   1313 Blood pressure improved [BS]   1315 General surgery recommends placing Neosporin on the wound daily [BS]      ED Course User Index  [BS] Braulio Arzola MD                             Clinical Impression:  Final diagnoses:  [Z48.89] Encounter for postoperative wound check (Primary)          ED Disposition Condition    Discharge Stable          ED Prescriptions    None       Follow-up Information       Follow up With Specialties Details Why Contact  Info    Leeanna De Leon MD Family Medicine Schedule an appointment as soon as possible for a visit in 1 day  [address]  [facility] [place] [address]  [phone]      [facility] Emergency Medicine Go to  If symptoms worsen [address]  [facility] [address]  [phone]    GENA Matute MD General Surgery Schedule an appointment as soon as possible for a visit in 1 day  [address] [facility] [facility] [address]  [phone]               Braulio Arzola MD  08/09/24 1313

## 2024-08-12 ENCOUNTER — HOSPITAL ENCOUNTER (EMERGENCY)
Facility: HOSPITAL | Age: 79
End: 2024-08-12
Attending: INTERNAL MEDICINE
Payer: MEDICARE

## 2024-08-12 VITALS
TEMPERATURE: 98 F | RESPIRATION RATE: 18 BRPM | WEIGHT: 145 LBS | SYSTOLIC BLOOD PRESSURE: 92 MMHG | HEART RATE: 70 BPM | HEIGHT: 62 IN | DIASTOLIC BLOOD PRESSURE: 58 MMHG | OXYGEN SATURATION: 94 % | BODY MASS INDEX: 26.68 KG/M2

## 2024-08-12 DIAGNOSIS — T81.31XA POSTOPERATIVE WOUND DEHISCENCE, INITIAL ENCOUNTER: Primary | ICD-10-CM

## 2024-08-12 PROCEDURE — 99283 EMERGENCY DEPT VISIT LOW MDM: CPT

## 2024-08-12 RX ORDER — SULFAMETHOXAZOLE AND TRIMETHOPRIM 800; 160 MG/1; MG/1
1 TABLET ORAL 2 TIMES DAILY
Qty: 14 TABLET | Refills: 0 | Status: SHIPPED | OUTPATIENT
Start: 2024-08-12 | End: 2024-08-19

## 2024-08-12 NOTE — ED PROVIDER NOTES
08/12/2024         3:16 PM    Source of History:  History obtained from patient and nursing home records.     Chief complaint:  From Nurse Triage:  Wound Dehiscence (Sent from [facility] for incision to [facility] from recent colostomy having an opening with some drainage)    HISTORY OF PRESENT ILLNES:  Ashley Calvillo is a 78 y.o. female  has a past medical history of CVA (cerebral vascular accident), Depression, HTN (hypertension), Hypothyroidism, unspecified, Squamous cell carcinoma in situ, and Tonsillar cancer. presenting with Wound Dehiscence (Sent from [facility] for incision to abd from recent colostomy having an opening with some drainage)  Patient had a colostomy with staples still in place.  Sent here for suspected dehiscence.  No fever minimal drainage    REVIEW OF SYSTEMS:   Constitutional symptoms:     Skin symptoms:      Eye symptoms:     ENMT symptoms:      Respiratory symptoms:      Cardiovascular symptoms:     Gastrointestinal symptoms:      Genitourinary symptoms:     Musculoskeletal symptoms:      Neurologic symptoms:      Psychiatric symptoms:               Additional review of systems information: Patient Denies Any Other Complaints.    All Other Systems Reviewed With Patient And Negative.    ALLEGIES:  Review of patient's allergies indicates:   Allergen Reactions    Nsaids (non-steroidal anti-inflammatory drug)     Oxycodone        MEDICINE LIST:  Current Outpatient Medications   Medication Instructions    acetaminophen (TYLENOL) 650 mg, Oral, Every 4 hours PRN    amLODIPine (NORVASC) 5 mg, Oral, 2 times daily    citalopram (CELEXA) 40 mg, Oral, Daily    clopidogreL (PLAVIX) 75 mg, Oral, Daily, Q.h.s.    docusate sodium (COLACE) 100 mg, Oral, Daily    levothyroxine (SYNTHROID) 112 mcg, Oral, Before breakfast    lisinopriL (PRINIVIL,ZESTRIL) 5 mg, Oral, 2 times daily    simvastatin (ZOCOR) 10 mg, Oral, Daily    sulfamethoxazole-trimethoprim 800-160mg (BACTRIM DS) 800-160 mg Tab 1 tablet, Oral, 2 times  daily    traMADoL (ULTRAM) 50 mg, Oral, Every 24 hours as needed        PMH:  As per HPI and below:    Reviewed and updated in chart.    PAST MEDICAL HISTORY:  Past Medical History:   Diagnosis Date    CVA (cerebral vascular accident)     Depression     HTN (hypertension)     Hypothyroidism, unspecified     Squamous cell carcinoma in situ     Tonsillar cancer         PAST SURGICAL HISTORY:  Past Surgical History:   Procedure Laterality Date    COLECTOMY, SIGMOID N/A 7/28/2024    Procedure: COLECTOMY, SIGMOID;  Surgeon: GENA Matute MD;  Location: [facility] OR;  Service: General;  Laterality: N/A;    COLOSTOMY N/A 7/28/2024    Procedure: CREATION, COLOSTOMY;  Surgeon: GENA Matute MD;  Location: [facility] OR;  Service: General;  Laterality: N/A;    FLEXIBLE SIGMOIDOSCOPY N/A 7/26/2024    Procedure: SIGMOIDOSCOPY, FLEXIBLE;  Surgeon: GENA Matute MD;  Location: [facility] ENDO;  Service: Endoscopy;  Laterality: N/A;  POST OP:DECOMPRESSION    HYSTERECTOMY      LAPAROSCOPY N/A 7/28/2024    Procedure: LAPAROSCOPY;  Surgeon: GENA Matute MD;  Location: [facility] OR;  Service: General;  Laterality: N/A;  massively distended sigmoid colon was identified Converted to open art 1620    TONSILLECTOMY      TOTAL SHOULDER ARTHROPLASTY         SOCIAL HISTORY:  Social History     Tobacco Use    Smoking status: Former     Types: Cigarettes    Smokeless tobacco: Never    Tobacco comments:     patient stated that she smokes on some days.    Substance Use Topics    Alcohol use: Yes     Comment: wine, occasionally    Drug use: Never       FAMILY HISTORY:  Family History   Problem Relation Name Age of Onset    Cancer Mother      Leukemia Mother      Stroke Mother      Alzheimer's disease Father      Cancer Sister      Leukemia Sister          PROBLEM LIST:  Patient Active Problem List   Diagnosis    Iron deficiency anemia    Thrombocytosis    HTN (hypertension)    Acquired hypothyroidism    Hypokalemia     Urinary incontinence    Weakness    Sigmoid volvulus        PHYSICAL EXAM:      ED Triage Vitals [08/12/24 1507]   BP (!) 92/58   Pulse 70   Resp 18   Temp 98.2 °F (36.8 °C)   SpO2 (!) 94 %        Vital Signs: Reviewed As In Chart.  General:  Alert, No Cardiorespiratory Distress Noted.  Skin: warm and dry, midline incision with staples in place.  Small dehiscence near umbilicus with minimal purulent drainage  Eye:   Extraocular Movements Are Intact.   ENT: Mucus membranes are moist.   Cardiovascular:  Normal peripheral perfusion     Respiratory:  Normal respiratory rate    Gastrointestinal:  No distention    Neurological:  Alert And Oriented To Person, Place, Time, And Situation, Normal Motor Observed, Normal Speech Observed.  Musculoskeletal:  No Gross Deformity Noted.     Psychiatric:  Cooperative.      ED WORKUP FOR MEDICAL DECISION MAKING:    ED ORDERS:  No orders of the defined types were placed in this encounter.      ED MEDICINES:  Medications - No data to display             ED LABS ORDERED AND REVIEWED:  No visits with results within 1 Day(s) from this visit.   Latest known visit with results is:   Admission on 08/09/2024, Discharged on 08/09/2024   Component Date Value Ref Range Status    Blood Culture 08/09/2024 No Growth At 72 Hours   Preliminary    Sodium 08/09/2024 139  136 - 145 mmol/L Final    Potassium 08/09/2024 3.8  3.5 - 5.1 mmol/L Final    Chloride 08/09/2024 107  98 - 107 mmol/L Final    CO2 08/09/2024 23  23 - 31 mmol/L Final    Glucose 08/09/2024 121 (H)  82 - 115 mg/dL Final    Blood Urea Nitrogen 08/09/2024 17.0  9.8 - 20.1 mg/dL Final    Creatinine 08/09/2024 0.90  0.55 - 1.02 mg/dL Final    Calcium 08/09/2024 9.0  8.4 - 10.2 mg/dL Final    Protein Total 08/09/2024 6.2  5.8 - 7.6 gm/dL Final    Albumin 08/09/2024 3.2 (L)  3.4 - 4.8 g/dL Final    Globulin 08/09/2024 3.0  2.4 - 3.5 gm/dL Final    Albumin/Globulin Ratio 08/09/2024 1.1  1.1 - 2.0 ratio Final    Bilirubin Total 08/09/2024 0.3   <=1.5 mg/dL Final    ALP 08/09/2024 59  40 - 150 unit/L Final    ALT 08/09/2024 9  0 - 55 unit/L Final    AST 08/09/2024 13  5 - 34 unit/L Final    eGFR 08/09/2024 >60  mL/min/1.73/m2 Final    Anion Gap 08/09/2024 9.0  mEq/L Final    BUN/Creatinine Ratio 08/09/2024 19   Final    Lactic Acid Level 08/09/2024 0.8  0.5 - 2.2 mmol/L Final    Color, UA 08/09/2024 Yellow  Yellow, Light-Yellow, Dark Yellow, [place], Straw Final    Appearance, UA 08/09/2024 Clear  Clear Final    Specific Gravity, UA 08/09/2024 1.015  1.005 - 1.030 Final    pH, UA 08/09/2024 6.5  5.0 - 8.5 Final    Protein, UA 08/09/2024 Negative  Negative Final    Glucose, UA 08/09/2024 Negative  Negative, Normal Final    Ketones, UA 08/09/2024 Negative  Negative Final    Blood, UA 08/09/2024 Negative  Negative Final    Bilirubin, UA 08/09/2024 Negative  Negative Final    Urobilinogen, UA 08/09/2024 0.2  0.2, 1.0, Normal Final    Nitrites, UA 08/09/2024 Negative  Negative Final    Leukocyte Esterase, UA 08/09/2024 1+ (A)  Negative Final    WBC 08/09/2024 10.15  4.50 - 11.50 x10(3)/mcL Final    RBC 08/09/2024 3.88 (L)  4.20 - 5.40 x10(6)/mcL Final    Hgb 08/09/2024 11.0 (L)  12.0 - 16.0 g/dL Final    Hct 08/09/2024 34.0 (L)  37.0 - 47.0 % Final    MCV 08/09/2024 87.6  80.0 - 94.0 fL Final    MCH 08/09/2024 28.4  27.0 - 31.0 pg Final    MCHC 08/09/2024 32.4 (L)  33.0 - 36.0 g/dL Final    RDW 08/09/2024 15.2  11.5 - 17.0 % Final    Platelet 08/09/2024 398  130 - 400 x10(3)/mcL Final    MPV 08/09/2024 9.4  7.4 - 10.4 fL Final    Neut % 08/09/2024 81.1  % Final    Lymph % 08/09/2024 10.0  % Final    Mono % 08/09/2024 5.7  % Final    Eos % 08/09/2024 2.0  % Final    Basophil % 08/09/2024 0.5  % Final    Lymph # 08/09/2024 1.02  0.6 - 4.6 x10(3)/mcL Final    Neut # 08/09/2024 8.23  2.1 - 9.2 x10(3)/mcL Final    Mono # 08/09/2024 0.58  0.1 - 1.3 x10(3)/mcL Final    Eos # 08/09/2024 0.20  0 - 0.9 x10(3)/mcL Final    Baso # 08/09/2024 0.05  <=0.2 x10(3)/mcL Final     IG# 08/09/2024 0.07 (H)  0 - 0.04 x10(3)/mcL Final    IG% 08/09/2024 0.7  % Final    Blood Culture 08/09/2024 No Growth At 72 Hours   Preliminary    Bacteria, UA 08/09/2024 Few (A)  None Seen, Rare, Occasional /HPF Final    RBC, UA 08/09/2024 0-2  None Seen, 0-2, 3-5, 0-5 /HPF Final    WBC, UA 08/09/2024 6-10 (A)  None Seen, 0-2, 3-5, 0-5 /HPF Final    Squamous Epithelial Cells, UA 08/09/2024 Few (A)  None Seen, Rare, Occasional, Occ /HPF Final       RADIOLOGY STUDIES ORDERED AND REVIEWED:  Imaging Results    None         MEDICAL DECISION MAKING:    Ashley Calvillo is 78 y.o. female who  has a past medical history of CVA (cerebral vascular accident), Depression, HTN (hypertension), Hypothyroidism, unspecified, Squamous cell carcinoma in situ, and Tonsillar cancer. arrives in ER with c/o Wound Dehiscence (Sent from [facility] for incision to [facility] from recent colostomy having an opening with some drainage)      Reviewed Nurses Note. Reviewed Vital Signs.     Reviewed Pertinent old records, History and updated as necessary.    Vitals:    08/12/24 1507   BP: (!) 92/58   Pulse: 70   Resp: 18   Temp: 98.2 °F (36.8 °C)        Medical Decision Making                      PROCEDURES PERFORMED IN ED:  Procedures    DIAGNOSTIC IMPRESSION:        ICD-10-CM ICD-9-CM   1. Postoperative wound dehiscence, initial encounter  T81.31XA 998.32         ED Disposition Condition    Discharge Stable               Medication List        START taking these medications      sulfamethoxazole-trimethoprim 800-160mg 800-160 mg Tab  Commonly known as: BACTRIM DS  Take 1 tablet by mouth 2 (two) times daily. for 7 days            ASK your doctor about these medications      acetaminophen 325 MG tablet  Commonly known as: TYLENOL     amLODIPine 5 MG tablet  Commonly known as: NORVASC     citalopram 40 MG tablet  Commonly known as: CeleXA     clopidogreL 75 mg tablet  Commonly known as: PLAVIX  Take 1 tablet (75 mg total) by mouth once daily. Q.h.s.      COLACE 100 MG capsule  Generic drug: docusate sodium     levothyroxine 112 MCG tablet  Commonly known as: SYNTHROID     lisinopriL 5 MG tablet  Commonly known as: PRINIVIL,ZESTRIL     simvastatin 10 MG tablet  Commonly known as: ZOCOR     traMADoL 50 mg tablet  Commonly known as: ULTRAM               Where to Get Your Medications        These medications were sent to [facility] [place] [address] [address]      Phone: [phone]   sulfamethoxazole-trimethoprim 800-160mg 800-160 mg Tab           Follow-up Information       Please follow up.    Contact information:  Follow up with your surgeon as scheduled.                            ED Prescriptions       Medication Sig Dispense Start Date End Date Auth. Provider    sulfamethoxazole-trimethoprim 800-160mg (BACTRIM DS) 800-160 mg Tab Take 1 tablet by mouth 2 (two) times daily. for 7 days 14 tablet 8/12/2024 8/19/2024 Carlos Perales MD          Follow-up Information       Follow up With Specialties Details Why Contact Info        Follow up with your surgeon as scheduled.               Carlos Perales MD  08/12/24 [id number]

## 2024-08-14 LAB
BACTERIA BLD CULT: NORMAL
BACTERIA BLD CULT: NORMAL

## 2024-08-16 ENCOUNTER — HOSPITAL ENCOUNTER (OUTPATIENT)
Facility: HOSPITAL | Age: 79
Discharge: OTHER INSTITUTION | End: 2024-08-17
Attending: INTERNAL MEDICINE | Admitting: INTERNAL MEDICINE
Payer: MEDICARE

## 2024-08-16 DIAGNOSIS — R53.1 WEAKNESS: ICD-10-CM

## 2024-08-16 DIAGNOSIS — N17.9 ACUTE KIDNEY INJURY: Primary | ICD-10-CM

## 2024-08-16 DIAGNOSIS — I95.9 HYPOTENSION, UNSPECIFIED HYPOTENSION TYPE: ICD-10-CM

## 2024-08-16 LAB
ALBUMIN SERPL-MCNC: 3.9 G/DL (ref 3.4–4.8)
ALBUMIN/GLOB SERPL: 1.1 RATIO (ref 1.1–2)
ALP SERPL-CCNC: 77 UNIT/L (ref 40–150)
ALT SERPL-CCNC: 20 UNIT/L (ref 0–55)
ANION GAP SERPL CALC-SCNC: 13 MEQ/L
AST SERPL-CCNC: 32 UNIT/L (ref 5–34)
BACTERIA #/AREA URNS AUTO: ABNORMAL /HPF
BASOPHILS # BLD AUTO: 0.1 X10(3)/MCL
BASOPHILS NFR BLD AUTO: 1.2 %
BILIRUB SERPL-MCNC: 0.2 MG/DL
BILIRUB UR QL STRIP.AUTO: NEGATIVE
BNP BLD-MCNC: 64.5 PG/ML
BUN SERPL-MCNC: 23 MG/DL (ref 9.8–20.1)
CALCIUM SERPL-MCNC: 9.9 MG/DL (ref 8.4–10.2)
CHLORIDE SERPL-SCNC: 105 MMOL/L (ref 98–107)
CLARITY UR: CLEAR
CO2 SERPL-SCNC: 20 MMOL/L (ref 23–31)
COLOR UR AUTO: YELLOW
CREAT SERPL-MCNC: 2.42 MG/DL (ref 0.55–1.02)
CREAT/UREA NIT SERPL: 10
EOSINOPHIL # BLD AUTO: 0.16 X10(3)/MCL (ref 0–0.9)
EOSINOPHIL NFR BLD AUTO: 1.9 %
ERYTHROCYTE [DISTWIDTH] IN BLOOD BY AUTOMATED COUNT: 15.7 % (ref 11.5–17)
GFR SERPLBLD CREATININE-BSD FMLA CKD-EPI: 20 ML/MIN/1.73/M2
GLOBULIN SER-MCNC: 3.5 GM/DL (ref 2.4–3.5)
GLUCOSE SERPL-MCNC: 95 MG/DL (ref 82–115)
GLUCOSE UR QL STRIP: NEGATIVE
HCT VFR BLD AUTO: 35.4 % (ref 37–47)
HGB BLD-MCNC: 11.6 G/DL (ref 12–16)
HGB UR QL STRIP: NEGATIVE
IMM GRANULOCYTES # BLD AUTO: 0.06 X10(3)/MCL (ref 0–0.04)
IMM GRANULOCYTES NFR BLD AUTO: 0.7 %
KETONES UR QL STRIP: NEGATIVE
LACTATE SERPL-SCNC: 2.1 MMOL/L (ref 0.5–2.2)
LEUKOCYTE ESTERASE UR QL STRIP: ABNORMAL
LYMPHOCYTES # BLD AUTO: 1.92 X10(3)/MCL (ref 0.6–4.6)
LYMPHOCYTES NFR BLD AUTO: 22.7 %
MCH RBC QN AUTO: 28.9 PG (ref 27–31)
MCHC RBC AUTO-ENTMCNC: 32.8 G/DL (ref 33–36)
MCV RBC AUTO: 88.3 FL (ref 80–94)
MONOCYTES # BLD AUTO: 0.96 X10(3)/MCL (ref 0.1–1.3)
MONOCYTES NFR BLD AUTO: 11.3 %
NEUTROPHILS # BLD AUTO: 5.26 X10(3)/MCL (ref 2.1–9.2)
NEUTROPHILS NFR BLD AUTO: 62.2 %
NITRITE UR QL STRIP: NEGATIVE
OHS QRS DURATION: 72 MS
OHS QTC CALCULATION: 441 MS
PH UR STRIP: 7 [PH]
PLATELET # BLD AUTO: 450 X10(3)/MCL (ref 130–400)
PMV BLD AUTO: 9.7 FL (ref 7.4–10.4)
POTASSIUM SERPL-SCNC: 5 MMOL/L (ref 3.5–5.1)
PROT SERPL-MCNC: 7.4 GM/DL (ref 5.8–7.6)
PROT UR QL STRIP: NEGATIVE
RBC # BLD AUTO: 4.01 X10(6)/MCL (ref 4.2–5.4)
RBC #/AREA URNS AUTO: ABNORMAL /HPF
SODIUM SERPL-SCNC: 138 MMOL/L (ref 136–145)
SP GR UR STRIP.AUTO: 1.01 (ref 1–1.03)
SQUAMOUS #/AREA URNS AUTO: ABNORMAL /HPF
TROPONIN I SERPL-MCNC: <0.01 NG/ML (ref 0–0.04)
UROBILINOGEN UR STRIP-ACNC: 0.2
WBC # BLD AUTO: 8.46 X10(3)/MCL (ref 4.5–11.5)
WBC #/AREA URNS AUTO: ABNORMAL /HPF

## 2024-08-16 PROCEDURE — 83880 ASSAY OF NATRIURETIC PEPTIDE: CPT | Performed by: INTERNAL MEDICINE

## 2024-08-16 PROCEDURE — G0378 HOSPITAL OBSERVATION PER HR: HCPCS

## 2024-08-16 PROCEDURE — 25000003 PHARM REV CODE 250: Performed by: INTERNAL MEDICINE

## 2024-08-16 PROCEDURE — 87040 BLOOD CULTURE FOR BACTERIA: CPT | Performed by: INTERNAL MEDICINE

## 2024-08-16 PROCEDURE — 83605 ASSAY OF LACTIC ACID: CPT | Performed by: INTERNAL MEDICINE

## 2024-08-16 PROCEDURE — 99285 EMERGENCY DEPT VISIT HI MDM: CPT | Mod: 25

## 2024-08-16 PROCEDURE — 93010 ELECTROCARDIOGRAM REPORT: CPT | Mod: ,,, | Performed by: INTERNAL MEDICINE

## 2024-08-16 PROCEDURE — 96361 HYDRATE IV INFUSION ADD-ON: CPT

## 2024-08-16 PROCEDURE — 81001 URINALYSIS AUTO W/SCOPE: CPT | Performed by: INTERNAL MEDICINE

## 2024-08-16 PROCEDURE — 84484 ASSAY OF TROPONIN QUANT: CPT | Performed by: INTERNAL MEDICINE

## 2024-08-16 PROCEDURE — 36415 COLL VENOUS BLD VENIPUNCTURE: CPT | Mod: 91 | Performed by: INTERNAL MEDICINE

## 2024-08-16 PROCEDURE — 80053 COMPREHEN METABOLIC PANEL: CPT | Performed by: INTERNAL MEDICINE

## 2024-08-16 PROCEDURE — 93005 ELECTROCARDIOGRAM TRACING: CPT

## 2024-08-16 PROCEDURE — 81003 URINALYSIS AUTO W/O SCOPE: CPT | Performed by: INTERNAL MEDICINE

## 2024-08-16 PROCEDURE — 85025 COMPLETE CBC W/AUTO DIFF WBC: CPT | Performed by: INTERNAL MEDICINE

## 2024-08-16 RX ORDER — SODIUM CHLORIDE 9 MG/ML
1000 INJECTION, SOLUTION INTRAVENOUS CONTINUOUS
Status: ACTIVE | OUTPATIENT
Start: 2024-08-16 | End: 2024-08-17

## 2024-08-16 RX ORDER — LISINOPRIL 5 MG/1
5 TABLET ORAL 2 TIMES DAILY
Status: ON HOLD | COMMUNITY
End: 2024-08-17 | Stop reason: HOSPADM

## 2024-08-16 RX ADMIN — SODIUM CHLORIDE 1000 ML: 9 INJECTION, SOLUTION INTRAVENOUS at 11:08

## 2024-08-16 RX ADMIN — SODIUM CHLORIDE 2000 ML: 9 INJECTION, SOLUTION INTRAVENOUS at 07:08

## 2024-08-17 VITALS
SYSTOLIC BLOOD PRESSURE: 122 MMHG | BODY MASS INDEX: 26.21 KG/M2 | OXYGEN SATURATION: 96 % | DIASTOLIC BLOOD PRESSURE: 70 MMHG | RESPIRATION RATE: 16 BRPM | WEIGHT: 142.44 LBS | HEART RATE: 76 BPM | TEMPERATURE: 98 F | HEIGHT: 62 IN

## 2024-08-17 PROBLEM — Z86.73 HISTORY OF CEREBROVASCULAR ACCIDENT (CVA) DUE TO ISCHEMIA: Status: ACTIVE | Noted: 2024-08-17

## 2024-08-17 PROBLEM — T81.30XA WOUND DEHISCENCE: Status: ACTIVE | Noted: 2024-08-17

## 2024-08-17 LAB
ALBUMIN SERPL-MCNC: 3 G/DL (ref 3.4–4.8)
ALBUMIN/GLOB SERPL: 1.3 RATIO (ref 1.1–2)
ALP SERPL-CCNC: 62 UNIT/L (ref 40–150)
ALT SERPL-CCNC: 15 UNIT/L (ref 0–55)
ANION GAP SERPL CALC-SCNC: 6 MEQ/L
AST SERPL-CCNC: 23 UNIT/L (ref 5–34)
BASOPHILS # BLD AUTO: 0.05 X10(3)/MCL
BASOPHILS NFR BLD AUTO: 0.8 %
BILIRUB SERPL-MCNC: 0.2 MG/DL
BUN SERPL-MCNC: 17 MG/DL (ref 9.8–20.1)
CALCIUM SERPL-MCNC: 8.4 MG/DL (ref 8.4–10.2)
CHLORIDE SERPL-SCNC: 113 MMOL/L (ref 98–107)
CO2 SERPL-SCNC: 19 MMOL/L (ref 23–31)
CREAT SERPL-MCNC: 1.56 MG/DL (ref 0.55–1.02)
CREAT/UREA NIT SERPL: 11
EOSINOPHIL # BLD AUTO: 0.16 X10(3)/MCL (ref 0–0.9)
EOSINOPHIL NFR BLD AUTO: 2.6 %
ERYTHROCYTE [DISTWIDTH] IN BLOOD BY AUTOMATED COUNT: 15.8 % (ref 11.5–17)
GFR SERPLBLD CREATININE-BSD FMLA CKD-EPI: 34 ML/MIN/1.73/M2
GLOBULIN SER-MCNC: 2.4 GM/DL (ref 2.4–3.5)
GLUCOSE SERPL-MCNC: 91 MG/DL (ref 82–115)
HCT VFR BLD AUTO: 30.1 % (ref 37–47)
HGB BLD-MCNC: 9.5 G/DL (ref 12–16)
IMM GRANULOCYTES # BLD AUTO: 0.03 X10(3)/MCL (ref 0–0.04)
IMM GRANULOCYTES NFR BLD AUTO: 0.5 %
LYMPHOCYTES # BLD AUTO: 1.17 X10(3)/MCL (ref 0.6–4.6)
LYMPHOCYTES NFR BLD AUTO: 19.1 %
MCH RBC QN AUTO: 28.4 PG (ref 27–31)
MCHC RBC AUTO-ENTMCNC: 31.6 G/DL (ref 33–36)
MCV RBC AUTO: 89.9 FL (ref 80–94)
MONOCYTES # BLD AUTO: 0.89 X10(3)/MCL (ref 0.1–1.3)
MONOCYTES NFR BLD AUTO: 14.5 %
NEUTROPHILS # BLD AUTO: 3.84 X10(3)/MCL (ref 2.1–9.2)
NEUTROPHILS NFR BLD AUTO: 62.5 %
PLATELET # BLD AUTO: 322 X10(3)/MCL (ref 130–400)
PMV BLD AUTO: 9.8 FL (ref 7.4–10.4)
POTASSIUM SERPL-SCNC: 4.6 MMOL/L (ref 3.5–5.1)
PROT SERPL-MCNC: 5.4 GM/DL (ref 5.8–7.6)
RBC # BLD AUTO: 3.35 X10(6)/MCL (ref 4.2–5.4)
SODIUM SERPL-SCNC: 138 MMOL/L (ref 136–145)
TSH SERPL-ACNC: 0.2 UIU/ML (ref 0.35–4.94)
WBC # BLD AUTO: 6.14 X10(3)/MCL (ref 4.5–11.5)

## 2024-08-17 PROCEDURE — 94761 N-INVAS EAR/PLS OXIMETRY MLT: CPT

## 2024-08-17 PROCEDURE — 36415 COLL VENOUS BLD VENIPUNCTURE: CPT | Mod: 91 | Performed by: INTERNAL MEDICINE

## 2024-08-17 PROCEDURE — 85025 COMPLETE CBC W/AUTO DIFF WBC: CPT | Performed by: INTERNAL MEDICINE

## 2024-08-17 PROCEDURE — 25000003 PHARM REV CODE 250: Performed by: INTERNAL MEDICINE

## 2024-08-17 PROCEDURE — 36415 COLL VENOUS BLD VENIPUNCTURE: CPT | Performed by: INTERNAL MEDICINE

## 2024-08-17 PROCEDURE — 80053 COMPREHEN METABOLIC PANEL: CPT | Performed by: INTERNAL MEDICINE

## 2024-08-17 PROCEDURE — A4216 STERILE WATER/SALINE, 10 ML: HCPCS | Performed by: INTERNAL MEDICINE

## 2024-08-17 PROCEDURE — 63600175 PHARM REV CODE 636 W HCPCS: Performed by: INTERNAL MEDICINE

## 2024-08-17 PROCEDURE — 96366 THER/PROPH/DIAG IV INF ADDON: CPT

## 2024-08-17 PROCEDURE — G0378 HOSPITAL OBSERVATION PER HR: HCPCS

## 2024-08-17 PROCEDURE — 96365 THER/PROPH/DIAG IV INF INIT: CPT

## 2024-08-17 PROCEDURE — 84443 ASSAY THYROID STIM HORMONE: CPT | Performed by: INTERNAL MEDICINE

## 2024-08-17 RX ORDER — ONDANSETRON HYDROCHLORIDE 2 MG/ML
4 INJECTION, SOLUTION INTRAVENOUS EVERY 6 HOURS PRN
Status: DISCONTINUED | OUTPATIENT
Start: 2024-08-17 | End: 2024-08-17 | Stop reason: HOSPADM

## 2024-08-17 RX ORDER — ACETAMINOPHEN 325 MG/1
650 TABLET ORAL EVERY 8 HOURS PRN
Status: DISCONTINUED | OUTPATIENT
Start: 2024-08-17 | End: 2024-08-17

## 2024-08-17 RX ORDER — ACETAMINOPHEN 325 MG/1
650 TABLET ORAL EVERY 4 HOURS PRN
Status: DISCONTINUED | OUTPATIENT
Start: 2024-08-17 | End: 2024-08-17 | Stop reason: HOSPADM

## 2024-08-17 RX ORDER — AMLODIPINE BESYLATE 5 MG/1
5 TABLET ORAL DAILY
Start: 2024-08-17

## 2024-08-17 RX ORDER — TRAMADOL HYDROCHLORIDE 50 MG/1
50 TABLET ORAL
Status: DISCONTINUED | OUTPATIENT
Start: 2024-08-17 | End: 2024-08-17 | Stop reason: HOSPADM

## 2024-08-17 RX ORDER — CITALOPRAM 20 MG/1
40 TABLET, FILM COATED ORAL DAILY
Status: DISCONTINUED | OUTPATIENT
Start: 2024-08-17 | End: 2024-08-17 | Stop reason: HOSPADM

## 2024-08-17 RX ORDER — LEVOTHYROXINE SODIUM 112 UG/1
112 TABLET ORAL
Status: DISCONTINUED | OUTPATIENT
Start: 2024-08-17 | End: 2024-08-17 | Stop reason: HOSPADM

## 2024-08-17 RX ORDER — SODIUM CHLORIDE 0.9 % (FLUSH) 0.9 %
10 SYRINGE (ML) INJECTION EVERY 8 HOURS
Status: DISCONTINUED | OUTPATIENT
Start: 2024-08-17 | End: 2024-08-17 | Stop reason: HOSPADM

## 2024-08-17 RX ORDER — SODIUM CHLORIDE, SODIUM LACTATE, POTASSIUM CHLORIDE, CALCIUM CHLORIDE 600; 310; 30; 20 MG/100ML; MG/100ML; MG/100ML; MG/100ML
INJECTION, SOLUTION INTRAVENOUS CONTINUOUS
Status: DISCONTINUED | OUTPATIENT
Start: 2024-08-17 | End: 2024-08-17 | Stop reason: HOSPADM

## 2024-08-17 RX ADMIN — LEVOTHYROXINE SODIUM 112 MCG: 112 TABLET ORAL at 05:08

## 2024-08-17 RX ADMIN — Medication 10 ML: at 10:08

## 2024-08-17 RX ADMIN — PIPERACILLIN AND TAZOBACTAM 4.5 G: 4; .5 INJECTION, POWDER, LYOPHILIZED, FOR SOLUTION INTRAVENOUS; PARENTERAL at 02:08

## 2024-08-17 RX ADMIN — CITALOPRAM HYDROBROMIDE 40 MG: 20 TABLET ORAL at 10:08

## 2024-08-17 NOTE — NURSING
Called and let the nurse know pt was being discharged as well as gave report to LIZETH Marcus at [facility]. Nurse Angeline said that she will send transportation to pick her up.

## 2024-08-17 NOTE — DISCHARGE SUMMARY
[facility] - Medical Surgical Unit  Hospital Medicine  Discharge Summary      Patient Name: Ashley Calvillo  MRN: [id number]  Admission Date: 8/16/2024  Hospital Length of Stay: 0 days  Discharge Date and Time:  08/17/2024 11:15 AM  Attending Physician: Jonny Cortez MD   Discharging Provider: Mohinder Camacho MD  Discharge Provider Team: Networked reference to record PCT   Primary Care Provider: Leeanna De Leon MD        HPI:   The patient is a 78-year-old female nursing home patient who has a recent history of abdominal surgery. She has wound hiss since discovered at the nursing home and was found to be hypotensive with the systolic blood pressure of 92. She got 30 cc per kilogram normal saline. She does have a recent history of colostomy secondary to see more ectomy. That acid is 2.1. Creatinine is also found to be 2.4 which is up from her baseline of 0.9. She does take an ace inhibitor at the nursing home, as well as ibuprofen on occasion. She is also on Bactrim. Denies any chest pain, shortness of breath, nausea, vomiting or diarrhea.    * No surgery found *      Hospital Course:   8/17-She has no complaints at this time; no abdominal pain, nausea/vomiting; dehiscence is known to patient's surgeon, Dr. Matute; since receiving fluid bolus in ED, blood pressure has been good; renal indices are trending down to normal    ---    Acute kidney injury  Following IV resuscitation in the ED, indices are trending back down towards normal; she is eating and drinking  She has had good urine output  No further intervention warranted at this time  Avoid ACE inhibitor, NSAIDs    Hypotension  Likely multifactorial; volume depletion, antihypertensive medication  Continue to hold ACE inhibitor  Resolved    Wound Dehiscence  This is an ongoing issue and is followed by her surgeon  Continue Bactrim  No evidence of acute infection at this time    Hypertension  Continue amlodipine but reduce dose to once  daily    Physical exam  Constitution-well nourished, normally developed female in NAD  Eyes-PERRL, EOMI  HENT-normocephalic, atraumatic  Neck-supple  Respiratory-normal respirations; CTA with good air movement  Heart-RRR; normal S1 and S2; no murmurs, gallops  Abdomen-soft, nontender, nondistended; active bowel sounds; colostomy LLQ; clean dressing in place over wound dehiscence; minimal drainage  Genitourinary-deferred  Musculoskeletal-no joint abnormalities, normal ROM throughout  Skin-warm, dry; no rashes  Neurologic-alert and oriented x3    Consults:     Final Active Diagnoses:    Diagnosis Date Noted POA    PRINCIPAL PROBLEM:  HARDEEP (acute kidney injury) [N17.9] 11/11/2022 Yes    Wound dehiscence [T81.30XA] 08/17/2024 Unknown    History of cerebrovascular accident (CVA) due to ischemia [Z86.73] 08/17/2024 Not Applicable    HTN (hypertension) [I10] 08/10/2022 Yes    Acquired hypothyroidism [E03.9] 08/10/2022 Yes      Problems Resolved During this Admission:      Discharged Condition: stable    Disposition: Nursing Facility    Follow Up:   Follow-up Information       Leeanna De Leon MD Follow up.    Specialty: Family Medicine  Why: She will be seen on nursing home rounds  Contact information:  [address]  [facility] [address] [phone]                           Patient Instructions:      Diet Cardiac   Order Comments: Resume routine nursing home diet     Activity as tolerated     Medications:  Reconciled Home Medications:      Medication List        CHANGE how you take these medications      amLODIPine 5 MG tablet  Commonly known as: NORVASC  Take 1 tablet (5 mg total) by mouth once daily.  What changed: when to take this            CONTINUE taking these medications      acetaminophen 325 MG tablet  Commonly known as: TYLENOL  Take 650 mg by mouth every 4 (four) hours as needed for Pain.     citalopram 40 MG tablet  Commonly known as: CeleXA  Take 40 mg by mouth once  daily.     clopidogreL 75 mg tablet  Commonly known as: PLAVIX  Take 1 tablet (75 mg total) by mouth once daily. Q.h.s.     COLACE 100 MG capsule  Generic drug: docusate sodium  Take 100 mg by mouth once daily.     levothyroxine 112 MCG tablet  Commonly known as: SYNTHROID  Take 112 mcg by mouth before breakfast.     simvastatin 10 MG tablet  Commonly known as: ZOCOR  Take 10 mg by mouth once daily.     sulfamethoxazole-trimethoprim 800-160mg 800-160 mg Tab  Commonly known as: BACTRIM DS  Take 1 tablet by mouth 2 (two) times daily. for 7 days     traMADoL 50 mg tablet  Commonly known as: ULTRAM  Take 50 mg by mouth every 24 hours as needed for Pain.            STOP taking these medications      lisinopriL 5 MG tablet  Commonly known as: PRINIVIL,ZESTRIL              Significant Diagnostic Studies: Labs: CMP   Recent Labs   Lab 08/16/24 1937 08/17/24  0405    138   K 5.0 4.6    113*   CO2 20* 19*   BUN 23.0* 17.0   CREATININE 2.42* 1.56*   CALCIUM 9.9 8.4   ALBUMIN 3.9 3.0*   BILITOT 0.2 0.2   ALKPHOS 77 62   AST 32 23   ALT 20 15    and CBC   Recent Labs   Lab 08/16/24 1931 08/17/24  0405   WBC 8.46 6.14   HGB 11.6* 9.5*   HCT 35.4* 30.1*   * 322     Radiology:   CXR  Impression:  No acute process    Pending Diagnostic Studies:       None          Indwelling Lines/Drains at time of discharge:   Lines/Drains/Airways       Drain  Duration                  Colostomy 07/28/24 Descending/sigmoid 20 days                  Patient screened for social drivers of health:  Housing instability  Transportation needs  Utility difficulties  Interpersonal safety  ---no needs identified    Time spent on the discharge of patient: 42 minutes         Mohinder Camacho MD  Department of Hospital Medicine  [facility] - Medical Surgical Unit

## 2024-08-17 NOTE — NURSING
Pt was discharged today and is going back to [facility].Notified [facility] of discharge. Educated the pt via avs in regards to medication changes,follow up appts,and clinical documents. Pt had no further questions at this time. It was a pleasure taking care of Ms Calvillo.

## 2024-08-17 NOTE — ED PROVIDER NOTES
Encounter Date: 8/16/2024  History from patient and nurses     History     Chief Complaint   Patient presents with    Hypotension     Hypotension at NH, c/o fatigue     HPI    Ashley Calvillo is 78 y.o. female who  has a past medical history of CVA (cerebral vascular accident), Depression, HTN (hypertension), Hypothyroidism, unspecified, Squamous cell carcinoma in situ, and Tonsillar cancer. arrives in ER with c/o Hypotension (Hypotension at NH, c/o fatigue)      Review of patient's allergies indicates:   Allergen Reactions    Nsaids (non-steroidal anti-inflammatory drug)     Oxycodone      Past Medical History:   Diagnosis Date    CVA (cerebral vascular accident)     Depression     HTN (hypertension)     Hypothyroidism, unspecified     Squamous cell carcinoma in situ     Tonsillar cancer      Past Surgical History:   Procedure Laterality Date    COLECTOMY, SIGMOID N/A 7/28/2024    Procedure: COLECTOMY, SIGMOID;  Surgeon: GENA Matute MD;  Location: [facility] OR;  Service: General;  Laterality: N/A;    COLOSTOMY N/A 7/28/2024    Procedure: CREATION, COLOSTOMY;  Surgeon: GENA Matute MD;  Location: [facility] OR;  Service: General;  Laterality: N/A;    FLEXIBLE SIGMOIDOSCOPY N/A 7/26/2024    Procedure: SIGMOIDOSCOPY, FLEXIBLE;  Surgeon: GENA Matute MD;  Location: [facility];  Service: Endoscopy;  Laterality: N/A;  POST OP:DECOMPRESSION    HYSTERECTOMY      LAPAROSCOPY N/A 7/28/2024    Procedure: LAPAROSCOPY;  Surgeon: GENA Matute MD;  Location: [facility] OR;  Service: General;  Laterality: N/A;  massively distended sigmoid colon was identified Converted to open art 1620    TONSILLECTOMY      TOTAL SHOULDER ARTHROPLASTY       Family History   Problem Relation Name Age of Onset    Cancer Mother      Leukemia Mother      Stroke Mother      Alzheimer's disease Father      Cancer Sister      Leukemia Sister       Social History     Tobacco Use    Smoking status: Former     Types: Cigarettes     Smokeless tobacco: Never    Tobacco comments:     patient stated that she smokes on some days.    Substance Use Topics    Alcohol use: Yes     Comment: wine, occasionally    Drug use: Never     Review of Systems   Constitutional:  Negative for fever.   HENT:  Negative for trouble swallowing and voice change.    Eyes:  Negative for visual disturbance.   Respiratory:  Negative for cough and shortness of breath.    Cardiovascular:  Negative for chest pain.   Gastrointestinal:  Negative for abdominal pain, diarrhea and vomiting.   Genitourinary:  Negative for dysuria and hematuria.   Musculoskeletal:  Negative for back pain and gait problem.   Skin:  Negative for color change and rash.   Neurological:  Positive for weakness. Negative for headaches.   Psychiatric/Behavioral:  Negative for behavioral problems and sleep disturbance.    All other systems reviewed and are negative.      Physical Exam     Initial Vitals [08/16/24 1912]   BP Pulse Resp Temp SpO2   (!) 90/53 89 18 96.9 °F (36.1 °C) 97 %      MAP       --         Physical Exam    Nursing note and vitals reviewed.  Constitutional: She appears well-developed.   HENT:   Head: Atraumatic.   Neck: Neck supple.   Cardiovascular:  Normal rate.           Pulmonary/Chest: Breath sounds normal. No respiratory distress. She has no wheezes.   Abdominal: Abdomen is soft. Bowel sounds are normal. She exhibits no distension and no mass. There is no abdominal tenderness.   Colostomy in place in the left side, she has a central scar also with dressing on it There is no rebound.   Musculoskeletal:         General: No edema. Normal range of motion.      Cervical back: Neck supple.     Neurological: She is alert and oriented to person, place, and time. GCS score is 15. GCS eye subscore is 4. GCS verbal subscore is 5. GCS motor subscore is 6.   Skin: Skin is warm and dry.         ED Course   Critical Care    Date/Time: 8/16/2024 10:58 PM    Performed by: Albert Gonzalez  MD  Authorized by: Albert Gonzalez MD  Direct patient critical care time: 10 minutes  Additional history critical care time: 5 minutes  Ordering / reviewing critical care time: 5 minutes  Documentation critical care time: 10 minutes  Consulting other physicians critical care time: 5 minutes  Total critical care time (exclusive of procedural time) : 35 minutes  Critical care was necessary to treat or prevent imminent or life-threatening deterioration of the following conditions: renal failure.  Critical care was time spent personally by me on the following activities: development of treatment plan with patient or surrogate, discussions with consultants, evaluation of patient's response to treatment, examination of patient, obtaining history from patient or surrogate, ordering and performing treatments and interventions, ordering and review of laboratory studies, ordering and review of radiographic studies, pulse oximetry, re-evaluation of patient's condition and review of old charts.          Orders Placed This Encounter   Procedures    Critical Care    Blood culture #1 **CANNOT BE ORDERED STAT**    Blood culture #2 **CANNOT BE ORDERED STAT**    X-ray Chest AP Portable    Comprehensive metabolic panel    CBC auto differential    Troponin I    Brain natriuretic peptide    CBC with Differential    Urinalysis, Reflex to Urine Culture    Lactic acid, plasma    Urinalysis, Microscopic    Diet NPO    Vital signs    Cardiac Monitoring - Adult    Oxygen Continuous    Pulse Oximetry Continuous    EKG 12-LEAD    Insert saline lock    Place in Observation     Medications   0.9%  NaCl infusion (has no administration in time range)   sodium chloride 0.9% bolus 1,959 mL 1,959 mL (0 mLs Intravenous Stopped 8/16/24 2037)     Admission on 08/16/2024   Component Date Value Ref Range Status    Sodium 08/16/2024 138  136 - 145 mmol/L Final    Potassium 08/16/2024 5.0  3.5 - 5.1 mmol/L Final    Chloride 08/16/2024 105  98 - 107 mmol/L  Final    CO2 08/16/2024 20 (L)  23 - 31 mmol/L Final    Glucose 08/16/2024 95  82 - 115 mg/dL Final    Blood Urea Nitrogen 08/16/2024 23.0 (H)  9.8 - 20.1 mg/dL Final    Creatinine 08/16/2024 2.42 (H)  0.55 - 1.02 mg/dL Final    Calcium 08/16/2024 9.9  8.4 - 10.2 mg/dL Final    Protein Total 08/16/2024 7.4  5.8 - 7.6 gm/dL Final    Albumin 08/16/2024 3.9  3.4 - 4.8 g/dL Final    Globulin 08/16/2024 3.5  2.4 - 3.5 gm/dL Final    Albumin/Globulin Ratio 08/16/2024 1.1  1.1 - 2.0 ratio Final    Bilirubin Total 08/16/2024 0.2  <=1.5 mg/dL Final    ALP 08/16/2024 77  40 - 150 unit/L Final    ALT 08/16/2024 20  0 - 55 unit/L Final    AST 08/16/2024 32  5 - 34 unit/L Final    eGFR 08/16/2024 20  mL/min/1.73/m2 Final    Anion Gap 08/16/2024 13.0  mEq/L Final    BUN/Creatinine Ratio 08/16/2024 10   Final    Troponin-I 08/16/2024 <0.010  0.000 - 0.045 ng/mL Final    Natriuretic Peptide 08/16/2024 64.5  <=100.0 pg/mL Final    QRS Duration 08/16/2024 72  ms Final    [facility] QTC Calculation 08/16/2024 441  ms Final    WBC 08/16/2024 8.46  4.50 - 11.50 x10(3)/mcL Final    RBC 08/16/2024 4.01 (L)  4.20 - 5.40 x10(6)/mcL Final    Hgb 08/16/2024 11.6 (L)  12.0 - 16.0 g/dL Final    Hct 08/16/2024 35.4 (L)  37.0 - 47.0 % Final    MCV 08/16/2024 88.3  80.0 - 94.0 fL Final    MCH 08/16/2024 28.9  27.0 - 31.0 pg Final    MCHC 08/16/2024 32.8 (L)  33.0 - 36.0 g/dL Final    RDW 08/16/2024 15.7  11.5 - 17.0 % Final    Platelet 08/16/2024 450 (H)  130 - 400 x10(3)/mcL Final    MPV 08/16/2024 9.7  7.4 - 10.4 fL Final    Neut % 08/16/2024 62.2  % Final    Lymph % 08/16/2024 22.7  % Final    Mono % 08/16/2024 11.3  % Final    Eos % 08/16/2024 1.9  % Final    Basophil % 08/16/2024 1.2  % Final    Lymph # 08/16/2024 1.92  0.6 - 4.6 x10(3)/mcL Final    Neut # 08/16/2024 5.26  2.1 - 9.2 x10(3)/mcL Final    Mono # 08/16/2024 0.96  0.1 - 1.3 x10(3)/mcL Final    Eos # 08/16/2024 0.16  0 - 0.9 x10(3)/mcL Final    Baso # 08/16/2024 0.10  <=0.2 x10(3)/mcL  Final    IG# 08/16/2024 0.06 (H)  0 - 0.04 x10(3)/mcL Final    IG% 08/16/2024 0.7  % Final    Color, UA 08/16/2024 Yellow  Yellow, Light-Yellow, Dark Yellow, Alina, Straw Final    Appearance, UA 08/16/2024 Clear  Clear Final    Specific Gravity, UA 08/16/2024 1.015  1.005 - 1.030 Final    pH, UA 08/16/2024 7.0  5.0 - 8.5 Final    Protein, UA 08/16/2024 Negative  Negative Final    Glucose, UA 08/16/2024 Negative  Negative, Normal Final    Ketones, UA 08/16/2024 Negative  Negative Final    Blood, UA 08/16/2024 Negative  Negative Final    Bilirubin, UA 08/16/2024 Negative  Negative Final    Urobilinogen, UA 08/16/2024 0.2  0.2, 1.0, Normal Final    Nitrites, UA 08/16/2024 Negative  Negative Final    Leukocyte Esterase, UA 08/16/2024 1+ (A)  Negative Final    Lactic Acid Level 08/16/2024 2.1  0.5 - 2.2 mmol/L Final    Bacteria, UA 08/16/2024 Few (A)  None Seen, Rare, Occasional /HPF Final    RBC, UA 08/16/2024 0-2  None Seen, 0-2, 3-5, 0-5 /HPF Final    WBC, UA 08/16/2024 6-10 (A)  None Seen, 0-2, 3-5, 0-5 /HPF Final    Squamous Epithelial Cells, UA 08/16/2024 Few (A)  None Seen, Rare, Occasional, Occ /HPF Final       Labs Reviewed   COMPREHENSIVE METABOLIC PANEL - Abnormal       Result Value    Sodium 138      Potassium 5.0      Chloride 105      CO2 20 (*)     Glucose 95      Blood Urea Nitrogen 23.0 (*)     Creatinine 2.42 (*)     Calcium 9.9      Protein Total 7.4      Albumin 3.9      Globulin 3.5      Albumin/Globulin Ratio 1.1      Bilirubin Total 0.2      ALP 77      ALT 20      AST 32      eGFR 20      Anion Gap 13.0      BUN/Creatinine Ratio 10     CBC WITH DIFFERENTIAL - Abnormal    WBC 8.46      RBC 4.01 (*)     Hgb 11.6 (*)     Hct 35.4 (*)     MCV 88.3      MCH 28.9      MCHC 32.8 (*)     RDW 15.7      Platelet 450 (*)     MPV 9.7      Neut % 62.2      Lymph % 22.7      Mono % 11.3      Eos % 1.9      Basophil % 1.2      Lymph # 1.92      Neut # 5.26      Mono # 0.96      Eos # 0.16      Baso # 0.10       IG# 0.06 (*)     IG% 0.7     URINALYSIS, REFLEX TO URINE CULTURE - Abnormal    Color, UA Yellow      Appearance, UA Clear      Specific Gravity, UA 1.015      pH, UA 7.0      Protein, UA Negative      Glucose, UA Negative      Ketones, UA Negative      Blood, UA Negative      Bilirubin, UA Negative      Urobilinogen, UA 0.2      Nitrites, UA Negative      Leukocyte Esterase, UA 1+ (*)    URINALYSIS, MICROSCOPIC - Abnormal    Bacteria, UA Few (*)     RBC, UA 0-2      WBC, UA 6-10 (*)     Squamous Epithelial Cells, UA Few (*)    TROPONIN I - Normal    Troponin-I <0.010     B-TYPE NATRIURETIC PEPTIDE - Normal    Natriuretic Peptide 64.5     LACTIC ACID, PLASMA - Normal    Lactic Acid Level 2.1     BLOOD CULTURE [facility]   BLOOD CULTURE [facility]   CBC W/ AUTO DIFFERENTIAL    Narrative:     The following orders were created for panel order CBC auto differential.  Procedure                               Abnormality         Status                     ---------                               -----------         ------                     CBC with Differential[[id number]]       Abnormal            Final result                 Please view results for these tests on the individual orders.        ECG Results              EKG 12-LEAD (Final result)        Collection Time Result Time QRS Duration [facility] QTC Calculation    08/16/24 19:26:36 08/16/24 21:30:23 72 441                     Final result by Interface, Lab In [facility] (08/16/24 21:30:32)                   Narrative:    Test Reason : R53.1,    Vent. Rate : 082 BPM     Atrial Rate : 082 BPM     P-R Int : 150 ms          QRS Dur : 072 ms      QT Int : 378 ms       P-R-T Axes : 005 047 015 degrees     QTc Int : 441 ms    Normal sinus rhythm  Normal ECG  When compared with ECG of 24-JAN-2024 16:23,  Nonspecific T wave abnormality now evident in Inferior leads  Confirmed by Will BRISCOE, Jerry ([id number]) on 8/16/2024 9:30:21 PM    Referred By: DOLORES VILLA           Confirmed By:Jerry Solis MD                       Wet Read by Albert Gonzalez MD (08/16/24 19:32:01, [facility] - Emergency Dept, Emergency Medicine)    EKG Initial Reading: Independently Interpreted by Albert Gonzalez MD. independently as: No STEMI. Rhythm: Normal Sinus Rhythm, Rate 82. Ectopy: No Ectopy. Conduction: Normal. ST Segments: Normal ST Segments. T Waves: Normal. Axis: Normal.                                      Imaging Results              X-ray Chest AP Portable (Final result)  Result time 08/16/24 19:37:54      Final result by Christian Medina MD (08/16/24 19:37:54)                   Impression:      No acute process      Electronically signed by: Christian Medina MD  Date:    08/16/2024  Time:    19:37               Narrative:    EXAMINATION:  Chest one view    CLINICAL HISTORY:  Weakness    COMPARISON:  08/09/2024    FINDINGS:  Cardiac silhouette is normal size.  Calcifications of the aortic knob are noted.  No confluent airspace disease.  There is no visible pneumothorax or pleural effusion.  No acute osseous finding.  There is partially seen left shoulder prosthesis.                                       Medications   0.9%  NaCl infusion (has no administration in time range)   sodium chloride 0.9% bolus 1,959 mL 1,959 mL (0 mLs Intravenous Stopped 8/16/24 2037)     Medical Decision Making    Ashley Calvillo is 78 y.o. female who  has a past medical history of CVA (cerebral vascular accident), Depression, HTN (hypertension), Hypothyroidism, unspecified, Squamous cell carcinoma in situ, and Tonsillar cancer. arrives in ER with c/o Hypotension (Hypotension at NH, c/o fatigue)    Patient is sent from nursing home saying that she is hypotensive, patient says she is absolutely all right, denies any complaints whatsoever, just feeling a little weak.  Patient is on 2 antihypertensives, last time also she was here and had hypotension.    She is denying any other complaints whatsoever.  I am going to do a sepsis workup on  her and cardiac workup and decide further.  I will give her 30 mL/kg bolus she does not appear to be sick by appearance and by symptoms.  Only symptom she has is just weakness.    Amount and/or Complexity of Data Reviewed  Labs: ordered.  Radiology: ordered.               ED Course as of 08/16/24 2301   Fri Aug 16, 2024   2012 BP(!): 103/59 [GQ]   2012 Pulse: 74 [GQ]   2012 SpO2: 97 %  Patient remains asymptomatic, except for generalized weakness [GQ]   2013 BUN(!): 23.0 [GQ]   2013 Creatinine(!): 2.42 [GQ]   2013 CO2(!): 20 [GQ]   2013 Hemoglobin(!): 11.6 [GQ]   2013 Hematocrit(!): 35.4 [GQ]   2013 Lactic Acid Level: 2.1  Cardiac workup is negative [GQ]   2013 Patient is on 2 antihypertensive, when last admission in the ER her blood pressure was low, I will advise him to hold them for the time being and then talk to the family doctor to adjust the dose because she has lost some weight. [GQ]   2020 Patient has a acute kidney injury, I am giving her IV fluids   [GQ]   2223 BP: 125/67 [GQ]   2223 Pulse: 78 [GQ]   2223 SpO2: 96 %  With IV fluids the patient's blood pressure has come up, she did not have any symptoms to begin with, she does have some acute kidney injury, I will talk to the hospitalist about admitting her in the hospital giving her IV fluids overnight, and possibly stopping her ACE inhibitor for the time being as she is developing renal insufficiency. [GQ]   2226 Patient's lactic acid level is normal, white count is normal, cardiac workup is normal, bicarb is 20, H&H is in safe range, she does not have any bleeding from any source chest x-ray does not show any pneumonia. [GQ]   2254 I talked to Dr. Carvajal, he is okay with admission, wants to get an ultrasound done on the kidney tomorrow morning, we will hold her ACE inhibitor and statin for the time being. [GQ]   2259 Patient's kidney function has gone down in the last week or so, I will put her on IV fluids, admit her in the hospital get an ultrasound  done on her kidney tomorrow morning. Pt. Does not have any signs of sepsis otherwise. Normal blood pressure now. [GQ]      ED Course User Index  [GQ] Albert Gonzalez MD                           Clinical Impression:  Final diagnoses:  [R53.1] Weakness  [N17.9] Acute kidney injury  [I95.9] Hypotension, unspecified hypotension type (Primary)          ED Disposition Condition    Observation Stable                Albert Gonzalez MD  08/16/24 1284

## 2024-08-17 NOTE — H&P
[facility] - Medical Surgical Unit    History & Physical      Patient Name: Ashley Calvillo  MRN: [id number]  Admission Date: 8/16/2024  Attending Physician: Jonny Cortez MD   Primary Care Provider: Leeanna De Leon MD         Patient information was obtained from patient, ER records, and primary team.     Subjective:     Principal Problem:<principal problem not specified>    Chief Complaint: Wound dehiscence   Chief Complaint   Patient presents with    Hypotension     Hypotension at NH, c/o fatigue        HPI: The patient is a 78-year-old female nursing home patient who has a recent history of abdominal surgery. She has wound hiss since discovered at the nursing home and was found to be hypotensive with the systolic blood pressure of 92. She got 30 cc per kilogram normal saline. She does have a recent history of colostomy secondary to see more ectomy. That acid is 2.1. Creatinine is also found to be 2.4 which is up from her baseline of 0.9. She does take an ace inhibitor at the nursing home, as well as ibuprofen on occasion. She is also on Bactrim. Denies any chest pain, shortness of breath, nausea, vomiting or diarrhea.      Past Medical History:   Diagnosis Date    Arthritis     CVA (cerebral vascular accident)     Depression     Dysphagia     HTN (hypertension)     Hypothyroidism, unspecified     Iron deficiency anemia, unspecified     Mixed hyperlipidemia     Squamous cell carcinoma in situ     Tonsillar cancer     Urinary tract infection, site not specified        Past Surgical History:   Procedure Laterality Date    COLECTOMY, SIGMOID N/A 7/28/2024    Procedure: COLECTOMY, SIGMOID;  Surgeon: EGNA Matute MD;  Location: [facility] OR;  Service: General;  Laterality: N/A;    COLOSTOMY N/A 7/28/2024    Procedure: CREATION, COLOSTOMY;  Surgeon: GENA Matute MD;  Location: [facility] OR;  Service: General;  Laterality: N/A;    FLEXIBLE SIGMOIDOSCOPY N/A 7/26/2024    Procedure:  SIGMOIDOSCOPY, FLEXIBLE;  Surgeon: GENA Matute MD;  Location: [facility] ENDO;  Service: Endoscopy;  Laterality: N/A;  POST OP:DECOMPRESSION    HYSTERECTOMY      LAPAROSCOPY N/A 7/28/2024    Procedure: LAPAROSCOPY;  Surgeon: GENA Matute MD;  Location: [facility] OR;  Service: General;  Laterality: N/A;  massively distended sigmoid colon was identified Converted to open art 1620    TONSILLECTOMY      TOTAL SHOULDER ARTHROPLASTY         Review of patient's allergies indicates:   Allergen Reactions    Nsaids (non-steroidal anti-inflammatory drug)     Oxycodone        No current facility-administered medications on file prior to encounter.     Current Outpatient Medications on File Prior to Encounter   Medication Sig    acetaminophen (TYLENOL) 325 MG tablet Take 650 mg by mouth every 4 (four) hours as needed for Pain.    amLODIPine (NORVASC) 5 MG tablet Take 5 mg by mouth 2 (two) times daily.    citalopram (CELEXA) 40 MG tablet Take 40 mg by mouth once daily.    clopidogreL (PLAVIX) 75 mg tablet Take 1 tablet (75 mg total) by mouth once daily. Q.h.s.    docusate sodium (COLACE) 100 MG capsule Take 100 mg by mouth once daily.    levothyroxine (SYNTHROID) 112 MCG tablet Take 112 mcg by mouth before breakfast.    lisinopriL (PRINIVIL,ZESTRIL) 5 MG tablet Take 5 mg by mouth 2 (two) times a day.    simvastatin (ZOCOR) 10 MG tablet Take 10 mg by mouth once daily.    traMADoL (ULTRAM) 50 mg tablet Take 50 mg by mouth every 24 hours as needed for Pain.    sulfamethoxazole-trimethoprim 800-160mg (BACTRIM DS) 800-160 mg Tab Take 1 tablet by mouth 2 (two) times daily. for 7 days     Family History       Problem Relation (Age of Onset)    Alzheimer's disease Father    Cancer Mother, Sister    Leukemia Mother, Sister    Stroke Mother          Tobacco Use    Smoking status: Former     Types: Cigarettes    Smokeless tobacco: Never    Tobacco comments:     patient stated that she smokes on some days.    Substance and  Sexual Activity    Alcohol use: Yes     Comment: wine, occasionally    Drug use: Never    Sexual activity: Not on file     Review of Systems 10 point review of systems is performed and is otherwise negative unless stated as above.   Objective:     Vital Signs (Most Recent):  Temp: 97.7 °F (36.5 °C) (08/17/24 0012)  Pulse: 78 (08/17/24 0012)  Resp: 18 (08/17/24 0012)  BP: 124/67 (08/17/24 0012)  SpO2: 97 % (08/17/24 0012) Vital Signs (24h Range):  Temp:  [96.9 °F (36.1 °C)-97.7 °F (36.5 °C)] 97.7 °F (36.5 °C)  Pulse:  [69-89] 78  Resp:  [18] 18  SpO2:  [96 %-99 %] 97 %  BP: ()/(53-67) 124/67     Weight: 65.3 kg (144 lb)  Body mass index is 26.34 kg/m².    Physical Exam   /67   Pulse 78   Temp 97.7 °F (36.5 °C) (Oral)   Resp 18   Wt 65.3 kg (144 lb)   SpO2 97%   BMI 26.34 kg/m²     General Appearance:  Alert, cooperative, no distress, appears stated age   Head:  Normocephalic, without obvious abnormality, atraumatic   Eyes:  PERRL, conjunctiva/corneas clear, EOM's intact, fundi benign, both eyes   Ears:  Normal TM's and external ear canals, both ears   Nose: Nares normal, septum midline,mucosa normal, no drainage or sinus tenderness   Throat: Lips, mucosa, and tongue normal; teeth and gums normal   Neck: Supple, symmetrical, trachea midline, no adenopathy;  thyroid: not enlarged, symmetric, no tenderness/mass/nodules; no carotid bruit or JVD   Back:   Symmetric, no curvature, ROM normal, no CVA tenderness   Lungs:   Clear to auscultation bilaterally, respirations unlabored   Breasts:  No masses or tenderness   Heart:  Regular rate and rhythm, S1 and S2 normal, no murmur, rub, or gallop   Abdomen:   Soft, non-tender, bowel sounds active all four quadrants,  no masses, no organomegaly   Pelvic: Deferred   Extremities: Extremities normal, atraumatic, no cyanosis or edema   Pulses: 2+ and symmetric   Skin: Skin color, texture, turgor normal, no rashes or lesions   Lymph nodes: Cervical, supraclavicular,  and axillary nodes normal   Neurologic: Normal         Significant Labs: All pertinent labs within the past 24 hours have been reviewed.  Recent Lab Results         08/16/24 2217 08/16/24  1937 08/16/24  1931 08/16/24 1926        Albumin/Globulin Ratio   1.1           Albumin   3.9           ALP   77           ALT   20           Anion Gap   13.0           Appearance, UA Clear             AST   32           Bacteria, UA Few             Baso #     0.10         Basophil %     1.2         Bilirubin (UA) Negative             BILIRUBIN TOTAL   0.2           BNP     64.5         BUN   23.0           BUN/CREAT RATIO   10           Calcium   9.9           Chloride   105           CO2   20           Color, UA Yellow             Creatinine   2.42           eGFR   20           Eos #     0.16         Eos %     1.9         Globulin, Total   3.5           Glucose   95           Glucose, UA Negative             Hematocrit     35.4         Hemoglobin     11.6         Immature Grans (Abs)     0.06         Immature Granulocytes     0.7         Ketones, UA Negative             Lactic Acid Level     2.1         Leukocyte Esterase, UA 1+             Lymph #     1.92         LYMPH %     22.7         MCH     28.9         MCHC     32.8         MCV     88.3         Mono #     0.96         Mono %     11.3         MPV     9.7         Neut #     5.26         Neut %     62.2         NITRITE UA Negative             Blood, UA Negative             QRS Duration       72       [facility] QTC Calculation       441       pH, UA 7.0             Platelet Count     450         Potassium   5.0           PROTEIN TOTAL   7.4           Protein, UA Negative             RBC     4.01         RBC, UA 0-2             RDW     15.7         Sodium   138           Specific Gravity,UA 1.015             Squam Epithel, UA Few             Troponin I     <0.010         Urobilinogen, UA 0.2             WBC, UA 6-10             WBC     8.46                 Significant  Imaging: I have reviewed all pertinent imaging results/findings within the past 24 hours.  I have reviewed and interpreted all pertinent imaging results/findings within the past 24 hours.    Assessment/Plan:     Active Diagnoses:    Diagnosis Date Noted POA    Wound dehiscence [T81.30XA] 08/17/2024 Unknown    History of cerebrovascular accident (CVA) due to ischemia [Z86.73] 08/17/2024 Not Applicable    HARDEEP (acute kidney injury) [N17.9] 11/11/2022 Yes    HTN (hypertension) [I10] 08/10/2022 Yes    Acquired hypothyroidism [E03.9] 08/10/2022 Yes     - Start Zosyn empirically given wound issue and hypotension at NH  - Check renal US  - Avoid npehrotoxins. Renally dose abx  - Holding ACE, NSAIDs, Bactrim  - Gentle volume repletion  - Reconcile home meds as warranted  - Pain, nausea control ongoing  - Surgical consultation if necessary    This encounter was completed via telemedicine (audio/video) w/ nursing at bedside ot assist w/ clinical exam.  SOC Audio/Visual Equipment is using HIPPA Compliant Web Platform. The patient is located in the hospital and the provider is located off site. This patient is placed in observation status under my care.       Participants on Call: Bedside RN, Patient, Physician on Call.   Problems Resolved During this Admission:     VTE Risk Mitigation (From admission, onward)           Ordered     IP VTE HIGH RISK PATIENT  Once         08/17/24 0015     Place sequential compression device  Until discontinued         08/17/24 0015                      Jonny Cortez MD  Department of Hospital Medicine   [facility] - Medical Surgical Unit

## 2024-08-22 LAB
BACTERIA BLD CULT: NORMAL
BACTERIA BLD CULT: NORMAL

## 2024-08-23 ENCOUNTER — LAB VISIT (OUTPATIENT)
Dept: LAB | Facility: HOSPITAL | Age: 79
End: 2024-08-23
Attending: SURGERY
Payer: MEDICARE

## 2024-08-23 ENCOUNTER — ANESTHESIA EVENT (OUTPATIENT)
Dept: SURGERY | Facility: HOSPITAL | Age: 79
End: 2024-08-23
Payer: MEDICARE

## 2024-08-23 ENCOUNTER — LAB REQUISITION (OUTPATIENT)
Dept: LAB | Facility: HOSPITAL | Age: 79
End: 2024-08-23
Payer: MEDICARE

## 2024-08-23 DIAGNOSIS — K92.1 MELENA: ICD-10-CM

## 2024-08-23 DIAGNOSIS — K92.1 MELENA: Primary | ICD-10-CM

## 2024-08-23 LAB
ADV 40+41 DNA STL QL NAA+NON-PROBE: NOT DETECTED
ALBUMIN SERPL-MCNC: 3.3 G/DL (ref 3.4–4.8)
ALBUMIN/GLOB SERPL: 1.2 RATIO (ref 1.1–2)
ALP SERPL-CCNC: 56 UNIT/L (ref 40–150)
ALT SERPL-CCNC: 13 UNIT/L (ref 0–55)
ANION GAP SERPL CALC-SCNC: 8 MEQ/L
AST SERPL-CCNC: 16 UNIT/L (ref 5–34)
ASTRO TYP 1-8 RNA STL QL NAA+NON-PROBE: NOT DETECTED
BASOPHILS # BLD AUTO: 0.07 X10(3)/MCL
BASOPHILS NFR BLD AUTO: 1.1 %
BILIRUB SERPL-MCNC: 0.2 MG/DL
BUN SERPL-MCNC: 37 MG/DL (ref 9.8–20.1)
C CAYETANENSIS DNA STL QL NAA+NON-PROBE: NOT DETECTED
C COLI+JEJ+UPSA DNA STL QL NAA+NON-PROBE: NOT DETECTED
CALCIUM SERPL-MCNC: 9 MG/DL (ref 8.4–10.2)
CHLORIDE SERPL-SCNC: 109 MMOL/L (ref 98–107)
CO2 SERPL-SCNC: 21 MMOL/L (ref 23–31)
CREAT SERPL-MCNC: 1.06 MG/DL (ref 0.55–1.02)
CREAT/UREA NIT SERPL: 35
CRYPTOSP DNA STL QL NAA+NON-PROBE: NOT DETECTED
E HISTOLYT DNA STL QL NAA+NON-PROBE: NOT DETECTED
EAEC PAA PLAS AGGR+AATA ST NAA+NON-PRB: NOT DETECTED
EC STX1+STX2 GENES STL QL NAA+NON-PROBE: NOT DETECTED
EOSINOPHIL # BLD AUTO: 0.18 X10(3)/MCL (ref 0–0.9)
EOSINOPHIL NFR BLD AUTO: 2.9 %
EPEC EAE GENE STL QL NAA+NON-PROBE: NOT DETECTED
ERYTHROCYTE [DISTWIDTH] IN BLOOD BY AUTOMATED COUNT: 15.7 % (ref 11.5–17)
ETEC LTA+ST1A+ST1B TOX ST NAA+NON-PROBE: NOT DETECTED
G LAMBLIA DNA STL QL NAA+NON-PROBE: NOT DETECTED
GFR SERPLBLD CREATININE-BSD FMLA CKD-EPI: 54 ML/MIN/1.73/M2
GLOBULIN SER-MCNC: 2.7 GM/DL (ref 2.4–3.5)
GLUCOSE SERPL-MCNC: 96 MG/DL (ref 82–115)
GROUP & RH: NORMAL
HCT VFR BLD AUTO: 25.8 % (ref 37–47)
HGB BLD-MCNC: 8.3 G/DL (ref 12–16)
IMM GRANULOCYTES # BLD AUTO: 0.04 X10(3)/MCL (ref 0–0.04)
IMM GRANULOCYTES NFR BLD AUTO: 0.6 %
INDIRECT COOMBS: NORMAL
LYMPHOCYTES # BLD AUTO: 1.08 X10(3)/MCL (ref 0.6–4.6)
LYMPHOCYTES NFR BLD AUTO: 17.3 %
MCH RBC QN AUTO: 28.8 PG (ref 27–31)
MCHC RBC AUTO-ENTMCNC: 32.2 G/DL (ref 33–36)
MCV RBC AUTO: 89.6 FL (ref 80–94)
MONOCYTES # BLD AUTO: 0.54 X10(3)/MCL (ref 0.1–1.3)
MONOCYTES NFR BLD AUTO: 8.7 %
NEUTROPHILS # BLD AUTO: 4.33 X10(3)/MCL (ref 2.1–9.2)
NEUTROPHILS NFR BLD AUTO: 69.4 %
NOROVIRUS GI+II RNA STL QL NAA+NON-PROBE: NOT DETECTED
P SHIGELLOIDES DNA STL QL NAA+NON-PROBE: NOT DETECTED
PLATELET # BLD AUTO: 331 X10(3)/MCL (ref 130–400)
PMV BLD AUTO: 10 FL (ref 7.4–10.4)
POTASSIUM SERPL-SCNC: 4.4 MMOL/L (ref 3.5–5.1)
PROT SERPL-MCNC: 6 GM/DL (ref 5.8–7.6)
RBC # BLD AUTO: 2.88 X10(6)/MCL (ref 4.2–5.4)
RVA RNA STL QL NAA+NON-PROBE: NOT DETECTED
S ENT+BONG DNA STL QL NAA+NON-PROBE: NOT DETECTED
SAPO I+II+IV+V RNA STL QL NAA+NON-PROBE: NOT DETECTED
SHIGELLA SP+EIEC IPAH ST NAA+NON-PROBE: NOT DETECTED
SODIUM SERPL-SCNC: 138 MMOL/L (ref 136–145)
SPECIMEN OUTDATE: NORMAL
V CHOL+PARA+VUL DNA STL QL NAA+NON-PROBE: NOT DETECTED
V CHOLERAE DNA STL QL NAA+NON-PROBE: NOT DETECTED
WBC # BLD AUTO: 6.24 X10(3)/MCL (ref 4.5–11.5)
Y ENTEROCOL DNA STL QL NAA+NON-PROBE: NOT DETECTED

## 2024-08-23 PROCEDURE — 86850 RBC ANTIBODY SCREEN: CPT | Performed by: FAMILY MEDICINE

## 2024-08-23 PROCEDURE — 36415 COLL VENOUS BLD VENIPUNCTURE: CPT

## 2024-08-23 PROCEDURE — 80053 COMPREHEN METABOLIC PANEL: CPT

## 2024-08-23 PROCEDURE — 86901 BLOOD TYPING SEROLOGIC RH(D): CPT | Performed by: FAMILY MEDICINE

## 2024-08-23 PROCEDURE — 87507 IADNA-DNA/RNA PROBE TQ 12-25: CPT | Performed by: FAMILY MEDICINE

## 2024-08-23 PROCEDURE — 85025 COMPLETE CBC W/AUTO DIFF WBC: CPT

## 2024-08-23 PROCEDURE — 86900 BLOOD TYPING SEROLOGIC ABO: CPT | Performed by: FAMILY MEDICINE

## 2024-08-23 NOTE — OR NURSING
Spoke to Rachelle ([facility]) --Instructions given to hold Plavix until after discharge instructions following Colonoscopy

## 2024-08-23 NOTE — ANESTHESIA PREPROCEDURE EVALUATION
08/23/2024  Ashley Calvillo is a 78 y.o., female.      Pre-op Assessment    I have reviewed the Patient Summary Reports.     I have reviewed the Nursing Notes. I have reviewed the NPO Status.   I have reviewed the Medications.     Review of Systems  Anesthesia Hx:             Denies Family Hx of Anesthesia complications.    Denies Personal Hx of Anesthesia complications.                    Social:  Former Smoker, Alcohol Use       Hematology/Oncology:  Hematology Normal   Oncology Normal                                   EENT/Dental:  EENT/Dental Normal           Cardiovascular:     Hypertension, well controlled              ECG has been reviewed.                          Pulmonary:  Pulmonary Normal                       Renal/:  Chronic Renal Disease, CKD                Hepatic/GI:  Hepatic/GI Normal                 Musculoskeletal:  Musculoskeletal Normal                Neurological:   CVA                                    Endocrine:   Hypothyroidism          Dermatological:  Skin Normal    Psych:  Psychiatric History                  Physical Exam  General: Cooperative, Alert and Oriented    Airway:  Mallampati: II   Mouth Opening: Normal  TM Distance: Normal  Tongue: Normal  Neck ROM: Normal ROM    Dental:  Intact        Anesthesia Plan  Type of Anesthesia, risks & benefits discussed:    Anesthesia Type: Gen Natural Airway  Intra-op Monitoring Plan: Standard ASA Monitors  Post Op Pain Control Plan:   (medical reason for not using multimodal pain management)  Induction:  IV  Informed Consent: Informed consent signed with the Patient and all parties understand the risks and agree with anesthesia plan.  All questions answered. Patient consented to blood products? Yes  ASA Score: 3    Ready For Surgery From Anesthesia Perspective.     .

## 2024-08-23 NOTE — DISCHARGE INSTRUCTIONS
Friday 8-23-24  stop plavix  --- Sunday 8-25-24  follow prep as instructed, nothing to eat or drink after midnight-- Monday 8-26-24 Arrival time 8:00 am --take Amlodipine with small sip of water prior to arrival        INSTRUCTIONS  AFTER A COLONOSCOPY/EGD    NO DRIVING X 24 HOURS. NOTIFY YOUR DOCTOR WITH     ABDOMINAL PAIN UNRELIEVED BY  PASSING GAS,   FEVER WITHIN 24 HOURS, OR LARGE AMOUNT OF BLEEDING.

## 2024-08-26 ENCOUNTER — HOSPITAL ENCOUNTER (OUTPATIENT)
Facility: HOSPITAL | Age: 79
Discharge: HOME | End: 2024-08-26
Attending: SURGERY | Admitting: SURGERY
Payer: MEDICARE

## 2024-08-26 ENCOUNTER — ANESTHESIA (OUTPATIENT)
Dept: SURGERY | Facility: HOSPITAL | Age: 79
End: 2024-08-26
Payer: MEDICARE

## 2024-08-26 VITALS
TEMPERATURE: 98 F | SYSTOLIC BLOOD PRESSURE: 124 MMHG | RESPIRATION RATE: 18 BRPM | HEART RATE: 80 BPM | BODY MASS INDEX: 26.13 KG/M2 | HEIGHT: 62 IN | WEIGHT: 142 LBS | DIASTOLIC BLOOD PRESSURE: 51 MMHG | OXYGEN SATURATION: 96 %

## 2024-08-26 DIAGNOSIS — K92.1 MELENA: ICD-10-CM

## 2024-08-26 PROCEDURE — 63600175 PHARM REV CODE 636 W HCPCS: Performed by: ANESTHESIOLOGY

## 2024-08-26 PROCEDURE — D9220A PRA ANESTHESIA: Mod: ,,, | Performed by: NURSE ANESTHETIST, CERTIFIED REGISTERED

## 2024-08-26 PROCEDURE — 37000008 HC ANESTHESIA 1ST 15 MINUTES: Performed by: SURGERY

## 2024-08-26 PROCEDURE — C1773 RET DEV, INSERTABLE: HCPCS | Performed by: SURGERY

## 2024-08-26 PROCEDURE — 63600175 PHARM REV CODE 636 W HCPCS: Performed by: NURSE ANESTHETIST, CERTIFIED REGISTERED

## 2024-08-26 PROCEDURE — 37000009 HC ANESTHESIA EA ADD 15 MINS: Performed by: SURGERY

## 2024-08-26 PROCEDURE — 25000003 PHARM REV CODE 250: Performed by: NURSE ANESTHETIST, CERTIFIED REGISTERED

## 2024-08-26 PROCEDURE — 45385 COLONOSCOPY W/LESION REMOVAL: CPT | Performed by: SURGERY

## 2024-08-26 RX ORDER — SODIUM CHLORIDE, SODIUM LACTATE, POTASSIUM CHLORIDE, CALCIUM CHLORIDE 600; 310; 30; 20 MG/100ML; MG/100ML; MG/100ML; MG/100ML
INJECTION, SOLUTION INTRAVENOUS CONTINUOUS
Status: DISCONTINUED | OUTPATIENT
Start: 2024-08-26 | End: 2024-08-26 | Stop reason: HOSPADM

## 2024-08-26 RX ORDER — PROPOFOL 10 MG/ML
VIAL (ML) INTRAVENOUS
Status: DISCONTINUED | OUTPATIENT
Start: 2024-08-26 | End: 2024-08-26

## 2024-08-26 RX ORDER — LIDOCAINE HYDROCHLORIDE 20 MG/ML
INJECTION INTRAVENOUS
Status: DISCONTINUED | OUTPATIENT
Start: 2024-08-26 | End: 2024-08-26

## 2024-08-26 RX ADMIN — PROPOFOL 30 MG: 10 INJECTION, EMULSION INTRAVENOUS at 09:08

## 2024-08-26 RX ADMIN — SODIUM CHLORIDE, POTASSIUM CHLORIDE, SODIUM LACTATE AND CALCIUM CHLORIDE: 600; 310; 30; 20 INJECTION, SOLUTION INTRAVENOUS at 08:08

## 2024-08-26 RX ADMIN — LIDOCAINE HYDROCHLORIDE 60 MG: 20 INJECTION, SOLUTION INTRAVENOUS at 09:08

## 2024-08-26 RX ADMIN — SODIUM CHLORIDE, POTASSIUM CHLORIDE, SODIUM LACTATE AND CALCIUM CHLORIDE: 600; 310; 30; 20 INJECTION, SOLUTION INTRAVENOUS at 09:08

## 2024-08-26 NOTE — ANESTHESIA POSTPROCEDURE EVALUATION
Anesthesia Post Evaluation    Patient: Ashley Calvillo    Procedure(s) Performed: Procedure(s) (LRB):  COLONOSCOPY, THROUGH STOMA, DIAGNOSTIC (N/A)  COLONOSCOPY, WITH POLYPECTOMY USING SNARE (N/A)    Final Anesthesia Type: general      Patient participation: Yes- Able to Participate  Level of consciousness: awake and alert  Post-procedure vital signs: reviewed and stable  Pain management: adequate  Airway patency: patent    PONV status at discharge: No PONV  Anesthetic complications: no      Cardiovascular status: blood pressure returned to baseline  Respiratory status: unassisted  Hydration status: euvolemic  Follow-up not needed.              Vitals Value Taken Time   BP 91/57 08/26/24 0829   Temp 36.8 °C (98.3 °F) 08/26/24 0812   Pulse 65 08/26/24 0829   Resp 20 08/26/24 0814   SpO2 98 % 08/26/24 0812         No case tracking events are documented in the log.      Pain/Trino Score: No data recorded

## 2024-08-26 NOTE — OP NOTE
08/26/2024    Preoperative Diagnosis:  Melena [K92.1] through colostomy    Postoperative Diagnosis:  Melena [K92.1] through colostomy    Procedure:  Colonoscopy through colostomy   Polypectomy    Findings:  Digital stomal exam with no mass lesions palpable, easily traversing the fascia  Prep fair with some areas of firm pellets of stool throughout.  Some thickened stool in the cecum required significant time to wash this out for better visualization.  Appendiceal orifice and ileocecal valve both identified and were normal-appearing.  Ileocecal valve could not be traversed due to positioning of the scope.    Normal ascending colon with significant diverticulosis identified, potentially the source of bleeding recently noted.    Normal transverse colon with single 3 mm polyp, completely removed with cold snare.    Normal descending colon    Procedure:  Patient was brought to the endoscopy suite and placed in left lateral decubitus position.  Sedation was provided via IV.  Time-out was carried out and agreed upon.  Digital stomal exam was performed with findings as noted above.  The scope was advanced through the colostomy into the colon up to the cecum.  Navigation was challenging due to tight twists of the flexures albeit navigation was successful.  The cecum was intubated.  The appendiceal orifice and ileocecal valve were both identified.  The scope could not be advanced into the terminal ileum due to positioning.  The cecum was normal.  There was some dark, bilious material within the cecum.  The scope was retrieved in the walls of the colon were evaluated under distention.  Diverticulosis of the ascending colon was noted without complication.  The hepatic flexure was identified.  The transverse colon was normal with a single 3 mm polyp that was removed with cold snare and retrieved.  The descending colon was normal-appearing scope was retrieved.    Patient tolerated the procedure well without complication.

## 2024-08-26 NOTE — INTERVAL H&P NOTE
The patient has been examined and the H&P has been reviewed:    I concur with the findings and changes have been noted since the H&P was written:  Patient has started to pass blood per stoma.  Plan will be for colonoscopy through colostomy today.    Surgery risks, benefits and alternative options discussed and understood by patient/family.          There are no hospital problems to display for this patient.

## 2024-08-27 LAB — PSYCHE PATHOLOGY RESULT: NORMAL

## 2024-08-29 ENCOUNTER — LAB VISIT (OUTPATIENT)
Dept: LAB | Facility: HOSPITAL | Age: 79
End: 2024-08-29
Attending: FAMILY MEDICINE
Payer: MEDICARE

## 2024-08-29 DIAGNOSIS — D64.9 ANEMIA, UNSPECIFIED TYPE: Primary | ICD-10-CM

## 2024-08-29 LAB
GROUP & RH: NORMAL
INDIRECT COOMBS: NORMAL
SPECIMEN OUTDATE: NORMAL

## 2024-08-29 PROCEDURE — 86850 RBC ANTIBODY SCREEN: CPT | Performed by: FAMILY MEDICINE

## 2024-08-29 PROCEDURE — 36415 COLL VENOUS BLD VENIPUNCTURE: CPT

## 2024-08-30 ENCOUNTER — INFUSION (OUTPATIENT)
Dept: INFUSION THERAPY | Facility: HOSPITAL | Age: 79
End: 2024-08-30
Payer: MEDICARE

## 2024-08-30 VITALS
DIASTOLIC BLOOD PRESSURE: 72 MMHG | HEART RATE: 71 BPM | SYSTOLIC BLOOD PRESSURE: 130 MMHG | HEIGHT: 62 IN | OXYGEN SATURATION: 97 % | BODY MASS INDEX: 26.68 KG/M2 | TEMPERATURE: 98 F | RESPIRATION RATE: 12 BRPM | WEIGHT: 145 LBS

## 2024-08-30 DIAGNOSIS — D64.9 ANEMIA, UNSPECIFIED TYPE: Primary | ICD-10-CM

## 2024-08-30 DIAGNOSIS — D64.9 ANEMIA, UNSPECIFIED TYPE: ICD-10-CM

## 2024-08-30 LAB
ABO + RH BLD: NORMAL
ABO + RH BLD: NORMAL
BLD PROD TYP BPU: NORMAL
BLD PROD TYP BPU: NORMAL
BLOOD UNIT EXPIRATION DATE: NORMAL
BLOOD UNIT EXPIRATION DATE: NORMAL
BLOOD UNIT TYPE CODE: 7300
BLOOD UNIT TYPE CODE: 7300
CROSSMATCH INTERPRETATION: NORMAL
CROSSMATCH INTERPRETATION: NORMAL
DISPENSE STATUS: NORMAL
DISPENSE STATUS: NORMAL
UNIT NUMBER: NORMAL
UNIT NUMBER: NORMAL

## 2024-08-30 PROCEDURE — 36430 TRANSFUSION BLD/BLD COMPNT: CPT

## 2024-08-30 PROCEDURE — 86923 COMPATIBILITY TEST ELECTRIC: CPT | Mod: 91 | Performed by: FAMILY MEDICINE

## 2024-08-30 PROCEDURE — P9016 RBC LEUKOCYTES REDUCED: HCPCS | Performed by: FAMILY MEDICINE

## 2024-08-30 RX ORDER — HYDROCODONE BITARTRATE AND ACETAMINOPHEN 500; 5 MG/1; MG/1
TABLET ORAL ONCE
Status: ACTIVE | OUTPATIENT
Start: 2024-08-30

## 2024-08-30 RX ORDER — HYDROCODONE BITARTRATE AND ACETAMINOPHEN 500; 5 MG/1; MG/1
TABLET ORAL ONCE
Status: CANCELLED | OUTPATIENT
Start: 2024-08-30 | End: 2024-08-30

## 2024-10-25 ENCOUNTER — ANESTHESIA EVENT (OUTPATIENT)
Dept: SURGERY | Facility: HOSPITAL | Age: 79
End: 2024-10-25
Payer: MEDICARE

## 2024-10-28 ENCOUNTER — ANESTHESIA (OUTPATIENT)
Dept: SURGERY | Facility: HOSPITAL | Age: 79
End: 2024-10-28
Payer: MEDICARE

## 2024-10-31 DIAGNOSIS — R13.10 DYSPHAGIA, UNSPECIFIED: Primary | ICD-10-CM

## 2024-11-01 ENCOUNTER — HOSPITAL ENCOUNTER (OUTPATIENT)
Facility: HOSPITAL | Age: 79
Discharge: HOME | End: 2024-11-01
Attending: SURGERY | Admitting: SURGERY
Payer: MEDICARE

## 2024-11-01 VITALS
SYSTOLIC BLOOD PRESSURE: 132 MMHG | DIASTOLIC BLOOD PRESSURE: 61 MMHG | TEMPERATURE: 98 F | BODY MASS INDEX: 27.59 KG/M2 | RESPIRATION RATE: 18 BRPM | HEART RATE: 65 BPM | OXYGEN SATURATION: 98 % | WEIGHT: 149.94 LBS | HEIGHT: 62 IN

## 2024-11-01 DIAGNOSIS — K56.2 SIGMOID VOLVULUS: ICD-10-CM

## 2024-11-01 PROCEDURE — 63600175 PHARM REV CODE 636 W HCPCS: Performed by: NURSE ANESTHETIST, CERTIFIED REGISTERED

## 2024-11-01 PROCEDURE — A4216 STERILE WATER/SALINE, 10 ML: HCPCS | Performed by: NURSE ANESTHETIST, CERTIFIED REGISTERED

## 2024-11-01 PROCEDURE — 37000009 HC ANESTHESIA EA ADD 15 MINS: Performed by: SURGERY

## 2024-11-01 PROCEDURE — 25000003 PHARM REV CODE 250: Performed by: NURSE ANESTHETIST, CERTIFIED REGISTERED

## 2024-11-01 PROCEDURE — 44388 COLONOSCOPY THRU STOMA SPX: CPT | Performed by: SURGERY

## 2024-11-01 PROCEDURE — 27201423 OPTIME MED/SURG SUP & DEVICES STERILE SUPPLY: Performed by: SURGERY

## 2024-11-01 PROCEDURE — 37000008 HC ANESTHESIA 1ST 15 MINUTES: Performed by: SURGERY

## 2024-11-01 RX ORDER — LIDOCAINE HYDROCHLORIDE 20 MG/ML
INJECTION, SOLUTION EPIDURAL; INFILTRATION; INTRACAUDAL; PERINEURAL
Status: DISCONTINUED | OUTPATIENT
Start: 2024-11-01 | End: 2024-11-01

## 2024-11-01 RX ORDER — SODIUM CHLORIDE 0.9 % (FLUSH) 0.9 %
SYRINGE (ML) INJECTION
Status: DISCONTINUED | OUTPATIENT
Start: 2024-11-01 | End: 2024-11-01

## 2024-11-01 RX ORDER — PROPOFOL 10 MG/ML
VIAL (ML) INTRAVENOUS
Status: DISCONTINUED | OUTPATIENT
Start: 2024-11-01 | End: 2024-11-01

## 2024-11-01 RX ADMIN — PROPOFOL 50 MG: 10 INJECTION, EMULSION INTRAVENOUS at 07:11

## 2024-11-01 RX ADMIN — SODIUM CHLORIDE, PRESERVATIVE FREE 10 ML: 5 INJECTION INTRAVENOUS at 07:11

## 2024-11-01 RX ADMIN — LIDOCAINE HYDROCHLORIDE 60 MG: 20 INJECTION, SOLUTION EPIDURAL; INFILTRATION; INTRACAUDAL; PERINEURAL at 07:11

## 2024-11-01 NOTE — OP NOTE
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  Patent exit.    Prep fair to poor with significant pooling of thick stool and some firm balls of stool noted.  Major lesions were able to be ruled out and most polyps were able to be ruled out.  There was some areas that were not sufficiently evaluated due to the prep.  Appendiceal orifice and ileocecal valve identified.  Normal-appearing.  Could not intubate ileocecal valve due to scope positioning.    Normal ascending, transverse, and descending colon.    Digital rectal exam with external hemorrhoids as well as grade 3 internal hemorrhoids without complication.  No mass lesions palpable or visible.  Large ball of desiccated stool at the proximal rectum.  Proximal most aspect of rectum suspected to be present at 12 cm, tattoo injected submucosally.  Remainder of mucosa appeared normal.      Procedure:  Patient was brought to the endoscopy suite and placed in supine position.  Sedation was provided via IV.  Time-out was carried out and agreed upon.  Digital stomal exam was performed with findings as noted above.  Scope was advanced through the stoma into the colon.  This was challenging due to kinking of the colon as well as entry through the stoma.  The cecum was ultimately reached.  Landmarks were identified.  The walls of the colon were evaluated under distention.  Findings are as noted above.  The scope was retrieved.    The patient was placed in left lateral decubitus position.  Digital rectal exam was performed with findings as noted above.  Scope was advanced through the rectum up to the proximal most aspect that could be visualized.  Tattoo was injected submucosally.  The scope was retrieved.  No significant  pathology was identified.    Patient tolerated the procedure well without complication.

## 2024-11-14 ENCOUNTER — CLINICAL SUPPORT (OUTPATIENT)
Dept: REHABILITATION | Facility: HOSPITAL | Age: 79
End: 2024-11-14
Attending: FAMILY MEDICINE
Payer: MEDICARE

## 2024-11-14 ENCOUNTER — HOSPITAL ENCOUNTER (OUTPATIENT)
Dept: RADIOLOGY | Facility: HOSPITAL | Age: 79
Discharge: HOME | End: 2024-11-14
Attending: FAMILY MEDICINE
Payer: MEDICARE

## 2024-11-14 DIAGNOSIS — R13.10 DYSPHAGIA, UNSPECIFIED: ICD-10-CM

## 2024-11-14 DIAGNOSIS — R13.12 OROPHARYNGEAL DYSPHAGIA: Primary | ICD-10-CM

## 2024-11-14 PROCEDURE — A9698 NON-RAD CONTRAST MATERIALNOC: HCPCS | Performed by: FAMILY MEDICINE

## 2024-11-14 PROCEDURE — 92611 MOTION FLUOROSCOPY/SWALLOW: CPT

## 2024-11-14 PROCEDURE — 74230 X-RAY XM SWLNG FUNCJ C+: CPT | Mod: TC

## 2024-11-14 PROCEDURE — 25500020 PHARM REV CODE 255: Performed by: FAMILY MEDICINE

## 2024-11-14 RX ADMIN — BARIUM SULFATE 10 ML: 0.81 POWDER, FOR SUSPENSION ORAL at 01:11

## 2024-11-14 RX ADMIN — BARIUM SULFATE 5 ML: 400 SUSPENSION ORAL at 01:11

## 2024-11-25 DIAGNOSIS — D50.9 IRON DEFICIENCY ANEMIA, UNSPECIFIED IRON DEFICIENCY ANEMIA TYPE: Primary | ICD-10-CM

## 2024-11-25 RX ORDER — HEPARIN 100 UNIT/ML
500 SYRINGE INTRAVENOUS
Status: CANCELLED | OUTPATIENT
Start: 2024-12-06

## 2024-11-25 RX ORDER — DIPHENHYDRAMINE HYDROCHLORIDE 50 MG/ML
50 INJECTION INTRAMUSCULAR; INTRAVENOUS ONCE AS NEEDED
Status: CANCELLED | OUTPATIENT
Start: 2024-12-06

## 2024-11-25 RX ORDER — EPINEPHRINE 0.3 MG/.3ML
0.3 INJECTION SUBCUTANEOUS ONCE AS NEEDED
Status: CANCELLED | OUTPATIENT
Start: 2024-12-06

## 2024-11-25 RX ORDER — SODIUM CHLORIDE 0.9 % (FLUSH) 0.9 %
10 SYRINGE (ML) INJECTION
Status: CANCELLED | OUTPATIENT
Start: 2024-12-06

## 2024-11-27 RX ORDER — HEPARIN 100 UNIT/ML
500 SYRINGE INTRAVENOUS
OUTPATIENT
Start: 2024-12-01

## 2024-11-27 RX ORDER — EPINEPHRINE 0.3 MG/.3ML
0.3 INJECTION SUBCUTANEOUS ONCE AS NEEDED
OUTPATIENT
Start: 2024-12-01

## 2024-11-27 RX ORDER — DIPHENHYDRAMINE HYDROCHLORIDE 50 MG/ML
50 INJECTION INTRAMUSCULAR; INTRAVENOUS ONCE AS NEEDED
OUTPATIENT
Start: 2024-12-01

## 2024-11-27 RX ORDER — SODIUM CHLORIDE 0.9 % (FLUSH) 0.9 %
10 SYRINGE (ML) INJECTION
OUTPATIENT
Start: 2024-12-01

## 2024-12-06 ENCOUNTER — INFUSION (OUTPATIENT)
Dept: INFUSION THERAPY | Facility: HOSPITAL | Age: 79
End: 2024-12-06
Payer: MEDICARE

## 2024-12-06 VITALS
HEIGHT: 62 IN | SYSTOLIC BLOOD PRESSURE: 143 MMHG | DIASTOLIC BLOOD PRESSURE: 62 MMHG | OXYGEN SATURATION: 95 % | TEMPERATURE: 98 F | BODY MASS INDEX: 27.42 KG/M2 | RESPIRATION RATE: 20 BRPM | HEART RATE: 72 BPM

## 2024-12-06 DIAGNOSIS — D50.9 IRON DEFICIENCY ANEMIA, UNSPECIFIED IRON DEFICIENCY ANEMIA TYPE: Primary | ICD-10-CM

## 2024-12-06 PROCEDURE — 63600175 PHARM REV CODE 636 W HCPCS: Performed by: FAMILY MEDICINE

## 2024-12-06 PROCEDURE — 96365 THER/PROPH/DIAG IV INF INIT: CPT

## 2024-12-06 PROCEDURE — 25000003 PHARM REV CODE 250: Performed by: FAMILY MEDICINE

## 2024-12-06 RX ORDER — EPINEPHRINE 0.3 MG/.3ML
0.3 INJECTION SUBCUTANEOUS ONCE AS NEEDED
OUTPATIENT
Start: 2024-12-13

## 2024-12-06 RX ORDER — DIPHENHYDRAMINE HYDROCHLORIDE 50 MG/ML
50 INJECTION INTRAMUSCULAR; INTRAVENOUS ONCE AS NEEDED
OUTPATIENT
Start: 2024-12-13

## 2024-12-06 RX ORDER — SODIUM CHLORIDE 0.9 % (FLUSH) 0.9 %
10 SYRINGE (ML) INJECTION
OUTPATIENT
Start: 2024-12-13

## 2024-12-06 RX ORDER — HEPARIN 100 UNIT/ML
500 SYRINGE INTRAVENOUS
OUTPATIENT
Start: 2024-12-13

## 2024-12-06 RX ADMIN — SODIUM CHLORIDE 125 MG: 9 INJECTION, SOLUTION INTRAVENOUS at 10:12

## 2024-12-13 ENCOUNTER — INFUSION (OUTPATIENT)
Dept: INFUSION THERAPY | Facility: HOSPITAL | Age: 79
End: 2024-12-13
Attending: FAMILY MEDICINE
Payer: MEDICARE

## 2024-12-13 VITALS
HEART RATE: 75 BPM | DIASTOLIC BLOOD PRESSURE: 63 MMHG | HEIGHT: 62 IN | RESPIRATION RATE: 16 BRPM | BODY MASS INDEX: 27.6 KG/M2 | SYSTOLIC BLOOD PRESSURE: 151 MMHG | OXYGEN SATURATION: 99 % | WEIGHT: 150 LBS | TEMPERATURE: 98 F

## 2024-12-13 DIAGNOSIS — D50.9 IRON DEFICIENCY ANEMIA, UNSPECIFIED IRON DEFICIENCY ANEMIA TYPE: Primary | ICD-10-CM

## 2024-12-13 PROCEDURE — 25000003 PHARM REV CODE 250: Performed by: FAMILY MEDICINE

## 2024-12-13 PROCEDURE — 96365 THER/PROPH/DIAG IV INF INIT: CPT

## 2024-12-13 PROCEDURE — 63600175 PHARM REV CODE 636 W HCPCS: Performed by: FAMILY MEDICINE

## 2024-12-13 RX ORDER — DIPHENHYDRAMINE HYDROCHLORIDE 50 MG/ML
50 INJECTION INTRAMUSCULAR; INTRAVENOUS ONCE AS NEEDED
OUTPATIENT
Start: 2024-12-20

## 2024-12-13 RX ORDER — EPINEPHRINE 0.3 MG/.3ML
0.3 INJECTION SUBCUTANEOUS ONCE AS NEEDED
OUTPATIENT
Start: 2024-12-20

## 2024-12-13 RX ORDER — HEPARIN 100 UNIT/ML
500 SYRINGE INTRAVENOUS
OUTPATIENT
Start: 2024-12-20

## 2024-12-13 RX ORDER — SODIUM CHLORIDE 0.9 % (FLUSH) 0.9 %
10 SYRINGE (ML) INJECTION
OUTPATIENT
Start: 2024-12-20

## 2024-12-13 RX ADMIN — SODIUM CHLORIDE 125 MG: 9 INJECTION, SOLUTION INTRAVENOUS at 10:12

## 2025-01-02 ENCOUNTER — HOSPITAL ENCOUNTER (EMERGENCY)
Facility: HOSPITAL | Age: 80
Discharge: HOME | End: 2025-01-02
Attending: STUDENT IN AN ORGANIZED HEALTH CARE EDUCATION/TRAINING PROGRAM
Payer: MEDICARE

## 2025-01-02 VITALS
HEART RATE: 65 BPM | DIASTOLIC BLOOD PRESSURE: 63 MMHG | SYSTOLIC BLOOD PRESSURE: 121 MMHG | WEIGHT: 150 LBS | OXYGEN SATURATION: 95 % | RESPIRATION RATE: 18 BRPM | BODY MASS INDEX: 27.44 KG/M2 | TEMPERATURE: 98 F

## 2025-01-02 DIAGNOSIS — K59.00 CONSTIPATION, UNSPECIFIED CONSTIPATION TYPE: Primary | ICD-10-CM

## 2025-01-02 DIAGNOSIS — R19.4 DECREASED STOOLING: ICD-10-CM

## 2025-01-02 PROCEDURE — 99283 EMERGENCY DEPT VISIT LOW MDM: CPT | Mod: 25

## 2025-01-02 RX ORDER — POLYETHYLENE GLYCOL 3350 17 G/17G
17 POWDER, FOR SOLUTION ORAL DAILY
Qty: 850 G | Refills: 0 | Status: SHIPPED | OUTPATIENT
Start: 2025-01-02

## 2025-01-03 NOTE — ED PROVIDER NOTES
Encounter Date: 1/2/2025       History     Chief Complaint   Patient presents with    Constipation     Pt from [facility] with reports of constipation x1 day. Had colostomy replaced yesterday, has not had BM since     HPI    79-year-old female from nursing home presents emergency department because she had to have any stool output from her colostomy today.  She states she had good stool output yesterday.  Denies any abdominal pain.  No nausea or vomiting.  States she has no idea why they sent her to the emergency department because she simply did not have a bowel movement today    Review of patient's allergies indicates:   Allergen Reactions    Nsaids (non-steroidal anti-inflammatory drug)     Oxycodone      Past Medical History:   Diagnosis Date    Arthritis     CVA (cerebral vascular accident)     Depression     Dysphagia     HTN (hypertension)     Hypothyroidism, unspecified     Iron deficiency anemia, unspecified     Mixed hyperlipidemia     Squamous cell carcinoma in situ     Tonsillar cancer     Urinary tract infection, site not specified      Past Surgical History:   Procedure Laterality Date    COLECTOMY, SIGMOID N/A 7/28/2024    Procedure: COLECTOMY, SIGMOID;  Surgeon: GENA Matute MD;  Location: [facility];  Service: General;  Laterality: N/A;    COLONOSCOPY, THROUGH STOMA, DIAGNOSTIC N/A 8/26/2024    Procedure: COLONOSCOPY, THROUGH STOMA, DIAGNOSTIC;  Surgeon: GENA Matute MD;  Location: [facility];  Service: Endoscopy;  Laterality: N/A;  DIVERTICULOSIS    COLONOSCOPY, THROUGH STOMA, DIAGNOSTIC N/A 11/1/2024    Procedure: COLONOSCOPY, THROUGH STOMA, DIAGNOSTIC;  Surgeon: GENA Matute MD;  Location: [facility];  Service: Endoscopy;  Laterality: N/A;  POST OP: NORMAL COLONOSCOPY    COLONOSCOPY, WITH DIRECTED SUBMUCOSAL INJECTION N/A 11/1/2024    Procedure: COLONOSCOPY, WITH DIRECTED SUBMUCOSAL INJECTION;  Surgeon: GENA Matute MD;  Location: [facility];  Service:  Endoscopy;  Laterality: N/A;  TATTOO INK SPOT IN RECTUM AT 12 CM (PROCTOSCOPY)    COLONOSCOPY, WITH POLYPECTOMY USING SNARE N/A 8/26/2024    Procedure: COLONOSCOPY, WITH POLYPECTOMY USING SNARE;  Surgeon: GENA Matute MD;  Location: [facility] ENDO;  Service: Endoscopy;  Laterality: N/A;  A) TRANSVERSE COLON POLYP    COLOSTOMY N/A 7/28/2024    Procedure: CREATION, COLOSTOMY;  Surgeon: GENA Matute MD;  Location: [facility] OR;  Service: General;  Laterality: N/A;    FLEXIBLE SIGMOIDOSCOPY N/A 7/26/2024    Procedure: SIGMOIDOSCOPY, FLEXIBLE;  Surgeon: GENA Matute MD;  Location: [facility] ENDO;  Service: Endoscopy;  Laterality: N/A;  POST OP:DECOMPRESSION    HYSTERECTOMY      LAPAROSCOPY N/A 7/28/2024    Procedure: LAPAROSCOPY;  Surgeon: GENA Matute MD;  Location: [facility] OR;  Service: General;  Laterality: N/A;  massively distended sigmoid colon was identified Converted to open art 1620    PROCTOSCOPY N/A 11/1/2024    Procedure: PROCTOSCOPY;  Surgeon: GENA Matute MD;  Location: [facility] ENDO;  Service: Endoscopy;  Laterality: N/A;  FLEXIBLE; EVALUATION OF RECTAL STUMP THROUGH RECTUM; NORMAL RECTUM    TONSILLECTOMY      TOTAL SHOULDER ARTHROPLASTY       Family History   Problem Relation Name Age of Onset    Cancer Mother      Leukemia Mother      Stroke Mother      Alzheimer's disease Father      Cancer Sister      Leukemia Sister       Social History     Tobacco Use    Smoking status: Former     Types: Cigarettes    Smokeless tobacco: Never    Tobacco comments:     patient stated that she smokes on some days.    Substance Use Topics    Alcohol use: Never     Comment: wine, occasionally    Drug use: Never     Review of Systems   Constitutional:  Negative for fever.   Respiratory:  Negative for cough.    Cardiovascular:  Negative for chest pain.   Gastrointestinal:  Negative for abdominal pain, constipation, diarrhea, nausea and vomiting.   Neurological:  Negative for headaches.    All other systems reviewed and are negative.      Physical Exam     Initial Vitals [01/02/25 2207]   BP Pulse Resp Temp SpO2   (!) 120/59 67 18 97.6 °F (36.4 °C) 96 %      MAP       --         Physical Exam    Nursing note and vitals reviewed.  Constitutional: She appears well-developed and well-nourished. No distress.   Cardiovascular:  Normal rate and regular rhythm.           Pulmonary/Chest: Breath sounds normal. No respiratory distress. She has no wheezes. She has no rhonchi. She has no rales.   Abdominal: Abdomen is soft. There is no abdominal tenderness.   Colostomy present with no stool.  There is no tenderness. There is no rebound and no guarding.   Musculoskeletal:         General: No tenderness. Normal range of motion.     Neurological: She is alert and oriented to person, place, and time. She has normal strength.   Skin: Skin is warm. Capillary refill takes less than 2 seconds.         ED Course   Procedures  Labs Reviewed - No data to display       Imaging Results              X-Ray Abdomen Flat And Erect (Preliminary result)  Result time 01/02/25 22:52:21      Wet Read by Braulio Arzola MD (01/02/25 22:52:21, [facility] - Emergency Dept, Emergency Medicine)    Signs of constipation                                     Medications - No data to display  Medical Decision Making  Initial Assessment:       Decreased stool output      Differential Diagnosis:   Judging by the patient's chief complaint and pertinent history, the patient has the following possible differential diagnoses, including but not limited to the following.  Some of these are deemed to be lower likelihood and some more likely based on my physical exam and history combined with possible lab work and/or imaging studies.   Please see the pertinent studies, and refer to the HPI.  Some of these diagnoses will take further evaluation to fully rule out, perhaps as an outpatient and the patient was encouraged to follow up when  discharged for more comprehensive evaluation.      Constipation, decreased stooling, bowel obstruction,  as well as multiple other possible etiologies      Problems Addressed:  Constipation, unspecified constipation type: chronic illness or injury    Amount and/or Complexity of Data Reviewed  Radiology: ordered and independent interpretation performed.    Risk  OTC drugs.                                      Clinical Impression:  Final diagnoses:  [R19.4] Decreased stooling  [K59.00] Constipation, unspecified constipation type (Primary)          ED Disposition Condition    Discharge Stable          ED Prescriptions       Medication Sig Dispense Start Date End Date Auth. Provider    polyethylene glycol (GLYCOLAX) 17 gram/dose powder Take 17 g by mouth once daily. 850 g 1/2/2025 -- Braulio Arzola MD          Follow-up Information       Follow up With Specialties Details Why Contact Info    Leeanna De Leon MD Family Medicine Schedule an appointment as soon as possible for a visit   [address]  [facility] [place] [address]  [phone]      [facility] - Emergency Dept Emergency Medicine Go to  If symptoms worsen [address] [facility] [id number] [phone]             Braulio Arzola MD  01/02/25 [id number]

## 2025-02-19 ENCOUNTER — CLINICAL SUPPORT (OUTPATIENT)
Dept: RESPIRATORY THERAPY | Facility: HOSPITAL | Age: 80
End: 2025-02-19
Attending: ANESTHESIOLOGY
Payer: MEDICARE

## 2025-02-19 DIAGNOSIS — Z01.818 PREOP EXAMINATION: Primary | ICD-10-CM

## 2025-02-19 DIAGNOSIS — Z01.818 PREOP EXAMINATION: ICD-10-CM

## 2025-02-19 LAB
OHS QRS DURATION: 80 MS
OHS QTC CALCULATION: 467 MS

## 2025-02-19 PROCEDURE — 93010 ELECTROCARDIOGRAM REPORT: CPT | Mod: ,,, | Performed by: INTERNAL MEDICINE

## 2025-02-19 PROCEDURE — 93005 ELECTROCARDIOGRAM TRACING: CPT

## 2025-02-19 RX ORDER — NYSTATIN 100000 [USP'U]/G
POWDER TOPICAL NIGHTLY
Status: ON HOLD | COMMUNITY
End: 2025-03-26 | Stop reason: HOSPADM

## 2025-02-19 RX ORDER — MONTELUKAST SODIUM 10 MG/1
10 TABLET ORAL NIGHTLY
COMMUNITY

## 2025-02-19 NOTE — DISCHARGE INSTRUCTIONS
Discharge instructions reviewed with patient and report called and given to nurse at [facility]  Please ensure wound care is completed as ordered   Orders for antibiotics sent with patient   Please take all medications as ordered   Please complete antibiotic therapy   Follow up with Dr. REESE Rudd on Monday, 03/03/2025 @ 5:30 a.m.    THANK YOU FOR CHOOSING [facility] FOR YOUR CARE!!        Wound care:  Mid-abdominal transverse incision: cleanse with wound cleanser apply island border dressing change daily and as needed  Left lower abdomen: squeeze wound to remove excess blood drainage, cleanse inside of wound with hydrogen perioxide soaked q-tip length 5 inches. Insert q-tip angled towards patients left side, clean outside of wound with wound cleanser and apply island border dressing. Change dressing twice daily and as needed  BENNY drain Right mid quadrant: apply spilt guaze, place around tubing place tegaderm dressing change daily and as needed  Right lower abdomen wound: cleanse with wound cleanser and apply island border dressing change daily and as needed

## 2025-02-21 ENCOUNTER — ANESTHESIA EVENT (OUTPATIENT)
Dept: SURGERY | Facility: HOSPITAL | Age: 80
End: 2025-02-21
Payer: MEDICARE

## 2025-02-21 NOTE — ANESTHESIA PREPROCEDURE EVALUATION
02/21/2025  Ashley Calvillo is a 79 y.o., female.      Pre-op Assessment    I have reviewed the Patient Summary Reports.     I have reviewed the Nursing Notes. I have reviewed the NPO Status.   I have reviewed the Medications.     Review of Systems  Anesthesia Hx:             Denies Family Hx of Anesthesia complications.    Denies Personal Hx of Anesthesia complications.                    Social:  Former Smoker, No Alcohol Use       Hematology/Oncology:  Hematology Normal   Oncology Normal                                   EENT/Dental:  EENT/Dental Normal           Cardiovascular:     Hypertension, well controlled              ECG has been reviewed.                            Pulmonary:  Pulmonary Normal                       Renal/:  Chronic Renal Disease, CKD                Hepatic/GI:  Hepatic/GI Normal                    Musculoskeletal:  Musculoskeletal Normal                Neurological:   CVA, residual symptoms                                    Endocrine:   Hypothyroidism          Dermatological:  Skin Normal    Psych:  Psychiatric History                  Physical Exam  General: Cooperative, Alert and Oriented    Airway:  Mallampati: II   Mouth Opening: Normal  TM Distance: Normal  Tongue: Normal  Neck ROM: Normal ROM    Dental:  Intact        Anesthesia Plan  Type of Anesthesia, risks & benefits discussed:    Anesthesia Type: Gen ETT  Intra-op Monitoring Plan: Standard ASA Monitors  Post Op Pain Control Plan: multimodal analgesia  Induction:  IV  Airway Plan: Direct  Informed Consent: Informed consent signed with the Patient and all parties understand the risks and agree with anesthesia plan.  All questions answered. Patient consented to blood products? Yes  ASA Score: 3    Ready For Surgery From Anesthesia Perspective.     .

## 2025-02-24 ENCOUNTER — ANESTHESIA (OUTPATIENT)
Dept: SURGERY | Facility: HOSPITAL | Age: 80
End: 2025-02-24
Payer: MEDICARE

## 2025-02-24 ENCOUNTER — HOSPITAL ENCOUNTER (INPATIENT)
Facility: HOSPITAL | Age: 80
LOS: 5 days | Discharge: HOME | DRG: 330 | End: 2025-03-01
Attending: SURGERY | Admitting: SURGERY
Payer: MEDICARE

## 2025-02-24 DIAGNOSIS — K56.2 SIGMOID VOLVULUS: ICD-10-CM

## 2025-02-24 DIAGNOSIS — Z93.3 COLOSTOMY IN PLACE: Primary | ICD-10-CM

## 2025-02-24 DIAGNOSIS — K56.2 VOLVULUS: ICD-10-CM

## 2025-02-24 LAB
GROUP & RH: NORMAL
HCT VFR BLD AUTO: 39.2 % (ref 37–47)
HGB BLD-MCNC: 12.3 G/DL (ref 12–16)
INDIRECT COOMBS: NORMAL
SPECIMEN OUTDATE: NORMAL

## 2025-02-24 PROCEDURE — 25000003 PHARM REV CODE 250: Performed by: SURGERY

## 2025-02-24 PROCEDURE — 0DN80ZZ RELEASE SMALL INTESTINE, OPEN APPROACH: ICD-10-PCS | Performed by: SURGERY

## 2025-02-24 PROCEDURE — 85018 HEMOGLOBIN: CPT | Performed by: NURSE ANESTHETIST, CERTIFIED REGISTERED

## 2025-02-24 PROCEDURE — 88305 TISSUE EXAM BY PATHOLOGIST: CPT | Performed by: SURGERY

## 2025-02-24 PROCEDURE — 36000709 HC OR TIME LEV III EA ADD 15 MIN: Performed by: SURGERY

## 2025-02-24 PROCEDURE — 37000009 HC ANESTHESIA EA ADD 15 MINS: Performed by: SURGERY

## 2025-02-24 PROCEDURE — 86901 BLOOD TYPING SEROLOGIC RH(D): CPT | Performed by: NURSE ANESTHETIST, CERTIFIED REGISTERED

## 2025-02-24 PROCEDURE — 71000033 HC RECOVERY, INTIAL HOUR: Performed by: SURGERY

## 2025-02-24 PROCEDURE — 0DBU0ZZ EXCISION OF OMENTUM, OPEN APPROACH: ICD-10-PCS | Performed by: SURGERY

## 2025-02-24 PROCEDURE — 0DJD8ZZ INSPECTION OF LOWER INTESTINAL TRACT, VIA NATURAL OR ARTIFICIAL OPENING ENDOSCOPIC: ICD-10-PCS | Performed by: SURGERY

## 2025-02-24 PROCEDURE — 63600175 PHARM REV CODE 636 W HCPCS: Performed by: ANESTHESIOLOGY

## 2025-02-24 PROCEDURE — 94761 N-INVAS EAR/PLS OXIMETRY MLT: CPT

## 2025-02-24 PROCEDURE — 0DBM0ZZ EXCISION OF DESCENDING COLON, OPEN APPROACH: ICD-10-PCS | Performed by: SURGERY

## 2025-02-24 PROCEDURE — 27201423 OPTIME MED/SURG SUP & DEVICES STERILE SUPPLY: Performed by: SURGERY

## 2025-02-24 PROCEDURE — P9045 ALBUMIN (HUMAN), 5%, 250 ML: HCPCS | Mod: JZ,TB | Performed by: NURSE ANESTHETIST, CERTIFIED REGISTERED

## 2025-02-24 PROCEDURE — 0DB80ZZ EXCISION OF SMALL INTESTINE, OPEN APPROACH: ICD-10-PCS | Performed by: SURGERY

## 2025-02-24 PROCEDURE — 11000001 HC ACUTE MED/SURG PRIVATE ROOM

## 2025-02-24 PROCEDURE — 36000708 HC OR TIME LEV III 1ST 15 MIN: Performed by: SURGERY

## 2025-02-24 PROCEDURE — 88304 TISSUE EXAM BY PATHOLOGIST: CPT

## 2025-02-24 PROCEDURE — 63600175 PHARM REV CODE 636 W HCPCS: Performed by: SURGERY

## 2025-02-24 PROCEDURE — 25000003 PHARM REV CODE 250: Performed by: ANESTHESIOLOGY

## 2025-02-24 PROCEDURE — 0DSM0ZZ REPOSITION DESCENDING COLON, OPEN APPROACH: ICD-10-PCS | Performed by: SURGERY

## 2025-02-24 PROCEDURE — C1729 CATH, DRAINAGE: HCPCS | Performed by: SURGERY

## 2025-02-24 PROCEDURE — 0UT00ZZ RESECTION OF RIGHT OVARY, OPEN APPROACH: ICD-10-PCS | Performed by: SURGERY

## 2025-02-24 PROCEDURE — 0UT50ZZ RESECTION OF RIGHT FALLOPIAN TUBE, OPEN APPROACH: ICD-10-PCS | Performed by: SURGERY

## 2025-02-24 PROCEDURE — 63600175 PHARM REV CODE 636 W HCPCS: Performed by: NURSE ANESTHETIST, CERTIFIED REGISTERED

## 2025-02-24 PROCEDURE — 0DXU0ZW TRANSFER OMENTUM TO ABDOMINAL REGION, OPEN APPROACH: ICD-10-PCS | Performed by: SURGERY

## 2025-02-24 PROCEDURE — 0UT10ZZ RESECTION OF LEFT OVARY, OPEN APPROACH: ICD-10-PCS | Performed by: SURGERY

## 2025-02-24 PROCEDURE — 25000003 PHARM REV CODE 250: Performed by: NURSE ANESTHETIST, CERTIFIED REGISTERED

## 2025-02-24 PROCEDURE — 88307 TISSUE EXAM BY PATHOLOGIST: CPT

## 2025-02-24 PROCEDURE — 36415 COLL VENOUS BLD VENIPUNCTURE: CPT | Performed by: NURSE ANESTHETIST, CERTIFIED REGISTERED

## 2025-02-24 PROCEDURE — 37000008 HC ANESTHESIA 1ST 15 MINUTES: Performed by: SURGERY

## 2025-02-24 RX ORDER — TRAMADOL HYDROCHLORIDE 50 MG/1
50 TABLET ORAL EVERY 6 HOURS PRN
Status: DISCONTINUED | OUTPATIENT
Start: 2025-02-24 | End: 2025-03-01 | Stop reason: HOSPADM

## 2025-02-24 RX ORDER — ONDANSETRON HYDROCHLORIDE 2 MG/ML
4 INJECTION, SOLUTION INTRAVENOUS DAILY PRN
Status: DISCONTINUED | OUTPATIENT
Start: 2025-02-24 | End: 2025-02-24

## 2025-02-24 RX ORDER — SODIUM CHLORIDE, SODIUM LACTATE, POTASSIUM CHLORIDE, CALCIUM CHLORIDE 600; 310; 30; 20 MG/100ML; MG/100ML; MG/100ML; MG/100ML
INJECTION, SOLUTION INTRAVENOUS CONTINUOUS
Status: DISCONTINUED | OUTPATIENT
Start: 2025-02-24 | End: 2025-02-25

## 2025-02-24 RX ORDER — DEXMEDETOMIDINE HYDROCHLORIDE 100 UG/ML
INJECTION, SOLUTION INTRAVENOUS
Status: DISCONTINUED | OUTPATIENT
Start: 2025-02-24 | End: 2025-02-24

## 2025-02-24 RX ORDER — MUPIROCIN 20 MG/G
OINTMENT TOPICAL 2 TIMES DAILY
Status: DISCONTINUED | OUTPATIENT
Start: 2025-02-24 | End: 2025-02-24 | Stop reason: HOSPADM

## 2025-02-24 RX ORDER — HYDROMORPHONE HYDROCHLORIDE 2 MG/ML
0.5 INJECTION, SOLUTION INTRAMUSCULAR; INTRAVENOUS; SUBCUTANEOUS EVERY 5 MIN PRN
Status: DISCONTINUED | OUTPATIENT
Start: 2025-02-24 | End: 2025-02-24

## 2025-02-24 RX ORDER — ACETAMINOPHEN 10 MG/ML
INJECTION, SOLUTION INTRAVENOUS
Status: DISCONTINUED | OUTPATIENT
Start: 2025-02-24 | End: 2025-02-24

## 2025-02-24 RX ORDER — DEXAMETHASONE SODIUM PHOSPHATE 4 MG/ML
INJECTION, SOLUTION INTRA-ARTICULAR; INTRALESIONAL; INTRAMUSCULAR; INTRAVENOUS; SOFT TISSUE
Status: DISCONTINUED | OUTPATIENT
Start: 2025-02-24 | End: 2025-02-24

## 2025-02-24 RX ORDER — FAMOTIDINE 10 MG/ML
INJECTION INTRAVENOUS
Status: DISCONTINUED | OUTPATIENT
Start: 2025-02-24 | End: 2025-02-24

## 2025-02-24 RX ORDER — ONDANSETRON HYDROCHLORIDE 2 MG/ML
4 INJECTION, SOLUTION INTRAVENOUS EVERY 6 HOURS PRN
Status: DISCONTINUED | OUTPATIENT
Start: 2025-02-24 | End: 2025-03-01 | Stop reason: HOSPADM

## 2025-02-24 RX ORDER — MONTELUKAST SODIUM 10 MG/1
10 TABLET ORAL NIGHTLY
Status: DISCONTINUED | OUTPATIENT
Start: 2025-02-24 | End: 2025-03-01 | Stop reason: HOSPADM

## 2025-02-24 RX ORDER — ONDANSETRON HYDROCHLORIDE 2 MG/ML
4 INJECTION, SOLUTION INTRAVENOUS EVERY 12 HOURS PRN
Status: DISCONTINUED | OUTPATIENT
Start: 2025-02-24 | End: 2025-03-01 | Stop reason: HOSPADM

## 2025-02-24 RX ORDER — TRAMADOL HYDROCHLORIDE 50 MG/1
50 TABLET ORAL EVERY 6 HOURS PRN
Status: DISCONTINUED | OUTPATIENT
Start: 2025-02-24 | End: 2025-02-24

## 2025-02-24 RX ORDER — TRANEXAMIC ACID 100 MG/ML
INJECTION, SOLUTION INTRAVENOUS
Status: DISCONTINUED | OUTPATIENT
Start: 2025-02-24 | End: 2025-02-24

## 2025-02-24 RX ORDER — DIPHENHYDRAMINE HYDROCHLORIDE 50 MG/ML
25 INJECTION, SOLUTION INTRAMUSCULAR; INTRAVENOUS EVERY 6 HOURS PRN
Status: DISCONTINUED | OUTPATIENT
Start: 2025-02-24 | End: 2025-02-24

## 2025-02-24 RX ORDER — FLUCONAZOLE 2 MG/ML
200 INJECTION, SOLUTION INTRAVENOUS ONCE
Status: COMPLETED | OUTPATIENT
Start: 2025-02-24 | End: 2025-02-24

## 2025-02-24 RX ORDER — HYDROCODONE BITARTRATE AND ACETAMINOPHEN 5; 325 MG/1; MG/1
1 TABLET ORAL EVERY 6 HOURS PRN
Refills: 0 | Status: DISCONTINUED | OUTPATIENT
Start: 2025-02-24 | End: 2025-03-01 | Stop reason: HOSPADM

## 2025-02-24 RX ORDER — PROCHLORPERAZINE EDISYLATE 5 MG/ML
5 INJECTION INTRAMUSCULAR; INTRAVENOUS EVERY 6 HOURS PRN
Status: DISCONTINUED | OUTPATIENT
Start: 2025-02-24 | End: 2025-03-01 | Stop reason: HOSPADM

## 2025-02-24 RX ORDER — LEVOTHYROXINE SODIUM ANHYDROUS 100 UG/5ML
62.5 INJECTION, POWDER, LYOPHILIZED, FOR SOLUTION INTRAVENOUS DAILY
Status: DISCONTINUED | OUTPATIENT
Start: 2025-02-25 | End: 2025-03-01 | Stop reason: HOSPADM

## 2025-02-24 RX ORDER — HYDROMORPHONE HYDROCHLORIDE 2 MG/ML
0.2 INJECTION, SOLUTION INTRAMUSCULAR; INTRAVENOUS; SUBCUTANEOUS EVERY 5 MIN PRN
Status: DISCONTINUED | OUTPATIENT
Start: 2025-02-24 | End: 2025-02-24

## 2025-02-24 RX ORDER — ALBUMIN HUMAN 50 G/1000ML
SOLUTION INTRAVENOUS
Status: DISCONTINUED | OUTPATIENT
Start: 2025-02-24 | End: 2025-02-24

## 2025-02-24 RX ORDER — ROCURONIUM BROMIDE 10 MG/ML
INJECTION, SOLUTION INTRAVENOUS
Status: DISCONTINUED | OUTPATIENT
Start: 2025-02-24 | End: 2025-02-24

## 2025-02-24 RX ORDER — ENOXAPARIN SODIUM 100 MG/ML
40 INJECTION SUBCUTANEOUS EVERY 24 HOURS
Status: DISCONTINUED | OUTPATIENT
Start: 2025-02-25 | End: 2025-02-25

## 2025-02-24 RX ORDER — FENTANYL CITRATE 50 UG/ML
INJECTION, SOLUTION INTRAMUSCULAR; INTRAVENOUS
Status: DISCONTINUED | OUTPATIENT
Start: 2025-02-24 | End: 2025-02-24

## 2025-02-24 RX ORDER — BUPIVACAINE HYDROCHLORIDE 5 MG/ML
INJECTION, SOLUTION EPIDURAL; INTRACAUDAL
Status: COMPLETED | OUTPATIENT
Start: 2025-02-24 | End: 2025-02-24

## 2025-02-24 RX ORDER — LIDOCAINE HYDROCHLORIDE 20 MG/ML
INJECTION, SOLUTION EPIDURAL; INFILTRATION; INTRACAUDAL; PERINEURAL
Status: DISCONTINUED | OUTPATIENT
Start: 2025-02-24 | End: 2025-02-24

## 2025-02-24 RX ORDER — HYDROMORPHONE HYDROCHLORIDE 2 MG/ML
0.5 INJECTION, SOLUTION INTRAMUSCULAR; INTRAVENOUS; SUBCUTANEOUS EVERY 6 HOURS PRN
Status: DISCONTINUED | OUTPATIENT
Start: 2025-02-24 | End: 2025-03-01 | Stop reason: HOSPADM

## 2025-02-24 RX ORDER — ONDANSETRON HYDROCHLORIDE 2 MG/ML
INJECTION, SOLUTION INTRAVENOUS
Status: DISCONTINUED | OUTPATIENT
Start: 2025-02-24 | End: 2025-02-24

## 2025-02-24 RX ORDER — EPHEDRINE SULFATE 50 MG/ML
INJECTION, SOLUTION INTRAVENOUS
Status: DISCONTINUED | OUTPATIENT
Start: 2025-02-24 | End: 2025-02-24

## 2025-02-24 RX ORDER — MUPIROCIN 20 MG/G
OINTMENT TOPICAL 2 TIMES DAILY
Status: DISCONTINUED | OUTPATIENT
Start: 2025-02-24 | End: 2025-03-01 | Stop reason: HOSPADM

## 2025-02-24 RX ORDER — NOREPINEPHRINE BITARTRATE 1 MG/ML
INJECTION, SOLUTION INTRAVENOUS
Status: DISCONTINUED | OUTPATIENT
Start: 2025-02-24 | End: 2025-02-24

## 2025-02-24 RX ORDER — VECURONIUM BROMIDE 1 MG/ML
INJECTION, POWDER, LYOPHILIZED, FOR SOLUTION INTRAVENOUS
Status: DISCONTINUED | OUTPATIENT
Start: 2025-02-24 | End: 2025-02-24

## 2025-02-24 RX ORDER — GLUCAGON 1 MG
1 KIT INJECTION
Status: DISCONTINUED | OUTPATIENT
Start: 2025-02-24 | End: 2025-02-24

## 2025-02-24 RX ORDER — NALOXONE HCL 0.4 MG/ML
0.02 VIAL (ML) INJECTION
Status: DISCONTINUED | OUTPATIENT
Start: 2025-02-24 | End: 2025-03-01 | Stop reason: HOSPADM

## 2025-02-24 RX ORDER — PROPOFOL 10 MG/ML
VIAL (ML) INTRAVENOUS
Status: DISCONTINUED | OUTPATIENT
Start: 2025-02-24 | End: 2025-02-24

## 2025-02-24 RX ORDER — CEFOXITIN 2 G/1
2 INJECTION, POWDER, FOR SOLUTION INTRAVENOUS
Status: COMPLETED | OUTPATIENT
Start: 2025-02-24 | End: 2025-02-25

## 2025-02-24 RX ORDER — FAMOTIDINE 10 MG/ML
20 INJECTION, SOLUTION INTRAVENOUS EVERY 12 HOURS
Status: DISCONTINUED | OUTPATIENT
Start: 2025-02-24 | End: 2025-03-01 | Stop reason: HOSPADM

## 2025-02-24 RX ORDER — CITALOPRAM 20 MG/1
40 TABLET, FILM COATED ORAL EVERY MORNING
Status: DISCONTINUED | OUTPATIENT
Start: 2025-02-25 | End: 2025-03-01 | Stop reason: HOSPADM

## 2025-02-24 RX ORDER — BUPIVACAINE HYDROCHLORIDE 5 MG/ML
INJECTION, SOLUTION EPIDURAL; INTRACAUDAL; PERINEURAL
Status: DISCONTINUED | OUTPATIENT
Start: 2025-02-24 | End: 2025-02-24 | Stop reason: HOSPADM

## 2025-02-24 RX ORDER — ACETAMINOPHEN 10 MG/ML
1000 INJECTION, SOLUTION INTRAVENOUS EVERY 8 HOURS
Status: COMPLETED | OUTPATIENT
Start: 2025-02-24 | End: 2025-02-25

## 2025-02-24 RX ADMIN — VECURONIUM BROMIDE 2 MG: 1 INJECTION, POWDER, LYOPHILIZED, FOR SOLUTION INTRAVENOUS at 02:02

## 2025-02-24 RX ADMIN — DEXMEDETOMIDINE 40 MCG: 200 INJECTION, SOLUTION INTRAVENOUS at 01:02

## 2025-02-24 RX ADMIN — PIPERACILLIN AND TAZOBACTAM 4.5 G: 4; .5 INJECTION, POWDER, LYOPHILIZED, FOR SOLUTION INTRAVENOUS; PARENTERAL at 01:02

## 2025-02-24 RX ADMIN — NOREPINEPHRINE BITARTRATE 10 MCG: 1 INJECTION, SOLUTION, CONCENTRATE INTRAVENOUS at 03:02

## 2025-02-24 RX ADMIN — ACETAMINOPHEN 1000 MG: 10 INJECTION, SOLUTION INTRAVENOUS at 01:02

## 2025-02-24 RX ADMIN — VECURONIUM BROMIDE 2 MG: 1 INJECTION, POWDER, LYOPHILIZED, FOR SOLUTION INTRAVENOUS at 06:02

## 2025-02-24 RX ADMIN — ONDANSETRON 4 MG: 2 INJECTION INTRAMUSCULAR; INTRAVENOUS at 07:02

## 2025-02-24 RX ADMIN — DEXAMETHASONE SODIUM PHOSPHATE 4 MG: 4 INJECTION, SOLUTION INTRA-ARTICULAR; INTRALESIONAL; INTRAMUSCULAR; INTRAVENOUS; SOFT TISSUE at 12:02

## 2025-02-24 RX ADMIN — SODIUM CHLORIDE, POTASSIUM CHLORIDE, SODIUM LACTATE AND CALCIUM CHLORIDE: 600; 310; 30; 20 INJECTION, SOLUTION INTRAVENOUS at 10:02

## 2025-02-24 RX ADMIN — VECURONIUM BROMIDE 2 MG: 1 INJECTION, POWDER, LYOPHILIZED, FOR SOLUTION INTRAVENOUS at 04:02

## 2025-02-24 RX ADMIN — ROCURONIUM BROMIDE 50 MG: 10 INJECTION, SOLUTION INTRAVENOUS at 12:02

## 2025-02-24 RX ADMIN — NOREPINEPHRINE BITARTRATE 10 MCG: 1 INJECTION, SOLUTION, CONCENTRATE INTRAVENOUS at 04:02

## 2025-02-24 RX ADMIN — EPHEDRINE SULFATE 20 MG: 50 INJECTION INTRAVENOUS at 03:02

## 2025-02-24 RX ADMIN — ONDANSETRON 4 MG: 2 INJECTION INTRAMUSCULAR; INTRAVENOUS at 12:02

## 2025-02-24 RX ADMIN — ACETAMINOPHEN 1000 MG: 10 INJECTION, SOLUTION INTRAVENOUS at 07:02

## 2025-02-24 RX ADMIN — PIPERACILLIN AND TAZOBACTAM 4.5 G: 4; .5 INJECTION, POWDER, LYOPHILIZED, FOR SOLUTION INTRAVENOUS; PARENTERAL at 03:02

## 2025-02-24 RX ADMIN — PIPERACILLIN AND TAZOBACTAM 4.5 G: 4; .5 INJECTION, POWDER, LYOPHILIZED, FOR SOLUTION INTRAVENOUS; PARENTERAL at 05:02

## 2025-02-24 RX ADMIN — LIDOCAINE HYDROCHLORIDE 100 MG: 20 INJECTION, SOLUTION EPIDURAL; INFILTRATION; INTRACAUDAL; PERINEURAL at 12:02

## 2025-02-24 RX ADMIN — FENTANYL CITRATE 50 MCG: 50 INJECTION, SOLUTION INTRAMUSCULAR; INTRAVENOUS at 12:02

## 2025-02-24 RX ADMIN — BUPIVACAINE HYDROCHLORIDE 30 ML: 5 INJECTION, SOLUTION EPIDURAL; INTRACAUDAL at 01:02

## 2025-02-24 RX ADMIN — DEXAMETHASONE SODIUM PHOSPHATE 4 MG: 4 INJECTION, SOLUTION INTRA-ARTICULAR; INTRALESIONAL; INTRAMUSCULAR; INTRAVENOUS; SOFT TISSUE at 01:02

## 2025-02-24 RX ADMIN — SODIUM CHLORIDE, POTASSIUM CHLORIDE, SODIUM LACTATE AND CALCIUM CHLORIDE: 600; 310; 30; 20 INJECTION, SOLUTION INTRAVENOUS at 08:02

## 2025-02-24 RX ADMIN — SODIUM CHLORIDE, POTASSIUM CHLORIDE, SODIUM LACTATE AND CALCIUM CHLORIDE: 600; 310; 30; 20 INJECTION, SOLUTION INTRAVENOUS at 09:02

## 2025-02-24 RX ADMIN — FAMOTIDINE 20 MG: 10 INJECTION INTRAVENOUS at 12:02

## 2025-02-24 RX ADMIN — FLUCONAZOLE IN SODIUM CHLORIDE 200 MG: 2 INJECTION, SOLUTION INTRAVENOUS at 01:02

## 2025-02-24 RX ADMIN — MUPIROCIN 1 G: 20 OINTMENT TOPICAL at 10:02

## 2025-02-24 RX ADMIN — ACETAMINOPHEN 1000 MG: 10 INJECTION, SOLUTION INTRAVENOUS at 10:02

## 2025-02-24 RX ADMIN — SODIUM CHLORIDE, POTASSIUM CHLORIDE, SODIUM LACTATE AND CALCIUM CHLORIDE: 600; 310; 30; 20 INJECTION, SOLUTION INTRAVENOUS at 04:02

## 2025-02-24 RX ADMIN — CEFOXITIN 2 G: 2 INJECTION, POWDER, FOR SOLUTION INTRAVENOUS at 10:02

## 2025-02-24 RX ADMIN — VECURONIUM BROMIDE 2 MG: 1 INJECTION, POWDER, LYOPHILIZED, FOR SOLUTION INTRAVENOUS at 03:02

## 2025-02-24 RX ADMIN — VECURONIUM BROMIDE 2 MG: 1 INJECTION, POWDER, LYOPHILIZED, FOR SOLUTION INTRAVENOUS at 01:02

## 2025-02-24 RX ADMIN — SUGAMMADEX 200 MG: 100 INJECTION, SOLUTION INTRAVENOUS at 07:02

## 2025-02-24 RX ADMIN — EPHEDRINE SULFATE 15 MG: 50 INJECTION INTRAVENOUS at 03:02

## 2025-02-24 RX ADMIN — EPHEDRINE SULFATE 15 MG: 50 INJECTION INTRAVENOUS at 01:02

## 2025-02-24 RX ADMIN — NOREPINEPHRINE BITARTRATE 0.02 MCG/KG/MIN: 1 INJECTION, SOLUTION, CONCENTRATE INTRAVENOUS at 04:02

## 2025-02-24 RX ADMIN — ALBUMIN (HUMAN) 250 ML: 2.5 SOLUTION INTRAVENOUS at 04:02

## 2025-02-24 RX ADMIN — PROPOFOL 100 MG: 10 INJECTION, EMULSION INTRAVENOUS at 12:02

## 2025-02-24 RX ADMIN — TRANEXAMIC ACID 1000 MG: 100 INJECTION INTRAVENOUS at 02:02

## 2025-02-24 RX ADMIN — FENTANYL CITRATE 50 MCG: 50 INJECTION, SOLUTION INTRAMUSCULAR; INTRAVENOUS at 07:02

## 2025-02-24 NOTE — ANESTHESIA PROCEDURE NOTES
Intubation    Date/Time: 2/24/2025 12:52 PM    Performed by: Victoriano Holden CRNA  Authorized by: Jordy Mcmahan DO    Intubation:     Induction:  Intravenous    Intubated:  Postinduction    Mask Ventilation:  Easy mask    Attempts:  1    Attempted By:  CRNA    Method of Intubation:  Direct    Blade:  Willson 2    Laryngeal View Grade: Grade I - full view of cords      Difficult Airway Encountered?: No      Complications:  None    Airway Device:  Oral endotracheal tube    Airway Device Size:  7.5    Style/Cuff Inflation:  Cuffed (inflated to minimal occlusive pressure)    Inflation Amount (mL):  6    Tube secured:  20    Secured at:  The lips    Placement Verified By:  Capnometry and Colorimetric ETCO2 device    Complicating Factors:  None    Findings Post-Intubation:  BS equal bilateral and atraumatic/condition of teeth unchanged

## 2025-02-24 NOTE — ANESTHESIA PROCEDURE NOTES
Peripheral Block    Patient location during procedure: OR   Block not for primary anesthetic.  Reason for block: at surgeon's request and post-op pain management   Post-op Pain Location: abdominal pain   Start time: 2/24/2025 12:59 PM  Timeout: 2/24/2025 12:58 PM   End time: 2/24/2025 1:11 PM    Staffing  Authorizing Provider: Jordy Mcmahan DO  Performing Provider: Victoriano Holden CRNA    Staffing  Performed by: Victoriano Holden CRNA  Authorized by: Jordy Mcmahan DO    Preanesthetic Checklist  Completed: patient identified, IV checked, site marked, risks and benefits discussed, surgical consent, monitors and equipment checked, pre-op evaluation and timeout performed  Peripheral Block  Patient position: supine  Prep: ChloraPrep  Patient monitoring: heart rate, cardiac monitor, continuous pulse ox, continuous capnometry and frequent blood pressure checks  Block type: TAP  Laterality: bilateral  Injection technique: single shot  Needle  Needle type: Stimuplex   Needle gauge: 20 G  Needle length: 4 in  Needle localization: anatomical landmarks and ultrasound guidance  Needle insertion depth: 4 cm   -ultrasound image captured on disc.  Assessment  Injection assessment: negative aspiration, negative parasthesia and local visualized surrounding nerve  Paresthesia pain: none  Heart rate change: no  Slow fractionated injection: yes  Pain Tolerance: comfortable throughout block and no complaints  Medications:    Medications: bupivacaine (pf) (MARCAINE) injection 0.5% - Perineural, Bilateral Transabdominus Plane   30 mL - 2/24/2025 1:10:00 PM

## 2025-02-24 NOTE — INTERVAL H&P NOTE
The patient has been examined and the H&P has been reviewed:    I concur with the findings and changes have been noted since the H&P was written:  Proximal, medial thigh pain.  Ultrasound duplex was obtained to rule out DVT.  There was no evidence of DVT.  We will proceed with surgery, as planned.    Surgery risks, benefits and alternative options discussed and understood by patient/family.          There are no hospital problems to display for this patient.

## 2025-02-25 LAB
ALBUMIN SERPL-MCNC: 3.2 G/DL (ref 3.4–4.8)
ALBUMIN/GLOB SERPL: 1.3 RATIO (ref 1.1–2)
ALP SERPL-CCNC: 43 UNIT/L (ref 40–150)
ALT SERPL-CCNC: <5 UNIT/L (ref 0–55)
ANION GAP SERPL CALC-SCNC: 13 MEQ/L
APTT PPP: 28.1 SECONDS (ref 24.2–35.9)
AST SERPL-CCNC: 18 UNIT/L (ref 5–34)
BASOPHILS # BLD AUTO: 0.03 X10(3)/MCL
BASOPHILS NFR BLD AUTO: 0.1 %
BILIRUB SERPL-MCNC: 0.7 MG/DL
BUN SERPL-MCNC: 22 MG/DL (ref 9.8–20.1)
CALCIUM SERPL-MCNC: 8 MG/DL (ref 8.4–10.2)
CHLORIDE SERPL-SCNC: 107 MMOL/L (ref 98–107)
CO2 SERPL-SCNC: 17 MMOL/L (ref 23–31)
CREAT SERPL-MCNC: 1.47 MG/DL (ref 0.55–1.02)
CREAT/UREA NIT SERPL: 15
EOSINOPHIL # BLD AUTO: 0 X10(3)/MCL (ref 0–0.9)
EOSINOPHIL NFR BLD AUTO: 0 %
ERYTHROCYTE [DISTWIDTH] IN BLOOD BY AUTOMATED COUNT: 19.9 % (ref 11.5–17)
GFR SERPLBLD CREATININE-BSD FMLA CKD-EPI: 36 ML/MIN/1.73/M2
GLOBULIN SER-MCNC: 2.4 GM/DL (ref 2.4–3.5)
GLUCOSE SERPL-MCNC: 238 MG/DL (ref 82–115)
HCT VFR BLD AUTO: 28.3 % (ref 37–47)
HGB BLD-MCNC: 9.1 G/DL (ref 12–16)
IMM GRANULOCYTES # BLD AUTO: 0.08 X10(3)/MCL (ref 0–0.04)
IMM GRANULOCYTES NFR BLD AUTO: 0.4 %
INR PPP: 1.2
LYMPHOCYTES # BLD AUTO: 0.6 X10(3)/MCL (ref 0.6–4.6)
LYMPHOCYTES NFR BLD AUTO: 2.6 %
MAGNESIUM SERPL-MCNC: 1.8 MG/DL (ref 1.6–2.6)
MCH RBC QN AUTO: 24.7 PG (ref 27–31)
MCHC RBC AUTO-ENTMCNC: 32.2 G/DL (ref 33–36)
MCV RBC AUTO: 76.7 FL (ref 80–94)
MONOCYTES # BLD AUTO: 1.02 X10(3)/MCL (ref 0.1–1.3)
MONOCYTES NFR BLD AUTO: 4.5 %
NEUTROPHILS # BLD AUTO: 21.02 X10(3)/MCL (ref 2.1–9.2)
NEUTROPHILS NFR BLD AUTO: 92.4 %
NRBC BLD AUTO-RTO: 0 %
PHOSPHATE SERPL-MCNC: 3.3 MG/DL (ref 2.3–4.7)
PLATELET # BLD AUTO: 305 X10(3)/MCL (ref 130–400)
PMV BLD AUTO: 9.8 FL (ref 7.4–10.4)
POTASSIUM SERPL-SCNC: 3.5 MMOL/L (ref 3.5–5.1)
PROT SERPL-MCNC: 5.6 GM/DL (ref 5.8–7.6)
PROTHROMBIN TIME: 15.6 SECONDS (ref 12.4–14.9)
RBC # BLD AUTO: 3.69 X10(6)/MCL (ref 4.2–5.4)
SODIUM SERPL-SCNC: 137 MMOL/L (ref 136–145)
WBC # BLD AUTO: 22.75 X10(3)/MCL (ref 4.5–11.5)

## 2025-02-25 PROCEDURE — 36415 COLL VENOUS BLD VENIPUNCTURE: CPT | Performed by: SURGERY

## 2025-02-25 PROCEDURE — 97530 THERAPEUTIC ACTIVITIES: CPT

## 2025-02-25 PROCEDURE — 94761 N-INVAS EAR/PLS OXIMETRY MLT: CPT

## 2025-02-25 PROCEDURE — 63600175 PHARM REV CODE 636 W HCPCS: Performed by: SURGERY

## 2025-02-25 PROCEDURE — 84597 ASSAY OF VITAMIN K: CPT | Performed by: SURGERY

## 2025-02-25 PROCEDURE — 97161 PT EVAL LOW COMPLEX 20 MIN: CPT

## 2025-02-25 PROCEDURE — 11000001 HC ACUTE MED/SURG PRIVATE ROOM

## 2025-02-25 PROCEDURE — 85730 THROMBOPLASTIN TIME PARTIAL: CPT | Performed by: SURGERY

## 2025-02-25 PROCEDURE — 25000003 PHARM REV CODE 250: Performed by: SURGERY

## 2025-02-25 PROCEDURE — 85025 COMPLETE CBC W/AUTO DIFF WBC: CPT | Performed by: SURGERY

## 2025-02-25 PROCEDURE — 99900035 HC TECH TIME PER 15 MIN (STAT)

## 2025-02-25 PROCEDURE — 80053 COMPREHEN METABOLIC PANEL: CPT | Performed by: SURGERY

## 2025-02-25 PROCEDURE — 84100 ASSAY OF PHOSPHORUS: CPT | Performed by: SURGERY

## 2025-02-25 PROCEDURE — 83735 ASSAY OF MAGNESIUM: CPT | Performed by: SURGERY

## 2025-02-25 PROCEDURE — 85610 PROTHROMBIN TIME: CPT | Performed by: SURGERY

## 2025-02-25 PROCEDURE — 94799 UNLISTED PULMONARY SVC/PX: CPT

## 2025-02-25 RX ORDER — CEFTRIAXONE 1 G/1
1 INJECTION, POWDER, FOR SOLUTION INTRAMUSCULAR; INTRAVENOUS 2 TIMES DAILY
Status: DISCONTINUED | OUTPATIENT
Start: 2025-02-25 | End: 2025-02-26

## 2025-02-25 RX ORDER — CLINDAMYCIN PHOSPHATE 900 MG/50ML
900 INJECTION, SOLUTION INTRAVENOUS
Status: DISCONTINUED | OUTPATIENT
Start: 2025-02-25 | End: 2025-02-26

## 2025-02-25 RX ORDER — CYANOCOBALAMIN 1000 UG/ML
1000 INJECTION, SOLUTION INTRAMUSCULAR; SUBCUTANEOUS DAILY
Status: DISCONTINUED | OUTPATIENT
Start: 2025-02-25 | End: 2025-02-26

## 2025-02-25 RX ORDER — ENOXAPARIN SODIUM 100 MG/ML
30 INJECTION SUBCUTANEOUS EVERY 24 HOURS
Status: DISCONTINUED | OUTPATIENT
Start: 2025-02-25 | End: 2025-02-26

## 2025-02-25 RX ORDER — CLINDAMYCIN PALMITATE HYDROCHLORIDE (PEDIATRIC) 75 MG/5ML
75 SOLUTION ORAL EVERY 8 HOURS
Status: DISCONTINUED | OUTPATIENT
Start: 2025-02-25 | End: 2025-02-25

## 2025-02-25 RX ADMIN — CEFOXITIN 2 G: 2 INJECTION, POWDER, FOR SOLUTION INTRAVENOUS at 05:02

## 2025-02-25 RX ADMIN — ENOXAPARIN SODIUM 30 MG: 30 INJECTION SUBCUTANEOUS at 06:02

## 2025-02-25 RX ADMIN — ACETAMINOPHEN 1000 MG: 10 INJECTION, SOLUTION INTRAVENOUS at 02:02

## 2025-02-25 RX ADMIN — FAMOTIDINE 20 MG: 10 INJECTION, SOLUTION INTRAVENOUS at 08:02

## 2025-02-25 RX ADMIN — CEFOXITIN 2 G: 2 INJECTION, POWDER, FOR SOLUTION INTRAVENOUS at 10:02

## 2025-02-25 RX ADMIN — ACETAMINOPHEN 1000 MG: 10 INJECTION, SOLUTION INTRAVENOUS at 05:02

## 2025-02-25 RX ADMIN — CEFTRIAXONE SODIUM 1 G: 1 INJECTION, POWDER, FOR SOLUTION INTRAMUSCULAR; INTRAVENOUS at 02:02

## 2025-02-25 RX ADMIN — CLINDAMYCIN PHOSPHATE 900 MG: 900 INJECTION, SOLUTION INTRAVENOUS at 12:02

## 2025-02-25 RX ADMIN — HYDROMORPHONE HYDROCHLORIDE 0.5 MG: 2 INJECTION INTRAMUSCULAR; INTRAVENOUS; SUBCUTANEOUS at 10:02

## 2025-02-25 RX ADMIN — CLINDAMYCIN PHOSPHATE 900 MG: 900 INJECTION, SOLUTION INTRAVENOUS at 06:02

## 2025-02-25 RX ADMIN — SODIUM BICARBONATE: 84 INJECTION, SOLUTION INTRAVENOUS at 08:02

## 2025-02-25 RX ADMIN — FAMOTIDINE 20 MG: 10 INJECTION, SOLUTION INTRAVENOUS at 10:02

## 2025-02-25 RX ADMIN — LEVOTHYROXINE SODIUM ANHYDROUS 62.5 MCG: 100 INJECTION, POWDER, LYOPHILIZED, FOR SOLUTION INTRAVENOUS at 10:02

## 2025-02-25 RX ADMIN — CLINDAMYCIN PHOSPHATE 900 MG: 900 INJECTION, SOLUTION INTRAVENOUS at 11:02

## 2025-02-25 RX ADMIN — MUPIROCIN 1 G: 20 OINTMENT TOPICAL at 08:02

## 2025-02-25 RX ADMIN — CYANOCOBALAMIN 1000 MCG: 1000 INJECTION INTRAMUSCULAR; SUBCUTANEOUS at 10:02

## 2025-02-25 RX ADMIN — SODIUM BICARBONATE: 84 INJECTION, SOLUTION INTRAVENOUS at 05:02

## 2025-02-25 RX ADMIN — ONDANSETRON 4 MG: 2 INJECTION INTRAMUSCULAR; INTRAVENOUS at 10:02

## 2025-02-25 RX ADMIN — MUPIROCIN 1 G: 20 OINTMENT TOPICAL at 10:02

## 2025-02-25 RX ADMIN — THIAMINE HYDROCHLORIDE 500 MG: 100 INJECTION, SOLUTION INTRAMUSCULAR; INTRAVENOUS at 10:02

## 2025-02-25 RX ADMIN — SODIUM CHLORIDE 1000 ML: 9 INJECTION, SOLUTION INTRAVENOUS at 01:02

## 2025-02-25 RX ADMIN — HYDROMORPHONE HYDROCHLORIDE 0.5 MG: 2 INJECTION INTRAMUSCULAR; INTRAVENOUS; SUBCUTANEOUS at 11:02

## 2025-02-25 RX ADMIN — CEFTRIAXONE SODIUM 1 G: 1 INJECTION, POWDER, FOR SOLUTION INTRAMUSCULAR; INTRAVENOUS at 08:02

## 2025-02-25 NOTE — PROGRESS NOTES
Feeling well  Got oob to chair - weak to walk, uses a wheelchair at time when not at hospital  Passed bowel movement per patient - trying to find out character.    Vs ok, hr 90s  A+ox3, nad  Eomi  Abd soft, drain with serosanguinous output  Prevena in place without issue    Labs   WBC 22, hgb 9.1, Plt 305  INR 1.2  Na 137, K 3.5  BUN/CR 22/1.47  Tbili 0.7    79F s/p colostomy reversal with sbr and descending colon resection - difficult case due to degree of scar tissue present.  Doing well.  Some mild renal insufficiency present (Acute).  Getting 2 doses sodium bicarb in 1/2 NS.  Plan for:  1. One liter NS bolus  2. Cont gum/hard candy and NGT  3. Monitor for bowel function  4. OOB and walk as able, ICS use

## 2025-02-25 NOTE — PT/OT/SLP EVAL
Physical Therapy Evaluation    Patient Name:  Ashley Calvillo   MRN:  [id number]    Recommendations:     Discharge Recommendations: Moderate Intensity Therapy Recommended Mode of Transport: Stretcher/AASI (poor balance, non-amb, NG tube, pain)  Discharge Equipment Recommendations: none   Barriers to discharge:  current medical status    Assessment:     Ashley Calvillo is a 79 y.o. female admitted with a medical diagnosis of Colostomy in place.  She presents with the following impairments/functional limitations: weakness, impaired endurance, impaired self care skills, impaired functional mobility, gait instability, impaired balance, decreased lower extremity function, pain .    Pt lives at [facility], is normally mobile WC<>bed with assistance, no longer ambulates, but has been in bed since admit and is feeling much weaker.  She requires maxAx2 helpers to move from supine to sitting EOB, to scoot, and required totalAx2 to stand pivot to chair and to scoot back into the chair.  Her legs gave way during the stand pivot tf, necessitating totalAx2 level of care.  We practiced an additional sit to stand with armrest of chair for UE support and tf improved from totalAx2 to maxAx2 helpers.  She plans to sit up in recliner as tolerated today, encouraged by therapist to decrease bedrest during the day to her tolerance.    Rehab Prognosis: Good; patient would benefit from acute skilled PT services to address these deficits and reach maximum level of function.    Recent Surgery: Procedure(s) (LRB):  CLOSURE, COLOSTOMY (N/A)  OMENTECTOMY (N/A)  OOPHORECTOMY (Left)  SIGMOIDOSCOPY, FLEXIBLE (N/A)  LYSIS, ADHESIONS, LAPAROSCOPIC (N/A)  SALPINGO-OOPHORECTOMY (Right)  COLON RESECTION (N/A) 1 Day Post-Op    Plan:     During this hospitalization, patient to be seen 6 x/week to address the identified rehab impairments via therapeutic activities, therapeutic exercises and progress toward the following goals:    Plan of Care Expires:        Subjective     Chief Complaint: weakness, thirsty  Patient/Family Comments/goals: to get up in chair today  Pain/Comfort:       Patients cultural, spiritual, Mandaeism conflicts given the current situation:      Living Environment:  [facility]  Prior to admission, patients level of function was WC<>bed tf with assist daily, ADLs in WC.  Equipment used at home: walker, rolling, wheelchair.  DME owned (not currently used): none.  Upon discharge, patient will have assistance from [facility] Staff, family.    Objective:     Communicated with pt, nurse prior to session.  Patient found HOB elevated with NG tube, SCDs, IV  upon PT entry to room.    General Precautions: Standard, fall  Orthopedic Precautions:    Braces:    Respiratory Status: Room air    Exams:  RLE ROM: WFL  LLE ROM: WFL    Functional Mobility:  Bed Mobility:     Scooting: maximal assistance and of 2 persons  Supine to Sit: maximal assistance and of 2 persons  Transfers:     Bed to Chair: total assistance and of 2 persons with  legs giving way  using  Stand Pivot      AM-PAC 6 CLICK MOBILITY  Total Score:        Treatment & Education:  Pt positioned in recliner with legs elevated, needs in reach.    Patient left up in chair with all lines intact, call button in reach, and chair alarm on.    GOALS:   Multidisciplinary Problems       Physical Therapy Goals          Problem: Physical Therapy    Goal Priority Disciplines Outcome Interventions   Physical Therapy Goal     PT, PT/OT Progressing    Description: 1.  Pt will improve bed mob from maxAx2 helpers to mod/Felicia  2.  Pt will improve tf to chair from totalAx2 helpers to mod/Felicia  3.  Pt will tolerate OOB activity daily  4.  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;  Service: General;  Laterality: N/A;  LAP ASSISSTED, CONVERTED TO OPEN AT 1400    COLECTOMY, SIGMOID N/A 7/28/2024    Procedure: COLECTOMY, SIGMOID;  Surgeon: GENA Matute MD;  Location: [facility];  Service: General;  Laterality: N/A;    COLON RESECTION N/A 2/24/2025    Procedure: COLON RESECTION;  Surgeon: GENA Matute MD;  Location: [facility];  Service: General;  Laterality: N/A;  SMALL BOWEL RESECTION    COLONOSCOPY, THROUGH STOMA, DIAGNOSTIC N/A 8/26/2024    Procedure: COLONOSCOPY, THROUGH STOMA, DIAGNOSTIC;  Surgeon: GENA Matute MD;  Location: [facility];  Service: Endoscopy;  Laterality: N/A;  DIVERTICULOSIS    COLONOSCOPY, THROUGH STOMA, DIAGNOSTIC N/A 11/1/2024    Procedure: COLONOSCOPY, THROUGH STOMA, DIAGNOSTIC;  Surgeon: GENA Matute MD;  Location: [facility];  Service: Endoscopy;  Laterality: N/A;  POST OP: NORMAL COLONOSCOPY    COLONOSCOPY, WITH DIRECTED SUBMUCOSAL INJECTION N/A 11/1/2024    Procedure: COLONOSCOPY, WITH DIRECTED SUBMUCOSAL INJECTION;  Surgeon: GENA Matute MD;  Location: [facility];  Service: Endoscopy;  Laterality: N/A;  TATTOO INK SPOT IN RECTUM AT 12 CM (PROCTOSCOPY)    COLONOSCOPY, WITH POLYPECTOMY USING SNARE N/A 8/26/2024    Procedure: COLONOSCOPY, WITH POLYPECTOMY USING SNARE;  Surgeon: GENA Matute MD;  Location: [facility];  Service: Endoscopy;  Laterality: N/A;  A) TRANSVERSE COLON POLYP    COLOSTOMY N/A 7/28/2024    Procedure: CREATION, COLOSTOMY;  Surgeon:  GENA Matute MD;  Location: [facility] OR;  Service: General;  Laterality: N/A;    FLEXIBLE SIGMOIDOSCOPY N/A 7/26/2024    Procedure: SIGMOIDOSCOPY, FLEXIBLE;  Surgeon: GENA Matute MD;  Location: [facility] ENDO;  Service: Endoscopy;  Laterality: N/A;  POST OP:DECOMPRESSION    FLEXIBLE SIGMOIDOSCOPY N/A 2/24/2025    Procedure: SIGMOIDOSCOPY, FLEXIBLE;  Surgeon: GENA Matute MD;  Location: [facility] OR;  Service: General;  Laterality: N/A;    HYSTERECTOMY      LAPAROSCOPIC LYSIS OF ADHESIONS N/A 2/24/2025    Procedure: LYSIS, ADHESIONS, LAPAROSCOPIC;  Surgeon: GENA Matute MD;  Location: [facility] OR;  Service: General;  Laterality: N/A;  LAPAROSCOPIC AND OPEN LYSIS OF EXTENSIVE ADHESIONS    LAPAROSCOPY N/A 7/28/2024    Procedure: LAPAROSCOPY;  Surgeon: GENA Matute MD;  Location: [facility];  Service: General;  Laterality: N/A;  massively distended sigmoid colon was identified Converted to open art 1620    OMENTECTOMY N/A 2/24/2025    Procedure: OMENTECTOMY;  Surgeon: GENA Matute MD;  Location: [facility];  Service: General;  Laterality: N/A;  PARTIAL OMENTECTOMY, OMENTAL PEDICLE FLAP TO ANASTOMOSIS    OOPHORECTOMY Left 2/24/2025    Procedure: OOPHORECTOMY;  Surgeon: GENA Matute MD;  Location: [facility] OR;  Service: General;  Laterality: Left;    PROCTOSCOPY N/A 11/1/2024    Procedure: PROCTOSCOPY;  Surgeon: GENA Matute MD;  Location: [facility] ENDO;  Service: Endoscopy;  Laterality: N/A;  FLEXIBLE; EVALUATION OF RECTAL STUMP THROUGH RECTUM; NORMAL RECTUM    SALPINGOOPHORECTOMY Right 2/24/2025    Procedure: SALPINGO-OOPHORECTOMY;  Surgeon: GENA Matute MD;  Location: [facility] OR;  Service: General;  Laterality: Right;    TONSILLECTOMY      TOTAL SHOULDER ARTHROPLASTY         Time Tracking:     PT Received On: 02/25/25  PT Start Time: 0930     PT Stop Time: 0959  PT Total Time (min): 29 min     Billable Minutes: Evaluation 15 and Therapeutic  Activity 14      02/25/2025

## 2025-02-25 NOTE — PROGRESS NOTES
Pharmacist Renal Dose Adjustment Note    Ashley Calvillo is a 79 y.o. female being treated with the medication enoxaparin    Patient Data:    Vital Signs (Most Recent):  Temp: 98.3 °F (36.8 °C) (02/25/25 1125)  Pulse: 92 (02/25/25 1125)  Resp: 20 (02/25/25 1125)  BP: 102/63 (02/25/25 1125)  SpO2: 95 % (02/25/25 1125) Vital Signs (72h Range):  Temp:  [95 °F (35 °C)-99.1 °F (37.3 °C)]   Pulse:  []   Resp:  [17-25]   BP: ()/(55-78)   SpO2:  [93 %-99 %]      Recent Labs   Lab 02/19/25  1010 02/25/25  0303   CREATININE 0.86 1.47*     Serum creatinine: 1.47 mg/dL (H) 02/25/25 0303  Estimated creatinine clearance: 28.5 mL/min (A)    Medication:enoxaprain dose: 40mg frequency q24h will be changed to medication enoxaparin 30mg frequency:q24h    Pharmacist's Name: Palomo Connor  Pharmacist's Extension: [phone]

## 2025-02-25 NOTE — PROGRESS NOTES
Pharmacist Renal Dose Adjustment Note    Ashley Calvillo is a 79 y.o. female being treated with the medication enoxaparin    Patient Data:    Vital Signs (Most Recent):  Temp: 98.3 °F (36.8 °C) (02/25/25 1125)  Pulse: 92 (02/25/25 1125)  Resp: 20 (02/25/25 1125)  BP: 102/63 (02/25/25 1125)  SpO2: 95 % (02/25/25 1125) Vital Signs (72h Range):  Temp:  [95 °F (35 °C)-99.1 °F (37.3 °C)]   Pulse:  []   Resp:  [17-25]   BP: ()/(55-78)   SpO2:  [93 %-99 %]      Recent Labs   Lab 02/19/25  1010 02/25/25  0303   CREATININE 0.86 1.47*     Serum creatinine: 1.47 mg/dL (H) 02/25/25 0303  Estimated creatinine clearance: 28.5 mL/min (A)    Medication:enoxaprain dose: 40mg frequency q24h will be changed to medication enoxaparin 30mg frequency:q24h    Pharmacist's Name: Palomo Connor  Pharmacist's Extension: [phone]

## 2025-02-25 NOTE — BRIEF OP NOTE
[facility] - Periop Services  Brief Operative Note    SUMMARY     Surgery Date: 2/24/2025     Surgeons and Role:     * GENA Matute MD - Primary     * Mejia Rudd MD - Assisting        Pre-op Diagnosis:    Colostomy    Post-op Diagnosis:   Colostomy    Procedure:  Diagnostic laparoscopy  Exploratory laparotomy  Extensive lysis of adhesions requiring at least 2 hours to obtain adequate exposure of pelvis and appropriately identify the colostomy  Redundant colon entering to colostomy hernia sac - descending colon removed   Partial omentectomy  Right salpingoopherectomy, left oopherectomy  Small bowel resection with primary anastomosis  Reversal colostomy with colorectal anastomosis at 12 cm  Flexible sigmoidoscopy  Omental pedicle flap to anastomosis    Anesthesia: General ETA with MATILDE block    Operative Findings:   Extensive adhesions requiring open procedure  Descending colon redundant into the parastomal hernia - descending colon resection carried out to allow healthy colon for anastomosis  Small bowel with several serosal tears - repaired.  One area of small bowel with significant mesenteric injury and direct wall injury related to lysis.  This area was removed.  Small bowel anastomosed with residual 150 cms from LOT and also intact ICV/right colon  Bleeding left ovary upon identification of left ureter - required oopherectomy.  Right ovary/fallopian tube kept falling into the pelvis and obscuring visualization requiring right salpingoopherectomy.  Right ureter identified/protected.  Rectal stump cleared of scar tissue at distal most aspect - 45 powered blue stapler used to ligate/divide the distalmost aspect.  Suture was used to oversew this at the site of staple overlap.   After checking rectum for leak (no air leaking detected on scope with intact staple line), descending colon was anastomosed to the rectum with 28 EEA stapler, purple load.  The anastomosis demonstrated a full  ring that was intact using flexible sigmoidoscopy, and there was no air leak.  Anastomosis rested at 12 cm.    Omentum from the left gastroepiploic arcade was dropped into the pelvis to overlay the anastomosis    Estimated Blood Loss: 200 mL    Estimated Blood Loss has been documented.         Specimens:   Specimen (24h ago, onward)       Start     Ordered    02/24/25 1937  Specimen to Pathology  RELEASE UPON ORDERING        References:    Click here for ordering Quick Tip   Question:  Release to patient  Answer:  Immediate    02/24/25 1937                    [id number]

## 2025-02-25 NOTE — ANESTHESIA POSTPROCEDURE EVALUATION
Anesthesia Post Evaluation    Patient: Ashley Calvillo    Procedure(s) Performed: Procedure(s) (LRB):  CLOSURE, COLOSTOMY (N/A)  OMENTECTOMY (N/A)  OOPHORECTOMY (Left)  SIGMOIDOSCOPY, FLEXIBLE (N/A)  LYSIS, ADHESIONS, LAPAROSCOPIC (N/A)  SALPINGO-OOPHORECTOMY (Right)  COLON RESECTION (N/A)    Final Anesthesia Type: general      Patient location during evaluation: PACU  Patient participation: Yes- Able to Participate  Level of consciousness: awake and alert  Post-procedure vital signs: reviewed and stable  Pain management: adequate  Airway patency: patent  IGNACIO mitigation strategies: Multimodal analgesia, Use of major conduction anesthesia (spinal/epidural) or peripheral nerve block, Extubation while patient is awake and Verification of full reversal of neuromuscular block  PONV status at discharge: No PONV  Anesthetic complications: no      Cardiovascular status: blood pressure returned to baseline  Respiratory status: unassisted  Hydration status: euvolemic  Follow-up not needed.              Vitals Value Taken Time   /57 02/24/25 10:51   Temp 36.6 °C (97.9 °F) 02/24/25 10:51   Pulse 57 02/24/25 10:51   Resp 18 02/24/25 10:51   SpO2 94 % 02/24/25 10:51         No case tracking events are documented in the log.      Pain/Trino Score: No data recorded

## 2025-02-26 LAB
ABO + RH BLD: NORMAL
ABS NEUT CALC (OHS): 22.85 X10(3)/MCL (ref 2.1–9.2)
ALBUMIN SERPL-MCNC: 2.8 G/DL (ref 3.4–4.8)
ALBUMIN/GLOB SERPL: 1.2 RATIO (ref 1.1–2)
ALP SERPL-CCNC: 38 UNIT/L (ref 40–150)
ALT SERPL-CCNC: 9 UNIT/L (ref 0–55)
ANION GAP SERPL CALC-SCNC: 8 MEQ/L
AST SERPL-CCNC: 34 UNIT/L (ref 5–34)
BACTERIA #/AREA URNS AUTO: NORMAL /HPF
BASOPHILS # BLD AUTO: 0.03 X10(3)/MCL
BASOPHILS # BLD AUTO: 0.03 X10(3)/MCL
BASOPHILS NFR BLD AUTO: 0.1 %
BASOPHILS NFR BLD AUTO: 0.1 %
BILIRUB SERPL-MCNC: 0.3 MG/DL
BILIRUB UR QL STRIP.AUTO: NEGATIVE
BLD PROD TYP BPU: NORMAL
BLOOD UNIT EXPIRATION DATE: NORMAL
BLOOD UNIT TYPE CODE: 7300
BUN SERPL-MCNC: 20 MG/DL (ref 9.8–20.1)
CALCIUM SERPL-MCNC: 7.7 MG/DL (ref 8.4–10.2)
CHLORIDE SERPL-SCNC: 105 MMOL/L (ref 98–107)
CLARITY UR: CLEAR
CO2 SERPL-SCNC: 27 MMOL/L (ref 23–31)
COLOR UR AUTO: YELLOW
CREAT SERPL-MCNC: 1.24 MG/DL (ref 0.55–1.02)
CREAT/UREA NIT SERPL: 16
CROSSMATCH INTERPRETATION: NORMAL
DISPENSE STATUS: NORMAL
ELLIPTOCYTOSIS (OHS): SLIGHT
EOSINOPHIL # BLD AUTO: 0 X10(3)/MCL (ref 0–0.9)
EOSINOPHIL # BLD AUTO: 0 X10(3)/MCL (ref 0–0.9)
EOSINOPHIL NFR BLD AUTO: 0 %
EOSINOPHIL NFR BLD AUTO: 0 %
ERYTHROCYTE [DISTWIDTH] IN BLOOD BY AUTOMATED COUNT: 18.7 % (ref 11.5–17)
ERYTHROCYTE [DISTWIDTH] IN BLOOD BY AUTOMATED COUNT: 20 % (ref 11.5–17)
ERYTHROCYTE [DISTWIDTH] IN BLOOD BY AUTOMATED COUNT: 20.3 % (ref 11.5–17)
GFR SERPLBLD CREATININE-BSD FMLA CKD-EPI: 45 ML/MIN/1.73/M2
GLOBULIN SER-MCNC: 2.4 GM/DL (ref 2.4–3.5)
GLUCOSE SERPL-MCNC: 99 MG/DL (ref 82–115)
GLUCOSE UR QL STRIP: NEGATIVE
HCT VFR BLD AUTO: 20.4 % (ref 37–47)
HCT VFR BLD AUTO: 20.9 % (ref 37–47)
HCT VFR BLD AUTO: 22.7 % (ref 37–47)
HGB BLD-MCNC: 6.8 G/DL (ref 12–16)
HGB BLD-MCNC: 6.8 G/DL (ref 12–16)
HGB BLD-MCNC: 7.8 G/DL (ref 12–16)
HGB UR QL STRIP: ABNORMAL
IMM GRANULOCYTES # BLD AUTO: 0.19 X10(3)/MCL (ref 0–0.04)
IMM GRANULOCYTES # BLD AUTO: 0.3 X10(3)/MCL (ref 0–0.04)
IMM GRANULOCYTES NFR BLD AUTO: 0.8 %
IMM GRANULOCYTES NFR BLD AUTO: 1.1 %
KETONES UR QL STRIP: ABNORMAL
LEUKOCYTE ESTERASE UR QL STRIP: NEGATIVE
LYMPHOCYTES # BLD AUTO: 0.87 X10(3)/MCL (ref 0.6–4.6)
LYMPHOCYTES # BLD AUTO: 1.07 X10(3)/MCL (ref 0.6–4.6)
LYMPHOCYTES NFR BLD AUTO: 3.6 %
LYMPHOCYTES NFR BLD AUTO: 4 %
LYMPHOCYTES NFR BLD MANUAL: 1.8 X10(3)/MCL (ref 0.6–4.6)
LYMPHOCYTES NFR BLD MANUAL: 7 % (ref 13–40)
MAGNESIUM SERPL-MCNC: 1.7 MG/DL (ref 1.6–2.6)
MCH RBC QN AUTO: 24.7 PG (ref 27–31)
MCH RBC QN AUTO: 24.9 PG (ref 27–31)
MCH RBC QN AUTO: 26.3 PG (ref 27–31)
MCHC RBC AUTO-ENTMCNC: 32.5 G/DL (ref 33–36)
MCHC RBC AUTO-ENTMCNC: 33.3 G/DL (ref 33–36)
MCHC RBC AUTO-ENTMCNC: 34.4 G/DL (ref 33–36)
MCV RBC AUTO: 74.7 FL (ref 80–94)
MCV RBC AUTO: 76 FL (ref 80–94)
MCV RBC AUTO: 76.4 FL (ref 80–94)
MONOCYTES # BLD AUTO: 1.18 X10(3)/MCL (ref 0.1–1.3)
MONOCYTES # BLD AUTO: 1.2 X10(3)/MCL (ref 0.1–1.3)
MONOCYTES NFR BLD AUTO: 4.4 %
MONOCYTES NFR BLD AUTO: 4.9 %
MONOCYTES NFR BLD MANUAL: 1.03 X10(3)/MCL (ref 0.1–1.3)
MONOCYTES NFR BLD MANUAL: 4 % (ref 2–11)
NEUTROPHILS # BLD AUTO: 22.04 X10(3)/MCL (ref 2.1–9.2)
NEUTROPHILS # BLD AUTO: 24.11 X10(3)/MCL (ref 2.1–9.2)
NEUTROPHILS NFR BLD AUTO: 90.4 %
NEUTROPHILS NFR BLD AUTO: 90.6 %
NEUTROPHILS NFR BLD MANUAL: 89 % (ref 47–80)
NITRITE UR QL STRIP: NEGATIVE
NRBC BLD AUTO-RTO: 0 %
PH UR STRIP: 6 [PH]
PHOSPHATE SERPL-MCNC: 2.7 MG/DL (ref 2.3–4.7)
PLATELET # BLD AUTO: 261 X10(3)/MCL (ref 130–400)
PLATELET # BLD AUTO: 270 X10(3)/MCL (ref 130–400)
PLATELET # BLD AUTO: 284 X10(3)/MCL (ref 130–400)
PLATELET # BLD EST: ADEQUATE 10*3/UL
PMV BLD AUTO: 10.4 FL (ref 7.4–10.4)
PMV BLD AUTO: 9.1 FL (ref 7.4–10.4)
PMV BLD AUTO: 9.7 FL (ref 7.4–10.4)
POIKILOCYTOSIS BLD QL SMEAR: SLIGHT
POTASSIUM SERPL-SCNC: 3.4 MMOL/L (ref 3.5–5.1)
PROT SERPL-MCNC: 5.2 GM/DL (ref 5.8–7.6)
PROT UR QL STRIP: ABNORMAL
PSYCHE PATHOLOGY RESULT: NORMAL
RBC # BLD AUTO: 2.73 X10(6)/MCL (ref 4.2–5.4)
RBC # BLD AUTO: 2.75 X10(6)/MCL (ref 4.2–5.4)
RBC # BLD AUTO: 2.97 X10(6)/MCL (ref 4.2–5.4)
RBC #/AREA URNS AUTO: NORMAL /HPF
RBC MORPH BLD: ABNORMAL
SODIUM SERPL-SCNC: 140 MMOL/L (ref 136–145)
SP GR UR STRIP.AUTO: 1.02 (ref 1–1.03)
SQUAMOUS #/AREA URNS AUTO: NORMAL /HPF
UNIT NUMBER: NORMAL
UROBILINOGEN UR STRIP-ACNC: 0.2
WBC # BLD AUTO: 24.33 X10(3)/MCL (ref 4.5–11.5)
WBC # BLD AUTO: 25.67 X10(3)/MCL (ref 4.5–11.5)
WBC # BLD AUTO: 26.69 X10(3)/MCL (ref 4.5–11.5)
WBC #/AREA URNS AUTO: NORMAL /HPF

## 2025-02-26 PROCEDURE — 94761 N-INVAS EAR/PLS OXIMETRY MLT: CPT

## 2025-02-26 PROCEDURE — 86923 COMPATIBILITY TEST ELECTRIC: CPT | Performed by: SURGERY

## 2025-02-26 PROCEDURE — 25000242 PHARM REV CODE 250 ALT 637 W/ HCPCS: Performed by: SURGERY

## 2025-02-26 PROCEDURE — 80053 COMPREHEN METABOLIC PANEL: CPT | Performed by: SURGERY

## 2025-02-26 PROCEDURE — 63600175 PHARM REV CODE 636 W HCPCS: Mod: JZ,TB | Performed by: SURGERY

## 2025-02-26 PROCEDURE — 63600175 PHARM REV CODE 636 W HCPCS: Performed by: SURGERY

## 2025-02-26 PROCEDURE — 36430 TRANSFUSION BLD/BLD COMPNT: CPT

## 2025-02-26 PROCEDURE — 25000003 PHARM REV CODE 250: Performed by: SURGERY

## 2025-02-26 PROCEDURE — 85025 COMPLETE CBC W/AUTO DIFF WBC: CPT | Performed by: SURGERY

## 2025-02-26 PROCEDURE — 81001 URINALYSIS AUTO W/SCOPE: CPT | Performed by: SURGERY

## 2025-02-26 PROCEDURE — 36415 COLL VENOUS BLD VENIPUNCTURE: CPT | Performed by: SURGERY

## 2025-02-26 PROCEDURE — 83735 ASSAY OF MAGNESIUM: CPT | Performed by: SURGERY

## 2025-02-26 PROCEDURE — 97112 NEUROMUSCULAR REEDUCATION: CPT

## 2025-02-26 PROCEDURE — 94640 AIRWAY INHALATION TREATMENT: CPT

## 2025-02-26 PROCEDURE — 84100 ASSAY OF PHOSPHORUS: CPT | Performed by: SURGERY

## 2025-02-26 PROCEDURE — P9016 RBC LEUKOCYTES REDUCED: HCPCS | Performed by: SURGERY

## 2025-02-26 PROCEDURE — 20000000 HC ICU ROOM

## 2025-02-26 PROCEDURE — 94799 UNLISTED PULMONARY SVC/PX: CPT | Mod: XB

## 2025-02-26 PROCEDURE — 97530 THERAPEUTIC ACTIVITIES: CPT

## 2025-02-26 PROCEDURE — 97110 THERAPEUTIC EXERCISES: CPT

## 2025-02-26 RX ORDER — DEXTROSE MONOHYDRATE, SODIUM CHLORIDE, AND POTASSIUM CHLORIDE 50; 1.49; 4.5 G/1000ML; G/1000ML; G/1000ML
INJECTION, SOLUTION INTRAVENOUS CONTINUOUS
Status: DISCONTINUED | OUTPATIENT
Start: 2025-02-26 | End: 2025-03-01

## 2025-02-26 RX ORDER — FUROSEMIDE 10 MG/ML
40 INJECTION INTRAMUSCULAR; INTRAVENOUS ONCE AS NEEDED
Status: COMPLETED | OUTPATIENT
Start: 2025-02-26 | End: 2025-02-26

## 2025-02-26 RX ORDER — ENOXAPARIN SODIUM 100 MG/ML
40 INJECTION SUBCUTANEOUS EVERY 24 HOURS
Status: DISCONTINUED | OUTPATIENT
Start: 2025-02-26 | End: 2025-02-26

## 2025-02-26 RX ORDER — ALBUTEROL SULFATE 0.83 MG/ML
2.5 SOLUTION RESPIRATORY (INHALATION) EVERY 8 HOURS
Status: DISCONTINUED | OUTPATIENT
Start: 2025-02-26 | End: 2025-03-01 | Stop reason: HOSPADM

## 2025-02-26 RX ORDER — CEFTRIAXONE 1 G/1
2 INJECTION, POWDER, FOR SOLUTION INTRAMUSCULAR; INTRAVENOUS 2 TIMES DAILY
Status: DISCONTINUED | OUTPATIENT
Start: 2025-02-26 | End: 2025-03-01 | Stop reason: HOSPADM

## 2025-02-26 RX ORDER — POTASSIUM CHLORIDE 7.45 MG/ML
10 INJECTION INTRAVENOUS
Status: COMPLETED | OUTPATIENT
Start: 2025-02-26 | End: 2025-02-27

## 2025-02-26 RX ORDER — FLUCONAZOLE 2 MG/ML
100 INJECTION, SOLUTION INTRAVENOUS
Status: DISCONTINUED | OUTPATIENT
Start: 2025-02-26 | End: 2025-03-01 | Stop reason: HOSPADM

## 2025-02-26 RX ORDER — ACETYLCYSTEINE 200 MG/ML
2 SOLUTION ORAL; RESPIRATORY (INHALATION) EVERY 8 HOURS
Status: DISCONTINUED | OUTPATIENT
Start: 2025-02-26 | End: 2025-02-27

## 2025-02-26 RX ORDER — HYDROCODONE BITARTRATE AND ACETAMINOPHEN 500; 5 MG/1; MG/1
TABLET ORAL
Status: DISCONTINUED | OUTPATIENT
Start: 2025-02-26 | End: 2025-02-27

## 2025-02-26 RX ORDER — CYANOCOBALAMIN 1000 UG/ML
1000 INJECTION, SOLUTION INTRAMUSCULAR; SUBCUTANEOUS DAILY
Status: COMPLETED | OUTPATIENT
Start: 2025-02-26 | End: 2025-02-27

## 2025-02-26 RX ORDER — METRONIDAZOLE 500 MG/100ML
500 INJECTION, SOLUTION INTRAVENOUS
Status: DISCONTINUED | OUTPATIENT
Start: 2025-02-26 | End: 2025-02-28

## 2025-02-26 RX ORDER — FUROSEMIDE 10 MG/ML
20 INJECTION INTRAMUSCULAR; INTRAVENOUS ONCE
Status: DISCONTINUED | OUTPATIENT
Start: 2025-02-26 | End: 2025-02-26

## 2025-02-26 RX ADMIN — ALBUTEROL SULFATE 2.5 MG: 2.5 SOLUTION RESPIRATORY (INHALATION) at 03:02

## 2025-02-26 RX ADMIN — FAMOTIDINE 20 MG: 10 INJECTION, SOLUTION INTRAVENOUS at 09:02

## 2025-02-26 RX ADMIN — FAMOTIDINE 20 MG: 10 INJECTION, SOLUTION INTRAVENOUS at 08:02

## 2025-02-26 RX ADMIN — HYDROMORPHONE HYDROCHLORIDE 0.5 MG: 2 INJECTION INTRAMUSCULAR; INTRAVENOUS; SUBCUTANEOUS at 07:02

## 2025-02-26 RX ADMIN — ONDANSETRON 4 MG: 2 INJECTION INTRAMUSCULAR; INTRAVENOUS at 01:02

## 2025-02-26 RX ADMIN — POTASSIUM CHLORIDE 10 MEQ: 7.46 INJECTION, SOLUTION INTRAVENOUS at 07:02

## 2025-02-26 RX ADMIN — METRONIDAZOLE 500 MG: 5 INJECTION, SOLUTION INTRAVENOUS at 04:02

## 2025-02-26 RX ADMIN — CEFTRIAXONE SODIUM 1 G: 1 INJECTION, POWDER, FOR SOLUTION INTRAMUSCULAR; INTRAVENOUS at 08:02

## 2025-02-26 RX ADMIN — HYDROMORPHONE HYDROCHLORIDE 0.5 MG: 2 INJECTION INTRAMUSCULAR; INTRAVENOUS; SUBCUTANEOUS at 09:02

## 2025-02-26 RX ADMIN — CYANOCOBALAMIN 1000 MCG: 1000 INJECTION INTRAMUSCULAR; SUBCUTANEOUS at 10:02

## 2025-02-26 RX ADMIN — FLUCONAZOLE, SODIUM CHLORIDE 100 MG: 2 INJECTION INTRAVENOUS at 12:02

## 2025-02-26 RX ADMIN — POTASSIUM CHLORIDE 10 MEQ: 7.46 INJECTION, SOLUTION INTRAVENOUS at 09:02

## 2025-02-26 RX ADMIN — POTASSIUM CHLORIDE 10 MEQ: 7.46 INJECTION, SOLUTION INTRAVENOUS at 04:02

## 2025-02-26 RX ADMIN — SODIUM CHLORIDE 125 MG: 9 INJECTION, SOLUTION INTRAVENOUS at 11:02

## 2025-02-26 RX ADMIN — CLINDAMYCIN PHOSPHATE 900 MG: 900 INJECTION, SOLUTION INTRAVENOUS at 06:02

## 2025-02-26 RX ADMIN — THIAMINE HYDROCHLORIDE 500 MG: 100 INJECTION, SOLUTION INTRAMUSCULAR; INTRAVENOUS at 10:02

## 2025-02-26 RX ADMIN — ACETYLCYSTEINE 2 ML: 200 INHALANT RESPIRATORY (INHALATION) at 03:02

## 2025-02-26 RX ADMIN — ALBUTEROL SULFATE 2.5 MG: 2.5 SOLUTION RESPIRATORY (INHALATION) at 07:02

## 2025-02-26 RX ADMIN — MUPIROCIN 1 G: 20 OINTMENT TOPICAL at 10:02

## 2025-02-26 RX ADMIN — ACETYLCYSTEINE 2 ML: 200 INHALANT RESPIRATORY (INHALATION) at 11:02

## 2025-02-26 RX ADMIN — METRONIDAZOLE 500 MG: 5 INJECTION, SOLUTION INTRAVENOUS at 07:02

## 2025-02-26 RX ADMIN — METRONIDAZOLE 500 MG: 5 INJECTION, SOLUTION INTRAVENOUS at 10:02

## 2025-02-26 RX ADMIN — POTASSIUM CHLORIDE 10 MEQ: 7.46 INJECTION, SOLUTION INTRAVENOUS at 12:02

## 2025-02-26 RX ADMIN — SODIUM BICARBONATE: 84 INJECTION, SOLUTION INTRAVENOUS at 06:02

## 2025-02-26 RX ADMIN — POTASSIUM CHLORIDE, DEXTROSE MONOHYDRATE AND SODIUM CHLORIDE: 150; 5; 450 INJECTION, SOLUTION INTRAVENOUS at 10:02

## 2025-02-26 RX ADMIN — ALBUTEROL SULFATE 2.5 MG: 2.5 SOLUTION RESPIRATORY (INHALATION) at 11:02

## 2025-02-26 RX ADMIN — POTASSIUM CHLORIDE 10 MEQ: 7.46 INJECTION, SOLUTION INTRAVENOUS at 10:02

## 2025-02-26 RX ADMIN — HYDROMORPHONE HYDROCHLORIDE 0.5 MG: 2 INJECTION INTRAMUSCULAR; INTRAVENOUS; SUBCUTANEOUS at 01:02

## 2025-02-26 RX ADMIN — CEFTRIAXONE SODIUM 2 G: 1 INJECTION, POWDER, FOR SOLUTION INTRAMUSCULAR; INTRAVENOUS at 09:02

## 2025-02-26 RX ADMIN — LEVOTHYROXINE SODIUM ANHYDROUS 62.5 MCG: 100 INJECTION, POWDER, LYOPHILIZED, FOR SOLUTION INTRAVENOUS at 08:02

## 2025-02-26 RX ADMIN — ACETYLCYSTEINE 2 ML: 200 INHALANT RESPIRATORY (INHALATION) at 07:02

## 2025-02-26 RX ADMIN — FUROSEMIDE 40 MG: 10 INJECTION, SOLUTION INTRAMUSCULAR; INTRAVENOUS at 11:02

## 2025-02-26 RX ADMIN — MUPIROCIN 1 G: 20 OINTMENT TOPICAL at 09:02

## 2025-02-26 NOTE — PROGRESS NOTES
Patient had low hgb this morning. S/p transfusion x 1 unit.   She thinks she is passing flatus but is not sure.   Got up today - was weak    VS ok, hr currently in 90s, Tmax 99.2  A+ox3, nad  Eomi  Abd distended and firm on exam, inc dressed, no ttp throughout  BENNY drain with serosanguinous output, no foul smell  Skin no rashes    Labs this morning  WBC 26, hgb 6.8 (from 9.1 yesterday)  Plt 270  Na 140, K 3.4  BUN/CR 20/1.2  Aphos 38  Tbili 0.3    79F s/p reversal colostomy with extensive ISAAK.  Drop in hgb with distended/firm abdomen on exam - concern of recent bleeding episode, although she is clinically stable and feeling well.    Recommend for:  1. AXR/KUB  2. Check h/h   3. Cont supportive care    Appreciate all help with Mrs. Calvillo's care.

## 2025-02-26 NOTE — PT/OT/SLP PROGRESS
"Physical Therapy Treatment    Patient Name:  Ashley Calvillo   MRN:  [id number]    Recommendations:     Discharge Recommendations: Moderate Intensity Therapy  Discharge Equipment Recommendations: none  Barriers to discharge:  current medical status    Assessment:     Ashley Calvillo is a 79 y.o. female admitted with a medical diagnosis of Colostomy in place.  She presents with the following impairments/functional limitations: weakness, impaired endurance, impaired self care skills, impaired functional mobility, gait instability, impaired balance, decreased lower extremity function, pain .    Pt had low H+H this morning and needed blood transusion, therefore PT was withheld in the am.  At 1300 I returned to offer service and pt was sleeping, but upon waking, she reports she is in pain now and would like us to return after she gets pain meds and I offerred to request them for her and she agreed.  I notified the nurse Rebecca and it was agreed that I return for therapy in about 30minutes. I return now and pt agrees to try, but upon sitting up on the side of the bed, she immediately begins to c/o severe pain in both legs from hips to feet.  She leans backward significantly.  I was able to support her in sitting for the  pain to lessen.  She tried to stand with me but was unable due to the pain/cramping in both legs returning.  I noted she is being given Potassium via IV, and pt may be having cramping in association with labs showing low Potassium, Calcium levels & decreased blood volume which was just recently replaced.  She has Negative Megan's Sign.  Pt states, "I'm so sorry I can't get up.  I wanted to do more for you, honey, my body is just not cooperating for me."  Pt sat EOB to work on sitting balance, sitting tolerance and was able to eventually participate in some AROM BLE ex's very minimal range and slow pace, 10reps.  She was assisted to lying and pulled up to HOB via draw sheet.  2 helpers were necessary to mobilize " the pt today.  She would benefit significantly from further rehab.     Rehab Prognosis: Fair; patient would benefit from acute skilled PT services to address these deficits and reach maximum level of function.    Recent Surgery: Procedure(s) (LRB):  CLOSURE, COLOSTOMY (N/A)  OMENTECTOMY (N/A)  OOPHORECTOMY (Left)  SIGMOIDOSCOPY, FLEXIBLE (N/A)  LYSIS, ADHESIONS, LAPAROSCOPIC (N/A)  SALPINGO-OOPHORECTOMY (Right)  COLON RESECTION (N/A) 2 Days Post-Op    Plan:     During this hospitalization, patient to be seen 6 x/week to address the identified rehab impairments via therapeutic activities, therapeutic exercises and progress toward the following goals:    Plan of Care Expires:       Subjective     Chief Complaint: bilateral leg pain/cramping this session, Potassium IV being admin  Patient/Family Comments/goals: to get up and return to her indep activity level  Pain/Comfort:         Objective:     Communicated with pt, nurse prior to session.  Patient found HOB elevated with   upon PT entry to room.     General Precautions: Standard, fall  Orthopedic Precautions:    Braces:    Respiratory Status: Room air     Functional Mobility:  Bed Mobility:     Scooting: maximal assistance and of 2 persons  Supine to Sit: maximal assistance and of 2 persons  Transfers:     Sit to Stand:  total assistance and of 2 persons with rolling walker and pt backward lean due to incr bilateral leg pain/cramping, unable to mu standing      AM-PAC 6 CLICK MOBILITY          Treatment & Education:  Sit balance fair, stand balance poor, Pt able to perf some gentle BLE AROM ex sitting EOB once some of the pain/cramping lessened.  Pt reports she is hoping to feel better soon and I assured the patient we will return tomorrow morning and try to help her mobilize again.    Patient left HOB elevated with all lines intact, call button in reach, and chair alarm on..    GOALS:   Multidisciplinary Problems       Physical Therapy Goals          Problem:  Physical Therapy    Goal Priority Disciplines Outcome Interventions   Physical Therapy Goal     PT, PT/OT Progressing    Description: 1.  Pt will improve bed mob from maxAx2 helpers to mod/Felicia  2.  Pt will improve tf to chair from totalAx2 helpers to mod/Felicia  3.  Pt will tolerate OOB activity daily  4.  Pt will train HEP to maint gains made in skilled PT program                       DME Justifications:  No DME recommended requiring DME justifications    Time Tracking:     PT Received On: 02/26/25  PT Start Time: 1345     PT Stop Time: 1424  PT Total Time (min): 39 min     Billable Minutes: Therapeutic Activity 15, Therapeutic Exercise 15, and Neuromuscular Re-education 9    Treatment Type: Treatment  PT/PTA: PT           02/26/2025

## 2025-02-26 NOTE — PLAN OF CARE
Problem: Adult Inpatient Plan of Care  Goal: Plan of Care Review  Outcome: Progressing     Problem: Wound  Goal: Optimal Coping  Outcome: Progressing  Goal: Optimal Functional Ability  Outcome: Progressing  Goal: Absence of Infection Signs and Symptoms  Outcome: Progressing  Goal: Improved Oral Intake  Outcome: Progressing  Goal: Optimal Pain Control and Function  Outcome: Progressing  Goal: Skin Health and Integrity  Outcome: Progressing  Goal: Optimal Wound Healing  Outcome: Progressing

## 2025-02-26 NOTE — PLAN OF CARE
Problem: Adult Inpatient Plan of Care  Goal: Plan of Care Review  Outcome: Progressing  Goal: Patient-Specific Goal (Individualized)  Outcome: Progressing  Goal: Absence of Hospital-Acquired Illness or Injury  Outcome: Progressing  Goal: Optimal Comfort and Wellbeing  Outcome: Progressing  Goal: Readiness for Transition of Care  Outcome: Progressing     Problem: Acute Kidney Injury/Impairment  Goal: Fluid and Electrolyte Balance  Outcome: Progressing  Goal: Improved Oral Intake  Outcome: Progressing  Goal: Effective Renal Function  Outcome: Progressing     Problem: Wound  Goal: Optimal Coping  Outcome: Progressing  Goal: Optimal Functional Ability  Outcome: Progressing  Goal: Absence of Infection Signs and Symptoms  Outcome: Progressing  Goal: Improved Oral Intake  Outcome: Progressing  Goal: Optimal Pain Control and Function  Outcome: Progressing  Goal: Skin Health and Integrity  Outcome: Progressing  Goal: Optimal Wound Healing  Outcome: Progressing     Problem: Skin Injury Risk Increased  Goal: Skin Health and Integrity  Outcome: Progressing     Problem: Infection  Goal: Absence of Infection Signs and Symptoms  Outcome: Progressing     Problem: Fall Injury Risk  Goal: Absence of Fall and Fall-Related Injury  Outcome: Progressing     Problem: Bowel Resection  Goal: Absence of Bleeding  Outcome: Progressing  Goal: Effective Bowel Elimination  Outcome: Progressing  Goal: Fluid and Electrolyte Balance  Outcome: Progressing  Goal: Absence of Infection Signs and Symptoms  Outcome: Progressing  Goal: Optimal Nutrition Delivery  Outcome: Progressing  Goal: Anesthesia/Sedation Recovery  Outcome: Progressing  Goal: Optimal Pain Control and Function  Outcome: Progressing  Goal: Nausea and Vomiting Relief  Outcome: Progressing  Goal: Effective Urinary Elimination  Outcome: Progressing  Goal: Effective Oxygenation and Ventilation  Outcome: Progressing     Problem: Comorbidity Management  Goal: Blood Pressure in Desired  Range  Outcome: Progressing

## 2025-02-26 NOTE — PLAN OF CARE
02/26/25 1435   Discharge Assessment   Assessment Type Discharge Planning Assessment   Confirmed/corrected address, phone number and insurance Yes   Confirmed Demographics Correct on Facesheet   Source of Information patient   People in Home facility resident   Facility Arrived From: [facility] Resident   Do you expect to return to your current living situation? Yes   Prior to hospitilization cognitive status: Alert/Oriented   Current cognitive status: Alert/Oriented   Walking or Climbing Stairs Difficulty yes   Walking or Climbing Stairs ambulation difficulty, requires equipment   Mobility Management WC   Equipment Currently Used at Home walker, rolling;wheelchair   Discharge Plan A Return to nursing home   Discharge Plan B Return to Nursing Home   DME Needed Upon Discharge  none   Discharge Plan discussed with: Patient   Transition of Care Barriers None   Physical Activity   On average, how many days per week do you engage in moderate to strenuous exercise (like a brisk walk)? 0 days   On average, how many minutes do you engage in exercise at this level? 0 min   Financial Resource Strain   How hard is it for you to pay for the very basics like food, housing, medical care, and heating? Not very   Housing Stability   In the last 12 months, was there a time when you were not able to pay the mortgage or rent on time? N   At any time in the past 12 months, were you homeless or living in a shelter (including now)? N   Transportation Needs   Has the lack of transportation kept you from medical appointments, meetings, work or from getting things needed for daily living? No   Food Insecurity   Within the past 12 months, you worried that your food would run out before you got the money to buy more. Never true   Within the past 12 months, the food you bought just didn't last and you didn't have money to get more. Never true   Stress   Do you feel stress - tense, restless, nervous, or anxious, or unable to sleep at night  because your mind is troubled all the time - these days? Not at all   Social Isolation   How often do you feel lonely or isolated from those around you?  Never   Alcohol Use   Q1: How often do you have a drink containing alcohol? Never   Q2: How many drinks containing alcohol do you have on a typical day when you are drinking? None   Q3: How often do you have six or more drinks on one occasion? Never   Utilities   In the past 12 months has the electric, gas, oil, or water company threatened to shut off services in your home? No   Health Literacy   How often do you need to have someone help you when you read instructions, pamphlets, or other written material from your doctor or pharmacy? Sometimes

## 2025-02-26 NOTE — NURSING
Dr. Mcclure notified of H&H. Per phlebotomist, labs drawn from same arm as IV infusion with 2 minute pause before law draw. Per Dr. Mcclure, CBC redrawn.

## 2025-02-26 NOTE — NURSING
Dr. Mcclure at bedside rounding. Orders received for ventolin and mucomyst q8hr, diflucan daily, flagyl q8hr, iron/thiamine/B12 for a total of 3 days, kcl, lasix, and discontinue rockwell & NGT.

## 2025-02-26 NOTE — PT/OT/SLP PROGRESS
Physical Therapy      Patient Name:  Ashley Calvillo   MRN:  [id number]    Patient not seen today secondary to PT on hold due to abnormally low H+H of 6.8+20.4, PT contraindicated, pt awaiting transfusion . Will follow-up later today.

## 2025-02-26 NOTE — PT/OT/SLP PROGRESS
Physical Therapy      Patient Name:  Ashley Calvillo   MRN:  [id number]    Patient received transfusion and cleared to resume tx but not treated today at 1310 secondary to pt reports to PT that upon me waking her, she is in pain now, and that she would like pain meds before trying to move around.  This was reported to her nurse and her nurse verifies that pain meds are available and agreed for us to return in 20-30min for a more tolerable tx for the pt  . Will follow-up at that time.

## 2025-02-27 LAB
ABO + RH BLD: NORMAL
ALBUMIN SERPL-MCNC: 2.9 G/DL (ref 3.4–4.8)
ALBUMIN/GLOB SERPL: 1.1 RATIO (ref 1.1–2)
ALP SERPL-CCNC: 50 UNIT/L (ref 40–150)
ALT SERPL-CCNC: 14 UNIT/L (ref 0–55)
ANION GAP SERPL CALC-SCNC: 8 MEQ/L
AST SERPL-CCNC: 48 UNIT/L (ref 5–34)
BASOPHILS # BLD AUTO: 0.03 X10(3)/MCL
BASOPHILS # BLD AUTO: 0.03 X10(3)/MCL
BASOPHILS NFR BLD AUTO: 0.1 %
BASOPHILS NFR BLD AUTO: 0.1 %
BILIRUB SERPL-MCNC: 0.3 MG/DL
BLD PROD TYP BPU: NORMAL
BLOOD UNIT EXPIRATION DATE: NORMAL
BLOOD UNIT TYPE CODE: 7300
BUN SERPL-MCNC: 20 MG/DL (ref 9.8–20.1)
CALCIUM SERPL-MCNC: 7.9 MG/DL (ref 8.4–10.2)
CHLORIDE SERPL-SCNC: 107 MMOL/L (ref 98–107)
CO2 SERPL-SCNC: 25 MMOL/L (ref 23–31)
CREAT SERPL-MCNC: 1.21 MG/DL (ref 0.55–1.02)
CREAT/UREA NIT SERPL: 17
CROSSMATCH INTERPRETATION: NORMAL
DISPENSE STATUS: NORMAL
EOSINOPHIL # BLD AUTO: 0 X10(3)/MCL (ref 0–0.9)
EOSINOPHIL # BLD AUTO: 0.01 X10(3)/MCL (ref 0–0.9)
EOSINOPHIL NFR BLD AUTO: 0 %
EOSINOPHIL NFR BLD AUTO: 0 %
ERYTHROCYTE [DISTWIDTH] IN BLOOD BY AUTOMATED COUNT: 18.9 % (ref 11.5–17)
ERYTHROCYTE [DISTWIDTH] IN BLOOD BY AUTOMATED COUNT: 21.3 % (ref 11.5–17)
GFR SERPLBLD CREATININE-BSD FMLA CKD-EPI: 46 ML/MIN/1.73/M2
GLOBULIN SER-MCNC: 2.7 GM/DL (ref 2.4–3.5)
GLUCOSE SERPL-MCNC: 116 MG/DL (ref 82–115)
HCT VFR BLD AUTO: 21.9 % (ref 37–47)
HCT VFR BLD AUTO: 27.4 % (ref 37–47)
HGB BLD-MCNC: 7.3 G/DL (ref 12–16)
HGB BLD-MCNC: 9.1 G/DL (ref 12–16)
IMM GRANULOCYTES # BLD AUTO: 0.21 X10(3)/MCL (ref 0–0.04)
IMM GRANULOCYTES # BLD AUTO: 0.22 X10(3)/MCL (ref 0–0.04)
IMM GRANULOCYTES NFR BLD AUTO: 1 %
IMM GRANULOCYTES NFR BLD AUTO: 1 %
LYMPHOCYTES # BLD AUTO: 0.89 X10(3)/MCL (ref 0.6–4.6)
LYMPHOCYTES # BLD AUTO: 0.99 X10(3)/MCL (ref 0.6–4.6)
LYMPHOCYTES NFR BLD AUTO: 4.1 %
LYMPHOCYTES NFR BLD AUTO: 4.6 %
MAGNESIUM SERPL-MCNC: 1.6 MG/DL (ref 1.6–2.6)
MCH RBC QN AUTO: 24.4 PG (ref 27–31)
MCH RBC QN AUTO: 25.4 PG (ref 27–31)
MCHC RBC AUTO-ENTMCNC: 33.2 G/DL (ref 33–36)
MCHC RBC AUTO-ENTMCNC: 33.3 G/DL (ref 33–36)
MCV RBC AUTO: 73.5 FL (ref 80–94)
MCV RBC AUTO: 76.3 FL (ref 80–94)
MONOCYTES # BLD AUTO: 1.15 X10(3)/MCL (ref 0.1–1.3)
MONOCYTES # BLD AUTO: 1.29 X10(3)/MCL (ref 0.1–1.3)
MONOCYTES NFR BLD AUTO: 5.4 %
MONOCYTES NFR BLD AUTO: 6 %
NEUTROPHILS # BLD AUTO: 19.01 X10(3)/MCL (ref 2.1–9.2)
NEUTROPHILS # BLD AUTO: 19.23 X10(3)/MCL (ref 2.1–9.2)
NEUTROPHILS NFR BLD AUTO: 88.8 %
NEUTROPHILS NFR BLD AUTO: 88.9 %
NRBC BLD AUTO-RTO: 0 %
NRBC BLD AUTO-RTO: 0 %
PHOSPHATE SERPL-MCNC: 1.4 MG/DL (ref 2.3–4.7)
PLATELET # BLD AUTO: 246 X10(3)/MCL (ref 130–400)
PLATELET # BLD AUTO: 255 X10(3)/MCL (ref 130–400)
PMV BLD AUTO: 9.6 FL (ref 7.4–10.4)
PMV BLD AUTO: 9.9 FL (ref 7.4–10.4)
POTASSIUM SERPL-SCNC: 3.7 MMOL/L (ref 3.5–5.1)
PROT SERPL-MCNC: 5.6 GM/DL (ref 5.8–7.6)
RBC # BLD AUTO: 2.87 X10(6)/MCL (ref 4.2–5.4)
RBC # BLD AUTO: 3.73 X10(6)/MCL (ref 4.2–5.4)
SODIUM SERPL-SCNC: 140 MMOL/L (ref 136–145)
UNIT NUMBER: NORMAL
WBC # BLD AUTO: 21.4 X10(3)/MCL (ref 4.5–11.5)
WBC # BLD AUTO: 21.66 X10(3)/MCL (ref 4.5–11.5)

## 2025-02-27 PROCEDURE — 25000242 PHARM REV CODE 250 ALT 637 W/ HCPCS: Performed by: SURGERY

## 2025-02-27 PROCEDURE — 25000003 PHARM REV CODE 250: Performed by: SURGERY

## 2025-02-27 PROCEDURE — 11000001 HC ACUTE MED/SURG PRIVATE ROOM

## 2025-02-27 PROCEDURE — 86923 COMPATIBILITY TEST ELECTRIC: CPT | Performed by: SURGERY

## 2025-02-27 PROCEDURE — P9016 RBC LEUKOCYTES REDUCED: HCPCS | Performed by: SURGERY

## 2025-02-27 PROCEDURE — 80053 COMPREHEN METABOLIC PANEL: CPT | Performed by: SURGERY

## 2025-02-27 PROCEDURE — 85025 COMPLETE CBC W/AUTO DIFF WBC: CPT | Performed by: SURGERY

## 2025-02-27 PROCEDURE — 63600175 PHARM REV CODE 636 W HCPCS: Performed by: SURGERY

## 2025-02-27 PROCEDURE — 27000221 HC OXYGEN, UP TO 24 HOURS

## 2025-02-27 PROCEDURE — 84100 ASSAY OF PHOSPHORUS: CPT | Performed by: SURGERY

## 2025-02-27 PROCEDURE — 83735 ASSAY OF MAGNESIUM: CPT | Performed by: SURGERY

## 2025-02-27 PROCEDURE — 94761 N-INVAS EAR/PLS OXIMETRY MLT: CPT

## 2025-02-27 PROCEDURE — 99900035 HC TECH TIME PER 15 MIN (STAT)

## 2025-02-27 PROCEDURE — 94799 UNLISTED PULMONARY SVC/PX: CPT

## 2025-02-27 PROCEDURE — 94640 AIRWAY INHALATION TREATMENT: CPT

## 2025-02-27 PROCEDURE — 36415 COLL VENOUS BLD VENIPUNCTURE: CPT | Performed by: SURGERY

## 2025-02-27 PROCEDURE — 25500020 PHARM REV CODE 255: Performed by: SURGERY

## 2025-02-27 RX ORDER — HYDROCODONE BITARTRATE AND ACETAMINOPHEN 500; 5 MG/1; MG/1
TABLET ORAL
Status: DISCONTINUED | OUTPATIENT
Start: 2025-02-27 | End: 2025-03-01 | Stop reason: HOSPADM

## 2025-02-27 RX ORDER — ACETYLCYSTEINE 200 MG/ML
2 SOLUTION ORAL; RESPIRATORY (INHALATION) EVERY 8 HOURS
Status: DISCONTINUED | OUTPATIENT
Start: 2025-02-27 | End: 2025-03-01 | Stop reason: HOSPADM

## 2025-02-27 RX ORDER — TRANEXAMIC ACID 10 MG/ML
1000 INJECTION, SOLUTION INTRAVENOUS ONCE
Status: DISCONTINUED | OUTPATIENT
Start: 2025-02-27 | End: 2025-02-27

## 2025-02-27 RX ORDER — HYDROCODONE BITARTRATE AND ACETAMINOPHEN 500; 5 MG/1; MG/1
TABLET ORAL
Status: DISCONTINUED | OUTPATIENT
Start: 2025-02-27 | End: 2025-02-27

## 2025-02-27 RX ORDER — TRANEXAMIC ACID 10 MG/ML
1000 INJECTION, SOLUTION INTRAVENOUS ONCE
Status: COMPLETED | OUTPATIENT
Start: 2025-02-27 | End: 2025-02-27

## 2025-02-27 RX ORDER — DIATRIZOATE MEGLUMINE AND DIATRIZOATE SODIUM 660; 100 MG/ML; MG/ML
60 SOLUTION ORAL; RECTAL
Status: COMPLETED | OUTPATIENT
Start: 2025-02-27 | End: 2025-02-27

## 2025-02-27 RX ADMIN — TRANEXAMIC ACID 1000 MG: 10 INJECTION, SOLUTION INTRAVENOUS at 06:02

## 2025-02-27 RX ADMIN — MUPIROCIN 1 G: 20 OINTMENT TOPICAL at 08:02

## 2025-02-27 RX ADMIN — ACETYLCYSTEINE 2 ML: 200 INHALANT RESPIRATORY (INHALATION) at 07:02

## 2025-02-27 RX ADMIN — POTASSIUM PHOSPHATE, MONOBASIC AND POTASSIUM PHOSPHATE, DIBASIC 30 MMOL: 224; 236 INJECTION, SOLUTION, CONCENTRATE INTRAVENOUS at 09:02

## 2025-02-27 RX ADMIN — METRONIDAZOLE 500 MG: 5 INJECTION, SOLUTION INTRAVENOUS at 02:02

## 2025-02-27 RX ADMIN — POLYMYXIN B SULFATE, BACITRACIN ZINC, NEOMYCIN SULFATE: 5000; 3.5; 4 OINTMENT TOPICAL at 05:02

## 2025-02-27 RX ADMIN — SODIUM CHLORIDE 125 MG: 9 INJECTION, SOLUTION INTRAVENOUS at 10:02

## 2025-02-27 RX ADMIN — CEFTRIAXONE SODIUM 2 G: 1 INJECTION, POWDER, FOR SOLUTION INTRAMUSCULAR; INTRAVENOUS at 08:02

## 2025-02-27 RX ADMIN — LEVOTHYROXINE SODIUM ANHYDROUS 62.5 MCG: 100 INJECTION, POWDER, LYOPHILIZED, FOR SOLUTION INTRAVENOUS at 08:02

## 2025-02-27 RX ADMIN — POTASSIUM CHLORIDE, DEXTROSE MONOHYDRATE AND SODIUM CHLORIDE: 150; 5; 450 INJECTION, SOLUTION INTRAVENOUS at 12:02

## 2025-02-27 RX ADMIN — POTASSIUM CHLORIDE 10 MEQ: 7.46 INJECTION, SOLUTION INTRAVENOUS at 12:02

## 2025-02-27 RX ADMIN — DIATRIZOATE MEGLUMINE AND DIATRIZOATE SODIUM 60 ML: 660; 100 LIQUID ORAL; RECTAL at 02:02

## 2025-02-27 RX ADMIN — ALBUTEROL SULFATE 2.5 MG: 2.5 SOLUTION RESPIRATORY (INHALATION) at 03:02

## 2025-02-27 RX ADMIN — METRONIDAZOLE 500 MG: 5 INJECTION, SOLUTION INTRAVENOUS at 05:02

## 2025-02-27 RX ADMIN — FAMOTIDINE 20 MG: 10 INJECTION, SOLUTION INTRAVENOUS at 08:02

## 2025-02-27 RX ADMIN — FLUCONAZOLE, SODIUM CHLORIDE 100 MG: 2 INJECTION INTRAVENOUS at 07:02

## 2025-02-27 RX ADMIN — ACETYLCYSTEINE 2 ML: 200 INHALANT RESPIRATORY (INHALATION) at 03:02

## 2025-02-27 RX ADMIN — CYANOCOBALAMIN 1000 MCG: 1000 INJECTION INTRAMUSCULAR; SUBCUTANEOUS at 08:02

## 2025-02-27 RX ADMIN — POTASSIUM CHLORIDE, DEXTROSE MONOHYDRATE AND SODIUM CHLORIDE: 150; 5; 450 INJECTION, SOLUTION INTRAVENOUS at 03:02

## 2025-02-27 RX ADMIN — ALBUTEROL SULFATE 2.5 MG: 2.5 SOLUTION RESPIRATORY (INHALATION) at 07:02

## 2025-02-27 RX ADMIN — THIAMINE HYDROCHLORIDE 500 MG: 100 INJECTION, SOLUTION INTRAMUSCULAR; INTRAVENOUS at 09:02

## 2025-02-27 RX ADMIN — HYDROMORPHONE HYDROCHLORIDE 0.5 MG: 2 INJECTION INTRAMUSCULAR; INTRAVENOUS; SUBCUTANEOUS at 03:02

## 2025-02-27 NOTE — PT/OT/SLP PROGRESS
Physical Therapy      Patient Name:  Ashley Calvillo   MRN:  [id number]    Patient not seen today secondary to tx withheld this morning due to change in medical status, tf from medsurg to ICU, currently pt with abnormal low H+H 7.3+21.9 PT contraindicated HGB<8.  [facility] Will follow-up later for clearance and await order to resume PT when pt stabilized.

## 2025-02-27 NOTE — NURSING
Dr. Haydee Matute notified of BP drop into 80s systolic after Dilaudid given.  BP returned to normal in 45 minutes

## 2025-02-27 NOTE — PT/OT/SLP PROGRESS
Physical Therapy      Patient Name:  Ashley Calvillo   MRN:  [id number]    Patient not seen today by physical therapy secondary to hold requested by ICU RN due to patient's confusion.  Will follow-up next day.

## 2025-02-28 LAB
ALBUMIN SERPL-MCNC: 3.1 G/DL (ref 3.4–4.8)
ALBUMIN/GLOB SERPL: 0.9 RATIO (ref 1.1–2)
ALP SERPL-CCNC: 82 UNIT/L (ref 40–150)
ALT SERPL-CCNC: 21 UNIT/L (ref 0–55)
ANION GAP SERPL CALC-SCNC: 10 MEQ/L
AST SERPL-CCNC: 57 UNIT/L (ref 5–34)
BASOPHILS # BLD AUTO: 0.04 X10(3)/MCL
BASOPHILS NFR BLD AUTO: 0.2 %
BILIRUB SERPL-MCNC: 0.4 MG/DL
BUN SERPL-MCNC: 8 MG/DL (ref 9.8–20.1)
CALCIUM SERPL-MCNC: 8.7 MG/DL (ref 8.4–10.2)
CHLORIDE SERPL-SCNC: 104 MMOL/L (ref 98–107)
CO2 SERPL-SCNC: 25 MMOL/L (ref 23–31)
CREAT SERPL-MCNC: 0.77 MG/DL (ref 0.55–1.02)
CREAT/UREA NIT SERPL: 10
EOSINOPHIL # BLD AUTO: 0.04 X10(3)/MCL (ref 0–0.9)
EOSINOPHIL NFR BLD AUTO: 0.2 %
ERYTHROCYTE [DISTWIDTH] IN BLOOD BY AUTOMATED COUNT: 22 % (ref 11.5–17)
GFR SERPLBLD CREATININE-BSD FMLA CKD-EPI: >60 ML/MIN/1.73/M2
GLOBULIN SER-MCNC: 3.3 GM/DL (ref 2.4–3.5)
GLUCOSE SERPL-MCNC: 95 MG/DL (ref 82–115)
HCT VFR BLD AUTO: 28.4 % (ref 37–47)
HGB BLD-MCNC: 9.3 G/DL (ref 12–16)
IMM GRANULOCYTES # BLD AUTO: 0.16 X10(3)/MCL (ref 0–0.04)
IMM GRANULOCYTES NFR BLD AUTO: 0.9 %
LYMPHOCYTES # BLD AUTO: 0.97 X10(3)/MCL (ref 0.6–4.6)
LYMPHOCYTES NFR BLD AUTO: 5.4 %
MAGNESIUM SERPL-MCNC: 1.6 MG/DL (ref 1.6–2.6)
MCH RBC QN AUTO: 24.2 PG (ref 27–31)
MCHC RBC AUTO-ENTMCNC: 32.7 G/DL (ref 33–36)
MCV RBC AUTO: 73.8 FL (ref 80–94)
MONOCYTES # BLD AUTO: 1.11 X10(3)/MCL (ref 0.1–1.3)
MONOCYTES NFR BLD AUTO: 6.2 %
NEUTROPHILS # BLD AUTO: 15.54 X10(3)/MCL (ref 2.1–9.2)
NEUTROPHILS NFR BLD AUTO: 87.1 %
NRBC BLD AUTO-RTO: 0 %
PHOSPHATE SERPL-MCNC: 2.5 MG/DL (ref 2.3–4.7)
PHYTONADIONE SERPL-MCNC: 0.07 NG/ML (ref 0.1–2.2)
PLATELET # BLD AUTO: 283 X10(3)/MCL (ref 130–400)
PMV BLD AUTO: 10 FL (ref 7.4–10.4)
POTASSIUM SERPL-SCNC: 3.3 MMOL/L (ref 3.5–5.1)
PROT SERPL-MCNC: 6.4 GM/DL (ref 5.8–7.6)
RBC # BLD AUTO: 3.85 X10(6)/MCL (ref 4.2–5.4)
SODIUM SERPL-SCNC: 139 MMOL/L (ref 136–145)
WBC # BLD AUTO: 17.86 X10(3)/MCL (ref 4.5–11.5)

## 2025-02-28 PROCEDURE — 36415 COLL VENOUS BLD VENIPUNCTURE: CPT | Performed by: SURGERY

## 2025-02-28 PROCEDURE — 83735 ASSAY OF MAGNESIUM: CPT | Performed by: SURGERY

## 2025-02-28 PROCEDURE — A9537 TC99M MEBROFENIN: HCPCS | Performed by: SURGERY

## 2025-02-28 PROCEDURE — 11000001 HC ACUTE MED/SURG PRIVATE ROOM

## 2025-02-28 PROCEDURE — 85025 COMPLETE CBC W/AUTO DIFF WBC: CPT | Performed by: SURGERY

## 2025-02-28 PROCEDURE — 94640 AIRWAY INHALATION TREATMENT: CPT

## 2025-02-28 PROCEDURE — 63600175 PHARM REV CODE 636 W HCPCS: Performed by: SURGERY

## 2025-02-28 PROCEDURE — 84100 ASSAY OF PHOSPHORUS: CPT | Performed by: SURGERY

## 2025-02-28 PROCEDURE — 99900035 HC TECH TIME PER 15 MIN (STAT)

## 2025-02-28 PROCEDURE — 25000003 PHARM REV CODE 250: Performed by: SURGERY

## 2025-02-28 PROCEDURE — 80053 COMPREHEN METABOLIC PANEL: CPT | Performed by: SURGERY

## 2025-02-28 PROCEDURE — 94799 UNLISTED PULMONARY SVC/PX: CPT | Mod: XB

## 2025-02-28 PROCEDURE — 25000242 PHARM REV CODE 250 ALT 637 W/ HCPCS: Performed by: SURGERY

## 2025-02-28 PROCEDURE — 94761 N-INVAS EAR/PLS OXIMETRY MLT: CPT

## 2025-02-28 PROCEDURE — 97530 THERAPEUTIC ACTIVITIES: CPT

## 2025-02-28 PROCEDURE — 97110 THERAPEUTIC EXERCISES: CPT

## 2025-02-28 RX ORDER — METRONIDAZOLE 500 MG/100ML
500 INJECTION, SOLUTION INTRAVENOUS
Status: DISCONTINUED | OUTPATIENT
Start: 2025-02-28 | End: 2025-03-01 | Stop reason: HOSPADM

## 2025-02-28 RX ORDER — POTASSIUM CHLORIDE 20 MEQ/1
60 TABLET, EXTENDED RELEASE ORAL ONCE
Status: COMPLETED | OUTPATIENT
Start: 2025-02-28 | End: 2025-02-28

## 2025-02-28 RX ORDER — MAGNESIUM SULFATE HEPTAHYDRATE 40 MG/ML
2 INJECTION, SOLUTION INTRAVENOUS ONCE
Status: COMPLETED | OUTPATIENT
Start: 2025-02-28 | End: 2025-02-28

## 2025-02-28 RX ORDER — KIT FOR THE PREPARATION OF TECHNETIUM TC 99M MEBROFENIN 45 MG/10ML
8 INJECTION, POWDER, LYOPHILIZED, FOR SOLUTION INTRAVENOUS
Status: COMPLETED | OUTPATIENT
Start: 2025-02-28 | End: 2025-02-28

## 2025-02-28 RX ADMIN — ACETYLCYSTEINE 2 ML: 200 INHALANT RESPIRATORY (INHALATION) at 07:02

## 2025-02-28 RX ADMIN — ACETYLCYSTEINE 2 ML: 200 INHALANT RESPIRATORY (INHALATION) at 03:02

## 2025-02-28 RX ADMIN — FAMOTIDINE 20 MG: 10 INJECTION, SOLUTION INTRAVENOUS at 09:02

## 2025-02-28 RX ADMIN — MEBROFENIN 8 MILLICURIE: 45 INJECTION, POWDER, LYOPHILIZED, FOR SOLUTION INTRAVENOUS at 10:02

## 2025-02-28 RX ADMIN — CEFTRIAXONE SODIUM 2 G: 1 INJECTION, POWDER, FOR SOLUTION INTRAMUSCULAR; INTRAVENOUS at 09:02

## 2025-02-28 RX ADMIN — POTASSIUM CHLORIDE 60 MEQ: 1500 TABLET, EXTENDED RELEASE ORAL at 03:02

## 2025-02-28 RX ADMIN — MAGNESIUM SULFATE HEPTAHYDRATE 2 G: 40 INJECTION, SOLUTION INTRAVENOUS at 05:02

## 2025-02-28 RX ADMIN — MAGNESIUM SULFATE IN WATER FOR 2 G: 40 INJECTION INTRAVENOUS at 03:02

## 2025-02-28 RX ADMIN — METRONIDAZOLE 500 MG: 5 INJECTION, SOLUTION INTRAVENOUS at 09:02

## 2025-02-28 RX ADMIN — SODIUM CHLORIDE 125 MG: 9 INJECTION, SOLUTION INTRAVENOUS at 12:02

## 2025-02-28 RX ADMIN — ACETYLCYSTEINE 2 ML: 200 INHALANT RESPIRATORY (INHALATION) at 12:02

## 2025-02-28 RX ADMIN — CITALOPRAM HYDROBROMIDE 40 MG: 20 TABLET ORAL at 05:02

## 2025-02-28 RX ADMIN — METRONIDAZOLE 500 MG: 5 INJECTION, SOLUTION INTRAVENOUS at 01:02

## 2025-02-28 RX ADMIN — MUPIROCIN 1 G: 20 OINTMENT TOPICAL at 09:02

## 2025-02-28 RX ADMIN — ALBUTEROL SULFATE 2.5 MG: 2.5 SOLUTION RESPIRATORY (INHALATION) at 07:02

## 2025-02-28 RX ADMIN — ALBUTEROL SULFATE 2.5 MG: 2.5 SOLUTION RESPIRATORY (INHALATION) at 12:02

## 2025-02-28 RX ADMIN — METRONIDAZOLE 500 MG: 5 INJECTION, SOLUTION INTRAVENOUS at 06:02

## 2025-02-28 RX ADMIN — ALBUTEROL SULFATE 2.5 MG: 2.5 SOLUTION RESPIRATORY (INHALATION) at 03:02

## 2025-02-28 RX ADMIN — FLUCONAZOLE, SODIUM CHLORIDE 100 MG: 2 INJECTION INTRAVENOUS at 09:02

## 2025-02-28 RX ADMIN — TRAMADOL HYDROCHLORIDE 50 MG: 50 TABLET, COATED ORAL at 03:02

## 2025-02-28 RX ADMIN — LEVOTHYROXINE SODIUM ANHYDROUS 62.5 MCG: 100 INJECTION, POWDER, LYOPHILIZED, FOR SOLUTION INTRAVENOUS at 09:02

## 2025-02-28 RX ADMIN — MONTELUKAST 10 MG: 10 TABLET, FILM COATED ORAL at 09:02

## 2025-02-28 NOTE — PT/OT/SLP PROGRESS
Physical Therapy Treatment    Patient Name:  Ashley Calvillo   MRN:  [id number]    Recommendations:     Discharge Recommendations: Moderate Intensity Therapy  Discharge Equipment Recommendations: none  Barriers to discharge:  current medical status    Assessment:     Ashley Calvillo is a 79 y.o. female admitted with a medical diagnosis of Colostomy in place.  She presents with the following impairments/functional limitations: weakness, impaired endurance, impaired self care skills, impaired functional mobility, gait instability, impaired balance, decreased lower extremity function, pain .    Pt feeling well today, sat EOB to work on sitting balance, sitting tolerance and was able to eventually participate in some AROM BLE ex's very minimal range and slow pace, 10reps.    Attempted to get pt OOB to chair but pt found to have loose bm all under her.  She was assisted to lying and pulled up to [facility] via draw sheet for [facility].  2 helpers were necessary to mobilize the pt today.  She would benefit significantly from further rehab. Will return later to try to get pt out of bed again if appropriate.    Rehab Prognosis: Fair; patient would benefit from acute skilled PT services to address these deficits and reach maximum level of function.    Recent Surgery: Procedure(s) (LRB):  CLOSURE, COLOSTOMY (N/A)  OMENTECTOMY (N/A)  OOPHORECTOMY (Left)  SIGMOIDOSCOPY, FLEXIBLE (N/A)  LYSIS, ADHESIONS, LAPAROSCOPIC (N/A)  SALPINGO-OOPHORECTOMY (Right)  COLON RESECTION (N/A) 4 Days Post-Op    Plan:     During this hospitalization, patient to be seen 6 x/week to address the identified rehab impairments via therapeutic activities, therapeutic exercises and progress toward the following goals:    Plan of Care Expires:       Subjective     Chief Complaint: none  Patient/Family Comments/goals: to get up and return to her indep activity level  Pain/Comfort:         Objective:     Communicated with pt, nurse prior to session.  Patient found HOB  elevated with agrees to participate in therapy  upon PT entry to room.     General Precautions: Standard, fall  Orthopedic Precautions:    Braces:  abdominal binder  Respiratory Status: Room air     Functional Mobility:  Bed Mobility:     Scooting: moderate assistance and of 2 persons  Supine to Sit: moderate assistance and of 2 persons  Transfers:     Sit to Stand:  maximal assistance, of 2 persons, and pt with BM in diaper, on back, on bedding, loose, had to stop tx for pt to be cleaned now      AM-PAC 6 CLICK MOBILITY          Treatment & Education:  Sit balance fair, Pt able to perf some gentle BLE AROM ex sitting EOB with no c/o pain/cramping.  When attempted to help pt stand and get OOB, noted loose BM everywhere under pt.  Nsg svc was called.  Pt laid down safely.  Patient left HOB elevated with all lines intact, call button in reach, bed alarm on, and nsg notified..    GOALS:   Multidisciplinary Problems       Physical Therapy Goals          Problem: Physical Therapy    Goal Priority Disciplines Outcome Interventions   Physical Therapy Goal     PT, PT/OT Progressing    Description: 1.  Pt will improve bed mob from maxAx2 helpers to mod/Felicia  2.  Pt will improve tf to chair from totalAx2 helpers to mod/Felicia  3.  Pt will tolerate OOB activity daily  4.  Pt will train HEP to maint gains made in skilled PT program                       DME Justifications:  No DME recommended requiring DME justifications    Time Tracking:     PT Received On: 02/28/25  PT Start Time: 0830     PT Stop Time: 0900  PT Total Time (min): 30 min     Billable Minutes: Therapeutic Activity 15, Therapeutic Exercise 10, and Neuromuscular Re-education 5    Treatment Type: Treatment  PT/PTA: PT           02/28/2025

## 2025-02-28 NOTE — PT/OT/SLP PROGRESS
Physical Therapy Treatment    Patient Name:  Ashley Calvillo   MRN:  [id number]    Recommendations:     Discharge Recommendations: Moderate Intensity Therapy  Discharge Equipment Recommendations: none  Barriers to discharge:  current medical status    Assessment:     Ashley Calvillo is a 79 y.o. female admitted with a medical diagnosis of Colostomy in place.  She presents with the following impairments/functional limitations: weakness, impaired endurance, impaired self care skills, impaired functional mobility, gait instability, impaired balance, decreased lower extremity function, pain .    Pt feeling well today, sat EOB to work on sitting balance, then pt OOB to chair max-totalA. She would benefit significantly from further rehab. Pt plans to sit up in chair this pm.    Rehab Prognosis: Fair; patient would benefit from acute skilled PT services to address these deficits and reach maximum level of function.    Recent Surgery: Procedure(s) (LRB):  CLOSURE, COLOSTOMY (N/A)  OMENTECTOMY (N/A)  OOPHORECTOMY (Left)  SIGMOIDOSCOPY, FLEXIBLE (N/A)  LYSIS, ADHESIONS, LAPAROSCOPIC (N/A)  SALPINGO-OOPHORECTOMY (Right)  COLON RESECTION (N/A) 4 Days Post-Op    Plan:     During this hospitalization, patient to be seen 6 x/week to address the identified rehab impairments via therapeutic activities, therapeutic exercises and progress toward the following goals:    Plan of Care Expires:       Subjective     Chief Complaint: none  Patient/Family Comments/goals: to get up and return to her indep activity level  Pain/Comfort:         Objective:     Communicated with pt, nurse prior to session.  Patient found HOB elevated with  willing to get up in chair now upon PT entry to room.     General Precautions: Standard, fall  Orthopedic Precautions:    Braces: abd binder   Respiratory Status: Room air     Functional Mobility:  Bed Mobility:     Scooting: moderate assistance and of 1 persons  Supine to Sit: moderate assistance and of 1  persons  Transfers:     Bed to Chair: maximal assistance with  gt belt, abd binder  using  Squat Pivot      AM-PAC 6 CLICK MOBILITY          Treatment & Education:  Sit balance fair+, stand balance poor, tf to chair max/totala and legs elevated in recliner.  Patient left up in chair with all lines intact, call button in reach, chair alarm on, and nsg notified..    GOALS:   Multidisciplinary Problems       Physical Therapy Goals          Problem: Physical Therapy    Goal Priority Disciplines Outcome Interventions   Physical Therapy Goal     PT, PT/OT Progressing    Description: 1.  Pt will improve bed mob from maxAx2 helpers to mod/Felicia  2.  Pt will improve tf to chair from totalAx2 helpers to mod/Felicia  3.  Pt will tolerate OOB activity daily  4.  Pt will train HEP to maint gains made in skilled PT program                       DME Justifications:  No DME recommended requiring DME justifications    Time Tracking:     PT Received On: 02/28/25  PT Start Time: 1240     PT Stop Time: 1250  PT Total Time (min): 10 min     Billable Minutes: Therapeutic Activity 10    Treatment Type: Treatment  PT/PTA: PT           02/28/2025

## 2025-02-28 NOTE — PROGRESS NOTES
CT abdomen and pelvis yesterday consistent with recent hemorrhage of the colostomy reversal site, intra-abdominal contents without evidence of bleeding or leak.  There was contrast in the rectum.  Distended gallbladder noted, and gallbladder workup is pending.  Ultrasound was negative for stones or sludge or other pathology.  HIDA scan with slightly decreased ejection fraction of 33%, but there is passage of contrast through the biliary system.    She says she feels well.  She is passing flatus in addition to stool.    Vital signs reviewed and okay, no fevers, heart rate 80s   Alert and oriented x3   Systolic blood pressure 1 teens to the 180s   Abdomen soft, incision clean and intact  Stoma incision site clean with some mild induration noted consistent with recent hemorrhage.  No skin discoloration.    White blood count 17.86, hemoglobin 9.3, platelets 283  Sodium 139, potassium 3.3, BUN 8, creatinine 0.77   Magnesium 1.6, phosphorus 2.5     79-year-old woman status post colostomy reversal-difficult case due to extensive adhesions.  Postoperative hemorrhage seems to have stopped.  White blood count is improving, possibly related to stress response.  Plan for:   1. Clear liquid diet  2. Remove Medina catheter as urine output and also character appears healthy   3. Continue supportive care   4.  Monitor white blood count  5. Replete electrolytes, including potassium and magnesium

## 2025-02-28 NOTE — NURSING
Report given to Nelson STANLEY at bedside in ICU. Wounds and wound care reviewed. Transferred to room 340 at 2108.

## 2025-02-28 NOTE — PT/OT/SLP PROGRESS
Physical Therapy Treatment    Patient Name:  Ashley Calvillo   MRN:  [id number]    Recommendations:     Discharge Recommendations: Moderate Intensity Therapy  Discharge Equipment Recommendations: none  Barriers to discharge:  current medical status    Assessment:     Ashley Calvillo is a 79 y.o. female admitted with a medical diagnosis of Colostomy in place.  She presents with the following impairments/functional limitations: weakness, impaired endurance, impaired self care skills, impaired functional mobility, gait instability, impaired balance, decreased lower extremity function, pain .    Pt feeling well today, sat up in chair over an hour, was assisted bed to chair max-totalA. She would benefit significantly from further rehab. Pt plans to sit up in chair this weekend.    Rehab Prognosis: Fair; patient would benefit from acute skilled PT services to address these deficits and reach maximum level of function.    Recent Surgery: Procedure(s) (LRB):  CLOSURE, COLOSTOMY (N/A)  OMENTECTOMY (N/A)  OOPHORECTOMY (Left)  SIGMOIDOSCOPY, FLEXIBLE (N/A)  LYSIS, ADHESIONS, LAPAROSCOPIC (N/A)  SALPINGO-OOPHORECTOMY (Right)  COLON RESECTION (N/A) 4 Days Post-Op    Plan:     During this hospitalization, patient to be seen 6 x/week to address the identified rehab impairments via therapeutic activities, therapeutic exercises and progress toward the following goals:    Plan of Care Expires:       Subjective     Chief Complaint: tired in chair  Patient/Family Comments/goals: to get up and return to her indep activity level  Pain/Comfort:         Objective:     Communicated with pt, nurse prior to session.  Patient found HOB elevated with  willing to get up in chair now upon PT entry to room.     General Precautions: Standard, fall  Orthopedic Precautions:    Braces: abd binder   Respiratory Status: Room air     Functional Mobility:  Transfers:     Chair to mat: maximal assistance and total assistance with  gt belt, abd binder  using   Squat Pivot      AM-PAC 6 CLICK MOBILITY          Treatment & Education:  Pt very fatigued, sat in chair over an hour, no further ex at this time tolerated.  Patient left HOB elevated with all lines intact, call button in reach, bed alarm on, and nsg notified..    GOALS:   Multidisciplinary Problems       Physical Therapy Goals          Problem: Physical Therapy    Goal Priority Disciplines Outcome Interventions   Physical Therapy Goal     PT, PT/OT Progressing    Description: 1.  Pt will improve bed mob from maxAx2 helpers to mod/Felicia  2.  Pt will improve tf to chair from totalAx2 helpers to mod/Felicia  3.  Pt will tolerate OOB activity daily  4.  Pt will train HEP to maint gains made in skilled PT program                       DME Justifications:  No DME recommended requiring DME justifications    Time Tracking:     PT Received On: 02/28/25  PT Start Time: 1434     PT Stop Time: 1444  PT Total Time (min): 10 min     Billable Minutes: Therapeutic Activity 10    Treatment Type: Treatment  PT/PTA: PT           02/28/2025

## 2025-02-28 NOTE — NURSING
Rounded with dr huynh, d/anita ng tube and pnrose drains. New orders for hiada scan/ultra sounds new to my practice dresssing order for midline neosporin and island dressing. New orders for penrose opening, move wound vac to it.

## 2025-02-28 NOTE — PROGRESS NOTES
Inpatient Nutrition Evaluation    Admit Date: 2/24/2025   Total duration of encounter: 4 days   Patient Age: 79 y.o.    Nutrition Recommendation/Prescription     Advance diet as tolerated / medically appropriate.  Gaol diet Regular.  Pt likely with need easy to chew diet 2/2 no dentures.    Weekly wts.    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    CVA (cerebral vascular accident)     Depression     Dysphagia     HTN (hypertension)     Hypothyroidism, unspecified     Iron deficiency anemia, unspecified     Mixed hyperlipidemia     Squamous cell carcinoma in situ     Tonsillar cancer     Urinary tract infection, site not specified          Scheduled Medications:  acetylcysteine 200 mg/ml (20%), 2 mL, Q8H  albuterol sulfate, 2.5 mg, Q8H  cefTRIAXone, 2 g, BID  citalopram, 40 mg, QAM  famotidine (PF), 20 mg, Q12H  ferric gluconate (Ferrlecit) IVPB, 125 mg, Daily  fluconazole (DIFLUCAN) IV (PEDS and ADULTS), 100 mg, Q24H  levothyroxine, 62.5 mcg, Daily  magnesium sulfate 2 g IVPB, 2 g, Once   Followed by  magnesium sulfate 2 g IVPB, 2 g, Once  metronidazole, 500 mg, Q8H  montelukast, 10 mg, QHS  mupirocin, , BID    Continuous Infusions:  dextrose 5 % and 0.45 % NaCl with KCl 20 mEq, Last Rate: 50 mL/hr at 02/28/25 1409    PRN Medications:   Current Facility-Administered Medications:     0.9%  NaCl infusion (for blood administration), , Intravenous, Q24H PRN    HYDROcodone-acetaminophen, 1 tablet, Oral, Q6H PRN    HYDROmorphone, 0.5 mg, Intravenous, Q6H PRN    naloxone, 0.02 mg, Intravenous, PRN    neomycin-bacitracin-polymyxin, , Topical (Top),  "Q8H PRN    ondansetron, 4 mg, Intravenous, Q6H PRN    ondansetron, 4 mg, Intravenous, Q12H PRN    prochlorperazine, 5 mg, Intravenous, Q6H PRN    traMADoL, 50 mg, Oral, Q6H PRN    Recent Labs   Lab 02/25/25  0303 02/26/25  0306 02/26/25  0442 02/26/25  1702 02/27/25  0341 02/27/25  1600 02/28/25  0500    140  --   --  140  --  139   K 3.5 3.4*  --   --  3.7  --  3.3*   CALCIUM 8.0* 7.7*  --   --  7.9*  --  8.7   PHOS 3.3 2.7  --   --  1.4*  --  2.5   MG 1.80 1.70  --   --  1.60  --  1.60    105  --   --  107  --  104   CO2 17* 27  --   --  25  --  25   BUN 22.0* 20.0  --   --  20.0  --  8.0*   CREATININE 1.47* 1.24*  --   --  1.21*  --  0.77   EGFRNORACEVR 36 45  --   --  46  --  >60   GLUCOSE 238* 99  --   --  116*  --  95   BILITOT 0.7 0.3  --   --  0.3  --  0.4   ALKPHOS 43 38*  --   --  50  --  82   ALT <5 9  --   --  14  --  21   AST 18 34  --   --  48*  --  57*   ALBUMIN 3.2* 2.8*  --   --  2.9*  --  3.1*   WBC 22.75* 24.33* 26.69* 25.67* 21.66* 21.40* 17.86*   HGB 9.1* 6.8* 6.8* 7.8* 7.3* 9.1* 9.3*   HCT 28.3* 20.9* 20.4* 22.7* 21.9* 27.4* 28.4*     Nutrition Orders:  Diet Clear Liquid      Appetite/Oral Intake: good/not applicable  Factors Affecting Nutritional Intake:  does not have dentures in hosp.  Food/[place]/Cultural Preferences: none reported  Food Allergies: no known food allergies  Last Bowel Movement: 02/28/25  Wound(s):  none noted except for surgical wounds    Comments    2/28:  Pt screened 2/2 NPO x 3 days.  Diet advanced when making rounds with pt.  Pt ready to eat.  States feeling better.  Pt reports good appetite prior to admit and denies any wt changes.  Pt does not have dentures with her.  May need to chop foods once diet advanced.  Noted pt with hx of dysphagia - may need swallow eval for texture modifications.  Labs / meds reviewed    Anthropometrics    Height: 5' 2" (157.5 cm), Height Method: Stated  Last Weight: 68 kg (149 lb 14.6 oz) (02/24/25 1051), Weight Method: " Standard Scale  BMI (Calculated): 27.4  BMI Classification: overweight (BMI 25-29.9)     Ideal Body Weight (IBW), Female: 110 lb     % Ideal Body Weight, Female (lb): 136.28 %                    Usual Body Weight (UBW), k kg  % Usual Body Weight: 100.21     Usual Weight Provided By: patient    Wt Readings from Last 5 Encounters:   25 68 kg (149 lb 14.6 oz)   25 68 kg (150 lb)   24 68 kg (150 lb)   24 68 kg (149 lb 14.6 oz)   24 65.8 kg (145 lb)     Weight Change(s) Since Admission: none noted  Wt Readings from Last 1 Encounters:   25 1051 68 kg (149 lb 14.6 oz)   25 0948 68 kg (149 lb 14.6 oz)   Admit Weight: 68 kg (149 lb 14.6 oz) (25 0948), Weight Method: Standard Scale    Patient Education     Not applicable.    Nutrition Goals & Monitoring     Dietitian will monitor: food and beverage intake and weight    Nutrition Risk/Follow-Up: low (follow-up in 5-7 days)  Patients assigned 'low nutrition risk' status do not qualify for a full nutritional assessment but will be monitored and re-evaluated in a 5-7 day time period. Please consult if re-evaluation needed sooner.

## 2025-03-01 VITALS
HEIGHT: 62 IN | DIASTOLIC BLOOD PRESSURE: 81 MMHG | OXYGEN SATURATION: 96 % | TEMPERATURE: 98 F | BODY MASS INDEX: 27.59 KG/M2 | HEART RATE: 72 BPM | SYSTOLIC BLOOD PRESSURE: 165 MMHG | RESPIRATION RATE: 20 BRPM | WEIGHT: 149.94 LBS

## 2025-03-01 LAB
ALBUMIN SERPL-MCNC: 2.8 G/DL (ref 3.4–4.8)
ALBUMIN/GLOB SERPL: 0.9 RATIO (ref 1.1–2)
ALP SERPL-CCNC: 59 UNIT/L (ref 40–150)
ALT SERPL-CCNC: 20 UNIT/L (ref 0–55)
ANION GAP SERPL CALC-SCNC: 7 MEQ/L
AST SERPL-CCNC: 36 UNIT/L (ref 5–34)
BASOPHILS # BLD AUTO: 0.04 X10(3)/MCL
BASOPHILS NFR BLD AUTO: 0.4 %
BILIRUB SERPL-MCNC: 0.3 MG/DL
BUN SERPL-MCNC: 11 MG/DL (ref 9.8–20.1)
CALCIUM SERPL-MCNC: 8.1 MG/DL (ref 8.4–10.2)
CHLORIDE SERPL-SCNC: 108 MMOL/L (ref 98–107)
CO2 SERPL-SCNC: 24 MMOL/L (ref 23–31)
CREAT SERPL-MCNC: 0.83 MG/DL (ref 0.55–1.02)
CREAT/UREA NIT SERPL: 13
EOSINOPHIL # BLD AUTO: 0.25 X10(3)/MCL (ref 0–0.9)
EOSINOPHIL NFR BLD AUTO: 2.7 %
ERYTHROCYTE [DISTWIDTH] IN BLOOD BY AUTOMATED COUNT: 21.5 % (ref 11.5–17)
GFR SERPLBLD CREATININE-BSD FMLA CKD-EPI: >60 ML/MIN/1.73/M2
GLOBULIN SER-MCNC: 3 GM/DL (ref 2.4–3.5)
GLUCOSE SERPL-MCNC: 109 MG/DL (ref 82–115)
HCT VFR BLD AUTO: 27.1 % (ref 37–47)
HGB BLD-MCNC: 8.7 G/DL (ref 12–16)
IMM GRANULOCYTES # BLD AUTO: 0.17 X10(3)/MCL (ref 0–0.04)
IMM GRANULOCYTES NFR BLD AUTO: 1.8 %
LYMPHOCYTES # BLD AUTO: 1.06 X10(3)/MCL (ref 0.6–4.6)
LYMPHOCYTES NFR BLD AUTO: 11.4 %
MAGNESIUM SERPL-MCNC: 2.6 MG/DL (ref 1.6–2.6)
MCH RBC QN AUTO: 23.9 PG (ref 27–31)
MCHC RBC AUTO-ENTMCNC: 32.1 G/DL (ref 33–36)
MCV RBC AUTO: 74.5 FL (ref 80–94)
MONOCYTES # BLD AUTO: 1.19 X10(3)/MCL (ref 0.1–1.3)
MONOCYTES NFR BLD AUTO: 12.8 %
NEUTROPHILS # BLD AUTO: 6.58 X10(3)/MCL (ref 2.1–9.2)
NEUTROPHILS NFR BLD AUTO: 70.9 %
NRBC BLD AUTO-RTO: 0 %
PHOSPHATE SERPL-MCNC: 2.1 MG/DL (ref 2.3–4.7)
PLATELET # BLD AUTO: 306 X10(3)/MCL (ref 130–400)
PMV BLD AUTO: 9.9 FL (ref 7.4–10.4)
POTASSIUM SERPL-SCNC: 3.8 MMOL/L (ref 3.5–5.1)
PROT SERPL-MCNC: 5.8 GM/DL (ref 5.8–7.6)
RBC # BLD AUTO: 3.64 X10(6)/MCL (ref 4.2–5.4)
SODIUM SERPL-SCNC: 139 MMOL/L (ref 136–145)
WBC # BLD AUTO: 9.29 X10(3)/MCL (ref 4.5–11.5)

## 2025-03-01 PROCEDURE — 85025 COMPLETE CBC W/AUTO DIFF WBC: CPT | Performed by: SURGERY

## 2025-03-01 PROCEDURE — 25000242 PHARM REV CODE 250 ALT 637 W/ HCPCS: Performed by: SURGERY

## 2025-03-01 PROCEDURE — 30233N1 TRANSFUSION OF NONAUTOLOGOUS RED BLOOD CELLS INTO PERIPHERAL VEIN, PERCUTANEOUS APPROACH: ICD-10-PCS | Performed by: SURGERY

## 2025-03-01 PROCEDURE — 80053 COMPREHEN METABOLIC PANEL: CPT | Performed by: SURGERY

## 2025-03-01 PROCEDURE — 94640 AIRWAY INHALATION TREATMENT: CPT

## 2025-03-01 PROCEDURE — 63600175 PHARM REV CODE 636 W HCPCS: Performed by: SURGERY

## 2025-03-01 PROCEDURE — 36415 COLL VENOUS BLD VENIPUNCTURE: CPT | Performed by: SURGERY

## 2025-03-01 PROCEDURE — 84100 ASSAY OF PHOSPHORUS: CPT | Performed by: SURGERY

## 2025-03-01 PROCEDURE — 83735 ASSAY OF MAGNESIUM: CPT | Performed by: SURGERY

## 2025-03-01 PROCEDURE — 94761 N-INVAS EAR/PLS OXIMETRY MLT: CPT

## 2025-03-01 PROCEDURE — 97530 THERAPEUTIC ACTIVITIES: CPT

## 2025-03-01 PROCEDURE — 99900035 HC TECH TIME PER 15 MIN (STAT)

## 2025-03-01 PROCEDURE — 25000003 PHARM REV CODE 250: Performed by: SURGERY

## 2025-03-01 RX ADMIN — ALBUTEROL SULFATE 2.5 MG: 2.5 SOLUTION RESPIRATORY (INHALATION) at 07:03

## 2025-03-01 RX ADMIN — POTASSIUM CHLORIDE, DEXTROSE MONOHYDRATE AND SODIUM CHLORIDE: 150; 5; 450 INJECTION, SOLUTION INTRAVENOUS at 01:03

## 2025-03-01 RX ADMIN — ACETYLCYSTEINE 2 ML: 200 INHALANT RESPIRATORY (INHALATION) at 07:03

## 2025-03-01 RX ADMIN — ALBUTEROL SULFATE 2.5 MG: 2.5 SOLUTION RESPIRATORY (INHALATION) at 01:03

## 2025-03-01 RX ADMIN — METRONIDAZOLE 500 MG: 5 INJECTION, SOLUTION INTRAVENOUS at 06:03

## 2025-03-01 RX ADMIN — LEVOTHYROXINE SODIUM ANHYDROUS 62.5 MCG: 100 INJECTION, POWDER, LYOPHILIZED, FOR SOLUTION INTRAVENOUS at 08:03

## 2025-03-01 RX ADMIN — FLUCONAZOLE, SODIUM CHLORIDE 100 MG: 2 INJECTION INTRAVENOUS at 08:03

## 2025-03-01 RX ADMIN — CITALOPRAM HYDROBROMIDE 40 MG: 20 TABLET ORAL at 06:03

## 2025-03-01 RX ADMIN — ACETYLCYSTEINE 2 ML: 200 INHALANT RESPIRATORY (INHALATION) at 01:03

## 2025-03-01 RX ADMIN — CEFTRIAXONE SODIUM 2 G: 1 INJECTION, POWDER, FOR SOLUTION INTRAMUSCULAR; INTRAVENOUS at 08:03

## 2025-03-01 RX ADMIN — FAMOTIDINE 20 MG: 10 INJECTION, SOLUTION INTRAVENOUS at 08:03

## 2025-03-01 NOTE — PLAN OF CARE
Discharge orders/AVS and clinicals sent to [facility] via Fax. Nurse given report information.

## 2025-03-01 NOTE — NURSING
Report called into [facility] at [facility]  They are waiting on someone to come and  patient. Will call me once they have someone en route

## 2025-03-01 NOTE — DISCHARGE SUMMARY
CT abdomen and pelvis yesterday consistent with recent hemorrhage of the colostomy reversal site, intra-abdominal contents without evidence of bleeding or leak.  There was contrast in the rectum.  Distended gallbladder noted, and gallbladder workup is pending.  Ultrasound was negative for stones or sludge or other pathology.  HIDA scan with slightly decreased ejection fraction of 33%, but there is passage of contrast through the biliary system.    She says she feels well.  She is passing flatus in addition to stool.    Vital signs reviewed and okay, no fevers, heart rate 80s   Alert and oriented x3   Systolic blood pressure 1 teens to the 180s   Abdomen soft, incision clean and intact  Stoma incision site clean with some mild induration noted consistent with recent hemorrhage.  No skin discoloration.    White blood count 17.86, hemoglobin 9.3, platelets 283  Sodium 139, potassium 3.3, BUN 8, creatinine 0.77   Magnesium 1.6, phosphorus 2.5     79-year-old woman status post colostomy reversal-difficult case due to extensive adhesions.  Postoperative hemorrhage seems to have stopped.  White blood count is improving, possibly related to stress response.  Plan for:   1. Clear liquid diet  2. Remove Medina catheter as urine output and also character appears healthy   3. Continue supportive care   4.  Monitor white blood count  5. Replete electrolytes, including potassium and magnesium    03/01/2025     Staff present during examination : Jocelyn Lam RN  Patient is doing well tolerating liquids   Passed bowels evidently substantially yesterday  Patient wounds are clean and dry   White count 9 hemoglobin 8.7 hematocrit 27 platelet count is 306 renal profile is unremarkable CO2 is 24  She lives at the nursing home   I think she can go home [facility] on liquid diet   She will follow up with me on Monday at 5:30 a.m. to make sure she is progressing appropriately   She will follow up with  on her next  available date   We will DC the BENNY in the office   She will go home on Cipro and doxycycline  Condition is good disposition to home

## 2025-03-01 NOTE — PLAN OF CARE
03/01/25 1128   Final Note   Assessment Type Final Discharge Note   Anticipated Discharge Disposition [facility]   Post-Acute Status   Post-Acute Authorization Placement   Post-Acute Placement Status Set-up Complete/Auth obtained  (Return to [facility])     April, [facility] sent information and made packet for NH and nurse to call report.

## 2025-03-01 NOTE — PT/OT/SLP PROGRESS
Physical Therapy Treatment    Patient Name:  Ashley Calvillo   MRN:  [id number]    Recommendations:     Discharge Recommendations: Moderate Intensity Therapy  Discharge Equipment Recommendations: none  Barriers to discharge:  current medical status    Assessment:     Ashley Calvillo is a 79 y.o. female admitted with a medical diagnosis of Colostomy in place.  She presents with the following impairments/functional limitations: weakness, impaired endurance, impaired self care skills, impaired functional mobility, gait instability, impaired balance, decreased lower extremity function, pain     Patient found with HOB elevated. Agreeable to sitting up in chair. Patient performed functional supine to sit with min, then transferred to chair with mod A via stand pivot with HHA. Patient left up in chair with all needs in reach and family present.     Rehab Prognosis: Fair; patient would benefit from acute skilled PT services to address these deficits and reach maximum level of function.    Recent Surgery: Procedure(s) (LRB):  CLOSURE, COLOSTOMY (N/A)  OMENTECTOMY (N/A)  OOPHORECTOMY (Left)  SIGMOIDOSCOPY, FLEXIBLE (N/A)  LYSIS, ADHESIONS, LAPAROSCOPIC (N/A)  SALPINGO-OOPHORECTOMY (Right)  COLON RESECTION (N/A) 5 Days Post-Op    Plan:     During this hospitalization, patient to be seen 6 x/week to address the identified rehab impairments via therapeutic activities, therapeutic exercises and progress toward the following goals:    Plan of Care Expires:       Subjective     Chief Complaint: none  Patient/Family Comments/goals: to go home  Pain/Comfort:         Objective:     Communicated with nursing prior to session.  Patient found HOB elevated with peripheral IV, PureWick upon PT entry to room.     General Precautions: Standard, fall  Orthopedic Precautions:    Braces:    Respiratory Status: Room air     Functional Mobility:  Bed Mobility:     Supine to Sit: minimum assistance  Transfers:     Bed to Chair: moderate assistance  with  hand-held assist  using  Stand Pivot      AM-PAC 6 CLICK MOBILITY          Treatment & Education:  See above    Patient left HOB elevated with all lines intact, call button in reach, and family present..    GOALS:   Multidisciplinary Problems       Physical Therapy Goals          Problem: Physical Therapy    Goal Priority Disciplines Outcome Interventions   Physical Therapy Goal     PT, PT/OT Progressing    Description: 1.  Pt will improve bed mob from maxAx2 helpers to mod/Felicia  2.  Pt will improve tf to chair from totalAx2 helpers to mod/Felicia  3.  Pt will tolerate OOB activity daily  4.  Pt will train HEP to maint gains made in skilled PT program                       DME Justifications:  No DME recommended requiring DME justifications    Time Tracking:     PT Received On: 03/01/25  PT Start Time: 0907     PT Stop Time: 0917  PT Total Time (min): 10 min     Billable Minutes: Therapeutic Activity 15    Treatment Type: Treatment  PT/PTA: PT           03/01/2025

## 2025-03-04 ENCOUNTER — LAB REQUISITION (OUTPATIENT)
Dept: LAB | Facility: HOSPITAL | Age: 80
End: 2025-03-04
Payer: MEDICARE

## 2025-03-04 DIAGNOSIS — I42.9 CARDIOMYOPATHY, UNSPECIFIED: ICD-10-CM

## 2025-03-04 DIAGNOSIS — R53.1 WEAKNESS: ICD-10-CM

## 2025-03-04 LAB
ABS NEUT CALC (OHS): 11.47 X10(3)/MCL (ref 2.1–9.2)
ALBUMIN SERPL-MCNC: 3.2 G/DL (ref 3.4–4.8)
ALBUMIN/GLOB SERPL: 1.3 RATIO (ref 1.1–2)
ALP SERPL-CCNC: 63 UNIT/L (ref 40–150)
ALT SERPL-CCNC: 14 UNIT/L (ref 0–55)
ANION GAP SERPL CALC-SCNC: 12 MEQ/L
ANISOCYTOSIS BLD QL SMEAR: SLIGHT
AST SERPL-CCNC: 19 UNIT/L (ref 5–34)
BILIRUB SERPL-MCNC: 0.4 MG/DL
BUN SERPL-MCNC: 32 MG/DL (ref 9.8–20.1)
CALCIUM SERPL-MCNC: 8.5 MG/DL (ref 8.4–10.2)
CHLORIDE SERPL-SCNC: 104 MMOL/L (ref 98–107)
CO2 SERPL-SCNC: 23 MMOL/L (ref 23–31)
CREAT SERPL-MCNC: 1.37 MG/DL (ref 0.55–1.02)
CREAT/UREA NIT SERPL: 23
ELLIPTOCYTOSIS (OHS): SLIGHT
EOSINOPHIL NFR BLD MANUAL: 0.28 X10(3)/MCL (ref 0–0.9)
EOSINOPHIL NFR BLD MANUAL: 2 % (ref 0–8)
ERYTHROCYTE [DISTWIDTH] IN BLOOD BY AUTOMATED COUNT: 22.1 % (ref 11.5–17)
GFR SERPLBLD CREATININE-BSD FMLA CKD-EPI: 40 ML/MIN/1.73/M2
GLOBULIN SER-MCNC: 2.5 GM/DL (ref 2.4–3.5)
GLUCOSE SERPL-MCNC: 107 MG/DL (ref 82–115)
HCT VFR BLD AUTO: 29.4 % (ref 37–47)
HGB BLD-MCNC: 9.6 G/DL (ref 12–16)
HYPOCHROMIA BLD QL SMEAR: ABNORMAL
LYMPHOCYTES NFR BLD MANUAL: 0.83 X10(3)/MCL (ref 0.6–4.6)
LYMPHOCYTES NFR BLD MANUAL: 6 % (ref 13–40)
MCH RBC QN AUTO: 25.1 PG (ref 27–31)
MCHC RBC AUTO-ENTMCNC: 32.7 G/DL (ref 33–36)
MCV RBC AUTO: 76.8 FL (ref 80–94)
METAMYELOCYTES NFR BLD MANUAL: 2 %
MONOCYTES NFR BLD MANUAL: 0.97 X10(3)/MCL (ref 0.1–1.3)
MONOCYTES NFR BLD MANUAL: 7 % (ref 2–11)
NEUTROPHILS NFR BLD MANUAL: 82 % (ref 47–80)
NEUTS BAND NFR BLD MANUAL: 1 % (ref 0–11)
NRBC BLD AUTO-RTO: 0 %
PLATELET # BLD AUTO: 475 X10(3)/MCL (ref 130–400)
PLATELET # BLD EST: ADEQUATE 10*3/UL
PMV BLD AUTO: 9.3 FL (ref 7.4–10.4)
POIKILOCYTOSIS BLD QL SMEAR: SLIGHT
POTASSIUM SERPL-SCNC: 4.2 MMOL/L (ref 3.5–5.1)
PROT SERPL-MCNC: 5.7 GM/DL (ref 5.8–7.6)
RBC # BLD AUTO: 3.83 X10(6)/MCL (ref 4.2–5.4)
RBC MORPH BLD: ABNORMAL
SODIUM SERPL-SCNC: 139 MMOL/L (ref 136–145)
WBC # BLD AUTO: 13.82 X10(3)/MCL (ref 4.5–11.5)

## 2025-03-04 PROCEDURE — 80053 COMPREHEN METABOLIC PANEL: CPT | Performed by: FAMILY MEDICINE

## 2025-03-04 PROCEDURE — 85025 COMPLETE CBC W/AUTO DIFF WBC: CPT | Performed by: FAMILY MEDICINE

## 2025-03-07 NOTE — PHYSICIAN QUERY
Please clarify the nature / etiology of the patient's hematological values:  Acute blood loss anemia

## 2025-03-11 ENCOUNTER — HOSPITAL ENCOUNTER (EMERGENCY)
Facility: HOSPITAL | Age: 80
End: 2025-03-11
Attending: FAMILY MEDICINE
Payer: MEDICARE

## 2025-03-11 VITALS
BODY MASS INDEX: 26.68 KG/M2 | WEIGHT: 145 LBS | SYSTOLIC BLOOD PRESSURE: 111 MMHG | DIASTOLIC BLOOD PRESSURE: 49 MMHG | HEART RATE: 75 BPM | TEMPERATURE: 99 F | RESPIRATION RATE: 19 BRPM | OXYGEN SATURATION: 96 % | HEIGHT: 62 IN

## 2025-03-11 DIAGNOSIS — Z13.6 SCREENING FOR CARDIOVASCULAR CONDITION: ICD-10-CM

## 2025-03-11 DIAGNOSIS — J11.1 INFLUENZA: Primary | ICD-10-CM

## 2025-03-11 LAB
ALBUMIN SERPL-MCNC: 3 G/DL (ref 3.4–4.8)
ALBUMIN/GLOB SERPL: 1 RATIO (ref 1.1–2)
ALP SERPL-CCNC: 60 UNIT/L (ref 40–150)
ALT SERPL-CCNC: 10 UNIT/L (ref 0–55)
ANION GAP SERPL CALC-SCNC: 10 MEQ/L
AST SERPL-CCNC: 20 UNIT/L (ref 5–34)
BASOPHILS # BLD AUTO: 0.04 X10(3)/MCL
BASOPHILS NFR BLD AUTO: 0.5 %
BILIRUB SERPL-MCNC: 0.3 MG/DL
BUN SERPL-MCNC: 33 MG/DL (ref 9.8–20.1)
CALCIUM SERPL-MCNC: 8.5 MG/DL (ref 8.4–10.2)
CHLORIDE SERPL-SCNC: 107 MMOL/L (ref 98–107)
CO2 SERPL-SCNC: 22 MMOL/L (ref 23–31)
CREAT SERPL-MCNC: 1.36 MG/DL (ref 0.55–1.02)
CREAT/UREA NIT SERPL: 24
EOSINOPHIL # BLD AUTO: 0.01 X10(3)/MCL (ref 0–0.9)
EOSINOPHIL NFR BLD AUTO: 0.1 %
ERYTHROCYTE [DISTWIDTH] IN BLOOD BY AUTOMATED COUNT: 21.9 % (ref 11.5–17)
FLUAV AG UPPER RESP QL IA.RAPID: DETECTED
FLUBV AG UPPER RESP QL IA.RAPID: NOT DETECTED
GFR SERPLBLD CREATININE-BSD FMLA CKD-EPI: 40 ML/MIN/1.73/M2
GLOBULIN SER-MCNC: 2.9 GM/DL (ref 2.4–3.5)
GLUCOSE SERPL-MCNC: 131 MG/DL (ref 82–115)
HCT VFR BLD AUTO: 28 % (ref 37–47)
HGB BLD-MCNC: 9.1 G/DL (ref 12–16)
IMM GRANULOCYTES # BLD AUTO: 0.07 X10(3)/MCL (ref 0–0.04)
IMM GRANULOCYTES NFR BLD AUTO: 0.9 %
LACTATE SERPL-SCNC: 1.5 MMOL/L (ref 0.5–2.2)
LYMPHOCYTES # BLD AUTO: 0.84 X10(3)/MCL (ref 0.6–4.6)
LYMPHOCYTES NFR BLD AUTO: 11 %
MAGNESIUM SERPL-MCNC: 1.6 MG/DL (ref 1.6–2.6)
MCH RBC QN AUTO: 25.4 PG (ref 27–31)
MCHC RBC AUTO-ENTMCNC: 32.5 G/DL (ref 33–36)
MCV RBC AUTO: 78.2 FL (ref 80–94)
MONOCYTES # BLD AUTO: 0.94 X10(3)/MCL (ref 0.1–1.3)
MONOCYTES NFR BLD AUTO: 12.3 %
NEUTROPHILS # BLD AUTO: 5.73 X10(3)/MCL (ref 2.1–9.2)
NEUTROPHILS NFR BLD AUTO: 75.2 %
NRBC BLD AUTO-RTO: 0 %
PLATELET # BLD AUTO: 375 X10(3)/MCL (ref 130–400)
PMV BLD AUTO: 10.1 FL (ref 7.4–10.4)
POTASSIUM SERPL-SCNC: 3.3 MMOL/L (ref 3.5–5.1)
PROT SERPL-MCNC: 5.9 GM/DL (ref 5.8–7.6)
RBC # BLD AUTO: 3.58 X10(6)/MCL (ref 4.2–5.4)
RSV A 5' UTR RNA NPH QL NAA+PROBE: NOT DETECTED
SARS-COV-2 RNA RESP QL NAA+PROBE: NOT DETECTED
SODIUM SERPL-SCNC: 139 MMOL/L (ref 136–145)
WBC # BLD AUTO: 7.63 X10(3)/MCL (ref 4.5–11.5)

## 2025-03-11 PROCEDURE — 93010 ELECTROCARDIOGRAM REPORT: CPT | Mod: ,,, | Performed by: INTERNAL MEDICINE

## 2025-03-11 PROCEDURE — 96361 HYDRATE IV INFUSION ADD-ON: CPT

## 2025-03-11 PROCEDURE — 87040 BLOOD CULTURE FOR BACTERIA: CPT | Performed by: FAMILY MEDICINE

## 2025-03-11 PROCEDURE — 96365 THER/PROPH/DIAG IV INF INIT: CPT

## 2025-03-11 PROCEDURE — 80053 COMPREHEN METABOLIC PANEL: CPT | Performed by: FAMILY MEDICINE

## 2025-03-11 PROCEDURE — 83605 ASSAY OF LACTIC ACID: CPT | Performed by: FAMILY MEDICINE

## 2025-03-11 PROCEDURE — 83735 ASSAY OF MAGNESIUM: CPT | Performed by: FAMILY MEDICINE

## 2025-03-11 PROCEDURE — 85025 COMPLETE CBC W/AUTO DIFF WBC: CPT | Performed by: FAMILY MEDICINE

## 2025-03-11 PROCEDURE — 25000003 PHARM REV CODE 250: Performed by: FAMILY MEDICINE

## 2025-03-11 PROCEDURE — 99285 EMERGENCY DEPT VISIT HI MDM: CPT | Mod: 25

## 2025-03-11 PROCEDURE — 0241U COVID/RSV/FLU A&B PCR: CPT | Performed by: FAMILY MEDICINE

## 2025-03-11 PROCEDURE — 93005 ELECTROCARDIOGRAM TRACING: CPT

## 2025-03-11 PROCEDURE — 63600175 PHARM REV CODE 636 W HCPCS: Performed by: FAMILY MEDICINE

## 2025-03-11 RX ORDER — POTASSIUM CHLORIDE 20 MEQ/1
40 TABLET, EXTENDED RELEASE ORAL
Status: COMPLETED | OUTPATIENT
Start: 2025-03-11 | End: 2025-03-11

## 2025-03-11 RX ORDER — MAGNESIUM SULFATE HEPTAHYDRATE 40 MG/ML
2 INJECTION, SOLUTION INTRAVENOUS ONCE
Status: COMPLETED | OUTPATIENT
Start: 2025-03-11 | End: 2025-03-11

## 2025-03-11 RX ORDER — OSELTAMIVIR PHOSPHATE 75 MG/1
75 CAPSULE ORAL
Status: COMPLETED | OUTPATIENT
Start: 2025-03-11 | End: 2025-03-11

## 2025-03-11 RX ORDER — OSELTAMIVIR PHOSPHATE 75 MG/1
75 CAPSULE ORAL 2 TIMES DAILY
Qty: 10 CAPSULE | Refills: 0 | Status: SHIPPED | OUTPATIENT
Start: 2025-03-11 | End: 2025-03-16

## 2025-03-11 RX ORDER — CETIRIZINE HYDROCHLORIDE 10 MG/1
10 TABLET ORAL DAILY
Qty: 30 TABLET | Refills: 0 | Status: SHIPPED | OUTPATIENT
Start: 2025-03-11 | End: 2026-03-11

## 2025-03-11 RX ORDER — BENZONATATE 100 MG/1
100 CAPSULE ORAL 3 TIMES DAILY PRN
Qty: 30 CAPSULE | Refills: 0 | Status: SHIPPED | OUTPATIENT
Start: 2025-03-11 | End: 2025-03-21

## 2025-03-11 RX ORDER — IPRATROPIUM BROMIDE 21 UG/1
2 SPRAY, METERED NASAL 3 TIMES DAILY
Qty: 5 ML | Refills: 0 | Status: SHIPPED | OUTPATIENT
Start: 2025-03-11

## 2025-03-11 RX ADMIN — OSELTAMIVIR PHOSPHATE 75 MG: 75 CAPSULE ORAL at 09:03

## 2025-03-11 RX ADMIN — SODIUM CHLORIDE 1000 ML: 9 INJECTION, SOLUTION INTRAVENOUS at 09:03

## 2025-03-11 RX ADMIN — MAGNESIUM SULFATE HEPTAHYDRATE 2 G: 40 INJECTION, SOLUTION INTRAVENOUS at 09:03

## 2025-03-11 RX ADMIN — POTASSIUM CHLORIDE 40 MEQ: 1500 TABLET, EXTENDED RELEASE ORAL at 09:03

## 2025-03-11 RX ADMIN — SODIUM CHLORIDE 1000 ML: 9 INJECTION, SOLUTION INTRAVENOUS at 08:03

## 2025-03-12 NOTE — ED PROVIDER NOTES
Encounter Date: 3/11/2025       History     Chief Complaint   Patient presents with    Fatigue     Pt arrives via [facility] from [facility] with c/o of weakness and dizziness x 1 day.     Patient has history of CVA depression hypertension hypothyroidism.  Patient states she just feels kind of weak and nauseated.  Apparently low blood pressure at a nursing home, although EMS had pressures in the 90s to 100s over 70s.  Patient has no chest pain.  No shortness of breath.  No viral URI complaints.  No urinary complaints.        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    CVA (cerebral vascular accident)     Depression     Dysphagia     HTN (hypertension)     Hypothyroidism, unspecified     Iron deficiency anemia, unspecified     Mixed hyperlipidemia     Squamous cell carcinoma in situ     Tonsillar cancer     Urinary tract infection, site not specified      Past Surgical History:   Procedure Laterality Date    CLOSURE, COLOSTOMY N/A 2/24/2025    Procedure: CLOSURE, COLOSTOMY;  Surgeon: GENA Matute MD;  Location: [facility];  Service: General;  Laterality: N/A;  LAP ASSISSTED, CONVERTED TO OPEN AT 1400    COLECTOMY, SIGMOID N/A 7/28/2024    Procedure: COLECTOMY, SIGMOID;  Surgeon: GENA Matute MD;  Location: [facility];  Service: General;  Laterality: N/A;    COLON RESECTION N/A 2/24/2025    Procedure: COLON RESECTION;  Surgeon: GENA Matute MD;  Location: [facility];  Service: General;  Laterality: N/A;  SMALL BOWEL RESECTION    COLONOSCOPY, THROUGH STOMA, DIAGNOSTIC N/A 8/26/2024    Procedure: COLONOSCOPY, THROUGH STOMA, DIAGNOSTIC;  Surgeon: GENA Matute MD;  Location: [facility];  Service: Endoscopy;  Laterality: N/A;  DIVERTICULOSIS    COLONOSCOPY, THROUGH STOMA, DIAGNOSTIC N/A 11/1/2024     Procedure: COLONOSCOPY, THROUGH STOMA, DIAGNOSTIC;  Surgeon: GENA Matute MD;  Location: [facility];  Service: Endoscopy;  Laterality: N/A;  POST OP: NORMAL COLONOSCOPY    COLONOSCOPY, WITH DIRECTED SUBMUCOSAL INJECTION N/A 11/1/2024    Procedure: COLONOSCOPY, WITH DIRECTED SUBMUCOSAL INJECTION;  Surgeon: GENA Matute MD;  Location: [facility];  Service: Endoscopy;  Laterality: N/A;  TATTOO INK SPOT IN RECTUM AT 12 CM (PROCTOSCOPY)    COLONOSCOPY, WITH POLYPECTOMY USING SNARE N/A 8/26/2024    Procedure: COLONOSCOPY, WITH POLYPECTOMY USING SNARE;  Surgeon: GENA Matute MD;  Location: [facility];  Service: Endoscopy;  Laterality: N/A;  A) TRANSVERSE COLON POLYP    COLOSTOMY N/A 7/28/2024    Procedure: CREATION, COLOSTOMY;  Surgeon: GENA Matute MD;  Location: [facility] OR;  Service: General;  Laterality: N/A;    FLEXIBLE SIGMOIDOSCOPY N/A 7/26/2024    Procedure: SIGMOIDOSCOPY, FLEXIBLE;  Surgeon: GENA Matute MD;  Location: [facility];  Service: Endoscopy;  Laterality: N/A;  POST OP:DECOMPRESSION    FLEXIBLE SIGMOIDOSCOPY N/A 2/24/2025    Procedure: SIGMOIDOSCOPY, FLEXIBLE;  Surgeon: GENA Matute MD;  Location: [facility];  Service: General;  Laterality: N/A;    HYSTERECTOMY      LAPAROSCOPIC LYSIS OF ADHESIONS N/A 2/24/2025    Procedure: LYSIS, ADHESIONS, LAPAROSCOPIC;  Surgeon: GENA Matute MD;  Location: [facility];  Service: General;  Laterality: N/A;  LAPAROSCOPIC AND OPEN LYSIS OF EXTENSIVE ADHESIONS    LAPAROSCOPY N/A 7/28/2024    Procedure: LAPAROSCOPY;  Surgeon: GENA Matute MD;  Location: [facility];  Service: General;  Laterality: N/A;  massively distended sigmoid colon was identified Converted to open art 1620    OMENTECTOMY N/A 2/24/2025    Procedure: OMENTECTOMY;  Surgeon: GENA Matuet MD;  Location: [facility] OR;  Service: General;  Laterality: N/A;  PARTIAL OMENTECTOMY, OMENTAL PEDICLE FLAP TO ANASTOMOSIS     OOPHORECTOMY Left 2/24/2025    Procedure: OOPHORECTOMY;  Surgeon: GENA Matute MD;  Location: [facility] OR;  Service: General;  Laterality: Left;    PROCTOSCOPY N/A 11/1/2024    Procedure: PROCTOSCOPY;  Surgeon: GENA Matute MD;  Location: [facility] END[facility];  Service: Endoscopy;  Laterality: N/A;  FLEXIBLE; EVALUATION OF RECTAL STUMP THROUGH RECTUM; NORMAL RECTUM    SALPINGOOPHORECTOMY Right 2/24/2025    Procedure: SALPINGO-OOPHORECTOMY;  Surgeon: GENA Matute MD;  Location: [facility] OR;  Service: General;  Laterality: Right;    TONSILLECTOMY      TOTAL SHOULDER ARTHROPLASTY       Family History   Problem Relation Name Age of Onset    Cancer Mother      Leukemia Mother      Stroke Mother      Alzheimer's disease Father      Cancer Sister      Leukemia Sister       Social History[1]  Review of Systems   All other systems reviewed and are negative.      Physical Exam     Initial Vitals [03/11/25 2007]   BP Pulse Resp Temp SpO2   (!) 96/49 88 16 98.6 °F (37 °C) 98 %      MAP       --         Physical Exam    Nursing note and vitals reviewed.  Constitutional: She appears well-developed and well-nourished.   HENT:   Head: Normocephalic and atraumatic.   Neck: Neck supple.   Cardiovascular:  Normal rate and regular rhythm.           Pulmonary/Chest: Breath sounds normal.   Abdominal: Abdomen is soft.   Musculoskeletal:      Cervical back: Neck supple.     Neurological: She is alert and oriented to person, place, and time.   Skin: Skin is warm.   Psychiatric: She has a normal mood and affect. Thought content normal.         ED Course   Procedures  Labs Reviewed   COMPREHENSIVE METABOLIC PANEL - Abnormal       Result Value    Sodium 139      Potassium 3.3 (*)     Chloride 107      CO2 22 (*)     Glucose 131 (*)     Blood Urea Nitrogen 33.0 (*)     Creatinine 1.36 (*)     Calcium 8.5      Protein Total 5.9      Albumin 3.0 (*)     Globulin 2.9      Albumin/Globulin Ratio 1.0 (*)     Bilirubin Total 0.3       ALP 60      ALT 10      AST 20      eGFR 40      Anion Gap 10.0      BUN/Creatinine Ratio 24     COVID/RSV/FLU A&B PCR - Abnormal    Influenza A PCR Detected (*)     Influenza B PCR Not Detected      Respiratory Syncytial Virus PCR Not Detected      SARS-CoV-2 PCR Not Detected      Narrative:     The Xpert Xpress SARS-CoV-2/FLU/RSV plus is a rapid, multiplexed real-time PCR test intended for the simultaneous qualitative detection and differentiation of SARS-CoV-2, Influenza A, Influenza B, and respiratory syncytial virus (RSV) viral RNA in either nasopharyngeal swab or nasal swab specimens.         CBC WITH DIFFERENTIAL - Abnormal    WBC 7.63      RBC 3.58 (*)     Hgb 9.1 (*)     Hct 28.0 (*)     MCV 78.2 (*)     MCH 25.4 (*)     MCHC 32.5 (*)     RDW 21.9 (*)     Platelet 375      MPV 10.1      Neut % 75.2      Lymph % 11.0      Mono % 12.3      Eos % 0.1      Basophil % 0.5      Imm Grans % 0.9      Neut # 5.73      Lymph # 0.84      Mono # 0.94      Eos # 0.01      Baso # 0.04      Imm Gran # 0.07 (*)     NRBC% 0.0     LACTIC ACID, PLASMA - Normal    Lactic Acid Level 1.5     BLOOD CULTURE [facility]   BLOOD CULTURE [facility]   CBC W/ AUTO DIFFERENTIAL    Narrative:     The following orders were created for panel order CBC auto differential.  Procedure                               Abnormality         Status                     ---------                               -----------         ------                     CBC with Differential[[id number]]       Abnormal            Final result                 Please view results for these tests on the individual orders.   URINALYSIS, REFLEX TO URINE CULTURE   MAGNESIUM        ECG Results              EKG 12-lead (In process)        Collection Time Result Time QRS Duration OHS QTC Calculation    03/11/25 20:15:38 03/11/25 20:55:38 70 547                     In process by Interface, Lab In [facility] (03/11/25 20:55:41)                   Narrative:    Test Reason : Z13.6,    Vent. Rate  :  85 BPM     Atrial Rate :    BPM     P-R Int :    ms          QRS Dur :  70 ms      QT Int : 460 ms       P-R-T Axes :     60  84 degrees    QTcB Int : 547 ms    Accelerated Junctional rhythm  Nonspecific ST and T wave abnormality  Abnormal ECG  When compared with ECG of 19-Feb-2025 09:35,  Junctional rhythm has replaced Sinus rhythm  ST no longer elevated in Inferior leads  Nonspecific T wave abnormality now evident in Inferior leads  Nonspecific T wave abnormality, worse in Anterior leads  QT has lengthened    Referred By: AAAREFERRAL SELF           Confirmed By:                                   Imaging Results              X-Ray Chest AP Portable (In process)                      Medications   sodium chloride 0.9% bolus 1,000 mL 1,000 mL (1,000 mLs Intravenous New Bag 3/11/25 2031)   magnesium sulfate 2g in water 50mL IVPB (premix) (has no administration in time range)   potassium chloride SA CR tablet 40 mEq (has no administration in time range)   oseltamivir capsule 75 mg (has no administration in time range)   sodium chloride 0.9% bolus 1,000 mL 1,000 mL (has no administration in time range)     Medical Decision Making  Amount and/or Complexity of Data Reviewed  Labs: ordered.  Radiology: ordered.                                      Clinical Impression:  Final diagnoses:  [Z13.6] Screening for cardiovascular condition  [J11.1] Influenza (Primary)          ED Disposition Condition    Discharge Stable          ED Prescriptions       Medication Sig Dispense Start Date End Date Auth. Provider    oseltamivir (TAMIFLU) 75 MG capsule Take 1 capsule (75 mg total) by mouth 2 (two) times daily. for 5 days 10 capsule 3/11/2025 3/16/2025 Masoud Pickens Jr., MD    cetirizine (ZYRTEC) 10 MG tablet Take 1 tablet (10 mg total) by mouth once daily. 30 tablet 3/11/2025 3/11/2026 Masoud Pickens Jr., MD    benzonatate (TESSALON) 100 MG capsule Take 1 capsule (100 mg total) by mouth 3 (three) times daily as needed  for Cough. 30 capsule 3/11/2025 3/21/2025 Masoud Pickens Jr., MD    ipratropium (ATROVENT) 21 mcg (0.03 %) nasal spray 2 sprays by Each Nostril route 3 (three) times daily. 5 mL 3/11/2025 -- Masoud Pickens Jr., MD          Follow-up Information       Follow up With Specialties Details Why Contact Info    Leeanna De Leon MD Family Medicine   [address]  [facility] [id number] [phone]                   [1]   Social History  Tobacco Use    Smoking status: Former     Types: Cigarettes    Smokeless tobacco: Never    Tobacco comments:     patient stated that she smokes on some days.    Substance Use Topics    Alcohol use: Never     Comment: wine, occasionally    Drug use: Never        Masoud Pickens Jr., MD  03/11/25 [id number]

## 2025-03-13 LAB
OHS QRS DURATION: 70 MS
OHS QTC CALCULATION: 547 MS

## 2025-03-15 LAB
BACTERIA BLD CULT: NORMAL
BACTERIA BLD CULT: NORMAL

## 2025-03-17 LAB
BACTERIA BLD CULT: NORMAL
BACTERIA BLD CULT: NORMAL

## 2025-03-18 ENCOUNTER — HOSPITAL ENCOUNTER (INPATIENT)
Facility: HOSPITAL | Age: 80
LOS: 3 days | Discharge: NURSING, REHAB OR LONG-TERM CARE | DRG: 863 | End: 2025-03-21
Attending: INTERNAL MEDICINE | Admitting: SURGERY
Payer: MEDICARE

## 2025-03-18 DIAGNOSIS — I95.9 HYPOTENSION: ICD-10-CM

## 2025-03-18 DIAGNOSIS — T81.30XA WOUND DEHISCENCE: Primary | ICD-10-CM

## 2025-03-18 DIAGNOSIS — E86.0 DEHYDRATION: ICD-10-CM

## 2025-03-18 DIAGNOSIS — T81.49XA ABDOMINAL WALL ABSCESS AT SITE OF SURGICAL WOUND: ICD-10-CM

## 2025-03-18 LAB
ALBUMIN SERPL-MCNC: 3 G/DL (ref 3.4–4.8)
ALBUMIN/GLOB SERPL: 0.8 RATIO (ref 1.1–2)
ALP SERPL-CCNC: 86 UNIT/L (ref 40–150)
ALT SERPL-CCNC: 10 UNIT/L (ref 0–55)
ANION GAP SERPL CALC-SCNC: 9 MEQ/L
ANISOCYTOSIS BLD QL SMEAR: ABNORMAL
APTT PPP: 26.6 SECONDS (ref 24.2–35.9)
AST SERPL-CCNC: 24 UNIT/L (ref 5–34)
BACTERIA #/AREA URNS AUTO: ABNORMAL /HPF
BASOPHILS # BLD AUTO: 0.04 X10(3)/MCL
BASOPHILS NFR BLD AUTO: 0.5 %
BILIRUB SERPL-MCNC: 0.4 MG/DL
BILIRUB UR QL STRIP.AUTO: NEGATIVE
BNP BLD-MCNC: 50.4 PG/ML
BUN SERPL-MCNC: 23 MG/DL (ref 9.8–20.1)
C DIFF TOX A+B STL QL IA: NEGATIVE
CALCIUM SERPL-MCNC: 8.8 MG/DL (ref 8.4–10.2)
CHLORIDE SERPL-SCNC: 109 MMOL/L (ref 98–107)
CLARITY UR: CLEAR
CLOSTRIDIUM DIFFICILE GDH ANTIGEN (OHS): NEGATIVE
CO2 SERPL-SCNC: 23 MMOL/L (ref 23–31)
COLOR STL: NORMAL
COLOR UR AUTO: YELLOW
CONSISTENCY STL: NORMAL
CREAT SERPL-MCNC: 0.85 MG/DL (ref 0.55–1.02)
CREAT/UREA NIT SERPL: 27
EOSINOPHIL # BLD AUTO: 0.31 X10(3)/MCL (ref 0–0.9)
EOSINOPHIL NFR BLD AUTO: 4.2 %
ERYTHROCYTE [DISTWIDTH] IN BLOOD BY AUTOMATED COUNT: 22.2 % (ref 11.5–17)
GFR SERPLBLD CREATININE-BSD FMLA CKD-EPI: >60 ML/MIN/1.73/M2
GLOBULIN SER-MCNC: 3.6 GM/DL (ref 2.4–3.5)
GLUCOSE SERPL-MCNC: 96 MG/DL (ref 82–115)
GLUCOSE UR QL STRIP: NEGATIVE
HCT VFR BLD AUTO: 32.1 % (ref 37–47)
HEMOCCULT SP1 STL QL: NEGATIVE
HGB BLD-MCNC: 10.1 G/DL (ref 12–16)
HGB UR QL STRIP: NEGATIVE
HYPOCHROMIA BLD QL SMEAR: ABNORMAL
IMM GRANULOCYTES # BLD AUTO: 0.05 X10(3)/MCL (ref 0–0.04)
IMM GRANULOCYTES NFR BLD AUTO: 0.7 %
INR PPP: 1.1
KETONES UR QL STRIP: NEGATIVE
LACTATE SERPL-SCNC: 1.1 MMOL/L (ref 0.5–2.2)
LEUKOCYTE ESTERASE UR QL STRIP: NEGATIVE
LIPASE SERPL-CCNC: 36 U/L
LYMPHOCYTES # BLD AUTO: 1.28 X10(3)/MCL (ref 0.6–4.6)
LYMPHOCYTES NFR BLD AUTO: 17.3 %
MCH RBC QN AUTO: 25.2 PG (ref 27–31)
MCHC RBC AUTO-ENTMCNC: 31.5 G/DL (ref 33–36)
MCV RBC AUTO: 80 FL (ref 80–94)
MONOCYTES # BLD AUTO: 0.73 X10(3)/MCL (ref 0.1–1.3)
MONOCYTES NFR BLD AUTO: 9.9 %
NEUTROPHILS # BLD AUTO: 5 X10(3)/MCL (ref 2.1–9.2)
NEUTROPHILS NFR BLD AUTO: 67.4 %
NITRITE UR QL STRIP: NEGATIVE
NRBC BLD AUTO-RTO: 0 %
OHS QRS DURATION: 70 MS
OHS QTC CALCULATION: 473 MS
PH UR STRIP: 6 [PH]
PLATELET # BLD AUTO: 372 X10(3)/MCL (ref 130–400)
PLATELET # BLD EST: ADEQUATE 10*3/UL
PMV BLD AUTO: 10.3 FL (ref 7.4–10.4)
POTASSIUM SERPL-SCNC: 4.3 MMOL/L (ref 3.5–5.1)
PROT SERPL-MCNC: 6.6 GM/DL (ref 5.8–7.6)
PROT UR QL STRIP: ABNORMAL
PROTHROMBIN TIME: 14.7 SECONDS (ref 12.4–14.9)
RBC # BLD AUTO: 4.01 X10(6)/MCL (ref 4.2–5.4)
RBC #/AREA URNS AUTO: ABNORMAL /HPF
RBC MORPH BLD: ABNORMAL
SODIUM SERPL-SCNC: 141 MMOL/L (ref 136–145)
SP GR UR STRIP.AUTO: 1.02 (ref 1–1.03)
SQUAMOUS #/AREA URNS AUTO: ABNORMAL /HPF
TROPONIN I SERPL-MCNC: <0.01 NG/ML (ref 0–0.04)
UROBILINOGEN UR STRIP-ACNC: 0.2
WBC # BLD AUTO: 7.41 X10(3)/MCL (ref 4.5–11.5)
WBC #/AREA URNS AUTO: ABNORMAL /HPF
WRIGHT STN SPEC: NORMAL
YEAST UR QL AUTO: ABNORMAL /HPF

## 2025-03-18 PROCEDURE — 87507 IADNA-DNA/RNA PROBE TQ 12-25: CPT | Performed by: INTERNAL MEDICINE

## 2025-03-18 PROCEDURE — 99285 EMERGENCY DEPT VISIT HI MDM: CPT | Mod: 25

## 2025-03-18 PROCEDURE — A4216 STERILE WATER/SALINE, 10 ML: HCPCS | Performed by: INTERNAL MEDICINE

## 2025-03-18 PROCEDURE — 80053 COMPREHEN METABOLIC PANEL: CPT | Performed by: INTERNAL MEDICINE

## 2025-03-18 PROCEDURE — 87040 BLOOD CULTURE FOR BACTERIA: CPT | Performed by: INTERNAL MEDICINE

## 2025-03-18 PROCEDURE — 85610 PROTHROMBIN TIME: CPT | Performed by: INTERNAL MEDICINE

## 2025-03-18 PROCEDURE — 85025 COMPLETE CBC W/AUTO DIFF WBC: CPT | Performed by: INTERNAL MEDICINE

## 2025-03-18 PROCEDURE — 86318 IA INFECTIOUS AGENT ANTIBODY: CPT | Performed by: INTERNAL MEDICINE

## 2025-03-18 PROCEDURE — 93010 ELECTROCARDIOGRAM REPORT: CPT | Mod: ,,, | Performed by: INTERNAL MEDICINE

## 2025-03-18 PROCEDURE — 94761 N-INVAS EAR/PLS OXIMETRY MLT: CPT

## 2025-03-18 PROCEDURE — 93005 ELECTROCARDIOGRAM TRACING: CPT

## 2025-03-18 PROCEDURE — 96361 HYDRATE IV INFUSION ADD-ON: CPT

## 2025-03-18 PROCEDURE — 82272 OCCULT BLD FECES 1-3 TESTS: CPT | Performed by: INTERNAL MEDICINE

## 2025-03-18 PROCEDURE — 81003 URINALYSIS AUTO W/O SCOPE: CPT | Performed by: INTERNAL MEDICINE

## 2025-03-18 PROCEDURE — 63600175 PHARM REV CODE 636 W HCPCS: Performed by: INTERNAL MEDICINE

## 2025-03-18 PROCEDURE — 83880 ASSAY OF NATRIURETIC PEPTIDE: CPT | Performed by: INTERNAL MEDICINE

## 2025-03-18 PROCEDURE — 84484 ASSAY OF TROPONIN QUANT: CPT | Performed by: INTERNAL MEDICINE

## 2025-03-18 PROCEDURE — 83605 ASSAY OF LACTIC ACID: CPT | Performed by: INTERNAL MEDICINE

## 2025-03-18 PROCEDURE — 51702 INSERT TEMP BLADDER CATH: CPT

## 2025-03-18 PROCEDURE — 27000207 HC ISOLATION

## 2025-03-18 PROCEDURE — 25000003 PHARM REV CODE 250: Performed by: SURGERY

## 2025-03-18 PROCEDURE — 25500020 PHARM REV CODE 255: Performed by: INTERNAL MEDICINE

## 2025-03-18 PROCEDURE — 87205 SMEAR GRAM STAIN: CPT | Performed by: SURGERY

## 2025-03-18 PROCEDURE — 11000001 HC ACUTE MED/SURG PRIVATE ROOM

## 2025-03-18 PROCEDURE — 25000003 PHARM REV CODE 250: Performed by: INTERNAL MEDICINE

## 2025-03-18 PROCEDURE — 21400001 HC TELEMETRY ROOM

## 2025-03-18 PROCEDURE — 96360 HYDRATION IV INFUSION INIT: CPT

## 2025-03-18 PROCEDURE — 83690 ASSAY OF LIPASE: CPT | Performed by: INTERNAL MEDICINE

## 2025-03-18 PROCEDURE — 63600175 PHARM REV CODE 636 W HCPCS: Performed by: SURGERY

## 2025-03-18 PROCEDURE — 85730 THROMBOPLASTIN TIME PARTIAL: CPT | Performed by: INTERNAL MEDICINE

## 2025-03-18 RX ORDER — ONDANSETRON HYDROCHLORIDE 2 MG/ML
4 INJECTION, SOLUTION INTRAVENOUS EVERY 8 HOURS PRN
Status: DISCONTINUED | OUTPATIENT
Start: 2025-03-18 | End: 2025-03-21 | Stop reason: HOSPADM

## 2025-03-18 RX ORDER — DIATRIZOATE MEGLUMINE AND DIATRIZOATE SODIUM 660; 100 MG/ML; MG/ML
30 SOLUTION ORAL; RECTAL
Status: COMPLETED | OUTPATIENT
Start: 2025-03-18 | End: 2025-03-18

## 2025-03-18 RX ORDER — CEFTRIAXONE 1 G/1
1 INJECTION, POWDER, FOR SOLUTION INTRAMUSCULAR; INTRAVENOUS
Status: DISCONTINUED | OUTPATIENT
Start: 2025-03-18 | End: 2025-03-19

## 2025-03-18 RX ORDER — MUPIROCIN 20 MG/G
OINTMENT TOPICAL 2 TIMES DAILY
Status: DISCONTINUED | OUTPATIENT
Start: 2025-03-18 | End: 2025-03-21 | Stop reason: HOSPADM

## 2025-03-18 RX ORDER — SODIUM CHLORIDE 0.9 % (FLUSH) 0.9 %
10 SYRINGE (ML) INJECTION EVERY 8 HOURS
Status: DISCONTINUED | OUTPATIENT
Start: 2025-03-18 | End: 2025-03-21 | Stop reason: HOSPADM

## 2025-03-18 RX ORDER — DEXTROSE MONOHYDRATE AND SODIUM CHLORIDE 5; .9 G/100ML; G/100ML
INJECTION, SOLUTION INTRAVENOUS CONTINUOUS
Status: DISCONTINUED | OUTPATIENT
Start: 2025-03-18 | End: 2025-03-21 | Stop reason: HOSPADM

## 2025-03-18 RX ORDER — ACETAMINOPHEN 325 MG/1
650 TABLET ORAL EVERY 8 HOURS PRN
Status: DISCONTINUED | OUTPATIENT
Start: 2025-03-18 | End: 2025-03-21 | Stop reason: HOSPADM

## 2025-03-18 RX ORDER — SODIUM CHLORIDE 450 MG/100ML
1000 INJECTION, SOLUTION INTRAVENOUS CONTINUOUS
Status: DISCONTINUED | OUTPATIENT
Start: 2025-03-18 | End: 2025-03-18

## 2025-03-18 RX ORDER — CLINDAMYCIN PHOSPHATE 900 MG/50ML
900 INJECTION, SOLUTION INTRAVENOUS
Status: DISCONTINUED | OUTPATIENT
Start: 2025-03-18 | End: 2025-03-21 | Stop reason: HOSPADM

## 2025-03-18 RX ORDER — LEVOTHYROXINE SODIUM 100 UG/1
100 TABLET ORAL
COMMUNITY

## 2025-03-18 RX ORDER — IOPAMIDOL 755 MG/ML
100 INJECTION, SOLUTION INTRAVASCULAR
Status: COMPLETED | OUTPATIENT
Start: 2025-03-18 | End: 2025-03-18

## 2025-03-18 RX ADMIN — CLINDAMYCIN PHOSPHATE 900 MG: 900 INJECTION, SOLUTION INTRAVENOUS at 05:03

## 2025-03-18 RX ADMIN — DEXTROSE AND SODIUM CHLORIDE: 5; 900 INJECTION, SOLUTION INTRAVENOUS at 05:03

## 2025-03-18 RX ADMIN — CEFTRIAXONE SODIUM 1 G: 1 INJECTION, POWDER, FOR SOLUTION INTRAMUSCULAR; INTRAVENOUS at 04:03

## 2025-03-18 RX ADMIN — DEXTROSE AND SODIUM CHLORIDE: 5; 900 INJECTION, SOLUTION INTRAVENOUS at 09:03

## 2025-03-18 RX ADMIN — IOPAMIDOL 100 ML: 755 INJECTION, SOLUTION INTRAVENOUS at 02:03

## 2025-03-18 RX ADMIN — DIATRIZOATE MEGLUMINE AND DIATRIZOATE SODIUM 30 ML: 660; 100 LIQUID ORAL; RECTAL at 02:03

## 2025-03-18 RX ADMIN — MUPIROCIN 1 G: 20 OINTMENT TOPICAL at 09:03

## 2025-03-18 RX ADMIN — Medication 10 ML: at 09:03

## 2025-03-18 RX ADMIN — SODIUM CHLORIDE, POTASSIUM CHLORIDE, SODIUM LACTATE AND CALCIUM CHLORIDE 1000 ML: 600; 310; 30; 20 INJECTION, SOLUTION INTRAVENOUS at 09:03

## 2025-03-18 NOTE — ED PROVIDER NOTES
Encounter Date: 3/18/2025  History from patient     History     Chief Complaint   Patient presents with    Hypotension     C/o hypotension this morning. Pt from [facility]. Pt states that she feels bad, just weak and tired and that her colostomy reversal site keeps bleeding. Pt does not remember what date she had the procedure     HPI    Ashley Calvillo is 79 y.o. 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 arrives in ER with c/o Hypotension (C/o hypotension this morning. Pt from [facility]. Pt states that she feels bad, just weak and tired and that her colostomy reversal site keeps bleeding. Pt does not remember what date she had the procedure)    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    CVA (cerebral vascular accident)     Depression     Dysphagia     HTN (hypertension)     Hypothyroidism, unspecified     Iron deficiency anemia, unspecified     Mixed hyperlipidemia     Squamous cell carcinoma in situ     Tonsillar cancer     Urinary tract infection, site not specified      Past Surgical History:   Procedure Laterality Date    CLOSURE, COLOSTOMY N/A 2/24/2025    Procedure: CLOSURE, COLOSTOMY;  Surgeon: GENA Matute MD;  Location: [facility] OR;  Service: General;  Laterality: N/A;  LAP ASSISSTED, CONVERTED TO OPEN AT 1400    COLECTOMY, SIGMOID N/A 7/28/2024    Procedure: COLECTOMY, SIGMOID;  Surgeon: GENA Matute MD;  Location: [facility] OR;  Service: General;  Laterality: N/A;    COLON RESECTION N/A  2/24/2025    Procedure: COLON RESECTION;  Surgeon: GENA Matute MD;  Location: [facility] OR;  Service: General;  Laterality: N/A;  SMALL BOWEL RESECTION    COLONOSCOPY, THROUGH STOMA, DIAGNOSTIC N/A 8/26/2024    Procedure: COLONOSCOPY, THROUGH STOMA, DIAGNOSTIC;  Surgeon: GENA Matute MD;  Location: [facility] ENDO;  Service: Endoscopy;  Laterality: N/A;  DIVERTICULOSIS    COLONOSCOPY, THROUGH STOMA, DIAGNOSTIC N/A 11/1/2024    Procedure: COLONOSCOPY, THROUGH STOMA, DIAGNOSTIC;  Surgeon: GENA Matute MD;  Location: [facility] ENDO;  Service: Endoscopy;  Laterality: N/A;  POST OP: NORMAL COLONOSCOPY    COLONOSCOPY, WITH DIRECTED SUBMUCOSAL INJECTION N/A 11/1/2024    Procedure: COLONOSCOPY, WITH DIRECTED SUBMUCOSAL INJECTION;  Surgeon: GENA Matute MD;  Location: [facility] ENDO;  Service: Endoscopy;  Laterality: N/A;  TATTOO INK SPOT IN RECTUM AT 12 CM (PROCTOSCOPY)    COLONOSCOPY, WITH POLYPECTOMY USING SNARE N/A 8/26/2024    Procedure: COLONOSCOPY, WITH POLYPECTOMY USING SNARE;  Surgeon: GENA Matute MD;  Location: [facility] ENDO;  Service: Endoscopy;  Laterality: N/A;  A) TRANSVERSE COLON POLYP    COLOSTOMY N/A 7/28/2024    Procedure: CREATION, COLOSTOMY;  Surgeon: GENA Matute MD;  Location: [facility] OR;  Service: General;  Laterality: N/A;    FLEXIBLE SIGMOIDOSCOPY N/A 7/26/2024    Procedure: SIGMOIDOSCOPY, FLEXIBLE;  Surgeon: GENA Matute MD;  Location: [facility] ENDO;  Service: Endoscopy;  Laterality: N/A;  POST OP:DECOMPRESSION    FLEXIBLE SIGMOIDOSCOPY N/A 2/24/2025    Procedure: SIGMOIDOSCOPY, FLEXIBLE;  Surgeon: GENA Matute MD;  Location: [facility] OR;  Service: General;  Laterality: N/A;    HYSTERECTOMY      LAPAROSCOPIC LYSIS OF ADHESIONS N/A 2/24/2025    Procedure: LYSIS, ADHESIONS, LAPAROSCOPIC;  Surgeon: GENA Matute MD;  Location: [facility];  Service: General;  Laterality: N/A;  LAPAROSCOPIC AND OPEN LYSIS OF EXTENSIVE ADHESIONS     LAPAROSCOPY N/A 7/28/2024    Procedure: LAPAROSCOPY;  Surgeon: GENA Matute MD;  Location: [facility] OR;  Service: General;  Laterality: N/A;  massively distended sigmoid colon was identified Converted to open art 1620    OMENTECTOMY N/A 2/24/2025    Procedure: OMENTECTOMY;  Surgeon: GENA Matute MD;  Location: [facility] OR;  Service: General;  Laterality: N/A;  PARTIAL OMENTECTOMY, OMENTAL PEDICLE FLAP TO ANASTOMOSIS    OOPHORECTOMY Left 2/24/2025    Procedure: OOPHORECTOMY;  Surgeon: GENA Matute MD;  Location: [facility] OR;  Service: General;  Laterality: Left;    PROCTOSCOPY N/A 11/1/2024    Procedure: PROCTOSCOPY;  Surgeon: GENA Matute MD;  Location: [facility] ENDO;  Service: Endoscopy;  Laterality: N/A;  FLEXIBLE; EVALUATION OF RECTAL STUMP THROUGH RECTUM; NORMAL RECTUM    SALPINGOOPHORECTOMY Right 2/24/2025    Procedure: SALPINGO-OOPHORECTOMY;  Surgeon: GENA Matute MD;  Location: [facility] OR;  Service: General;  Laterality: Right;    TONSILLECTOMY      TOTAL SHOULDER ARTHROPLASTY       Family History   Problem Relation Name Age of Onset    Cancer Mother      Leukemia Mother      Stroke Mother      Alzheimer's disease Father      Cancer Sister      Leukemia Sister       Social History[1]  Review of Systems   Constitutional:  Negative for fever.   HENT:  Negative for trouble swallowing and voice change.    Eyes:  Negative for visual disturbance.   Respiratory:  Negative for cough and shortness of breath.    Cardiovascular:  Negative for chest pain.   Gastrointestinal:  Positive for abdominal pain. Negative for diarrhea and vomiting.        Colostomy closure site leaking   Genitourinary:  Negative for dysuria and hematuria.   Musculoskeletal:  Negative for back pain and gait problem.   Skin:  Negative for color change and rash.   Neurological:  Positive for weakness. Negative for headaches.   Psychiatric/Behavioral:  Negative for behavioral problems and sleep disturbance.     All other systems reviewed and are negative.      Physical Exam     Initial Vitals [03/18/25 0833]   BP Pulse Resp Temp SpO2   (!) 71/46 81 16 96.8 °F (36 °C) 96 %      MAP       --         Physical Exam    Nursing note and vitals reviewed.  Constitutional: No distress.   HENT:   Head: Atraumatic.   Cardiovascular:  Normal rate and regular rhythm.           Pulmonary/Chest: Breath sounds normal. No respiratory distress.   Abdominal: Abdomen is soft. Bowel sounds are normal.   Tenderness at the lower abdomen with the discharge from the colostomy side, she has a wound dehiscence about 2 cm.   Musculoskeletal:         General: Normal range of motion.     Neurological: She is alert and oriented to person, place, and time.   Skin: Skin is warm and dry.   Psychiatric: She has a normal mood and affect.         ED Course   Critical Care    Date/Time: 3/18/2025 4:11 PM    Performed by: Albert Gonzalez MD  Authorized by: Albert Gonzalez MD  Direct patient critical care time: 30 minutes  Consulting other physicians critical care time: 5 minutes  Total critical care time (exclusive of procedural time) : 35 minutes  Critical care was necessary to treat or prevent imminent or life-threatening deterioration of the following conditions: Hypotension with surgical site infection.  Critical care was time spent personally by me on the following activities: development of treatment plan with patient or surrogate, discussions with consultants, evaluation of patient's response to treatment, examination of patient, obtaining history from patient or surrogate, ordering and performing treatments and interventions, ordering and review of laboratory studies, ordering and review of radiographic studies, pulse oximetry, re-evaluation of patient's condition and review of old charts.        Orders Placed This Encounter    Critical Care    Blood culture #1 **CANNOT BE ORDERED STAT**    Blood culture #2 **CANNOT BE ORDERED STAT**     Clostridium Diff Toxin, A & B, EIA    X-ray Chest AP Portable    CT Abdomen Pelvis With IV Contrast Routine Oral Contrast    Comprehensive metabolic panel    CBC auto differential    Troponin I    Brain natriuretic peptide    CBC with Differential    Lactic acid, plasma    Urinalysis, Reflex to Urine Culture    APTT    Protime-INR    Lipase    Blood Smear Microscopic Exam    Urinalysis, Microscopic    CBC auto differential    Comprehensive metabolic panel    Gastrointestinal Pathogens Panel, PCR    Occult Blood, Stool 1st Specimen    Diet Clear Liquid    Vital signs    Rockewll to [place]    Rockwell Catheter Care every 12 hours    Nurse to discontinue rockwell when patient no longer meets criteria    Post Rockwell Catheter Removal Protocol    Vital signs    Cardiac Monitoring - Adult    Intake and output    Notify physician     Place sequential compression device    DNR (Do Not Resuscitate)    Oxygen Continuous    Pulse Oximetry Q4H    Cardiac monitoring strips    Cardiac monitoring strips    EKG 12-LEAD    Insert saline lock    Saline lock IV    Admit to Inpatient    lactated ringers bolus 1,974 mL    diatrizoate meglumineand-diatrizoate sodium (GASTROVIEW) solution 30 mL    iopamidoL (ISOVUE-370) injection 100 mL    sodium chloride 0.9% flush 10 mL    acetaminophen tablet 650 mg    ondansetron injection 4 mg    cefTRIAXone injection 1 g    clindamycin in D5W 900 mg/50 mL IVPB 900 mg    dextrose 5 % and 0.9 % NaCl infusion    mupirocin 2 % ointment    IP VTE HIGH RISK PATIENT    Progressive Mobility Protocol (mobilize patient to their highest level of functioning at least twice daily)    Discontinue CDiff after 48hours if specimen not collected       Labs Reviewed   COMPREHENSIVE METABOLIC PANEL - Abnormal       Result Value    Sodium 141      Potassium 4.3      Chloride 109 (*)     CO2 23      Glucose 96      Blood Urea Nitrogen 23.0 (*)     Creatinine 0.85      Calcium 8.8      Protein Total 6.6      Albumin 3.0 (*)      Globulin 3.6 (*)     Albumin/Globulin Ratio 0.8 (*)     Bilirubin Total 0.4      ALP 86      ALT 10      AST 24      eGFR >60      Anion Gap 9.0      BUN/Creatinine Ratio 27     CBC WITH DIFFERENTIAL - Abnormal    WBC 7.41      RBC 4.01 (*)     Hgb 10.1 (*)     Hct 32.1 (*)     MCV 80.0      MCH 25.2 (*)     MCHC 31.5 (*)     RDW 22.2 (*)     Platelet 372      MPV 10.3      Neut % 67.4      Lymph % 17.3      Mono % 9.9      Eos % 4.2      Basophil % 0.5      Imm Grans % 0.7      Neut # 5.00      Lymph # 1.28      Mono # 0.73      Eos # 0.31      Baso # 0.04      Imm Gran # 0.05 (*)     NRBC% 0.0     URINALYSIS, REFLEX TO URINE CULTURE - Abnormal    Color, UA Yellow      Appearance, UA Clear      Specific Gravity, UA 1.020      pH, UA 6.0      Protein, UA Trace (*)     Glucose, UA Negative      Ketones, UA Negative      Blood, UA Negative      Bilirubin, UA Negative      Urobilinogen, UA 0.2      Nitrites, UA Negative      Leukocyte Esterase, UA Negative     BLOOD SMEAR MICROSCOPIC EXAM (OLG) - Abnormal    RBC Morph Abnormal (*)     Anisocytosis 2+ (*)     Hypochromasia 2+ (*)     Platelets Adequate     URINALYSIS, MICROSCOPIC - Abnormal    Bacteria, UA None Seen      Yeast, UA Moderate (*)     RBC, UA None Seen      WBC, UA None Seen      Squamous Epithelial Cells, UA Few (*)    TROPONIN I - Normal    Troponin-I <0.010     B-TYPE NATRIURETIC PEPTIDE - Normal    Natriuretic Peptide 50.4     LACTIC ACID, PLASMA - Normal    Lactic Acid Level 1.1     APTT - Normal    PTT 26.6     PROTIME-INR - Normal    PT 14.7      INR 1.1      Narrative:     Protimes are used to monitor anticoagulant agents such as warfarin. PT INR values are based on the current patient normal mean and the SERGIO value for the specific instrument reagent used.  **Routine theraputic target values for the INR are 2.0-3.0**   LIPASE - Normal    Lipase Level 36     BLOOD CULTURE [facility]LG   BLOOD CULTURE [facility]   CLOSTRIDIUM DIFFICILE TOXIN A AND B, EIA   CBC W/  AUTO DIFFERENTIAL    Narrative:     The following orders were created for panel order CBC auto differential.  Procedure                               Abnormality         Status                     ---------                               -----------         ------                     CBC with Differential[[id number]]       Abnormal            Final result                 Please view results for these tests on the individual orders.   GASTROINTESTINAL PATHOGENS PANEL, PCR   OCCULT BLOOD, STOOL 1ST SPECIMEN        ECG Results              EKG 12-LEAD (Final result)        Collection Time Result Time QRS Duration [facility] QTC Calculation    03/18/25 08:50:54 03/18/25 12:52:03 70 473                     Wet Read by Albert Gonzalez MD (03/18/25 12:52:38, [facility] - Emergency Dept, Emergency Medicine)    EKG: Independently reviewed and / or Interpreted by Albert Gonzalez MD. independently as Normal Sinus Rhythm, Rate 77, Normal Axis, Normal Intervals., Nonspecific ST changes, Non Specific T wave Changes, No STEMI, tremor in the baseline                       Final result by Interface, Lab In [facility] (03/18/25 11:49:55)                   Narrative:    Test Reason : I95.9,    Vent. Rate :  77 BPM     Atrial Rate :  77 BPM     P-R Int : 138 ms          QRS Dur :  70 ms      QT Int : 418 ms       P-R-T Axes :  -4  32  37 degrees    QTcB Int : 473 ms    Normal sinus rhythm  Nonspecific ST and T wave abnormality  Abnormal ECG  When compared with ECG of 11-Mar-2025 20:15,  Sinus rhythm has replaced Junctional rhythm  Nonspecific T wave abnormality, improved in Anterior-lateral leads  QT has shortened  Confirmed by Luiz Lamb ([id number]) on 3/18/2025 11:49:45 AM    Referred By: AAAREFERRAL SELF           Confirmed By: Luiz Lamb                                  Imaging Results              CT Abdomen Pelvis With IV Contrast Routine Oral Contrast (Final result)  Result time 03/18/25 15:44:33      Final result by  Kevin Vasquez MD (03/18/25 15:44:33)                   Impression:      1. Interim removal of drainage catheters from the right pelvis and anterior left abdominal wall with residual fluid collection at the anterior left abdominal wall subcutaneous fat with surrounding a enhancing wall suggesting abscess measuring up to 9 x 3 x 3 cm  2. Rectosigmoid anastomosis with surrounding ill-defined fluid and edema again identified in the perirectal region  3. Medina catheter and gas and small amount of fluid present within the bladder  4. Suspect ill-defined mucosal thickening at a cluster of loops of small bowel in the anterior lower pelvis.  5. Extensive atherosclerosis  6. Findings and other details as above      Electronically signed by: Kevin Vasquez  Date:    03/18/2025  Time:    15:44               Narrative:    EXAMINATION:  CT ABDOMEN PELVIS WITH IV CONTRAST    CLINICAL HISTORY:  Abdominal abscess/infection suspected;, .    TECHNIQUE:  PATIENT RADIATION DOSE: DLP(mGycm) : 383    As per PQRS measures, all CT scans at this facility used dose modulation, iterative reconstruction, and/or weight based dose adjustment when appropriate to reduce radiation dose to as low as reasonably achievable.    COMPARISON:  02/27/2025    FINDINGS:  Serial axial images were obtained through the abdomen and pelvis with the administration of  IV contrast. Coronal and sagittal reconstructions where also obtained. Degenerative changes are noted to the thoracolumbar spine.  There is heterogeneity of bone trabecula suggesting osteopenia.  The heart is enlarged.  Atelectasis and/or scarring is evident the lung bases.  The liver, spleen, adrenal glands, and pancreas are grossly within normal limits.  The gallbladder is nondistended.  The stomach is distended with with oral contrast.  There is mucosal thickening at a small hiatus hernia.  Atherosclerosis is seen within the aorta and branching vessels.  The kidneys are relatively symmetric in  size.  No hydronephrosis is seen.  There is a 1.6 cm cyst at the mid right kidney.  A 1.6 cm exophytic cyst noted to the anterior lower left kidney.  No periaortic or pericaval lymphadenopathy is identified.  No dilated loops of bowel are seen.  Oral contrast seen within the stomach and small bowel.  Gas and feces and fluid are present within the colon.  There is interim removal of the right drainage catheter from the right pelvis since the prior exam.  There is interim removal of drainage catheter from the subcutaneous fat at the left lower abdomen since the prior exam.  There is a residual lobulated/loculated fluid collection extending from the skin surface to the abdominal wall measuring approximately 9 x 3 x 3 cm.  There is surrounding edema/stranding.  There is a mildly enhancing surrounding wall.  There is gas and fluid at the rectum.  Anastomotic sutures are evident at the rectosigmoid region.  A Medina catheter is present within the bladder the small amount of air.  The left side of the colon is surgically absent.  There is a ovoid small fluid density at the lateral left mid abdomen measuring 1.7 cm.  There is again ill-defined fluid and soft tissue density at the perirectal space.  There is interim resolution of previous small blebs of gas within the a abdomen and subcu fat since the prior exam.  There is suspect ill-defined mucosal thickening at a cluster of loops of small bowel in the lower pelvis.                                       X-ray Chest AP Portable (Final result)  Result time 03/18/25 12:09:52      Final result by Kevin Vasquez MD (03/18/25 12:09:52)                   Impression:      1. No active cardiopulmonary disease identified      Electronically signed by: Kevin Vasquez  Date:    03/18/2025  Time:    12:09               Narrative:    EXAMINATION:  XR CHEST AP PORTABLE    CLINICAL HISTORY:  Hypotension;, .    COMPARISON:  03/11/2025    FINDINGS:  An AP view or more reveals the heart to  be normal in size.  The trachea is midline.  Atherosclerosis seen within a tortuous thoracic aorta.  No infiltrate or effusion is seen.  A left shoulder prosthetic is present.  Bony structures are osteopenic.  Degenerative changes are evident at the thoracic spine.                                       Medications   sodium chloride 0.9% flush 10 mL (has no administration in time range)   acetaminophen tablet 650 mg (has no administration in time range)   ondansetron injection 4 mg (has no administration in time range)   cefTRIAXone injection 1 g (1 g Intravenous Given 3/18/25 1641)   clindamycin in D5W 900 mg/50 mL IVPB 900 mg (has no administration in time range)   dextrose 5 % and 0.9 % NaCl infusion (has no administration in time range)   mupirocin 2 % ointment (has no administration in time range)   lactated ringers bolus 1,974 mL (0 mLs Intravenous Stopped 3/18/25 1153)   diatrizoate meglumineand-diatrizoate sodium (GASTROVIEW) solution 30 mL (30 mLs Oral Given 3/18/25 1437)   iopamidoL (ISOVUE-370) injection 100 mL (100 mLs Intravenous Given 3/18/25 1437)     Medical Decision Making  Patient is essentially sent from the nursing home with a hypotension, they actually sent her in a wheelchair and on arrival in the emergency room her blood pressure was in 70s, she does not have any particular diarrhea nausea or vomiting, but she says her colostomy closure site is leaking.  She does report some abdominal pain    On examination patient does have hypotension, her heart rate is normal, O2 sat is normal, she does have mild diffuse tenderness, the colostomy site has some wound dehiscence about 2 cm, she has bloody and dark brown drainage from the site.  She has a dressing on it, she has a about 2 in monika on the 4 x 4 dressing but it is not soaked.    I am going to give her 30 mL/kg bolus, I will draw blood cultures lactic acid, I will do blood work and I will essentially have to get a CT scan done possibly with IV  contrast to look if she has bleeding in the abdominal cavity or if she has some infection inflammatory signs going on which is causing her to be hypotensive.        Amount and/or Complexity of Data Reviewed  Labs: ordered.  Radiology: ordered.    Risk  OTC drugs.  Prescription drug management.  Decision regarding hospitalization.               ED Course as of 03/18/25 1723   Tue Mar 18, 2025   0915 BP: 97/61 [GQ]   0915 MAP (mmHg): 71  We started giving her 30 mL/kg Ringer's lactate bolus, her blood pressure has come up to 97/61 with a map of 71.  She does have an elevated BUN of 23, cardiac enzymes are normal, lipase is normal, white count is normal, her H&H is in safe range around 10 and 32,  [GQ]   1251 Patient's blood pressure has been maintained since arrival after getting that fluid bolus, workup on it reveals some dehydration rest of the workup is essentially negative, cardiac workup is negative.  I will do a CT scan of the abdomen and pelvis with oral and IV contrast as she has that leaking colostomy site. [GQ]   1605 Patient's blood pressure has been maintained since she got 30 mL/kg bolus, actually went up in his in normal range now, her BNP is normal, cardiac workup is normal, CT of the abdomen shows that she has a collection of 9 x 3 x 3 cm in the area of the colostomy, I talked to Dr. Rudd's, recommends putting her in the hospital giving her IV fluids, clear liquid, putting her on Rocephin 1 g q.12 and clindamycin 900 q.6 and will see the patient. [GQ]   1644 Patient had a big Soft bowel movement in the emergency room, we are going to send the stool workup on that. [GQ]      ED Course User Index  [GQ] Albert Gonzalez MD                           Clinical Impression:  Final diagnoses:  [I95.9] Hypotension  [E86.0] Dehydration (Primary)  [T81.49XA] Abdominal wall abscess at site of surgical wound          ED Disposition Condition    Admit Stable                        Albert Gonzalez,  MD  03/18/25 1612       Albert Gonzalez MD  03/18/25 1645         [1]   Social History  Tobacco Use    Smoking status: Former     Types: Cigarettes    Smokeless tobacco: Never    Tobacco comments:     patient stated that she smokes on some days.    Substance Use Topics    Alcohol use: Never     Comment: wine, occasionally    Drug use: Never        Albert Gonzalez MD  03/18/25 3764

## 2025-03-18 NOTE — DISCHARGE INSTRUCTIONS
Discharge on   Rifampin 300 mg po BID x10 days  Cipro 500 mg po BID x10 days     Patient must be seen by patient's primary care M.D. for follow-up and further treatment as needed.     Inform patients Primary Care physician about the ER visit and need for follow up.    Get medicines and orders from the emergency room approved by patient's primary care M.D.    The exam and treatment you received in Emergency Room was for an urgent problem and NOT INTENDED AS COMPLETE CARE. It is important that you FOLLOW UP with a doctor for ongoing care. If your symptoms become WORSE or you DO NOT IMPROVE and you are unable to reach your health care provider, you should RETURN to the emergency department. The Emergency Room doctor has provided a PRELIMINARY INTERPRETATION of all your STUDIES. A final interpretation may be done after you are discharged. IF A CHANGE in your diagnosis or treatment is needed WE WILL CONTACT YOU. It is critical that we have a CURRENT PHONE NUMBER FOR YOU.

## 2025-03-19 LAB
ADV 40+41 DNA STL QL NAA+NON-PROBE: NOT DETECTED
ALBUMIN SERPL-MCNC: 2.6 G/DL (ref 3.4–4.8)
ALBUMIN/GLOB SERPL: 0.9 RATIO (ref 1.1–2)
ALP SERPL-CCNC: 74 UNIT/L (ref 40–150)
ALT SERPL-CCNC: <5 UNIT/L (ref 0–55)
ANION GAP SERPL CALC-SCNC: 9 MEQ/L
AST SERPL-CCNC: 15 UNIT/L (ref 5–34)
ASTRO TYP 1-8 RNA STL QL NAA+NON-PROBE: NOT DETECTED
BASOPHILS # BLD AUTO: 0.03 X10(3)/MCL
BASOPHILS NFR BLD AUTO: 0.5 %
BILIRUB SERPL-MCNC: 0.3 MG/DL
BUN SERPL-MCNC: 11 MG/DL (ref 9.8–20.1)
C CAYETANENSIS DNA STL QL NAA+NON-PROBE: NOT DETECTED
C COLI+JEJ+UPSA DNA STL QL NAA+NON-PROBE: NOT DETECTED
CALCIUM SERPL-MCNC: 7.9 MG/DL (ref 8.4–10.2)
CHLORIDE SERPL-SCNC: 108 MMOL/L (ref 98–107)
CO2 SERPL-SCNC: 23 MMOL/L (ref 23–31)
CREAT SERPL-MCNC: 0.77 MG/DL (ref 0.55–1.02)
CREAT/UREA NIT SERPL: 14
CRYPTOSP DNA STL QL NAA+NON-PROBE: NOT DETECTED
E HISTOLYT DNA STL QL NAA+NON-PROBE: NOT DETECTED
EAEC PAA PLAS AGGR+AATA ST NAA+NON-PRB: NOT DETECTED
EC STX1+STX2 GENES STL QL NAA+NON-PROBE: NOT DETECTED
EOSINOPHIL # BLD AUTO: 0.17 X10(3)/MCL (ref 0–0.9)
EOSINOPHIL NFR BLD AUTO: 2.9 %
EPEC EAE GENE STL QL NAA+NON-PROBE: NOT DETECTED
ERYTHROCYTE [DISTWIDTH] IN BLOOD BY AUTOMATED COUNT: 21.8 % (ref 11.5–17)
EST. AVERAGE GLUCOSE BLD GHB EST-MCNC: 102.5 MG/DL
ETEC LTA+ST1A+ST1B TOX ST NAA+NON-PROBE: NOT DETECTED
G LAMBLIA DNA STL QL NAA+NON-PROBE: NOT DETECTED
GFR SERPLBLD CREATININE-BSD FMLA CKD-EPI: >60 ML/MIN/1.73/M2
GLOBULIN SER-MCNC: 2.9 GM/DL (ref 2.4–3.5)
GLUCOSE SERPL-MCNC: 113 MG/DL (ref 82–115)
HBA1C MFR BLD: 5.2 %
HCT VFR BLD AUTO: 27.6 % (ref 37–47)
HGB BLD-MCNC: 8.8 G/DL (ref 12–16)
IMM GRANULOCYTES # BLD AUTO: 0.04 X10(3)/MCL (ref 0–0.04)
IMM GRANULOCYTES NFR BLD AUTO: 0.7 %
LYMPHOCYTES # BLD AUTO: 0.89 X10(3)/MCL (ref 0.6–4.6)
LYMPHOCYTES NFR BLD AUTO: 14.9 %
MAGNESIUM SERPL-MCNC: 1.7 MG/DL (ref 1.6–2.6)
MCH RBC QN AUTO: 25.3 PG (ref 27–31)
MCHC RBC AUTO-ENTMCNC: 31.9 G/DL (ref 33–36)
MCV RBC AUTO: 79.3 FL (ref 80–94)
MONOCYTES # BLD AUTO: 0.71 X10(3)/MCL (ref 0.1–1.3)
MONOCYTES NFR BLD AUTO: 11.9 %
NEUTROPHILS # BLD AUTO: 4.12 X10(3)/MCL (ref 2.1–9.2)
NEUTROPHILS NFR BLD AUTO: 69.1 %
NOROVIRUS GI+II RNA STL QL NAA+NON-PROBE: NOT DETECTED
NRBC BLD AUTO-RTO: 0 %
P SHIGELLOIDES DNA STL QL NAA+NON-PROBE: NOT DETECTED
PHOSPHATE SERPL-MCNC: 3 MG/DL (ref 2.3–4.7)
PLATELET # BLD AUTO: 286 X10(3)/MCL (ref 130–400)
PMV BLD AUTO: 10.7 FL (ref 7.4–10.4)
POTASSIUM SERPL-SCNC: 3.1 MMOL/L (ref 3.5–5.1)
PROT SERPL-MCNC: 5.5 GM/DL (ref 5.8–7.6)
RBC # BLD AUTO: 3.48 X10(6)/MCL (ref 4.2–5.4)
RVA RNA STL QL NAA+NON-PROBE: NOT DETECTED
S ENT+BONG DNA STL QL NAA+NON-PROBE: NOT DETECTED
SAPO I+II+IV+V RNA STL QL NAA+NON-PROBE: NOT DETECTED
SHIGELLA SP+EIEC IPAH ST NAA+NON-PROBE: NOT DETECTED
SODIUM SERPL-SCNC: 140 MMOL/L (ref 136–145)
TSH SERPL-ACNC: 0.73 UIU/ML (ref 0.35–4.94)
V CHOL+PARA+VUL DNA STL QL NAA+NON-PROBE: NOT DETECTED
V CHOLERAE DNA STL QL NAA+NON-PROBE: NOT DETECTED
WBC # BLD AUTO: 5.96 X10(3)/MCL (ref 4.5–11.5)
Y ENTEROCOL DNA STL QL NAA+NON-PROBE: NOT DETECTED

## 2025-03-19 PROCEDURE — 87077 CULTURE AEROBIC IDENTIFY: CPT | Performed by: SURGERY

## 2025-03-19 PROCEDURE — 83036 HEMOGLOBIN GLYCOSYLATED A1C: CPT | Performed by: SURGERY

## 2025-03-19 PROCEDURE — 3E10X8Z IRRIGATION OF SKIN AND MUCOUS MEMBRANES USING IRRIGATING SUBSTANCE: ICD-10-PCS | Performed by: SURGERY

## 2025-03-19 PROCEDURE — 63600175 PHARM REV CODE 636 W HCPCS: Performed by: SURGERY

## 2025-03-19 PROCEDURE — 84443 ASSAY THYROID STIM HORMONE: CPT | Performed by: SURGERY

## 2025-03-19 PROCEDURE — 25000003 PHARM REV CODE 250: Performed by: INTERNAL MEDICINE

## 2025-03-19 PROCEDURE — 21400001 HC TELEMETRY ROOM

## 2025-03-19 PROCEDURE — 85025 COMPLETE CBC W/AUTO DIFF WBC: CPT | Performed by: INTERNAL MEDICINE

## 2025-03-19 PROCEDURE — 63600175 PHARM REV CODE 636 W HCPCS: Performed by: INTERNAL MEDICINE

## 2025-03-19 PROCEDURE — 94761 N-INVAS EAR/PLS OXIMETRY MLT: CPT

## 2025-03-19 PROCEDURE — 87184 SC STD DISK METHOD PER PLATE: CPT | Performed by: SURGERY

## 2025-03-19 PROCEDURE — 99900035 HC TECH TIME PER 15 MIN (STAT)

## 2025-03-19 PROCEDURE — A4216 STERILE WATER/SALINE, 10 ML: HCPCS | Performed by: INTERNAL MEDICINE

## 2025-03-19 PROCEDURE — 36415 COLL VENOUS BLD VENIPUNCTURE: CPT | Performed by: INTERNAL MEDICINE

## 2025-03-19 PROCEDURE — 25000003 PHARM REV CODE 250: Performed by: SURGERY

## 2025-03-19 PROCEDURE — 83735 ASSAY OF MAGNESIUM: CPT | Performed by: SURGERY

## 2025-03-19 PROCEDURE — 84100 ASSAY OF PHOSPHORUS: CPT | Performed by: SURGERY

## 2025-03-19 PROCEDURE — 80053 COMPREHEN METABOLIC PANEL: CPT | Performed by: INTERNAL MEDICINE

## 2025-03-19 RX ORDER — LIDOCAINE HYDROCHLORIDE 10 MG/ML
10 INJECTION, SOLUTION INFILTRATION; PERINEURAL ONCE
Status: DISCONTINUED | OUTPATIENT
Start: 2025-03-19 | End: 2025-03-21 | Stop reason: HOSPADM

## 2025-03-19 RX ORDER — LEVOTHYROXINE SODIUM 100 UG/1
100 TABLET ORAL DAILY
Status: DISCONTINUED | OUTPATIENT
Start: 2025-03-19 | End: 2025-03-21 | Stop reason: HOSPADM

## 2025-03-19 RX ORDER — CLINDAMYCIN PALMITATE HYDROCHLORIDE (PEDIATRIC) 75 MG/5ML
600 SOLUTION ORAL EVERY 6 HOURS
Status: DISCONTINUED | OUTPATIENT
Start: 2025-03-19 | End: 2025-03-19

## 2025-03-19 RX ORDER — CEFTRIAXONE 1 G/1
1 INJECTION, POWDER, FOR SOLUTION INTRAMUSCULAR; INTRAVENOUS EVERY 12 HOURS
Status: DISCONTINUED | OUTPATIENT
Start: 2025-03-19 | End: 2025-03-21 | Stop reason: HOSPADM

## 2025-03-19 RX ORDER — DOCUSATE SODIUM 100 MG/1
100 CAPSULE, LIQUID FILLED ORAL DAILY
Status: DISCONTINUED | OUTPATIENT
Start: 2025-03-19 | End: 2025-03-21 | Stop reason: HOSPADM

## 2025-03-19 RX ORDER — LIDOCAINE HYDROCHLORIDE 10 MG/ML
5 INJECTION, SOLUTION EPIDURAL; INFILTRATION; INTRACAUDAL; PERINEURAL ONCE
Status: DISCONTINUED | OUTPATIENT
Start: 2025-03-19 | End: 2025-03-19

## 2025-03-19 RX ORDER — CLOPIDOGREL BISULFATE 75 MG/1
75 TABLET ORAL DAILY
Status: DISCONTINUED | OUTPATIENT
Start: 2025-03-19 | End: 2025-03-21 | Stop reason: HOSPADM

## 2025-03-19 RX ORDER — CETIRIZINE HYDROCHLORIDE 10 MG/1
10 TABLET ORAL DAILY
Status: DISCONTINUED | OUTPATIENT
Start: 2025-03-19 | End: 2025-03-21 | Stop reason: HOSPADM

## 2025-03-19 RX ORDER — ATORVASTATIN CALCIUM 10 MG/1
10 TABLET, FILM COATED ORAL NIGHTLY
Status: DISCONTINUED | OUTPATIENT
Start: 2025-03-19 | End: 2025-03-21 | Stop reason: HOSPADM

## 2025-03-19 RX ORDER — CITALOPRAM 20 MG/1
40 TABLET, FILM COATED ORAL DAILY
Status: DISCONTINUED | OUTPATIENT
Start: 2025-03-19 | End: 2025-03-21 | Stop reason: HOSPADM

## 2025-03-19 RX ORDER — MONTELUKAST SODIUM 10 MG/1
10 TABLET ORAL NIGHTLY
Status: DISCONTINUED | OUTPATIENT
Start: 2025-03-19 | End: 2025-03-21 | Stop reason: HOSPADM

## 2025-03-19 RX ORDER — CYANOCOBALAMIN 1000 UG/ML
1000 INJECTION, SOLUTION INTRAMUSCULAR; SUBCUTANEOUS DAILY
Status: DISCONTINUED | OUTPATIENT
Start: 2025-03-19 | End: 2025-03-21 | Stop reason: HOSPADM

## 2025-03-19 RX ORDER — AMLODIPINE BESYLATE 5 MG/1
5 TABLET ORAL DAILY
Status: DISCONTINUED | OUTPATIENT
Start: 2025-03-19 | End: 2025-03-21 | Stop reason: HOSPADM

## 2025-03-19 RX ORDER — ACETAMINOPHEN 325 MG/1
650 TABLET ORAL EVERY 4 HOURS PRN
Status: DISCONTINUED | OUTPATIENT
Start: 2025-03-19 | End: 2025-03-21 | Stop reason: HOSPADM

## 2025-03-19 RX ORDER — BUPIVACAINE HYDROCHLORIDE 2.5 MG/ML
10 INJECTION, SOLUTION EPIDURAL; INFILTRATION; INTRACAUDAL; PERINEURAL ONCE
Status: DISCONTINUED | OUTPATIENT
Start: 2025-03-19 | End: 2025-03-21 | Stop reason: HOSPADM

## 2025-03-19 RX ORDER — POLYETHYLENE GLYCOL 3350 17 G/17G
17 POWDER, FOR SOLUTION ORAL DAILY
Status: DISCONTINUED | OUTPATIENT
Start: 2025-03-19 | End: 2025-03-21 | Stop reason: HOSPADM

## 2025-03-19 RX ORDER — BENZONATATE 100 MG/1
100 CAPSULE ORAL 3 TIMES DAILY PRN
Status: DISCONTINUED | OUTPATIENT
Start: 2025-03-19 | End: 2025-03-21 | Stop reason: HOSPADM

## 2025-03-19 RX ADMIN — CLINDAMYCIN PHOSPHATE 900 MG: 900 INJECTION, SOLUTION INTRAVENOUS at 10:03

## 2025-03-19 RX ADMIN — CLINDAMYCIN PHOSPHATE 900 MG: 900 INJECTION, SOLUTION INTRAVENOUS at 04:03

## 2025-03-19 RX ADMIN — DEXTROSE AND SODIUM CHLORIDE: 5; 900 INJECTION, SOLUTION INTRAVENOUS at 03:03

## 2025-03-19 RX ADMIN — FOLIC ACID 5 MG: 5 INJECTION, SOLUTION INTRAMUSCULAR; INTRAVENOUS; SUBCUTANEOUS at 10:03

## 2025-03-19 RX ADMIN — MUPIROCIN 1 G: 20 OINTMENT TOPICAL at 10:03

## 2025-03-19 RX ADMIN — CEFTRIAXONE SODIUM 1 G: 1 INJECTION, POWDER, FOR SOLUTION INTRAMUSCULAR; INTRAVENOUS at 09:03

## 2025-03-19 RX ADMIN — Medication 10 ML: at 10:03

## 2025-03-19 RX ADMIN — CEFTRIAXONE SODIUM 1 G: 1 INJECTION, POWDER, FOR SOLUTION INTRAMUSCULAR; INTRAVENOUS at 03:03

## 2025-03-19 RX ADMIN — DEXTROSE AND SODIUM CHLORIDE: 5; 900 INJECTION, SOLUTION INTRAVENOUS at 04:03

## 2025-03-19 RX ADMIN — Medication 10 ML: at 02:03

## 2025-03-19 RX ADMIN — CEFTRIAXONE SODIUM 1 G: 1 INJECTION, POWDER, FOR SOLUTION INTRAMUSCULAR; INTRAVENOUS at 10:03

## 2025-03-19 RX ADMIN — CLINDAMYCIN PHOSPHATE 900 MG: 900 INJECTION, SOLUTION INTRAVENOUS at 01:03

## 2025-03-19 RX ADMIN — ATORVASTATIN CALCIUM 10 MG: 10 TABLET, FILM COATED ORAL at 10:03

## 2025-03-19 RX ADMIN — THIAMINE HYDROCHLORIDE 500 MG: 100 INJECTION, SOLUTION INTRAMUSCULAR; INTRAVENOUS at 10:03

## 2025-03-19 RX ADMIN — MONTELUKAST 10 MG: 10 TABLET, FILM COATED ORAL at 10:03

## 2025-03-19 RX ADMIN — SODIUM CHLORIDE 125 MG: 9 INJECTION, SOLUTION INTRAVENOUS at 06:03

## 2025-03-19 NOTE — NURSING
Received report from HS nurse that patient would have an I/D of abdominal abscess. Family was notified per HS charge nurse and attempted to call daughter Rema at 0812 with no answer. NO call back received, but came @ 1315 to visit and get an update on her. She is aware of above noted and states that Mrs. Calvillo is able to sign consents for herself. Remains NPO except meds.

## 2025-03-19 NOTE — PLAN OF CARE
Problem: Skin Injury Risk Increased  Goal: Skin Health and Integrity  Outcome: Progressing     Problem: Infection  Goal: Absence of Infection Signs and Symptoms  Outcome: Progressing     Problem: Adult Inpatient Plan of Care  Goal: Plan of Care Review  Outcome: Progressing  Goal: Patient-Specific Goal (Individualized)  Outcome: Progressing  Goal: Absence of Hospital-Acquired Illness or Injury  Outcome: Progressing  Goal: Optimal Comfort and Wellbeing  Outcome: Progressing  Goal: Readiness for Transition of Care  Outcome: Progressing     Problem: Acute Kidney Injury/Impairment  Goal: Fluid and Electrolyte Balance  Outcome: Progressing  Goal: Improved Oral Intake  Outcome: Progressing  Goal: Effective Renal Function  Outcome: Progressing     Problem: Acute Kidney Injury/Impairment  Goal: Fluid and Electrolyte Balance  Outcome: Progressing  Goal: Improved Oral Intake  Outcome: Progressing  Goal: Effective Renal Function  Outcome: Progressing     Problem: Wound  Goal: Optimal Coping  Outcome: Progressing  Goal: Optimal Functional Ability  Outcome: Progressing  Goal: Absence of Infection Signs and Symptoms  Outcome: Progressing  Goal: Improved Oral Intake  Outcome: Progressing  Goal: Optimal Pain Control and Function  Outcome: Progressing  Goal: Skin Health and Integrity  Outcome: Progressing  Goal: Optimal Wound Healing  Outcome: Progressing     Problem: Fall Injury Risk  Goal: Absence of Fall and Fall-Related Injury  Outcome: Progressing     Problem: Comorbidity Management  Goal: Blood Pressure in Desired Range  Outcome: Progressing

## 2025-03-19 NOTE — PLAN OF CARE
Problem: Skin Injury Risk Increased  Goal: Skin Health and Integrity  Outcome: Progressing     Problem: Infection  Goal: Absence of Infection Signs and Symptoms  Outcome: Progressing     Problem: Adult Inpatient Plan of Care  Goal: Plan of Care Review  Outcome: Progressing  Goal: Patient-Specific Goal (Individualized)  Outcome: Progressing  Goal: Absence of Hospital-Acquired Illness or Injury  Outcome: Progressing  Goal: Optimal Comfort and Wellbeing  Outcome: Progressing  Goal: Readiness for Transition of Care  Outcome: Progressing     Problem: Acute Kidney Injury/Impairment  Goal: Fluid and Electrolyte Balance  Outcome: Progressing  Goal: Improved Oral Intake  Outcome: Progressing  Goal: Effective Renal Function  Outcome: Progressing

## 2025-03-19 NOTE — PLAN OF CARE
03/19/25 1217   Discharge Assessment   Assessment Type Discharge Planning Assessment   Source of Information health record;patient   People in Home facility resident   Facility Arrived From: [facility]ident of [facility]   Do you expect to return to your current living situation? Yes   Are you on dialysis? No   Do you take coumadin? No   Discharge Plan A Return to nursing home   Discharge Plan B Skilled Nursing Facility   DME Needed Upon Discharge  none   Discharge Plan discussed with: Patient   Transition of Care Barriers None   Physical Activity   On average, how many days per week do you engage in moderate to strenuous exercise (like a brisk walk)? 0 days   On average, how many minutes do you engage in exercise at this level? 0 min   Financial Resource Strain   How hard is it for you to pay for the very basics like food, housing, medical care, and heating? Not very   Housing Stability   In the last 12 months, was there a time when you were not able to pay the mortgage or rent on time? N   At any time in the past 12 months, were you homeless or living in a shelter (including now)? N   Transportation Needs   Has the lack of transportation kept you from medical appointments, meetings, work or from getting things needed for daily living? No   Food Insecurity   Within the past 12 months, you worried that your food would run out before you got the money to buy more. Never true   Within the past 12 months, the food you bought just didn't last and you didn't have money to get more. Never true   Stress   Do you feel stress - tense, restless, nervous, or anxious, or unable to sleep at night because your mind is troubled all the time - these days? Not at all   Social Isolation   How often do you feel lonely or isolated from those around you?  Never   Alcohol Use   Q1: How often do you have a drink containing alcohol? Never   Q2: How many drinks containing alcohol do you have on a typical day when you are drinking? None   Q3: How  often do you have six or more drinks on one occasion? Never   Utilities   In the past 12 months has the electric, gas, oil, or water company threatened to shut off services in your home? No   Health Literacy   How often do you need to have someone help you when you read instructions, pamphlets, or other written material from your doctor or pharmacy? Sometimes

## 2025-03-20 LAB
ALBUMIN SERPL-MCNC: 2.6 G/DL (ref 3.4–4.8)
ALBUMIN/GLOB SERPL: 0.9 RATIO (ref 1.1–2)
ALP SERPL-CCNC: 71 UNIT/L (ref 40–150)
ALT SERPL-CCNC: 5 UNIT/L (ref 0–55)
ANION GAP SERPL CALC-SCNC: 10 MEQ/L
AST SERPL-CCNC: 16 UNIT/L (ref 5–34)
BASOPHILS # BLD AUTO: 0.01 X10(3)/MCL
BASOPHILS NFR BLD AUTO: 0.2 %
BILIRUB SERPL-MCNC: 0.2 MG/DL
BUN SERPL-MCNC: 7 MG/DL (ref 9.8–20.1)
CALCIUM SERPL-MCNC: 7.4 MG/DL (ref 8.4–10.2)
CHLORIDE SERPL-SCNC: 107 MMOL/L (ref 98–107)
CO2 SERPL-SCNC: 21 MMOL/L (ref 23–31)
CREAT SERPL-MCNC: 0.79 MG/DL (ref 0.55–1.02)
CREAT/UREA NIT SERPL: 9
EOSINOPHIL # BLD AUTO: 0.22 X10(3)/MCL (ref 0–0.9)
EOSINOPHIL NFR BLD AUTO: 4.2 %
ERYTHROCYTE [DISTWIDTH] IN BLOOD BY AUTOMATED COUNT: 21.4 % (ref 11.5–17)
GFR SERPLBLD CREATININE-BSD FMLA CKD-EPI: >60 ML/MIN/1.73/M2
GLOBULIN SER-MCNC: 2.8 GM/DL (ref 2.4–3.5)
GLUCOSE SERPL-MCNC: 96 MG/DL (ref 82–115)
HCT VFR BLD AUTO: 27.2 % (ref 37–47)
HGB BLD-MCNC: 8.6 G/DL (ref 12–16)
IMM GRANULOCYTES # BLD AUTO: 0.05 X10(3)/MCL (ref 0–0.04)
IMM GRANULOCYTES NFR BLD AUTO: 1 %
LYMPHOCYTES # BLD AUTO: 0.87 X10(3)/MCL (ref 0.6–4.6)
LYMPHOCYTES NFR BLD AUTO: 16.7 %
MAGNESIUM SERPL-MCNC: 1.4 MG/DL (ref 1.6–2.6)
MCH RBC QN AUTO: 25.6 PG (ref 27–31)
MCHC RBC AUTO-ENTMCNC: 31.6 G/DL (ref 33–36)
MCV RBC AUTO: 81 FL (ref 80–94)
MONOCYTES # BLD AUTO: 0.73 X10(3)/MCL (ref 0.1–1.3)
MONOCYTES NFR BLD AUTO: 14 %
NEUTROPHILS # BLD AUTO: 3.34 X10(3)/MCL (ref 2.1–9.2)
NEUTROPHILS NFR BLD AUTO: 63.9 %
PHOSPHATE SERPL-MCNC: 2.6 MG/DL (ref 2.3–4.7)
PLATELET # BLD AUTO: 214 X10(3)/MCL (ref 130–400)
PMV BLD AUTO: 10.2 FL (ref 7.4–10.4)
POTASSIUM SERPL-SCNC: 2.9 MMOL/L (ref 3.5–5.1)
PROT SERPL-MCNC: 5.4 GM/DL (ref 5.8–7.6)
RBC # BLD AUTO: 3.36 X10(6)/MCL (ref 4.2–5.4)
SODIUM SERPL-SCNC: 138 MMOL/L (ref 136–145)
WBC # BLD AUTO: 5.22 X10(3)/MCL (ref 4.5–11.5)

## 2025-03-20 PROCEDURE — 84100 ASSAY OF PHOSPHORUS: CPT | Performed by: SURGERY

## 2025-03-20 PROCEDURE — 85025 COMPLETE CBC W/AUTO DIFF WBC: CPT | Performed by: SURGERY

## 2025-03-20 PROCEDURE — 63600175 PHARM REV CODE 636 W HCPCS: Performed by: SURGERY

## 2025-03-20 PROCEDURE — 83735 ASSAY OF MAGNESIUM: CPT | Performed by: SURGERY

## 2025-03-20 PROCEDURE — 25000003 PHARM REV CODE 250: Performed by: SURGERY

## 2025-03-20 PROCEDURE — A4216 STERILE WATER/SALINE, 10 ML: HCPCS | Performed by: INTERNAL MEDICINE

## 2025-03-20 PROCEDURE — 21400001 HC TELEMETRY ROOM

## 2025-03-20 PROCEDURE — 36415 COLL VENOUS BLD VENIPUNCTURE: CPT | Performed by: SURGERY

## 2025-03-20 PROCEDURE — 94761 N-INVAS EAR/PLS OXIMETRY MLT: CPT

## 2025-03-20 PROCEDURE — 63600175 PHARM REV CODE 636 W HCPCS: Performed by: INTERNAL MEDICINE

## 2025-03-20 PROCEDURE — 80053 COMPREHEN METABOLIC PANEL: CPT | Performed by: SURGERY

## 2025-03-20 PROCEDURE — 25000003 PHARM REV CODE 250: Performed by: INTERNAL MEDICINE

## 2025-03-20 RX ORDER — MAGNESIUM SULFATE HEPTAHYDRATE 40 MG/ML
2 INJECTION, SOLUTION INTRAVENOUS ONCE
Status: COMPLETED | OUTPATIENT
Start: 2025-03-20 | End: 2025-03-20

## 2025-03-20 RX ORDER — POTASSIUM CHLORIDE 7.45 MG/ML
10 INJECTION INTRAVENOUS ONCE
Status: COMPLETED | OUTPATIENT
Start: 2025-03-20 | End: 2025-03-20

## 2025-03-20 RX ADMIN — Medication 10 ML: at 02:03

## 2025-03-20 RX ADMIN — CLOPIDOGREL BISULFATE 75 MG: 75 TABLET ORAL at 10:03

## 2025-03-20 RX ADMIN — LEVOTHYROXINE SODIUM 100 MCG: 100 TABLET ORAL at 10:03

## 2025-03-20 RX ADMIN — MUPIROCIN 1 G: 20 OINTMENT TOPICAL at 08:03

## 2025-03-20 RX ADMIN — THIAMINE HYDROCHLORIDE 500 MG: 100 INJECTION, SOLUTION INTRAMUSCULAR; INTRAVENOUS at 10:03

## 2025-03-20 RX ADMIN — MUPIROCIN 1 G: 20 OINTMENT TOPICAL at 10:03

## 2025-03-20 RX ADMIN — CEFTRIAXONE SODIUM 1 G: 1 INJECTION, POWDER, FOR SOLUTION INTRAMUSCULAR; INTRAVENOUS at 08:03

## 2025-03-20 RX ADMIN — AMLODIPINE BESYLATE 5 MG: 5 TABLET ORAL at 09:03

## 2025-03-20 RX ADMIN — POTASSIUM PHOSPHATE, MONOBASIC 500 MG: 500 TABLET, SOLUBLE ORAL at 10:03

## 2025-03-20 RX ADMIN — CLINDAMYCIN PHOSPHATE 900 MG: 900 INJECTION, SOLUTION INTRAVENOUS at 01:03

## 2025-03-20 RX ADMIN — ATORVASTATIN CALCIUM 10 MG: 10 TABLET, FILM COATED ORAL at 08:03

## 2025-03-20 RX ADMIN — DEXTROSE AND SODIUM CHLORIDE: 5; 900 INJECTION, SOLUTION INTRAVENOUS at 04:03

## 2025-03-20 RX ADMIN — POTASSIUM PHOSPHATE, MONOBASIC AND POTASSIUM PHOSPHATE, DIBASIC 30 MMOL: 224; 236 INJECTION, SOLUTION, CONCENTRATE INTRAVENOUS at 11:03

## 2025-03-20 RX ADMIN — POLYETHYLENE GLYCOL 3350 17 G: 17 POWDER, FOR SOLUTION ORAL at 10:03

## 2025-03-20 RX ADMIN — CLINDAMYCIN PHOSPHATE 900 MG: 900 INJECTION, SOLUTION INTRAVENOUS at 04:03

## 2025-03-20 RX ADMIN — CETIRIZINE HYDROCHLORIDE 10 MG: 10 TABLET, FILM COATED ORAL at 10:03

## 2025-03-20 RX ADMIN — CEFTRIAXONE SODIUM 1 G: 1 INJECTION, POWDER, FOR SOLUTION INTRAMUSCULAR; INTRAVENOUS at 10:03

## 2025-03-20 RX ADMIN — MONTELUKAST 10 MG: 10 TABLET, FILM COATED ORAL at 08:03

## 2025-03-20 RX ADMIN — MAGNESIUM SULFATE HEPTAHYDRATE 2 G: 40 INJECTION, SOLUTION INTRAVENOUS at 07:03

## 2025-03-20 RX ADMIN — Medication 10 ML: at 10:03

## 2025-03-20 RX ADMIN — CYANOCOBALAMIN 1000 MCG: 1000 INJECTION INTRAMUSCULAR; SUBCUTANEOUS at 10:03

## 2025-03-20 RX ADMIN — CLINDAMYCIN PHOSPHATE 900 MG: 900 INJECTION, SOLUTION INTRAVENOUS at 10:03

## 2025-03-20 RX ADMIN — DOCUSATE SODIUM 100 MG: 100 CAPSULE, LIQUID FILLED ORAL at 10:03

## 2025-03-20 RX ADMIN — CITALOPRAM HYDROBROMIDE 40 MG: 20 TABLET ORAL at 10:03

## 2025-03-20 RX ADMIN — POTASSIUM CHLORIDE 10 MEQ: 7.46 INJECTION, SOLUTION INTRAVENOUS at 06:03

## 2025-03-20 RX ADMIN — DEXTROSE AND SODIUM CHLORIDE: 5; 900 INJECTION, SOLUTION INTRAVENOUS at 06:03

## 2025-03-20 NOTE — PLAN OF CARE
Clinic updates and discharge order sent over to [facility] and I was then informed that the pt will need ins auth before returning back to the nursing facility. I informed the doctor and the nurse   1:26pm  [facility] called and stated that the pt ins auth came back and they can accept on tomorrow

## 2025-03-20 NOTE — DISCHARGE SUMMARY
[facility]  Medical Surgical Unit  Discharge Summary      Patient Name: Ashley Calvillo  MRN: [id number]  Admission Date: 3/18/2025  Hospital Length of Stay: 2 days  Discharge Date and Time:  03/20/2025 5:15 AM  Attending Physician: GENA Matute,*   Discharging Provider: Mejia Rudd MD  Primary Care Provider: Leeanna De Leon MD  Staff present during examination : Jocelyn Lam RN      HPI:  Patient is a 79-year-old female with a history of osteoarthritis had a sigmoid volvulus noted on 07/28/2024 status post sigmoidectomy with diverting colostomy.  Patient has a colostomy closure on 02/24/2025.  Patient postoperatively developed an infected hematoma of the ostomy site that required incision drainage.  Papers shunt was admitted given IV fluids antibiotics had incision drainage done at the bedside cultures were obtained.    Laboratory studies show white count of 5 hemoglobin of 8.6 with a hematocrit 27 platelet count is 214 renal profile is unremarkable otherwise patient is in stable condition     Patient will be discharged back to the nursing home on Levaquin and doxycycline and follow up in the office on Monday at 5:30 a.m. [facility]     Indwelling Lines/Drains at time of discharge:   Lines/Drains/Airways       Drain  Duration                  Urethral Catheter 03/18/25 0931 Double-lumen 1 day                  Hospital Course: No notes on file    Consults:     Significant Diagnostic Studies: N/A    Pending Diagnostic Studies:       Procedure Component Value Units Date/Time    Comprehensive Metabolic Panel [[id number]] Collected: 03/20/25 0431    Order Status: Sent Lab Status: In process Updated: 03/20/25 0449    Specimen: Blood     Magnesium [[id number]] Collected: 03/20/25 0431    Order Status: Sent Lab Status: In process Updated: 03/20/25 0449    Specimen: Blood     Phosphorus [[id number]] Collected: 03/20/25 0431    Order Status: Sent Lab Status: In process Updated: 03/20/25  0449    Specimen: Blood           Final Active Diagnoses:    Diagnosis Date Noted POA    PRINCIPAL PROBLEM:  Wound dehiscence [T81.30XA] 08/17/2024 Yes    Weakness [R53.1] 11/12/2022 Yes      Problems Resolved During this Admission:      Discharged Condition: good    Disposition: Nursing Facility    Follow Up:   Follow-up Information       Leeanna De Leon MD In 2 days.    Specialty: Family Medicine  Contact information:  [address]  [facility] [place] [address]  [phone]               Mejia Rudd MD Follow up on 3/24/2025.    Specialties: General Surgery, Cardiology  Contact information:  [address] [facility]  [address]  [facility] [address]  [phone]                           Patient Instructions:      Diet full liquid     Notify your health care provider if you experience any of the following:  temperature >100.4     Notify your health care provider if you experience any of the following:  severe uncontrolled pain     Notify your health care provider if you experience any of the following:  redness, tenderness, or signs of infection (pain, swelling, redness, odor or green/yellow discharge around incision site)     Notify your health care provider if you experience any of the following:  difficulty breathing or increased cough     Activity as tolerated     Medications:  Reconciled Home Medications:      Medication List        CHANGE how you take these medications      clopidogreL 75 mg tablet  Commonly known as: PLAVIX  Take 1 tablet (75 mg total) by mouth once daily. Q.h.s.  What changed:   when to take this  additional instructions     levothyroxine 100 MCG tablet  Commonly known as: SYNTHROID  Take 100 mcg by mouth before breakfast.  What changed: Another medication with the same name was removed. Continue taking this medication, and follow the directions you see here.     polyethylene glycol 17 gram/dose powder  Commonly known as: GLYCOLAX  Take 17 g by mouth once  daily.  What changed: when to take this            CONTINUE taking these medications      acetaminophen 325 MG tablet  Commonly known as: TYLENOL  Take 650 mg by mouth every 4 (four) hours as needed for Pain.     amLODIPine 5 MG tablet  Commonly known as: NORVASC  Take 1 tablet (5 mg total) by mouth once daily.     benzonatate 100 MG capsule  Commonly known as: TESSALON  Take 1 capsule (100 mg total) by mouth 3 (three) times daily as needed for Cough.     cetirizine 10 MG tablet  Commonly known as: ZYRTEC  Take 1 tablet (10 mg total) by mouth once daily.     citalopram 40 MG tablet  Commonly known as: CeleXA  Take 40 mg by mouth every morning.     COLACE 100 MG capsule  Generic drug: docusate sodium  Take 100 mg by mouth every morning.     ipratropium 21 mcg (0.03 %) nasal spray  Commonly known as: ATROVENT  2 sprays by Each Nostril route 3 (three) times daily.     montelukast 10 mg tablet  Commonly known as: SINGULAIR  Take 10 mg by mouth every evening.     simvastatin 10 MG tablet  Commonly known as: ZOCOR  Take 10 mg by mouth every evening.     traMADoL 50 mg tablet  Commonly known as: ULTRAM  Take 50 mg by mouth every 24 hours as needed for Pain.            ASK your doctor about these medications      nystatin powder  Commonly known as: MYCOSTATIN  Apply topically every evening.            Time spent on the discharge of patient:  5 minutes         Mejia Rudd MD  [facility] - Medical Surgical Unit

## 2025-03-20 NOTE — H&P
[facility] - Medical Surgical Unit  General Surgery  History and Physical Note    Patient Name: Ashley Calvillo  YOB: 1945      Staff present during examination : Katia Hager RN     PRESENTING HISTORY     Chief Complaint/Reason for Admission:  Infected hematoma of the left abdominal colostomy closure site with a purulent drainage in need of drainage       History of Present Illness:  Ms. Aslhey Calvillo is a 79 y.o. female with a history of osteoarthritis had colostomy placement on 02/24/2025.  Patient had colostomy closure recently.  Patient now presents with an infected hematoma that requires incision drainage.  Patient was admitted to my service for this purpose.    White count 7 hemoglobin 10 hematocrit 32 platelet count is 327 renal profile is unremarkable    Review of Systems:  12 point ROS negative except as stated in HPI.  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;  Service: General;  Laterality: N/A;  LAP ASSISSTED, CONVERTED TO OPEN AT 1400    COLECTOMY, SIGMOID N/A 7/28/2024    Procedure: COLECTOMY, SIGMOID;  Surgeon: GENA Matute MD;  Location: [facility] OR;  Service: General;  Laterality: N/A;    COLON RESECTION N/A 2/24/2025    Procedure: COLON RESECTION;  Surgeon: GENA Matute MD;  Location: [facility] OR;  Service: General;  Laterality: N/A;  SMALL BOWEL RESECTION    COLONOSCOPY, THROUGH  STOMA, DIAGNOSTIC N/A 8/26/2024    Procedure: COLONOSCOPY, THROUGH STOMA, DIAGNOSTIC;  Surgeon: GENA Matute MD;  Location: [facility] ENDO;  Service: Endoscopy;  Laterality: N/A;  DIVERTICULOSIS    COLONOSCOPY, THROUGH STOMA, DIAGNOSTIC N/A 11/1/2024    Procedure: COLONOSCOPY, THROUGH STOMA, DIAGNOSTIC;  Surgeon: GENA Matute MD;  Location: [facility] ENDO;  Service: Endoscopy;  Laterality: N/A;  POST OP: NORMAL COLONOSCOPY    COLONOSCOPY, WITH DIRECTED SUBMUCOSAL INJECTION N/A 11/1/2024    Procedure: COLONOSCOPY, WITH DIRECTED SUBMUCOSAL INJECTION;  Surgeon: GENA Matute MD;  Location: [facility] ENDO;  Service: Endoscopy;  Laterality: N/A;  TATTOO INK SPOT IN RECTUM AT 12 CM (PROCTOSCOPY)    COLONOSCOPY, WITH POLYPECTOMY USING SNARE N/A 8/26/2024    Procedure: COLONOSCOPY, WITH POLYPECTOMY USING SNARE;  Surgeon: GENA Matute MD;  Location: [facility] ENDO;  Service: Endoscopy;  Laterality: N/A;  A) TRANSVERSE COLON POLYP    COLOSTOMY N/A 7/28/2024    Procedure: CREATION, COLOSTOMY;  Surgeon: GENA Matute MD;  Location: [facility] OR;  Service: General;  Laterality: N/A;    FLEXIBLE SIGMOIDOSCOPY N/A 7/26/2024    Procedure: SIGMOIDOSCOPY, FLEXIBLE;  Surgeon: GENA Matute MD;  Location: [facility] ENDO;  Service: Endoscopy;  Laterality: N/A;  POST OP:DECOMPRESSION    FLEXIBLE SIGMOIDOSCOPY N/A 2/24/2025    Procedure: SIGMOIDOSCOPY, FLEXIBLE;  Surgeon: GENA Matute MD;  Location: [facility] OR;  Service: General;  Laterality: N/A;    HYSTERECTOMY      LAPAROSCOPIC LYSIS OF ADHESIONS N/A 2/24/2025    Procedure: LYSIS, ADHESIONS, LAPAROSCOPIC;  Surgeon: GENA Matute MD;  Location: [facility] OR;  Service: General;  Laterality: N/A;  LAPAROSCOPIC AND OPEN LYSIS OF EXTENSIVE ADHESIONS    LAPAROSCOPY N/A 7/28/2024    Procedure: LAPAROSCOPY;  Surgeon: GENA Matute MD;  Location: [facility] OR;  Service: General;  Laterality: N/A;  massively distended sigmoid colon was  identified Converted to open art 1620    OMENTECTOMY N/A 2/24/2025    Procedure: OMENTECTOMY;  Surgeon: GENA Matute MD;  Location: [facility] OR;  Service: General;  Laterality: N/A;  PARTIAL OMENTECTOMY, OMENTAL PEDICLE FLAP TO ANASTOMOSIS    OOPHORECTOMY Left 2/24/2025    Procedure: OOPHORECTOMY;  Surgeon: GENA Matute MD;  Location: [facility] OR;  Service: General;  Laterality: Left;    PROCTOSCOPY N/A 11/1/2024    Procedure: PROCTOSCOPY;  Surgeon: GENA Matute MD;  Location: [facility] ENDO;  Service: Endoscopy;  Laterality: N/A;  FLEXIBLE; EVALUATION OF RECTAL STUMP THROUGH RECTUM; NORMAL RECTUM    SALPINGOOPHORECTOMY Right 2/24/2025    Procedure: SALPINGO-OOPHORECTOMY;  Surgeon: GENA Matute MD;  Location: [facility] OR;  Service: General;  Laterality: Right;    TONSILLECTOMY      TOTAL SHOULDER ARTHROPLASTY         Family History   Problem Relation Name Age of Onset    Cancer Mother      Leukemia Mother      Stroke Mother      Alzheimer's disease Father      Cancer Sister      Leukemia Sister         Social History     Socioeconomic History    Marital status: Single   Tobacco Use    Smoking status: Former     Types: Cigarettes    Smokeless tobacco: Never    Tobacco comments:     patient stated that she smokes on some days.    Substance and Sexual Activity    Alcohol use: Never     Comment: wine, occasionally    Drug use: Never    Sexual activity: Not Currently     Social Drivers of Health     Financial Resource Strain: Low Risk  (3/19/2025)    Overall Financial Resource Strain (CARDIA)     Difficulty of Paying Living Expenses: Not very hard   Food Insecurity: No Food Insecurity (3/19/2025)    Hunger Vital Sign     Worried About Running Out of Food in the Last Year: Never true     Ran Out of Food in the Last Year: Never true   Transportation Needs: Patient Declined (7/29/2024)    TRANSPORTATION NEEDS     Transportation : Patient declined   Physical Activity: Inactive (3/19/2025)     Exercise Vital Sign     Days of Exercise per Week: 0 days     Minutes of Exercise per Session: 0 min   Stress: No Stress Concern Present (3/19/2025)    [employer] - Occupational Stress Questionnaire     Feeling of Stress : Not at all   Housing Stability: Low Risk  (3/19/2025)    Housing Stability Vital Sign     Unable to Pay for Housing in the Last Year: No     Homeless in the Last Year: No       Immunization History   Administered Date(s) Administered    Influenza (FLUAD) - Quadrivalent - Adjuvanted - PF *Preferred* (65+) 11/15/2022       MEDICATIONS & ALLERGIES:     No current facility-administered medications on file prior to encounter.     Current Outpatient Medications on File Prior to Encounter   Medication Sig    amLODIPine (NORVASC) 5 MG tablet Take 1 tablet (5 mg total) by mouth once daily.    benzonatate (TESSALON) 100 MG capsule Take 1 capsule (100 mg total) by mouth 3 (three) times daily as needed for Cough.    cetirizine (ZYRTEC) 10 MG tablet Take 1 tablet (10 mg total) by mouth once daily.    citalopram (CELEXA) 40 MG tablet Take 40 mg by mouth every morning.    clopidogreL (PLAVIX) 75 mg tablet Take 1 tablet (75 mg total) by mouth once daily. Q.h.s. (Patient taking differently: Take 75 mg by mouth every evening.)    docusate sodium (COLACE) 100 MG capsule Take 100 mg by mouth every morning.    ipratropium (ATROVENT) 21 mcg (0.03 %) nasal spray 2 sprays by Each Nostril route 3 (three) times daily.    levothyroxine (SYNTHROID) 100 MCG tablet Take 100 mcg by mouth before breakfast.    montelukast (SINGULAIR) 10 mg tablet Take 10 mg by mouth every evening.    polyethylene glycol (GLYCOLAX) 17 gram/dose powder Take 17 g by mouth once daily. (Patient taking differently: Take 17 g by mouth Daily.)    simvastatin (ZOCOR) 10 MG tablet Take 10 mg by mouth every evening.    traMADoL (ULTRAM) 50 mg tablet Take 50 mg by mouth every 24 hours as needed for Pain.    acetaminophen  "(TYLENOL) 325 MG tablet Take 650 mg by mouth every 4 (four) hours as needed for Pain.    levothyroxine (SYNTHROID) 112 MCG tablet Take 125 mcg by mouth before breakfast. Takes 125 mcg    nystatin (MYCOSTATIN) powder Apply topically every evening.       Review of patient's allergies indicates:   Allergen Reactions    Nsaids (non-steroidal anti-inflammatory drug)     Oxycodone     Oxycodone-aspirin        OBJECTIVE:     Vital Signs:  Temp:  [97.9 °F (36.6 °C)-98.2 °F (36.8 °C)] 98 °F (36.7 °C)  Pulse:  [71-81] 72  Resp:  [18-20] 18  SpO2:  [95 %-97 %] 97 %  BP: ()/(56-75) 89/59  Body mass index is 26.52 kg/m².     Physical Exam:  General:  Well developed, well nourished, no acute distress  HEENT:  Normocephalic, atraumatic, PERRL, EOMI, clear sclera, ears normal, neck supple, throat clear without erythema or exudates  CVS:  RRR, S1 and S2 normal, no murmurs, rubs, gallops  Resp:  Lungs clear to auscultation, no wheezes, rales, rhonchi, cough  GI:  Abdomen soft, non-tender, non-distended, normoactive bowel sounds, no masses.       :  Deferred  MSK:  No muscle atrophy, cyanosis, peripheral edema, full range of motion  Skin:  No rashes, ulcers, erythema  Neuro:  CNII-XII grossly intact  Psych:  Alert and oriented to person, place, and time    Laboratory:  CBC:  Recent Labs     03/18/25  0849 03/19/25  0409   WBC 7.41 5.96   RBC 4.01* 3.48*   HGB 10.1* 8.8*   HCT 32.1* 27.6*    286   MCV 80.0 79.3*   MCH 25.2* 25.3*   MCHC 31.5* 31.9*       CMP:  Recent Labs     03/18/25  0849 03/19/25  0409   CALCIUM 8.8 7.9*   ALBUMIN 3.0* 2.6*    140   K 4.3 3.1*   CO2 23 23   * 108*   BUN 23.0* 11.0   CREATININE 0.85 0.77   ALKPHOS 86 74   ALT 10 <5   AST 24 15   BILITOT 0.4 0.3       Troponin:  Recent Labs     03/18/25  0849   TROPONINI <0.010       Lactic Acid:  Invalid input(s): "LACTATIC"    Etoh:  No results for input(s): "ALCOHOLMEDIC" in the last 72 hours.    Drug Screen:  No results for input(s): " ""PCDSCOMETHA", "COCAINEMETAB", "OPIATESCREEN", "BARBITURATES", "AMPHETAMINES", "MARIJUANATHC", "PCDSOPHENCYN", "CREATRANDUR", "TOXINFO" in the last 72 hours.    ABG:  No results for input(s): "PH", "PCO2", "PO2", "HCO3", "BE", "POCSATURATED" in the last 72 hours.    Diagnostic Results:  No results found in the last 24 hours.      ASSESSMENT & PLAN:   Ms. Ashley Calvillo is a 79 y.o. female with a history of previous CVA anxiety depression hypertension dysphagia hypothyroidism iron-deficiency anemia and hyperlipidemia.  Patient had tonsillar cancer urinary tract infections and diverting colostomy with a subsequent colostomy closure.  Patient has developed a infected hematoma in the old colostomy site.  It requires incision drainage debridement and washout.  She will be done after she is appropriately NPO.        PLAN:  1. IV fluids  2. IV antibiotics Rocephin Cleocin   3. Incision drainage debridement washout of stomal infected hematoma        [facility]  General Surgery    3/19/2025  10:09 PM      "

## 2025-03-20 NOTE — PLAN OF CARE
Problem: Skin Injury Risk Increased  Goal: Skin Health and Integrity  Outcome: Progressing     Problem: Infection  Goal: Absence of Infection Signs and Symptoms  Outcome: Progressing     Problem: Adult Inpatient Plan of Care  Goal: Plan of Care Review  Outcome: Progressing  Goal: Patient-Specific Goal (Individualized)  Outcome: Progressing  Goal: Absence of Hospital-Acquired Illness or Injury  Outcome: Progressing  Goal: Optimal Comfort and Wellbeing  Outcome: Progressing  Goal: Readiness for Transition of Care  Outcome: Progressing     Problem: Acute Kidney Injury/Impairment  Goal: Fluid and Electrolyte Balance  Outcome: Progressing  Goal: Improved Oral Intake  Outcome: Progressing  Goal: Effective Renal Function  Outcome: Progressing     Problem: Wound  Goal: Optimal Coping  Outcome: Progressing  Goal: Optimal Functional Ability  Outcome: Progressing  Goal: Absence of Infection Signs and Symptoms  Outcome: Progressing  Goal: Improved Oral Intake  Outcome: Progressing  Goal: Optimal Pain Control and Function  Outcome: Progressing  Goal: Skin Health and Integrity  Outcome: Progressing  Goal: Optimal Wound Healing  Outcome: Progressing     Problem: Fall Injury Risk  Goal: Absence of Fall and Fall-Related Injury  Outcome: Progressing     Problem: Comorbidity Management  Goal: Blood Pressure in Desired Range  Outcome: Progressing     Problem: Infection  Goal: Absence of Infection Signs and Symptoms  Outcome: Progressing     Problem: Adult Inpatient Plan of Care  Goal: Plan of Care Review  Outcome: Progressing     Problem: Acute Kidney Injury/Impairment  Goal: Fluid and Electrolyte Balance  Outcome: Progressing     Problem: Wound  Goal: Optimal Coping  Outcome: Progressing

## 2025-03-20 NOTE — PROGRESS NOTES
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  Patient had resection with an ostomy.  Patient has subsequently on 02/24/2025 had ostomy closure.  Patient did well from this had no problems or difficulties.  She has now developed an infected hematoma in the old ostomy site that requires incision drainage debridement and washout.  Patient underwent release of the couple of stitches at the bedside and then washout culturing and clean out of the infected hematoma and wound.        History of Present Illness:  Ms. Ashley Calvillo is a 79 y.o. female s/p   . Pt without any complaints.  Tolerating a regular diet with NL BM.  Denies any fever / chills.    Review of Systems:  12 point ROS negative except as stated in HPI       Scheduled Meds:   amLODIPine  5 mg Oral Daily    atorvastatin  10 mg Oral QHS    BUPivacaine (PF) 0.25% (2.5 mg/ml)  10 mL Other Once    cefTRIAXone  1 g Intravenous Q12H    cetirizine  10 mg Oral Daily    citalopram  40 mg Oral Daily    clindamycin IV (PEDS and ADULTS)  900 mg Intravenous Q8H    clopidogreL  75 mg Oral Daily    cyanocobalamin  1,000 mcg Intramuscular Daily    docusate sodium  100 mg Oral Daily    levothyroxine  100 mcg Oral Daily    LIDOcaine HCL 10 mg/ml (1%)  10 mL Other Once    montelukast  10 mg Oral QHS    mupirocin   Nasal BID    polyethylene glycol  17 g Oral Daily    sodium chloride 0.9%  10 mL Intravenous Q8H    thiamine (B-1) 500 mg in D5W 100 mL IVPB  500 mg Intravenous Daily       Continuous Infusions:   D5 and 0.9% NaCl   Intravenous Continuous 100 mL/hr at 03/19/25 1855 Rate Verify at 03/19/25 1855       PRN Meds:  Current Facility-Administered Medications:      acetaminophen, 650 mg, Oral, Q8H PRN    acetaminophen, 650 mg, Oral, Q4H PRN    benzonatate, 100 mg, Oral, TID PRN    ondansetron, 4 mg, Intravenous, Q8H PRN     Objective:  Temp:  [97.9 °F (36.6 °C)-98.2 °F (36.8 °C)] 98 °F (36.7 °C)  Pulse:  [71-81] 72  Resp:  [18-20] 18  SpO2:  [95 %-97 %] 97 %  BP: ()/(56-75) 89/59     I/O last 3 completed shifts:  In: 4784.7 [P.O.:120; I.V.:2074; IV Piggyback:2590.7]  Out: 2750 [Urine:2750]  No intake/output data recorded.     Physical Exam:  General:  Well developed, well nourished, no acute distress  HEENT:  Normocephalic, atraumatic, PERRL, EOMI, clear sclera, ears normal, neck supple, throat clear without erythema or exudates  CVS:  RRR, S1 and S2 normal, no murmurs, rubs, gallops  Resp:  Lungs clear to auscultation, no wheezes, rales, rhonchi, cough  GI:  Abdomen soft, non-tender, non-distended, normoactive bowel sounds, no masses. Incisions c/d/I.         *  :  Deferred  MSK:  No muscle atrophy, cyanosis, peripheral edema, full range of motion  Skin:  No rashes, ulcers, erythema.  Neuro:  CNII-XII grossly intact  Psych:  Alert and oriented to person, place, and time     Labs:  Recent Labs     03/18/25  0849 03/19/25  0409   WBC 7.41 5.96   HGB 10.1* 8.8*   HCT 32.1* 27.6*   MCV 80.0 79.3*    286   INR 1.1  --      Recent Labs     03/18/25  0849 03/19/25  0409    140   K 4.3 3.1*   * 108*   CO2 23 23   BUN 23.0* 11.0   CREATININE 0.85 0.77   CALCIUM 8.8 7.9*   MG  --  1.70   PHOS  --  3.0   ALBUMIN 3.0* 2.6*   BILITOT 0.4 0.3   AST 24 15   ALKPHOS 86 74   ALT 10 <5       Imaging:  No results found in the last 24 hours.       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  Patient had resection with an ostomy.  Patient has subsequently on 02/24/2025 had ostomy closure.  Patient did well from this had no problems or difficulties.  She has now  developed an infected hematoma in the old ostomy site that requires incision drainage debridement and washout.  Patient underwent release of the couple of stitches at the bedside and then washout culturing and clean out of the infected hematoma and wound.  The wound washed washed out with peroxide saline packed with saline wet-to-dry gauze    [facility]       3/19/2025  10:58 PM

## 2025-03-20 NOTE — PLAN OF CARE
Problem: Skin Injury Risk Increased  Goal: Skin Health and Integrity  Outcome: Progressing     Problem: Infection  Goal: Absence of Infection Signs and Symptoms  Outcome: Progressing     Problem: Adult Inpatient Plan of Care  Goal: Plan of Care Review  Outcome: Progressing  Goal: Patient-Specific Goal (Individualized)  Outcome: Progressing  Goal: Absence of Hospital-Acquired Illness or Injury  Outcome: Progressing  Goal: Optimal Comfort and Wellbeing  Outcome: Progressing  Goal: Readiness for Transition of Care  Outcome: Progressing     Problem: Acute Kidney Injury/Impairment  Goal: Fluid and Electrolyte Balance  Outcome: Progressing  Goal: Improved Oral Intake  Outcome: Progressing  Goal: Effective Renal Function  Outcome: Progressing     Problem: Wound  Goal: Optimal Coping  Outcome: Progressing  Goal: Optimal Functional Ability  Outcome: Progressing  Goal: Absence of Infection Signs and Symptoms  Outcome: Progressing  Goal: Improved Oral Intake  Outcome: Progressing  Goal: Optimal Pain Control and Function  Outcome: Progressing  Goal: Skin Health and Integrity  Outcome: Progressing  Goal: Optimal Wound Healing  Outcome: Progressing     Problem: Fall Injury Risk  Goal: Absence of Fall and Fall-Related Injury  Outcome: Progressing     Problem: Comorbidity Management  Goal: Blood Pressure in Desired Range  Outcome: Progressing

## 2025-03-21 VITALS
BODY MASS INDEX: 26.68 KG/M2 | OXYGEN SATURATION: 95 % | HEART RATE: 74 BPM | SYSTOLIC BLOOD PRESSURE: 93 MMHG | WEIGHT: 145 LBS | TEMPERATURE: 98 F | DIASTOLIC BLOOD PRESSURE: 54 MMHG | RESPIRATION RATE: 20 BRPM | HEIGHT: 62 IN

## 2025-03-21 LAB — BACTERIA WND CULT: ABNORMAL

## 2025-03-21 PROCEDURE — 63600175 PHARM REV CODE 636 W HCPCS: Performed by: SURGERY

## 2025-03-21 PROCEDURE — A4216 STERILE WATER/SALINE, 10 ML: HCPCS | Performed by: INTERNAL MEDICINE

## 2025-03-21 PROCEDURE — 25000003 PHARM REV CODE 250: Performed by: SURGERY

## 2025-03-21 PROCEDURE — 63600175 PHARM REV CODE 636 W HCPCS: Performed by: INTERNAL MEDICINE

## 2025-03-21 PROCEDURE — 25000003 PHARM REV CODE 250: Performed by: INTERNAL MEDICINE

## 2025-03-21 RX ADMIN — POTASSIUM PHOSPHATE, MONOBASIC 500 MG: 500 TABLET, SOLUBLE ORAL at 09:03

## 2025-03-21 RX ADMIN — CLINDAMYCIN PHOSPHATE 900 MG: 900 INJECTION, SOLUTION INTRAVENOUS at 01:03

## 2025-03-21 RX ADMIN — CETIRIZINE HYDROCHLORIDE 10 MG: 10 TABLET, FILM COATED ORAL at 09:03

## 2025-03-21 RX ADMIN — CYANOCOBALAMIN 1000 MCG: 1000 INJECTION INTRAMUSCULAR; SUBCUTANEOUS at 09:03

## 2025-03-21 RX ADMIN — CLOPIDOGREL BISULFATE 75 MG: 75 TABLET ORAL at 09:03

## 2025-03-21 RX ADMIN — CITALOPRAM HYDROBROMIDE 40 MG: 20 TABLET ORAL at 09:03

## 2025-03-21 RX ADMIN — MUPIROCIN 1 G: 20 OINTMENT TOPICAL at 09:03

## 2025-03-21 RX ADMIN — POLYETHYLENE GLYCOL 3350 17 G: 17 POWDER, FOR SOLUTION ORAL at 09:03

## 2025-03-21 RX ADMIN — DOCUSATE SODIUM 100 MG: 100 CAPSULE, LIQUID FILLED ORAL at 09:03

## 2025-03-21 RX ADMIN — LEVOTHYROXINE SODIUM 100 MCG: 100 TABLET ORAL at 09:03

## 2025-03-21 RX ADMIN — Medication 10 ML: at 06:03

## 2025-03-21 RX ADMIN — DEXTROSE AND SODIUM CHLORIDE: 5; 900 INJECTION, SOLUTION INTRAVENOUS at 06:03

## 2025-03-21 NOTE — PLAN OF CARE
03/21/25 0913   Final Note   Assessment Type Final Discharge Note   Anticipated Discharge Disposition SNF   Hospital Resources/Appts/Education Provided Post-Acute resouces added to AVS   Post-Acute Status   Post-Acute Authorization Placement   Post-Acute Placement Status Set-up Complete/Auth obtained   Discharge Delays None known at this time     D/c to SNF at [facility] today.  D/C facilitated by LARS Matthews.

## 2025-03-22 LAB — BACTERIA WND CULT: ABNORMAL

## 2025-03-23 LAB
BACTERIA BLD CULT: NORMAL
BACTERIA BLD CULT: NORMAL

## 2025-03-24 ENCOUNTER — HOSPITAL ENCOUNTER (INPATIENT)
Facility: HOSPITAL | Age: 80
LOS: 2 days | Discharge: NURSING, REHAB OR LONG-TERM CARE | DRG: 683 | End: 2025-03-26
Attending: INTERNAL MEDICINE | Admitting: INTERNAL MEDICINE
Payer: MEDICARE

## 2025-03-24 DIAGNOSIS — N39.0 URINARY TRACT INFECTION WITHOUT HEMATURIA, SITE UNSPECIFIED: ICD-10-CM

## 2025-03-24 DIAGNOSIS — A41.9 SEPSIS, DUE TO UNSPECIFIED ORGANISM, UNSPECIFIED WHETHER ACUTE ORGAN DYSFUNCTION PRESENT: ICD-10-CM

## 2025-03-24 DIAGNOSIS — A41.9 SEPSIS: ICD-10-CM

## 2025-03-24 DIAGNOSIS — N39.0 URINARY TRACT INFECTION WITHOUT HEMATURIA: ICD-10-CM

## 2025-03-24 DIAGNOSIS — I95.9 HYPOTENSION, UNSPECIFIED HYPOTENSION TYPE: Primary | ICD-10-CM

## 2025-03-24 DIAGNOSIS — R53.1 GENERALIZED WEAKNESS: ICD-10-CM

## 2025-03-24 LAB
ALBUMIN SERPL-MCNC: 2.8 G/DL (ref 3.4–4.8)
ALBUMIN/GLOB SERPL: 0.8 RATIO (ref 1.1–2)
ALP SERPL-CCNC: 94 UNIT/L (ref 40–150)
ALT SERPL-CCNC: 7 UNIT/L (ref 0–55)
ANION GAP SERPL CALC-SCNC: 10 MEQ/L
AST SERPL-CCNC: 16 UNIT/L (ref 11–45)
BACTERIA #/AREA URNS AUTO: ABNORMAL /HPF
BASOPHILS # BLD AUTO: 0.05 X10(3)/MCL
BASOPHILS NFR BLD AUTO: 0.7 %
BILIRUB SERPL-MCNC: 0.9 MG/DL
BILIRUB UR QL STRIP.AUTO: ABNORMAL
BNP BLD-MCNC: 227.5 PG/ML
BUN SERPL-MCNC: 19 MG/DL (ref 9.8–20.1)
CALCIUM SERPL-MCNC: 8.8 MG/DL (ref 8.4–10.2)
CHLORIDE SERPL-SCNC: 106 MMOL/L (ref 98–107)
CLARITY UR: CLEAR
CO2 SERPL-SCNC: 24 MMOL/L (ref 23–31)
COLOR UR AUTO: ABNORMAL
CREAT SERPL-MCNC: 1.35 MG/DL (ref 0.55–1.02)
CREAT/UREA NIT SERPL: 14
EOSINOPHIL # BLD AUTO: 0.17 X10(3)/MCL (ref 0–0.9)
EOSINOPHIL NFR BLD AUTO: 2.4 %
ERYTHROCYTE [DISTWIDTH] IN BLOOD BY AUTOMATED COUNT: 21 % (ref 11.5–17)
GFR SERPLBLD CREATININE-BSD FMLA CKD-EPI: 40 ML/MIN/1.73/M2
GLOBULIN SER-MCNC: 3.5 GM/DL (ref 2.4–3.5)
GLUCOSE SERPL-MCNC: 144 MG/DL (ref 82–115)
GLUCOSE UR QL STRIP: ABNORMAL
HCT VFR BLD AUTO: 29.5 % (ref 37–47)
HGB BLD-MCNC: 9.5 G/DL (ref 12–16)
HGB UR QL STRIP: ABNORMAL
IMM GRANULOCYTES # BLD AUTO: 0.1 X10(3)/MCL (ref 0–0.04)
IMM GRANULOCYTES NFR BLD AUTO: 1.4 %
KETONES UR QL STRIP: ABNORMAL
LACTATE SERPL-SCNC: 2.5 MMOL/L (ref 0.5–2.2)
LACTATE SERPL-SCNC: 2.6 MMOL/L (ref 0.5–2.2)
LEUKOCYTE ESTERASE UR QL STRIP: NEGATIVE
LYMPHOCYTES # BLD AUTO: 0.71 X10(3)/MCL (ref 0.6–4.6)
LYMPHOCYTES NFR BLD AUTO: 9.9 %
MCH RBC QN AUTO: 25.9 PG (ref 27–31)
MCHC RBC AUTO-ENTMCNC: 32.2 G/DL (ref 33–36)
MCV RBC AUTO: 80.4 FL (ref 80–94)
MONOCYTES # BLD AUTO: 0.73 X10(3)/MCL (ref 0.1–1.3)
MONOCYTES NFR BLD AUTO: 10.2 %
NEUTROPHILS # BLD AUTO: 5.43 X10(3)/MCL (ref 2.1–9.2)
NEUTROPHILS NFR BLD AUTO: 75.4 %
NITRITE UR QL STRIP: POSITIVE
NRBC BLD AUTO-RTO: 0 %
PH UR STRIP: 5 [PH]
PLATELET # BLD AUTO: 306 X10(3)/MCL (ref 130–400)
PMV BLD AUTO: 10.5 FL (ref 7.4–10.4)
POTASSIUM SERPL-SCNC: 3.6 MMOL/L (ref 3.5–5.1)
PROT SERPL-MCNC: 6.3 GM/DL (ref 5.8–7.6)
PROT UR QL STRIP: ABNORMAL
RBC # BLD AUTO: 3.67 X10(6)/MCL (ref 4.2–5.4)
RBC #/AREA URNS AUTO: ABNORMAL /HPF
SODIUM SERPL-SCNC: 140 MMOL/L (ref 136–145)
SP GR UR STRIP.AUTO: >=1.03 (ref 1–1.03)
SQUAMOUS #/AREA URNS AUTO: ABNORMAL /HPF
TROPONIN I SERPL-MCNC: 0.01 NG/ML (ref 0–0.04)
UROBILINOGEN UR STRIP-ACNC: 0.2
WBC # BLD AUTO: 7.19 X10(3)/MCL (ref 4.5–11.5)
WBC #/AREA URNS AUTO: ABNORMAL /HPF
YEAST UR QL AUTO: ABNORMAL /HPF

## 2025-03-24 PROCEDURE — 25000003 PHARM REV CODE 250: Performed by: INTERNAL MEDICINE

## 2025-03-24 PROCEDURE — 81001 URINALYSIS AUTO W/SCOPE: CPT | Performed by: INTERNAL MEDICINE

## 2025-03-24 PROCEDURE — 96360 HYDRATION IV INFUSION INIT: CPT

## 2025-03-24 PROCEDURE — 80053 COMPREHEN METABOLIC PANEL: CPT | Performed by: INTERNAL MEDICINE

## 2025-03-24 PROCEDURE — 87040 BLOOD CULTURE FOR BACTERIA: CPT | Performed by: INTERNAL MEDICINE

## 2025-03-24 PROCEDURE — 83605 ASSAY OF LACTIC ACID: CPT | Performed by: INTERNAL MEDICINE

## 2025-03-24 PROCEDURE — 63600175 PHARM REV CODE 636 W HCPCS: Performed by: INTERNAL MEDICINE

## 2025-03-24 PROCEDURE — 21400001 HC TELEMETRY ROOM

## 2025-03-24 PROCEDURE — 11000001 HC ACUTE MED/SURG PRIVATE ROOM

## 2025-03-24 PROCEDURE — 94761 N-INVAS EAR/PLS OXIMETRY MLT: CPT

## 2025-03-24 PROCEDURE — 85025 COMPLETE CBC W/AUTO DIFF WBC: CPT | Performed by: INTERNAL MEDICINE

## 2025-03-24 PROCEDURE — 84484 ASSAY OF TROPONIN QUANT: CPT | Performed by: INTERNAL MEDICINE

## 2025-03-24 PROCEDURE — 83880 ASSAY OF NATRIURETIC PEPTIDE: CPT | Performed by: INTERNAL MEDICINE

## 2025-03-24 PROCEDURE — 99285 EMERGENCY DEPT VISIT HI MDM: CPT | Mod: 25

## 2025-03-24 RX ORDER — ACETAMINOPHEN 325 MG/1
650 TABLET ORAL EVERY 8 HOURS PRN
Status: DISCONTINUED | OUTPATIENT
Start: 2025-03-24 | End: 2025-03-26 | Stop reason: HOSPADM

## 2025-03-24 RX ORDER — LEVOTHYROXINE SODIUM 100 UG/1
100 TABLET ORAL
Status: DISCONTINUED | OUTPATIENT
Start: 2025-03-25 | End: 2025-03-26 | Stop reason: HOSPADM

## 2025-03-24 RX ORDER — MUPIROCIN 20 MG/G
OINTMENT TOPICAL 2 TIMES DAILY
Status: DISCONTINUED | OUTPATIENT
Start: 2025-03-24 | End: 2025-03-26 | Stop reason: HOSPADM

## 2025-03-24 RX ORDER — CEFEPIME HYDROCHLORIDE 1 G/1
1 INJECTION, POWDER, FOR SOLUTION INTRAMUSCULAR; INTRAVENOUS
Status: DISCONTINUED | OUTPATIENT
Start: 2025-03-24 | End: 2025-03-26 | Stop reason: HOSPADM

## 2025-03-24 RX ORDER — SODIUM CHLORIDE 9 MG/ML
1000 INJECTION, SOLUTION INTRAVENOUS
Status: COMPLETED | OUTPATIENT
Start: 2025-03-24 | End: 2025-03-24

## 2025-03-24 RX ORDER — ONDANSETRON HYDROCHLORIDE 2 MG/ML
4 INJECTION, SOLUTION INTRAVENOUS EVERY 8 HOURS PRN
Status: DISCONTINUED | OUTPATIENT
Start: 2025-03-24 | End: 2025-03-26 | Stop reason: HOSPADM

## 2025-03-24 RX ORDER — CITALOPRAM 20 MG/1
40 TABLET, FILM COATED ORAL EVERY MORNING
Status: DISCONTINUED | OUTPATIENT
Start: 2025-03-25 | End: 2025-03-25

## 2025-03-24 RX ORDER — CLOPIDOGREL BISULFATE 75 MG/1
75 TABLET ORAL DAILY
Status: DISCONTINUED | OUTPATIENT
Start: 2025-03-25 | End: 2025-03-26 | Stop reason: HOSPADM

## 2025-03-24 RX ORDER — ACETAMINOPHEN 325 MG/1
650 TABLET ORAL EVERY 4 HOURS PRN
Status: DISCONTINUED | OUTPATIENT
Start: 2025-03-24 | End: 2025-03-26 | Stop reason: HOSPADM

## 2025-03-24 RX ORDER — TRAMADOL HYDROCHLORIDE 50 MG/1
50 TABLET ORAL
Status: DISCONTINUED | OUTPATIENT
Start: 2025-03-24 | End: 2025-03-26 | Stop reason: HOSPADM

## 2025-03-24 RX ORDER — ATORVASTATIN CALCIUM 40 MG/1
40 TABLET, FILM COATED ORAL DAILY
Status: DISCONTINUED | OUTPATIENT
Start: 2025-03-24 | End: 2025-03-26 | Stop reason: HOSPADM

## 2025-03-24 RX ORDER — DOCUSATE SODIUM 100 MG/1
100 CAPSULE, LIQUID FILLED ORAL EVERY MORNING
Status: DISCONTINUED | OUTPATIENT
Start: 2025-03-25 | End: 2025-03-26 | Stop reason: HOSPADM

## 2025-03-24 RX ADMIN — ATORVASTATIN CALCIUM 40 MG: 40 TABLET, FILM COATED ORAL at 04:03

## 2025-03-24 RX ADMIN — SODIUM CHLORIDE 1000 ML: 9 INJECTION, SOLUTION INTRAVENOUS at 01:03

## 2025-03-24 RX ADMIN — SODIUM CHLORIDE, POTASSIUM CHLORIDE, SODIUM LACTATE AND CALCIUM CHLORIDE 1947 ML: 600; 310; 30; 20 INJECTION, SOLUTION INTRAVENOUS at 10:03

## 2025-03-24 RX ADMIN — CEFEPIME 1 G: 1 INJECTION, POWDER, FOR SOLUTION INTRAMUSCULAR; INTRAVENOUS at 01:03

## 2025-03-24 RX ADMIN — MUPIROCIN 1 G: 20 OINTMENT TOPICAL at 09:03

## 2025-03-24 NOTE — PROGRESS NOTES
[facility] [place] Medical Surgical Unit  Wound Care    Patient Name: Ashley Calvillo  MRN: [id number]  Date: 3/24/2025  Diagnosis: Urinary tract infection without hematuria      Subjective:           Patient ID: Ashley Calvillo is a 79 y.o. female.    Chief Complaint: Extremity Weakness and Hypotension (Brought per TABITHAI from [facility] for weakness and hypotension after given her Norvasc this am)      HPI      Past Medical History:     1. Hypotension, unspecified hypotension type    2. Generalized weakness    3. Urinary tract infection without hematuria, site unspecified    4. Sepsis, due to unspecified organism, unspecified whether acute organ dysfunction present    5. Sepsis      Wound Assessment:           Wound 02/24/25 1943 Incision midline;transverse Abdomen midline;low transverse (Active)   02/24/25 1943 Abdomen   Present on Original Admission: N   Primary Wound Type: Incision   Side:    Orientation: midline;transverse   Wound Approximate Age at First Assessment (Weeks):    Wound Number:    Is this injury device related?:    Incision Type: midline;low transverse   Closure Method: Staples   Wound Description (Comments):    Type:    Additional Comments: GAUZE AND MEDIPORE DRESSING TO TRANSVERSE INCISION, PREVENA TO MIDLINE INCISION, X2 TROCAR SITES WITH 3M DRESSINGS; ABDOMINAL BINDER APPLIED   Ankle-Brachial Index:    Pulses:    Removal Indication and Assessment:    Wound Outcome:    Wound Image   03/24/25 1554   Incision WDL WDL 03/24/25 1441   Dressing Appearance Moist drainage 03/24/25 1554   Drainage Amount Small 03/24/25 1554   Drainage Characteristics/Odor Serosanguineous 03/24/25 1554   Appearance Red;Moist;Granulating 03/24/25 1554   Periwound Area Dry 03/24/25 1554   Wound Edges Open 03/24/25 1554   Wound Length (cm) 0.5 cm 03/24/25 1554   Wound Width (cm) 5 cm 03/24/25 1554   Wound Depth (cm) 1.7 cm 03/24/25 1554   Wound Volume (cm^3) 2.225 cm^3 03/24/25 1554   Wound Surface Area (cm^2)  1.96 cm^2 03/24/25 1554   Dressing Change Due 03/24/25 03/24/25 1441            Wound 03/18/25 1828 Other (comment) Buttocks (Active)   03/18/25 1828 Buttocks   Present on Original Admission: Y   Primary Wound Type: Other   Side:    Orientation:    Wound Approximate Age at First Assessment (Weeks):    Wound Number:    Is this injury device related?:    Incision Type:    Closure Method:    Wound Description (Comments):    Type:    Additional Comments:    Ankle-Brachial Index:    Pulses:    Removal Indication and Assessment:    Wound Outcome:    Wound Image   03/24/25 1554   Dressing Appearance Open to air 03/24/25 1554   Drainage Amount None 03/24/25 1554   Appearance Red 03/24/25 1554   Periwound Area Moist 03/24/25 1554           Plan:     Daily dressing changes per nursing staff, re-evaluation per wound care in 5-7 days. Wound to the left lower quadrant abdomen has healthy granulation tissue present, patient states that she is experiencing minimal pain with it. There is no wound to buttocks, it appears that maybe the redness is from a little excess moisture, the patient was encouraged to try and turn from side to side and stay off of her buttocks, so as not to make things worse, she verbalized understanding.         Recommendations:   Left lower quadrant abdomen: Cleanse with wound cleanser, gently pack with NS moist 4x4 gauze, cover with 4x4 gauze and island dressing, change daily   Buttocks: keep clean and dry         Time spent in room:     Veronika Lord RN

## 2025-03-24 NOTE — PLAN OF CARE
Problem: Infection  Goal: Absence of Infection Signs and Symptoms  Outcome: Progressing     Problem: Adult Inpatient Plan of Care  Goal: Plan of Care Review  Outcome: Progressing  Goal: Patient-Specific Goal (Individualized)  Outcome: Progressing  Goal: Absence of Hospital-Acquired Illness or Injury  Outcome: Progressing  Goal: Optimal Comfort and Wellbeing  Outcome: Progressing  Goal: Readiness for Transition of Care  Outcome: Progressing     Problem: Acute Kidney Injury/Impairment  Goal: Fluid and Electrolyte Balance  Outcome: Progressing  Goal: Improved Oral Intake  Outcome: Progressing  Goal: Effective Renal Function  Outcome: Progressing     Problem: Wound  Goal: Optimal Coping  Outcome: Progressing  Goal: Optimal Functional Ability  Outcome: Progressing  Goal: Absence of Infection Signs and Symptoms  Outcome: Progressing  Goal: Improved Oral Intake  Outcome: Progressing  Goal: Optimal Pain Control and Function  Outcome: Progressing  Goal: Skin Health and Integrity  Outcome: Progressing  Goal: Optimal Wound Healing  Outcome: Progressing     Problem: Skin Injury Risk Increased  Goal: Skin Health and Integrity  Outcome: Progressing     Problem: UTI (Urinary Tract Infection)  Goal: Improved Infection Symptoms  Outcome: Progressing

## 2025-03-24 NOTE — H&P
Hospital Medicine History & Physical Examination       Patient Name: Ashley Calvillo  MRN: [id number]  Patient Class: IP- Inpatient   Admission Date: 3/24/2025   Admitting Physician: [facility]   Length of Stay: 0  Attending Physician: Victoriano Dhillon MD  Primary Care Provider: Leeanna De Leon MD  Face-to-Face encounter date: 03/24/2025  Code Status:  Chief Complaint: Extremity Weakness and Hypotension (Brought per [facility] from [facility] for weakness and hypotension after given her Norvasc this am)      Screening for Social Drivers for health:  Patient screened for food insecurity, housing instability, transportation needs, utility difficulties, and interpersonal safety (select all that apply as identified as concern)  []Housing or Food  []Transportation Needs  []Utility Difficulties  []Interpersonal safety  [x]None      Patient information was obtained from patient, patient's family, past medical records and ER records.  ED records were reviewed in detail and documented below    HISTORY OF PRESENT ILLNESS:   Ashley Calvillo is a 79 y.o. 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     79 years old female history of hypertension and CVA transferred from nursing home to our emergency room secondary to hypotension    This morning, the patient took her Norvasc, patient complain of generalized weakness, blood pressure was 70/50     No fever, no cough, no shortness for breath, no nausea     At emergency room, WBC 7, hemoglobin 9, BUN 19, creatinine 1.3, baseline creatinine 0.7, , lactic acid 2.5     Urinary shows acute UTI     Chest x-ray was unremarkable     ER doctor gave normal saline 30 mL/kg and  blood pressure improved    The patient was admitted to the hospital for acute UTI and acute renal failure    PAST MEDICAL HISTORY:     Past Medical History:   Diagnosis Date    Arthritis     Colostomy in place 02/24/2025    CVA (cerebral vascular accident)     Depression     Dysphagia     HTN (hypertension)     Hypothyroidism, unspecified     Iron deficiency anemia, unspecified     Mixed hyperlipidemia     Squamous cell carcinoma in situ     Tonsillar cancer     Urinary tract infection, site not specified        PAST SURGICAL HISTORY:     Past Surgical History:   Procedure Laterality Date    CLOSURE, COLOSTOMY N/A 2/24/2025    Procedure: CLOSURE, COLOSTOMY;  Surgeon: GENA Matute MD;  Location: [facility];  Service: General;  Laterality: N/A;  LAP ASSISSTED, CONVERTED TO OPEN AT 1400    COLECTOMY, SIGMOID N/A 7/28/2024    Procedure: COLECTOMY, SIGMOID;  Surgeon: GENA Matute MD;  Location: [facility];  Service: General;  Laterality: N/A;    COLON RESECTION N/A 2/24/2025    Procedure: COLON RESECTION;  Surgeon: GENA Matute MD;  Location: [facility];  Service: General;  Laterality: N/A;  SMALL BOWEL RESECTION    COLONOSCOPY, THROUGH STOMA, DIAGNOSTIC N/A 8/26/2024    Procedure: COLONOSCOPY, THROUGH STOMA, DIAGNOSTIC;  Surgeon: GENA Matute MD;  Location: [facility];  Service: Endoscopy;  Laterality: N/A;  DIVERTICULOSIS    COLONOSCOPY, THROUGH STOMA, DIAGNOSTIC N/A 11/1/2024    Procedure: COLONOSCOPY, THROUGH STOMA, DIAGNOSTIC;  Surgeon: GENA Matute MD;  Location: [facility];  Service: Endoscopy;  Laterality: N/A;  POST OP: NORMAL COLONOSCOPY    COLONOSCOPY, WITH DIRECTED SUBMUCOSAL INJECTION N/A 11/1/2024    Procedure: COLONOSCOPY, WITH DIRECTED SUBMUCOSAL INJECTION;  Surgeon: GENA Matute MD;  Location: [facility];  Service: Endoscopy;  Laterality: N/A;  TATTOO INK SPOT IN RECTUM AT 12 CM (PROCTOSCOPY)    COLONOSCOPY, WITH POLYPECTOMY USING SNARE N/A  8/26/2024    Procedure: COLONOSCOPY, WITH POLYPECTOMY USING SNARE;  Surgeon: GENA Matute MD;  Location: [facility] END[facility];  Service: Endoscopy;  Laterality: N/A;  A) TRANSVERSE COLON POLYP    COLOSTOMY N/A 7/28/2024    Procedure: CREATION, COLOSTOMY;  Surgeon: GENA Matute MD;  Location: [facility] OR;  Service: General;  Laterality: N/A;    FLEXIBLE SIGMOIDOSCOPY N/A 7/26/2024    Procedure: SIGMOIDOSCOPY, FLEXIBLE;  Surgeon: GENA Matute MD;  Location: [facility] ENDO;  Service: Endoscopy;  Laterality: N/A;  POST OP:DECOMPRESSION    FLEXIBLE SIGMOIDOSCOPY N/A 2/24/2025    Procedure: SIGMOIDOSCOPY, FLEXIBLE;  Surgeon: GENA Matute MD;  Location: [facility] OR;  Service: General;  Laterality: N/A;    HYSTERECTOMY      LAPAROSCOPIC LYSIS OF ADHESIONS N/A 2/24/2025    Procedure: LYSIS, ADHESIONS, LAPAROSCOPIC;  Surgeon: GENA Matute MD;  Location: [facility] OR;  Service: General;  Laterality: N/A;  LAPAROSCOPIC AND OPEN LYSIS OF EXTENSIVE ADHESIONS    LAPAROSCOPY N/A 7/28/2024    Procedure: LAPAROSCOPY;  Surgeon: GENA Matute MD;  Location: [facility];  Service: General;  Laterality: N/A;  massively distended sigmoid colon was identified Converted to open art 1620    OMENTECTOMY N/A 2/24/2025    Procedure: OMENTECTOMY;  Surgeon: GENA Matute MD;  Location: [facility] OR;  Service: General;  Laterality: N/A;  PARTIAL OMENTECTOMY, OMENTAL PEDICLE FLAP TO ANASTOMOSIS    OOPHORECTOMY Left 2/24/2025    Procedure: OOPHORECTOMY;  Surgeon: GENA Matute MD;  Location: [facility] OR;  Service: General;  Laterality: Left;    PROCTOSCOPY N/A 11/1/2024    Procedure: PROCTOSCOPY;  Surgeon: GENA Matute MD;  Location: [facility] ENDO;  Service: Endoscopy;  Laterality: N/A;  FLEXIBLE; EVALUATION OF RECTAL STUMP THROUGH RECTUM; NORMAL RECTUM    SALPINGOOPHORECTOMY Right 2/24/2025    Procedure: SALPINGO-OOPHORECTOMY;  Surgeon: GENA Matute MD;  Location: [facility];   Service: General;  Laterality: Right;    TONSILLECTOMY      TOTAL SHOULDER ARTHROPLASTY         ALLERGIES:   Nsaids (non-steroidal anti-inflammatory drug), Oxycodone, and Oxycodone-aspirin    FAMILY HISTORY:   Reviewed and negative    SOCIAL HISTORY:     Social History     Tobacco Use    Smoking status: Former     Types: Cigarettes    Smokeless tobacco: Never    Tobacco comments:     patient stated that she smokes on some days.    Substance Use Topics    Alcohol use: Never     Comment: wine, occasionally        HOME MEDICATIONS:     Prior to Admission medications    Medication Sig Start Date End Date Taking? Authorizing Provider   acetaminophen (TYLENOL) 325 MG tablet Take 650 mg by mouth every 4 (four) hours as needed for Pain.   Yes Provider, Historical   amLODIPine (NORVASC) 5 MG tablet Take 1 tablet (5 mg total) by mouth once daily. 8/17/24  Yes Mohinder Camacho MD   cetirizine (ZYRTEC) 10 MG tablet Take 1 tablet (10 mg total) by mouth once daily. 3/11/25 3/11/26 Yes Masoud Pickens Jr., MD   citalopram (CELEXA) 40 MG tablet Take 40 mg by mouth every morning. 5/17/22  Yes Provider, Historical   clopidogreL (PLAVIX) 75 mg tablet Take 1 tablet (75 mg total) by mouth once daily. Q.h.s.  Patient taking differently: Take 75 mg by mouth every evening. 1/24/24 3/24/25 Yes Kevin Grubbs MD   docusate sodium (COLACE) 100 MG capsule Take 100 mg by mouth every morning.   Yes Provider, Historical   ipratropium (ATROVENT) 21 mcg (0.03 %) nasal spray 2 sprays by Each Nostril route 3 (three) times daily. 3/11/25  Yes Masoud Pickens Jr., MD   levothyroxine (SYNTHROID) 100 MCG tablet Take 100 mcg by mouth before breakfast.   Yes Provider, Historical   montelukast (SINGULAIR) 10 mg tablet Take 10 mg by mouth every evening.   Yes Provider, Historical   nystatin (MYCOSTATIN) powder Apply topically every evening.   Yes Provider, Historical   polyethylene glycol (GLYCOLAX) 17 gram/dose powder Take 17 g by mouth once  daily.  Patient taking differently: Take 17 g by mouth Daily. 1/2/25  Yes Braulio Arzola MD   simvastatin (ZOCOR) 10 MG tablet Take 10 mg by mouth every evening. 8/8/22  Yes Provider, Historical   traMADoL (ULTRAM) 50 mg tablet Take 50 mg by mouth every 24 hours as needed for Pain. 7/28/22  Yes Provider, Historical       REVIEW OF SYSTEMS:   Except as documented, all other systems reviewed and negative     PHYSICAL EXAM:     VITAL SIGNS: 24 HRS MIN & MAX LAST   Temp  Min: 97.5 °F (36.4 °C)  Max: 98 °F (36.7 °C) 98 °F (36.7 °C)   BP  Min: 72/42  Max: 144/52 132/75   Pulse  Min: 65  Max: 77  71   Resp  Min: 9  Max: 23 20   SpO2  Min: 96 %  Max: 99 % 97 %     General appearance: Well-developed, well-nourished female in no apparent distress.  HENT: Atraumatic head. Moist mucous membranes of oral cavity.  Eyes: Normal extraocular movements.   Neck: Supple.   Lungs: Clear to auscultation bilaterally. No wheezing present.   Heart: Regular rate and rhythm. S1 and S2 present with no murmurs/gallop/rub. No pedal edema. No JVD present.   Abdomen: Soft, non-distended, non-tender. No rebound tenderness/guarding. Bowel sounds are normal.   Extremities: No cyanosis, clubbing, or edema.  Skin: No Rash.   Neuro: Motor and sensory exams grossly intact. Good tone. Muscle strength 5/5 in all 4 extremities  Psych/mental status: Appropriate mood and affect. Responds appropriately to questions.     LABS AND IMAGING:     Recent Labs   Lab 03/19/25  0409 03/20/25  0431 03/24/25  1033   WBC 5.96 5.22 7.19   RBC 3.48* 3.36* 3.67*   HGB 8.8* 8.6* 9.5*   HCT 27.6* 27.2* 29.5*   MCV 79.3* 81.0 80.4   MCH 25.3* 25.6* 25.9*   MCHC 31.9* 31.6* 32.2*   RDW 21.8* 21.4* 21.0*    214 306   MPV 10.7* 10.2 10.5*       Recent Labs   Lab 03/19/25  0409 03/20/25  0431 03/24/25  1033    138 140   K 3.1* 2.9* 3.6   * 107 106   CO2 23 21* 24   BUN 11.0 7.0* 19.0   CREATININE 0.77 0.79 1.35*   CALCIUM 7.9* 7.4* 8.8   MG 1.70 1.40*  --     ALBUMIN 2.6* 2.6* 2.8*   ALKPHOS 74 71 94   ALT <5 5 7   AST 15 16 16   BILITOT 0.3 0.2 0.9       Microbiology Results (last 7 days)       Procedure Component Value Units Date/Time    Blood culture #1 **CANNOT BE ORDERED STAT** [[id number]] Collected: 03/24/25 1055    Order Status: Sent Specimen: Blood from Arm, Left Updated: 03/24/25 1105    Blood culture #2 **CANNOT BE ORDERED STAT** [[id number]] Collected: 03/24/25 1101    Order Status: Sent Specimen: Blood from Hand, Left Updated: 03/24/25 1105             X-ray Chest AP Portable  Narrative: EXAMINATION:  XR CHEST AP PORTABLE    CLINICAL HISTORY:  Generalized weakness;, .    COMPARISON:  03/18/2025    FINDINGS:  An AP view or more reveals the heart to be normal in size.  The trachea is midline.  Atherosclerosis seen within a tortuous thoracic aorta.  No infiltrate or effusion is seen.  Bony structures are osteopenic.  Degenerative changes are evident at the thoracic spine.  A left shoulder prosthetic is present.  Impression: 1. No active cardiopulmonary disease identified    Electronically signed by: Kevin Vasquez  Date:    03/24/2025  Time:    14:22      ASSESSMENT & PLAN:     Acute UTI   Acute renal failure   History of hypertension   History of CVA   Dysphagia   Anemia   Hypercholesterolemia    Hold Norvasc  Continue hydration with normal saline 125 cc/hour   Continue IV cefepime   Check blood culture and urine culture   Continue physical therapy  Check CBC BMP in a.m.      VTE Prophylaxis: will be placed on Heparin/Lovenox/ Xarelto/ SCD for DVT prophylaxis and will be advised to be as mobile as possible and sit in a chair as tolerated    Patient condition:  Stable/Fair/Guarded/ Serious/ Critical    __________________________________________________________________________  INPATIENT LIST OF MEDICATIONS     Scheduled Meds:   atorvastatin  40 mg Oral Daily    ceFEPime IV (PEDS and ADULTS)  1 g Intravenous Q12H    [START ON 3/25/2025] citalopram  40 mg Oral  QAM    [START ON 3/25/2025] clopidogreL  75 mg Oral Daily    [START ON 3/25/2025] docusate sodium  100 mg Oral QAM    [START ON 3/25/2025] levothyroxine  100 mcg Oral Before breakfast    mupirocin   Nasal BID     Continuous Infusions:  PRN Meds:.  Current Facility-Administered Medications:     acetaminophen, 650 mg, Oral, Q8H PRN    acetaminophen, 650 mg, Oral, Q4H PRN    ondansetron, 4 mg, Intravenous, Q8H PRN    traMADoL, 50 mg, Oral, Q24H PRN      I, _ NP/PA have reviewed and discussed the case with  _   Please see the following addendum for further assessment and plan from there attending MD.    03/24/2025    ________________________________________________________________________________    MD Addendum:  Dr. JOSEMANUEL ---assumed care of this patient today at ---am/pm  For the patient encounter, I performed the substantive portion of the visit, I reviewed the NP/PA documentation, treatment plan, and medical decision making.  I had face to face time with this patient     A. History:    B. Physical exam:    C. Medical decision making:    Discharge Planning and Disposition: No mobility needs. Ambulating well. Good social support system.   Anticipated discharge    If patient was admitted under observational status it is with my approval/permission.        All diagnosis and differential diagnosis have been reviewed; assessment and plan has been documented; I have personally reviewed the labs and test results that are presently available; I have reviewed the patients medication list; I have reviewed the consulting providers response and recommendations. I have reviewed or attempted to review medical records based upon their availability.    All of the patient and family questions have been addressed and answered. Patient's is agreeable to the above stated plan. I will continue to monitor closely and make adjustments to medical management as needed.    If patient was admitted under observational status it is with my  approval/permission.      Victoriano Dhillon MD   03/24/2025

## 2025-03-24 NOTE — ED PROVIDER NOTES
Encounter Date: 3/24/2025  History from patient     History     Chief Complaint   Patient presents with    Extremity Weakness    Hypotension     Brought per [facility] from [facility] for weakness and hypotension after given her Norvasc this am     HPI    Ashley Calvillo is 79 y.o. 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 arrives in ER with c/o Extremity Weakness and Hypotension (Brought per [facility] from [facility] for weakness and hypotension after given her Norvasc this am)    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    CVA (cerebral vascular accident)     Depression     Dysphagia     HTN (hypertension)     Hypothyroidism, unspecified     Iron deficiency anemia, unspecified     Mixed hyperlipidemia     Squamous cell carcinoma in situ     Tonsillar cancer     Urinary tract infection, site not specified      Past Surgical History:   Procedure Laterality Date    CLOSURE, COLOSTOMY N/A 2/24/2025    Procedure: CLOSURE, COLOSTOMY;  Surgeon: GENA Matute MD;  Location: [facility];  Service: General;  Laterality: N/A;  LAP ASSISSTED, CONVERTED TO OPEN AT 1400    COLECTOMY, SIGMOID N/A 7/28/2024    Procedure: COLECTOMY, SIGMOID;  Surgeon: GENA Matute MD;  Location: [facility] OR;  Service: General;  Laterality: N/A;    COLON RESECTION N/A 2/24/2025    Procedure: COLON RESECTION;  Surgeon: GENA Matute MD;  Location: [facility] OR;  Service: General;  Laterality: N/A;  SMALL BOWEL RESECTION     COLONOSCOPY, THROUGH STOMA, DIAGNOSTIC N/A 8/26/2024    Procedure: COLONOSCOPY, THROUGH STOMA, DIAGNOSTIC;  Surgeon: GENA Matute MD;  Location: [facility] ENDO;  Service: Endoscopy;  Laterality: N/A;  DIVERTICULOSIS    COLONOSCOPY, THROUGH STOMA, DIAGNOSTIC N/A 11/1/2024    Procedure: COLONOSCOPY, THROUGH STOMA, DIAGNOSTIC;  Surgeon: GENA Matute MD;  Location: [facility] ENDO;  Service: Endoscopy;  Laterality: N/A;  POST OP: NORMAL COLONOSCOPY    COLONOSCOPY, WITH DIRECTED SUBMUCOSAL INJECTION N/A 11/1/2024    Procedure: COLONOSCOPY, WITH DIRECTED SUBMUCOSAL INJECTION;  Surgeon: GENA Matute MD;  Location: [facility];  Service: Endoscopy;  Laterality: N/A;  TATTOO INK SPOT IN RECTUM AT 12 CM (PROCTOSCOPY)    COLONOSCOPY, WITH POLYPECTOMY USING SNARE N/A 8/26/2024    Procedure: COLONOSCOPY, WITH POLYPECTOMY USING SNARE;  Surgeon: GENA Matute MD;  Location: [facility] ENDO;  Service: Endoscopy;  Laterality: N/A;  A) TRANSVERSE COLON POLYP    COLOSTOMY N/A 7/28/2024    Procedure: CREATION, COLOSTOMY;  Surgeon: GENA Matute MD;  Location: [facility] OR;  Service: General;  Laterality: N/A;    FLEXIBLE SIGMOIDOSCOPY N/A 7/26/2024    Procedure: SIGMOIDOSCOPY, FLEXIBLE;  Surgeon: GENA Matute MD;  Location: [facility];  Service: Endoscopy;  Laterality: N/A;  POST OP:DECOMPRESSION    FLEXIBLE SIGMOIDOSCOPY N/A 2/24/2025    Procedure: SIGMOIDOSCOPY, FLEXIBLE;  Surgeon: GENA Matute MD;  Location: [facility] OR;  Service: General;  Laterality: N/A;    HYSTERECTOMY      LAPAROSCOPIC LYSIS OF ADHESIONS N/A 2/24/2025    Procedure: LYSIS, ADHESIONS, LAPAROSCOPIC;  Surgeon: GENA Matute MD;  Location: [facility] OR;  Service: General;  Laterality: N/A;  LAPAROSCOPIC AND OPEN LYSIS OF EXTENSIVE ADHESIONS    LAPAROSCOPY N/A 7/28/2024    Procedure: LAPAROSCOPY;  Surgeon: SabGENA Gibbons MD;  Location: [facility];  Service: General;  Laterality: N/A;  massively  distended sigmoid colon was identified Converted to open art 1620    OMENTECTOMY N/A 2/24/2025    Procedure: OMENTECTOMY;  Surgeon: GENA Matute MD;  [facility];  Service: General;  Laterality: N/A;  PARTIAL OMENTECTOMY, OMENTAL PEDICLE FLAP TO ANASTOMOSIS    OOPHORECTOMY Left 2/24/2025    Procedure: OOPHORECTOMY;  Surgeon: GENA Matute MD;  [facility];  Service: General;  Laterality: Left;    PROCTOSCOPY N/A 11/1/2024    Procedure: PROCTOSCOPY;  Surgeon: GENA Matute MD;  [facility];  Service: Endoscopy;  Laterality: N/A;  FLEXIBLE; EVALUATION OF RECTAL STUMP THROUGH RECTUM; NORMAL RECTUM    SALPINGOOPHORECTOMY Right 2/24/2025    Procedure: SALPINGO-OOPHORECTOMY;  Surgeon: GENA Matute MD;  [facility];  Service: General;  Laterality: Right;    TONSILLECTOMY      TOTAL SHOULDER ARTHROPLASTY       Family History   Problem Relation Name Age of Onset    Cancer Mother      Leukemia Mother      Stroke Mother      Alzheimer's disease Father      Cancer Sister      Leukemia Sister       Social History[1]  Review of Systems   Constitutional:  Positive for fatigue. Negative for fever.   HENT:  Negative for trouble swallowing and voice change.    Eyes:  Negative for visual disturbance.   Respiratory:  Negative for cough and shortness of breath.    Cardiovascular:  Negative for chest pain.   Gastrointestinal:  Negative for abdominal pain, diarrhea and vomiting.   Genitourinary:  Negative for dysuria and hematuria.   Musculoskeletal:  Negative for back pain and gait problem.   Skin:  Negative for color change and rash.   Neurological:  Positive for weakness. Negative for headaches.   Psychiatric/Behavioral:  Negative for behavioral problems and sleep disturbance.    All other systems reviewed and are negative.      Physical Exam     Initial Vitals [03/24/25 1017]   BP Pulse Resp Temp SpO2   (!) 72/42 73 16 97.5 °F (36.4 °C) 98 %      MAP       --          Physical Exam    Nursing note and vitals reviewed.  Constitutional: She appears well-developed.   Elderly lady, frail looking   HENT:   Head: Atraumatic.   Neck: Neck supple.   Cardiovascular:  Normal rate and regular rhythm.           Pulmonary/Chest: Breath sounds normal. No respiratory distress. She has no wheezes. She has no rales.   Abdominal: Abdomen is soft. Bowel sounds are normal. She exhibits no distension. There is abdominal tenderness. There is no rebound.   Musculoskeletal:         General: No edema.      Cervical back: Neck supple.     Neurological: She is alert and oriented to person, place, and time. GCS score is 15. GCS eye subscore is 4. GCS verbal subscore is 5. GCS motor subscore is 6.         ED Course   Critical Care    Date/Time: 3/24/2025 1:28 PM    Performed by: Albert Gonzalez MD  Authorized by: Victoriano Dhillon MD  Direct patient critical care time: 30 minutes  Consulting other physicians critical care time: 5 minutes  Total critical care time (exclusive of procedural time) : 35 minutes  Critical care was necessary to treat or prevent imminent or life-threatening deterioration of the following conditions: sepsis.  Critical care was time spent personally by me on the following activities: development of treatment plan with patient or surrogate, discussions with consultants, evaluation of patient's response to treatment, examination of patient, obtaining history from patient or surrogate, ordering and performing treatments and interventions, ordering and review of laboratory studies, ordering and review of radiographic studies, pulse oximetry, re-evaluation of patient's condition and review of old charts.        Orders Placed This Encounter    Critical Care    Blood culture #1 **CANNOT BE ORDERED STAT**    Blood culture #2 **CANNOT BE ORDERED STAT**    X-ray Chest AP Portable    Comprehensive metabolic panel    CBC auto differential    Troponin I    Brain natriuretic peptide    CBC  with Differential    Urinalysis, Reflex to Urine Culture    Lactic acid, plasma    Lactic Acid, Plasma    Urinalysis, Microscopic    Diet NPO    Vital signs    Cardiac Monitoring - Adult    Straight Cath (In and Out)    Oxygen Continuous    Pulse Oximetry Continuous    Cardiac monitoring strips    Cardiac monitoring strips    Cardiac monitoring strips    Cardiac monitoring strips    Cardiac monitoring strips    EKG 12-LEAD    Insert saline lock    Admit to Inpatient    lactated ringers bolus 1,947 mL    ceFEPIme injection 1 g    0.9% NaCl infusion       Labs Reviewed   COMPREHENSIVE METABOLIC PANEL - Abnormal       Result Value    Sodium 140      Potassium 3.6      Chloride 106      CO2 24      Glucose 144 (*)     Blood Urea Nitrogen 19.0      Creatinine 1.35 (*)     Calcium 8.8      Protein Total 6.3      Albumin 2.8 (*)     Globulin 3.5      Albumin/Globulin Ratio 0.8 (*)     Bilirubin Total 0.9      ALP 94      ALT 7      AST 16      eGFR 40      Anion Gap 10.0      BUN/Creatinine Ratio 14     B-TYPE NATRIURETIC PEPTIDE - Abnormal    Natriuretic Peptide 227.5 (*)    CBC WITH DIFFERENTIAL - Abnormal    WBC 7.19      RBC 3.67 (*)     Hgb 9.5 (*)     Hct 29.5 (*)     MCV 80.4      MCH 25.9 (*)     MCHC 32.2 (*)     RDW 21.0 (*)     Platelet 306      MPV 10.5 (*)     Neut % 75.4      Lymph % 9.9      Mono % 10.2      Eos % 2.4      Basophil % 0.7      Imm Grans % 1.4      Neut # 5.43      Lymph # 0.71      Mono # 0.73      Eos # 0.17      Baso # 0.05      Imm Gran # 0.10 (*)     NRBC% 0.0     URINALYSIS, REFLEX TO URINE CULTURE - Abnormal    Color, UA Orange (*)     Appearance, UA Clear      Specific Gravity, UA >=1.030      pH, UA 5.0      Protein, UA 2+ (*)     Glucose, UA Trace (*)     Ketones, UA 1+ (*)     Blood, UA 3+ (*)     Bilirubin, UA 1+ (*)     Urobilinogen, UA 0.2      Nitrites, UA Positive (*)     Leukocyte Esterase, UA Negative     LACTIC ACID, PLASMA - Abnormal    Lactic Acid Level 2.5 (*)    LACTIC  ACID, PLASMA - Abnormal    Lactic Acid Level 2.6 (*)    URINALYSIS, MICROSCOPIC - Abnormal    Bacteria, UA Occasional      Yeast, UA Moderate (*)     RBC, UA 3-5      WBC, UA None Seen      Squamous Epithelial Cells, UA Few (*)    TROPONIN I - Normal    Troponin-I 0.015     BLOOD CULTURE [facility]   BLOOD CULTURE [facility]   CBC W/ AUTO DIFFERENTIAL    Narrative:     The following orders were created for panel order CBC auto differential.  Procedure                               Abnormality         Status                     ---------                               -----------         ------                     CBC with Differential[[id number]]       Abnormal            Final result                 Please view results for these tests on the individual orders.        ECG Results              EKG 12-LEAD (Preliminary result)  Result time 03/24/25 13:00:18      Wet Read by Albert Gonzalez MD (03/24/25 13:00:18, [facility] - Emergency Dept, Emergency Medicine)    EKG: Independently reviewed and / or Interpreted by Albert Gonzalez MD. independently as Normal Sinus Rhythm, Rate 68, Normal Axis, Prolonged QT Interval., Non Specific T wave Changes, No STEMI, no other dysrhythmia.                                    Imaging Results              X-ray Chest AP Portable (Preliminary result)  Result time 03/24/25 13:01:01      Wet Read by Albert Gonzalez MD (03/24/25 13:01:01, [facility] - Emergency Dept, Emergency Medicine)    Chest One View:  Independently reviewed and/or interpreted by Albert Gonzalez MD.  No Focal Consolidation, No Acute Cardiopulmonary abnormality identified grossly.                                     Medications   ceFEPIme injection 1 g (has no administration in time range)   0.9% NaCl infusion (has no administration in time range)   lactated ringers bolus 1,947 mL (0 mLs Intravenous Stopped 3/24/25 1135)     Medical Decision Making    Ashley Calvillo is 79 y.o. 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 arrives in ER with c/o Extremity Weakness and Hypotension (Brought per [facility] from [facility] for weakness and hypotension after given her Norvasc this am)    Patient is sent from the nursing home with a complaint of generalized weakness and hypotension.  Patient was given her dose of blood pressure medicine this morning, patient recently had her colostomy reversal, which had some wound day since, was admitted in the hospital, recently discharged from the hospital she has been treated for UTI these days, she is on Rocephin twice a day.    Amount and/or Complexity of Data Reviewed  Labs: ordered.  Radiology: ordered and independent interpretation performed.    Risk  Prescription drug management.  Decision regarding hospitalization.               ED Course as of 03/24/25 1329   Mon Mar 24, 2025   1305 Although patient's blood pressure has come up nicely, and she is maintaining it, she still has elevated lactic acid level, he does have a mild urinary tract infection, her O2 sat is 99% on room air, so she does not have any major pneumonia on the chest x-ray, I will talk to hospitalist to admit her in the hospital, continue giving her slow IV hydration and wait for the blood cultures to come back, I will put her on antibiotic for that sepsis. [GQ]   1308 Sepsis Reassessment:    Patient's condition is improved, doing better  Review of systems, see above.  Vital signs reviewed see charting above  Oxygen Saturation:   Heart S1, S2 is audible, no S3 no S4, no murmurs.  Chest clear to auscultation  Abdomen is soft, nondistended, nontender, bowel sounds are audible.  Mental status  "unchanged  Tissue Perfusion:        Skin in normal for ethnic origin       Capillary refill normal       Peripheral pulses Radial are palpable b/L.       Urine Out put: Adequate.    Patient got 30 mL/kg bolus, repeat lactic acid is still 2.6, she does have a mild urinary tract infection, chest x-ray does not show any pneumonia.  I talked to Dr. Dhillon in his okay with admission.  We will wait for the blood culture PCR to come back before we decide about disposition. [GQ]   1310 Her hypotension has resolved though with a fluid bolus.  And her blood pressure is actually elevated 144/52 with good capillary refill.  And all peripheral pulses palpable. [GQ]   1325 Looking at the urine culture from 3/23/2025 apparently patient has 100,000 colonies/ml Aerococcus urinae in the urine.  And she was put on Rocephin I will looked at the sensitivity report it appears like she is sensitive to cefepime I will put her on cefepime and continue giving her that. [GQ]   1326 Last blood cultures were negative, and I have drawn blood cultures again on her today. [GQ]      ED Course User Index  [GQ] Albert Gonzalez MD    Sepsis Perfusion Assessment: "I attest a sepsis perfusion exam was performed within 6 hours of sepsis, severe sepsis, or septic shock presentation, following fluid resuscitation."                      Clinical Impression:  Final diagnoses:  [R53.1] Generalized weakness  [I95.9] Hypotension, unspecified hypotension type (Primary)  [N39.0] Urinary tract infection without hematuria, site unspecified  [A41.9] Sepsis, due to unspecified organism, unspecified whether acute organ dysfunction present  [A41.9] Sepsis          ED Disposition Condition    Admit Stable                  [1]   Social History  Tobacco Use    Smoking status: Former     Types: Cigarettes    Smokeless tobacco: Never    Tobacco comments:     patient stated that she smokes on some days.    Substance Use Topics    Alcohol use: Never     Comment: wine, " occasionally    Drug use: Never        Albert Gonzalez MD  03/24/25 6030

## 2025-03-25 LAB
ALBUMIN SERPL-MCNC: 2.4 G/DL (ref 3.4–4.8)
ALBUMIN/GLOB SERPL: 0.9 RATIO (ref 1.1–2)
ALP SERPL-CCNC: 75 UNIT/L (ref 40–150)
ALT SERPL-CCNC: 5 UNIT/L (ref 0–55)
ANION GAP SERPL CALC-SCNC: 8 MEQ/L
AST SERPL-CCNC: 13 UNIT/L (ref 11–45)
BASOPHILS # BLD AUTO: 0.05 X10(3)/MCL
BASOPHILS NFR BLD AUTO: 0.8 %
BILIRUB SERPL-MCNC: 0.5 MG/DL
BUN SERPL-MCNC: 12 MG/DL (ref 9.8–20.1)
CALCIUM SERPL-MCNC: 8.1 MG/DL (ref 8.4–10.2)
CHLORIDE SERPL-SCNC: 108 MMOL/L (ref 98–107)
CO2 SERPL-SCNC: 23 MMOL/L (ref 23–31)
CREAT SERPL-MCNC: 0.87 MG/DL (ref 0.55–1.02)
CREAT/UREA NIT SERPL: 14
EOSINOPHIL # BLD AUTO: 0.18 X10(3)/MCL (ref 0–0.9)
EOSINOPHIL NFR BLD AUTO: 3 %
ERYTHROCYTE [DISTWIDTH] IN BLOOD BY AUTOMATED COUNT: 20.9 % (ref 11.5–17)
GFR SERPLBLD CREATININE-BSD FMLA CKD-EPI: >60 ML/MIN/1.73/M2
GLOBULIN SER-MCNC: 2.7 GM/DL (ref 2.4–3.5)
GLUCOSE SERPL-MCNC: 87 MG/DL (ref 82–115)
HCT VFR BLD AUTO: 25.3 % (ref 37–47)
HGB BLD-MCNC: 8.1 G/DL (ref 12–16)
IMM GRANULOCYTES # BLD AUTO: 0.06 X10(3)/MCL (ref 0–0.04)
IMM GRANULOCYTES NFR BLD AUTO: 1 %
LYMPHOCYTES # BLD AUTO: 0.92 X10(3)/MCL (ref 0.6–4.6)
LYMPHOCYTES NFR BLD AUTO: 15.1 %
MCH RBC QN AUTO: 25.7 PG (ref 27–31)
MCHC RBC AUTO-ENTMCNC: 32 G/DL (ref 33–36)
MCV RBC AUTO: 80.3 FL (ref 80–94)
MONOCYTES # BLD AUTO: 0.78 X10(3)/MCL (ref 0.1–1.3)
MONOCYTES NFR BLD AUTO: 12.8 %
NEUTROPHILS # BLD AUTO: 4.09 X10(3)/MCL (ref 2.1–9.2)
NEUTROPHILS NFR BLD AUTO: 67.3 %
NRBC BLD AUTO-RTO: 0 %
PLATELET # BLD AUTO: 264 X10(3)/MCL (ref 130–400)
PMV BLD AUTO: 10.5 FL (ref 7.4–10.4)
POCT GLUCOSE: 78 MG/DL (ref 70–110)
POTASSIUM SERPL-SCNC: 2.7 MMOL/L (ref 3.5–5.1)
PROT SERPL-MCNC: 5.1 GM/DL (ref 5.8–7.6)
RBC # BLD AUTO: 3.15 X10(6)/MCL (ref 4.2–5.4)
SODIUM SERPL-SCNC: 139 MMOL/L (ref 136–145)
WBC # BLD AUTO: 6.08 X10(3)/MCL (ref 4.5–11.5)

## 2025-03-25 PROCEDURE — 63600175 PHARM REV CODE 636 W HCPCS: Performed by: INTERNAL MEDICINE

## 2025-03-25 PROCEDURE — 27000207 HC ISOLATION

## 2025-03-25 PROCEDURE — 85025 COMPLETE CBC W/AUTO DIFF WBC: CPT | Performed by: INTERNAL MEDICINE

## 2025-03-25 PROCEDURE — 36415 COLL VENOUS BLD VENIPUNCTURE: CPT | Performed by: INTERNAL MEDICINE

## 2025-03-25 PROCEDURE — 63600175 PHARM REV CODE 636 W HCPCS: Performed by: EMERGENCY MEDICINE

## 2025-03-25 PROCEDURE — 21400001 HC TELEMETRY ROOM

## 2025-03-25 PROCEDURE — 97530 THERAPEUTIC ACTIVITIES: CPT

## 2025-03-25 PROCEDURE — 80053 COMPREHEN METABOLIC PANEL: CPT | Performed by: INTERNAL MEDICINE

## 2025-03-25 PROCEDURE — 94761 N-INVAS EAR/PLS OXIMETRY MLT: CPT

## 2025-03-25 PROCEDURE — 25000003 PHARM REV CODE 250: Performed by: INTERNAL MEDICINE

## 2025-03-25 PROCEDURE — 97162 PT EVAL MOD COMPLEX 30 MIN: CPT

## 2025-03-25 RX ORDER — POTASSIUM CHLORIDE 7.45 MG/ML
10 INJECTION INTRAVENOUS ONCE
Status: COMPLETED | OUTPATIENT
Start: 2025-03-25 | End: 2025-03-25

## 2025-03-25 RX ORDER — FAMOTIDINE 20 MG/1
20 TABLET, FILM COATED ORAL DAILY
Status: DISCONTINUED | OUTPATIENT
Start: 2025-03-26 | End: 2025-03-26 | Stop reason: HOSPADM

## 2025-03-25 RX ORDER — POTASSIUM CHLORIDE 7.45 MG/ML
10 INJECTION INTRAVENOUS
Status: DISPENSED | OUTPATIENT
Start: 2025-03-25 | End: 2025-03-25

## 2025-03-25 RX ADMIN — MUPIROCIN 1 G: 20 OINTMENT TOPICAL at 08:03

## 2025-03-25 RX ADMIN — ATORVASTATIN CALCIUM 40 MG: 40 TABLET, FILM COATED ORAL at 08:03

## 2025-03-25 RX ADMIN — CITALOPRAM HYDROBROMIDE 40 MG: 20 TABLET ORAL at 06:03

## 2025-03-25 RX ADMIN — MUPIROCIN 1 G: 20 OINTMENT TOPICAL at 09:03

## 2025-03-25 RX ADMIN — CEFEPIME 1 G: 1 INJECTION, POWDER, FOR SOLUTION INTRAMUSCULAR; INTRAVENOUS at 12:03

## 2025-03-25 RX ADMIN — POTASSIUM CHLORIDE 10 MEQ: 7.46 INJECTION, SOLUTION INTRAVENOUS at 10:03

## 2025-03-25 RX ADMIN — POTASSIUM CHLORIDE 10 MEQ: 7.46 INJECTION, SOLUTION INTRAVENOUS at 08:03

## 2025-03-25 RX ADMIN — POTASSIUM CHLORIDE 10 MEQ: 7.46 INJECTION, SOLUTION INTRAVENOUS at 06:03

## 2025-03-25 RX ADMIN — LEVOTHYROXINE SODIUM 100 MCG: 100 TABLET ORAL at 05:03

## 2025-03-25 RX ADMIN — CLOPIDOGREL BISULFATE 75 MG: 75 TABLET ORAL at 08:03

## 2025-03-25 RX ADMIN — DOCUSATE SODIUM 100 MG: 100 CAPSULE, LIQUID FILLED ORAL at 06:03

## 2025-03-25 RX ADMIN — POTASSIUM CHLORIDE 10 MEQ: 7.46 INJECTION, SOLUTION INTRAVENOUS at 04:03

## 2025-03-25 NOTE — PLAN OF CARE
Emily stated that the ins auth came back and they can accept the pt on tomorrow and I informed the doctor

## 2025-03-25 NOTE — PT/OT/SLP EVAL
Physical Therapy Evaluation    Patient Name:  Ashley Calvillo   MRN:  [id number]    Recommendations:     Discharge Recommendations: Moderate Intensity Therapy (Would greatly benefit from a return to SNF)   Discharge Equipment Recommendations: to be determined by next level of care   Barriers to discharge:  inability to care for self - requires SNF    Assessment:     Ashley Calvillo is a 79 y.o. female admitted with a medical diagnosis of Urinary tract infection without hematuria.  She presents with the following impairments/functional limitations: weakness, impaired endurance, impaired self care skills, impaired functional mobility, gait instability, impaired balance, decreased safety awareness, decreased lower extremity function, decreased upper extremity function .    Assessment and tx: Patient exhibits gross overall weakness necessitating 24/7 care at this time. Patient is a great candidate for continued rehab as prior to her recent surgeries, she states she was able to transfer herself to her chair without assist and is now requiring 1-2 person assist. Patient required max A for all aspects of bed mobility and required total assist for stand pivot transfer bed>chair with bilateral lower extremities blocked to prevent buckling. Patient was encouraged to stay up in her chair and agreed. Will continue to progress her mobility as tolerated.    Rehab Prognosis: Good; patient would benefit from acute skilled PT services to address these deficits and reach maximum level of function.    Recent Surgery: * No surgery found *      Plan:     During this hospitalization, patient to be seen 5 x/week to address the identified rehab impairments via gait training, therapeutic activities, therapeutic exercises and progress toward the following goals:    Plan of Care Expires:       Subjective     Chief Complaint: overall weakness  Patient/Family Comments/goals: to get stronger  Pain/Comfort:  Pain Rating 1: 0/10    Patients cultural,  spiritual, Bahai conflicts given the current situation:      Living Environment:  Patient reports she came from a nursing home where she was able to transfer herself to her chair without assist in the past and is now requiring 1-2 person assist and is now unable to propel her chair due to her weakness limiting her participation in her environment.    Equipment used at home: wheelchair.  DME owned (not currently used): none.  Upon discharge, patient will have assistance from NH staff.    Objective:     Communicated with nurse prior to session.  Patient found HOB elevated with bed alarm, telemetry, peripheral IV, PureWick  upon PT entry to room.    General Precautions: Standard, fall, contact  Orthopedic Precautions:N/A   Braces: N/A  Respiratory Status: Room air    Exams:  RLE ROM: WFL  LLE ROM: WFL  Exhibits gross functional weakness of B LE    Functional Mobility:  Bed Mobility:     Supine to Sit: maximal assistance  Transfers:     Sit to Stand:  total assistance with no AD  Bed to Chair: total assistance with  no AD  using  Stand Pivot        Patient left up in chair with all lines intact, call button in reach, chair alarm on, and nurse notified.    GOALS:   Multidisciplinary Problems       Physical Therapy Goals          Problem: Physical Therapy    Goal Priority Disciplines Outcome Interventions   Physical Therapy Goal     PT, PT/OT Progressing    Description: Goals to be met by: discharge     Patient will increase functional independence with mobility by performin. Supine to sit with MInimal Assistance  2. Sit to stand transfer with Minimal Assistance  3. Bed to chair transfer with Minimal Assistance using Rolling Walker                         DME Justifications:  No DME recommended requiring DME justifications    History:     Past Medical History:   Diagnosis Date    Arthritis     Colostomy in place 2025    CVA (cerebral vascular accident)     Depression     Dysphagia     HTN (hypertension)      Hypothyroidism, unspecified     Iron deficiency anemia, unspecified     Mixed hyperlipidemia     Squamous cell carcinoma in situ     Tonsillar cancer     Urinary tract infection, site not specified        Past Surgical History:   Procedure Laterality Date    CLOSURE, COLOSTOMY N/A 2/24/2025    Procedure: CLOSURE, COLOSTOMY;  Surgeon: GENA Matute MD;  Location: [facility];  Service: General;  Laterality: N/A;  LAP ASSISSTED, CONVERTED TO OPEN AT 1400    COLECTOMY, SIGMOID N/A 7/28/2024    Procedure: COLECTOMY, SIGMOID;  Surgeon: GENA Matute MD;  Location: [facility];  Service: General;  Laterality: N/A;    COLON RESECTION N/A 2/24/2025    Procedure: COLON RESECTION;  Surgeon: GENA Matute MD;  Location: [facility];  Service: General;  Laterality: N/A;  SMALL BOWEL RESECTION    COLONOSCOPY, THROUGH STOMA, DIAGNOSTIC N/A 8/26/2024    Procedure: COLONOSCOPY, THROUGH STOMA, DIAGNOSTIC;  Surgeon: GENA Matute MD;  Location: [facility];  Service: Endoscopy;  Laterality: N/A;  DIVERTICULOSIS    COLONOSCOPY, THROUGH STOMA, DIAGNOSTIC N/A 11/1/2024    Procedure: COLONOSCOPY, THROUGH STOMA, DIAGNOSTIC;  Surgeon: GENA Matute MD;  Location: [facility];  Service: Endoscopy;  Laterality: N/A;  POST OP: NORMAL COLONOSCOPY    COLONOSCOPY, WITH DIRECTED SUBMUCOSAL INJECTION N/A 11/1/2024    Procedure: COLONOSCOPY, WITH DIRECTED SUBMUCOSAL INJECTION;  Surgeon: GENA Matute MD;  Location: [facility];  Service: Endoscopy;  Laterality: N/A;  TATTOO INK SPOT IN RECTUM AT 12 CM (PROCTOSCOPY)    COLONOSCOPY, WITH POLYPECTOMY USING SNARE N/A 8/26/2024    Procedure: COLONOSCOPY, WITH POLYPECTOMY USING SNARE;  Surgeon: GENA Matute MD;  Location: [facility];  Service: Endoscopy;  Laterality: N/A;  A) TRANSVERSE COLON POLYP    COLOSTOMY N/A 7/28/2024    Procedure: CREATION, COLOSTOMY;  Surgeon: GENA Matute MD;  Location: [facility];  Service: General;   Laterality: N/A;    FLEXIBLE SIGMOIDOSCOPY N/A 7/26/2024    Procedure: SIGMOIDOSCOPY, FLEXIBLE;  Surgeon: GENA Matute MD;  Location: [facility];  Service: Endoscopy;  Laterality: N/A;  POST OP:DECOMPRESSION    FLEXIBLE SIGMOIDOSCOPY N/A 2/24/2025    Procedure: SIGMOIDOSCOPY, FLEXIBLE;  Surgeon: GENA Matute MD;  Location: [facility] OR;  Service: General;  Laterality: N/A;    HYSTERECTOMY      LAPAROSCOPIC LYSIS OF ADHESIONS N/A 2/24/2025    Procedure: LYSIS, ADHESIONS, LAPAROSCOPIC;  Surgeon: GENA Matute MD;  Location: [facility] OR;  Service: General;  Laterality: N/A;  LAPAROSCOPIC AND OPEN LYSIS OF EXTENSIVE ADHESIONS    LAPAROSCOPY N/A 7/28/2024    Procedure: LAPAROSCOPY;  Surgeon: GENA Matute MD;  Location: [facility];  Service: General;  Laterality: N/A;  massively distended sigmoid colon was identified Converted to open art 1620    OMENTECTOMY N/A 2/24/2025    Procedure: OMENTECTOMY;  Surgeon: GENA Matute MD;  Location: [facility];  Service: General;  Laterality: N/A;  PARTIAL OMENTECTOMY, OMENTAL PEDICLE FLAP TO ANASTOMOSIS    OOPHORECTOMY Left 2/24/2025    Procedure: OOPHORECTOMY;  Surgeon: GENA Matute MD;  Location: [facility];  Service: General;  Laterality: Left;    PROCTOSCOPY N/A 11/1/2024    Procedure: PROCTOSCOPY;  Surgeon: GENA Matute MD;  Location: [facility];  Service: Endoscopy;  Laterality: N/A;  FLEXIBLE; EVALUATION OF RECTAL STUMP THROUGH RECTUM; NORMAL RECTUM    SALPINGOOPHORECTOMY Right 2/24/2025    Procedure: SALPINGO-OOPHORECTOMY;  Surgeon: GENA Matute MD;  Location: [facility];  Service: General;  Laterality: Right;    TONSILLECTOMY      TOTAL SHOULDER ARTHROPLASTY         Time Tracking:     PT Received On: 03/25/25  PT Start Time: 1050     PT Stop Time: 1103  PT Total Time (min): 13 min     Billable Minutes: Evaluation 13 03/25/2025

## 2025-03-25 NOTE — PLAN OF CARE
Problem: Infection  Goal: Absence of Infection Signs and Symptoms  3/25/2025 0745 by Bethany Lam LPN  Outcome: Progressing  3/25/2025 0745 by Bethany Lam LPN  Outcome: Progressing     Problem: Adult Inpatient Plan of Care  Goal: Plan of Care Review  3/25/2025 0745 by Bethany Lam LPN  Outcome: Progressing  3/25/2025 0745 by Bethany Lam LPN  Outcome: Progressing  Goal: Patient-Specific Goal (Individualized)  3/25/2025 0745 by Bethany Lam LPN  Outcome: Progressing  3/25/2025 0745 by Bethany Lam LPN  Outcome: Progressing  Goal: Absence of Hospital-Acquired Illness or Injury  3/25/2025 0745 by Bethany Lam LPN  Outcome: Progressing  3/25/2025 0745 by Bethany Lma LPN  Outcome: Progressing  Goal: Optimal Comfort and Wellbeing  3/25/2025 0745 by Bethany Lam LPN  Outcome: Progressing  3/25/2025 0745 by Bethany Lam LPN  Outcome: Progressing  Goal: Readiness for Transition of Care  3/25/2025 0745 by Bethany Lam LPN  Outcome: Progressing  3/25/2025 0745 by Bethany Lam LPN  Outcome: Progressing     Problem: Acute Kidney Injury/Impairment  Goal: Fluid and Electrolyte Balance  3/25/2025 0745 by Bethany Lam LPN  Outcome: Progressing  3/25/2025 0745 by Bethany Lam LPN  Outcome: Progressing  Goal: Improved Oral Intake  3/25/2025 0745 by Bethany Lam LPN  Outcome: Progressing  3/25/2025 0745 by Bethany Lam LPN  Outcome: Progressing  Goal: Effective Renal Function  3/25/2025 0745 by Bethany Lam LPN  Outcome: Progressing  3/25/2025 0745 by Bethany Lam LPN  Outcome: Progressing     Problem: Wound  Goal: Optimal Coping  3/25/2025 0745 by Bethany Lam LPN  Outcome: Progressing  3/25/2025 0745 by Bethany Lam LPN  Outcome: Progressing  Goal: Optimal Functional Ability  3/25/2025 0745 by Bethany Lam LPN  Outcome: Progressing  3/25/2025 0745 by Bethany Lam LPN  Outcome:  Progressing  Goal: Absence of Infection Signs and Symptoms  3/25/2025 0745 by Bethany Lam LPN  Outcome: Progressing  3/25/2025 0745 by Bethany Lam LPN  Outcome: Progressing  Goal: Improved Oral Intake  3/25/2025 0745 by Bethany Lam LPN  Outcome: Progressing  3/25/2025 0745 by Bethany Lam LPN  Outcome: Progressing  Goal: Optimal Pain Control and Function  3/25/2025 0745 by Bethany Lam LPN  Outcome: Progressing  3/25/2025 0745 by Bethany Lam LPN  Outcome: Progressing  Goal: Skin Health and Integrity  3/25/2025 0745 by Bethany Lam LPN  Outcome: Progressing  3/25/2025 0745 by Bethany Lam LPN  Outcome: Progressing  Goal: Optimal Wound Healing  3/25/2025 0745 by Bethany Lam LPN  Outcome: Progressing  3/25/2025 0745 by Bethany Lam LPN  Outcome: Progressing     Problem: Skin Injury Risk Increased  Goal: Skin Health and Integrity  3/25/2025 0745 by Bethany Lam LPN  Outcome: Progressing  3/25/2025 0745 by Bethany Lam LPN  Outcome: Progressing     Problem: UTI (Urinary Tract Infection)  Goal: Improved Infection Symptoms  3/25/2025 0745 by Bethany Lam LPN  Outcome: Progressing  3/25/2025 0745 by Bethany Lam LPN  Outcome: Progressing

## 2025-03-25 NOTE — PROGRESS NOTES
[facility] Medicine  Progress Note    Patient Name: Ashley Calvillo  MRN: [id number]  Patient Class: IP- Inpatient   Admission Date: 3/24/2025  Length of Stay: 1 days  Attending Physician: Mohinder Camacho MD  Primary Care Provider: Leeanna De Leon MD        Subjective:     Principal Problem:Urinary tract infection without hematuria    Interval History:   HPI: Ashley Calvillo is a 79 y.o. 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      79 years old female history of hypertension and CVA transferred from nursing home to our emergency room secondary to hypotension     This morning, the patient took her Norvasc, patient complain of generalized weakness, blood pressure was 70/50      No fever, no cough, no shortness for breath, no nausea      At emergency room, WBC 7, hemoglobin 9, BUN 19, creatinine 1.3, baseline creatinine 0.7, , lactic acid 2.5      Urinary shows acute UTI      Chest x-ray was unremarkable      ER doctor gave normal saline 30 mL/kg and blood pressure improved     The patient was admitted to the hospital for acute UTI and acute renal failure    3/25-no issue since admission; when seen on rounds this morning was lying comfortably in bed and had no complaints      Objective:     Vital Signs (Most Recent):  Temp: 98.2 °F (36.8 °C) (03/25/25 1624)  Pulse: 73 (03/25/25 1624)  Resp: 18 (03/25/25 1624)  BP: (!) 148/66 (03/25/25 1624)  SpO2: 96 % (03/25/25 1624) Vital Signs (24h Range):  Temp:  [98 °F (36.7 °C)-98.2 °F (36.8 °C)] 98.2 °F (36.8 °C)  Pulse:  [70-76] 73  Resp:  [18-20] 18  SpO2:  [95 %-97 %] 96 %  BP: (101-148)/(55-70) 148/66  "    Weight: 64.9 kg (143 lb)  Body mass index is 26.16 kg/m².    Intake/Output Summary (Last 24 hours) at 3/25/2025 1631  Last data filed at 3/25/2025 1241  Gross per 24 hour   Intake 300 ml   Output 700 ml   Net -400 ml      Physical exam  Constitution-well nourished, normally developed female in NAD  Eyes-PERRL, EOMI  HENT-normocephalic, atraumatic  Neck-supple  Respiratory-normal respirations  Heart-RRR  Abdomen-soft, nontender, nondistended  Genitourinary-deferred  Musculoskeletal-no joint abnormalities, normal ROM throughout  Skin-warm, dry; no rashes  Neurologic-alert and oriented x3    Scheduled Meds:   atorvastatin  40 mg Oral Daily    ceFEPime IV (PEDS and ADULTS)  1 g Intravenous Q12H    citalopram  40 mg Oral QAM    clopidogreL  75 mg Oral Daily    docusate sodium  100 mg Oral QAM    levothyroxine  100 mcg Oral Before breakfast    mupirocin   Nasal BID     Continuous Infusions:  PRN Meds:.  Current Facility-Administered Medications:     acetaminophen, 650 mg, Oral, Q8H PRN    acetaminophen, 650 mg, Oral, Q4H PRN    ondansetron, 4 mg, Intravenous, Q8H PRN    traMADoL, 50 mg, Oral, Q24H PRN    Significant Labs: All pertinent labs within the past 24 hours have been reviewed.  CBC:   Recent Labs   Lab 03/24/25  1033 03/25/25  0322   WBC 7.19 6.08   HGB 9.5* 8.1*   HCT 29.5* 25.3*    264     CMP:   Recent Labs   Lab 03/24/25  1033 03/25/25  0322    139   K 3.6 2.7*    108*   CO2 24 23   BUN 19.0 12.0   CREATININE 1.35* 0.87   CALCIUM 8.8 8.1*   ALBUMIN 2.8* 2.4*   BILITOT 0.9 0.5   ALKPHOS 94 75   AST 16 13   ALT 7 5     Cardiac Markers:   Recent Labs   Lab 03/24/25  1033   .5*     Magnesium: No results for input(s): "MG" in the last 48 hours.  POCT Glucose:   Recent Labs   Lab 03/25/25  0559   POCTGLUCOSE 78     Urine Studies:   Recent Labs   Lab 03/24/25  1051   COLORU [place]*   APPEARANCEUA Clear   PHUR 5.0   PROTEINUA 2+*   GLUCUA Trace*   BILIRUBINUA 1+*   OCCULTUA 3+*   NITRITE " Positive*   UROBILINOGEN 0.2   LEUKOCYTESUR Negative   RBCUA 3-5   WBCUA None Seen   BACTERIA Occasional       Significant Imaging:   CXR  Impression:  1. No active cardiopulmonary disease identified    Assessment/Plan:   Acute UTI   Urinalysis with positive nitrites and occasional bacteria but otherwise fairly unremarkable; antibiotic coverage not warranted    Acute kidney injury  Renal indices trending down with hydration    History of hypertension   Medications held at admission due to hypotension; blood pressure is slowly improving; continue to hold meds for now    History of CVA, dysphagia    Anemia   H&H has trended down slightly, likely due to hemodilution    Hypercholesterolemia  Continue statin    Hypothyroidism  Continue levothyroxine    Hypokalemia  Supplement as necessary    GI prophylaxis: Famotidine    Code status: DNR  Active Diagnoses:    Diagnosis Date Noted POA    PRINCIPAL PROBLEM:  Urinary tract infection without hematuria [N39.0] 03/24/2025 Yes      Problems Resolved During this Admission:     VTE Risk Mitigation (From admission, onward)           Ordered     IP VTE HIGH RISK PATIENT  Once         03/24/25 1430     Place sequential compression device  Until discontinued         03/24/25 1430                       Mohinder Camacho MD  Department of Hospital Medicine   [facility]

## 2025-03-25 NOTE — PLAN OF CARE
3/25/25  [facility] sending for skilled auth today, to take patient back.  Patient is from SNF at [facility].

## 2025-03-25 NOTE — PT/OT/SLP PROGRESS
Physical Therapy Treatment    Patient Name:  Ashley Calvillo   MRN:  [id number]    Recommendations:     Discharge Recommendations: Moderate Intensity Therapy (Would greatly benefit from a return to SNF)  Discharge Equipment Recommendations: to be determined by next level of care  Barriers to discharge:  inability to care for self    Assessment:     Ashley Calvillo is a 79 y.o. female admitted with a medical diagnosis of Urinary tract infection without hematuria.  She presents with the following impairments/functional limitations: weakness, impaired endurance, impaired self care skills, impaired functional mobility, gait instability, impaired balance, decreased safety awareness, decreased lower extremity function, decreased upper extremity function .    Assessment: Pt continues to exhibit gross overall weakness limiting her participation in her environment and making her a great candidate for a SNF. Pt tolerated sitting up in her chair for ~2 hours this day, did require total assist for stand pivot transfer back to bed due to her weakness. Pt also had an incontinent bowel episode necessitating total assist for LB hygiene mgmt. Will continue to progress mobility and independence as tolerated.    Rehab Prognosis: Good; patient would benefit from acute skilled PT services to address these deficits and reach maximum level of function.    Recent Surgery: * No surgery found *      Plan:     During this hospitalization, patient to be seen 5 x/week to address the identified rehab impairments via gait training, therapeutic activities, therapeutic exercises and progress toward the following goals:    Plan of Care Expires:       Subjective     Chief Complaint: weakness  Patient/Family Comments/goals: to get stronger  Pain/Comfort:  Pain Rating 1: 0/10      Objective:     Communicated with nurse prior to session.  Patient found up in chair with bed alarm, telemetry, peripheral IV, PureWick upon PT entry to room.     General  Precautions: Standard, fall, contact  Orthopedic Precautions: N/A  Braces: N/A  Respiratory Status: Room air     Functional Mobility:  Bed Mobility:     Sit to Supine: maximal assistance  Transfers:     Sit to Stand:  total assistance with no AD  Bed to Chair: total assistance with  no AD  using  Step Transfer        Patient left HOB elevated with all lines intact, call button in reach, bed alarm on, and nurse notified..    GOALS:   Multidisciplinary Problems       Physical Therapy Goals          Problem: Physical Therapy    Goal Priority Disciplines Outcome Interventions   Physical Therapy Goal     PT, PT/OT Progressing    Description: Goals to be met by: discharge     Patient will increase functional independence with mobility by performin. Supine to sit with MInimal Assistance  2. Sit to stand transfer with Minimal Assistance  3. Bed to chair transfer with Minimal Assistance using Rolling Walker                         DME Justifications:  No DME recommended requiring DME justifications    Time Tracking:     PT Received On: 25  PT Start Time: 1253     PT Stop Time: 1306  PT Total Time (min): 13 min     Billable Minutes: Therapeutic Activity 13    Treatment Type: Treatment  PT/PTA: PT           2025

## 2025-03-25 NOTE — PLAN OF CARE
03/25/25 1329   Discharge Assessment   Assessment Type Discharge Planning Assessment   Confirmed/corrected address, phone number and insurance Yes   Confirmed Demographics Correct on Facesheet   Source of Information health record   People in Home facility resident   Facility Arrived From: From SNF at [facility]   Do you expect to return to your current living situation? Yes   Do you have help at home or someone to help you manage your care at home? No   Prior to hospitilization cognitive status: Alert/Oriented;No Deficits   Current cognitive status: Alert/Oriented;No Deficits   Walking or Climbing Stairs Difficulty yes   Walking or Climbing Stairs ambulation difficulty, assistance 1 person   Mobility Management WC   Dressing/Bathing Difficulty yes   Dressing/Bathing bathing difficulty, assistance 1 person   Equipment Currently Used at Home wheelchair   Readmission within 30 days? Yes   Patient currently being followed by outpatient case management? Unable to determine (comments)   Do you currently have service(s) that help you manage your care at home? No   Are you on dialysis? No   Do you take coumadin? No   Discharge Plan A Skilled Nursing Facility   Discharge Plan B Skilled Nursing Facility   DME Needed Upon Discharge  none   Discharge Plan discussed with: Patient   Transition of Care Barriers None   Physical Activity   On average, how many days per week do you engage in moderate to strenuous exercise (like a brisk walk)? 0 days   On average, how many minutes do you engage in exercise at this level? 0 min   Financial Resource Strain   How hard is it for you to pay for the very basics like food, housing, medical care, and heating? Somewhat   Housing Stability   In the last 12 months, was there a time when you were not able to pay the mortgage or rent on time? N   At any time in the past 12 months, were you homeless or living in a shelter (including now)? N   Transportation Needs   In the past 12 months, has  lack of transportation kept you from medical appointments or from getting medications? no   In the past 12 months, has lack of transportation kept you from meetings, work, or from getting things needed for daily living? No   Food Insecurity   Within the past 12 months, you worried that your food would run out before you got the money to buy more. Never true   Within the past 12 months, the food you bought just didn't last and you didn't have money to get more. Never true   Stress   Do you feel stress - tense, restless, nervous, or anxious, or unable to sleep at night because your mind is troubled all the time - these days? To some exte   Social Isolation   How often do you feel lonely or isolated from those around you?  Never   Alcohol Use   Q1: How often do you have a drink containing alcohol? Never   Q2: How many drinks containing alcohol do you have on a typical day when you are drinking? None   Q3: How often do you have six or more drinks on one occasion? Never   Health Literacy   How often do you need to have someone help you when you read instructions, pamphlets, or other written material from your doctor or pharmacy? Sometimes

## 2025-03-26 VITALS
RESPIRATION RATE: 20 BRPM | TEMPERATURE: 98 F | OXYGEN SATURATION: 96 % | HEIGHT: 62 IN | SYSTOLIC BLOOD PRESSURE: 136 MMHG | HEART RATE: 72 BPM | DIASTOLIC BLOOD PRESSURE: 69 MMHG | BODY MASS INDEX: 26.31 KG/M2 | WEIGHT: 143 LBS

## 2025-03-26 LAB
ANION GAP SERPL CALC-SCNC: 8 MEQ/L
BUN SERPL-MCNC: 9 MG/DL (ref 9.8–20.1)
CALCIUM SERPL-MCNC: 8.1 MG/DL (ref 8.4–10.2)
CHLORIDE SERPL-SCNC: 105 MMOL/L (ref 98–107)
CO2 SERPL-SCNC: 23 MMOL/L (ref 23–31)
CREAT SERPL-MCNC: 0.82 MG/DL (ref 0.55–1.02)
CREAT/UREA NIT SERPL: 11
GFR SERPLBLD CREATININE-BSD FMLA CKD-EPI: >60 ML/MIN/1.73/M2
GLUCOSE SERPL-MCNC: 90 MG/DL (ref 82–115)
MAGNESIUM SERPL-MCNC: 1.5 MG/DL (ref 1.6–2.6)
POTASSIUM SERPL-SCNC: 3 MMOL/L (ref 3.5–5.1)
SODIUM SERPL-SCNC: 136 MMOL/L (ref 136–145)

## 2025-03-26 PROCEDURE — 97530 THERAPEUTIC ACTIVITIES: CPT

## 2025-03-26 PROCEDURE — 83735 ASSAY OF MAGNESIUM: CPT | Performed by: EMERGENCY MEDICINE

## 2025-03-26 PROCEDURE — 63600175 PHARM REV CODE 636 W HCPCS: Performed by: EMERGENCY MEDICINE

## 2025-03-26 PROCEDURE — 63600175 PHARM REV CODE 636 W HCPCS: Performed by: INTERNAL MEDICINE

## 2025-03-26 PROCEDURE — 94761 N-INVAS EAR/PLS OXIMETRY MLT: CPT

## 2025-03-26 PROCEDURE — 80048 BASIC METABOLIC PNL TOTAL CA: CPT | Performed by: EMERGENCY MEDICINE

## 2025-03-26 PROCEDURE — 25000003 PHARM REV CODE 250: Performed by: INTERNAL MEDICINE

## 2025-03-26 PROCEDURE — 25000003 PHARM REV CODE 250: Performed by: EMERGENCY MEDICINE

## 2025-03-26 PROCEDURE — 36415 COLL VENOUS BLD VENIPUNCTURE: CPT | Performed by: EMERGENCY MEDICINE

## 2025-03-26 PROCEDURE — 93041 RHYTHM ECG TRACING: CPT

## 2025-03-26 RX ORDER — POTASSIUM CHLORIDE 20 MEQ/1
40 TABLET, EXTENDED RELEASE ORAL ONCE
Status: COMPLETED | OUTPATIENT
Start: 2025-03-26 | End: 2025-03-26

## 2025-03-26 RX ORDER — POTASSIUM CHLORIDE 7.45 MG/ML
10 INJECTION INTRAVENOUS ONCE
Status: COMPLETED | OUTPATIENT
Start: 2025-03-26 | End: 2025-03-26

## 2025-03-26 RX ORDER — MAGNESIUM SULFATE HEPTAHYDRATE 40 MG/ML
2 INJECTION, SOLUTION INTRAVENOUS ONCE
Status: COMPLETED | OUTPATIENT
Start: 2025-03-26 | End: 2025-03-26

## 2025-03-26 RX ORDER — POTASSIUM CHLORIDE 20 MEQ/1
20 TABLET, EXTENDED RELEASE ORAL ONCE
Status: DISCONTINUED | OUTPATIENT
Start: 2025-03-26 | End: 2025-03-26 | Stop reason: HOSPADM

## 2025-03-26 RX ORDER — CEFDINIR 300 MG/1
300 CAPSULE ORAL 2 TIMES DAILY
Qty: 10 CAPSULE | Refills: 0 | Status: SHIPPED | OUTPATIENT
Start: 2025-03-26 | End: 2025-03-31

## 2025-03-26 RX ADMIN — POTASSIUM CHLORIDE 40 MEQ: 1500 TABLET, EXTENDED RELEASE ORAL at 09:03

## 2025-03-26 RX ADMIN — ATORVASTATIN CALCIUM 40 MG: 40 TABLET, FILM COATED ORAL at 09:03

## 2025-03-26 RX ADMIN — FAMOTIDINE 20 MG: 20 TABLET, FILM COATED ORAL at 09:03

## 2025-03-26 RX ADMIN — CLOPIDOGREL BISULFATE 75 MG: 75 TABLET ORAL at 09:03

## 2025-03-26 RX ADMIN — MAGNESIUM SULFATE HEPTAHYDRATE 2 G: 40 INJECTION, SOLUTION INTRAVENOUS at 12:03

## 2025-03-26 RX ADMIN — MAGNESIUM SULFATE HEPTAHYDRATE 2 G: 40 INJECTION, SOLUTION INTRAVENOUS at 09:03

## 2025-03-26 RX ADMIN — POTASSIUM CHLORIDE 10 MEQ: 7.46 INJECTION, SOLUTION INTRAVENOUS at 02:03

## 2025-03-26 RX ADMIN — CEFEPIME 1 G: 1 INJECTION, POWDER, FOR SOLUTION INTRAMUSCULAR; INTRAVENOUS at 02:03

## 2025-03-26 RX ADMIN — LEVOTHYROXINE SODIUM 100 MCG: 100 TABLET ORAL at 06:03

## 2025-03-26 RX ADMIN — MUPIROCIN 1 G: 20 OINTMENT TOPICAL at 09:03

## 2025-03-26 RX ADMIN — CEFEPIME 1 G: 1 INJECTION, POWDER, FOR SOLUTION INTRAMUSCULAR; INTRAVENOUS at 01:03

## 2025-03-26 RX ADMIN — DOCUSATE SODIUM 100 MG: 100 CAPSULE, LIQUID FILLED ORAL at 06:03

## 2025-03-26 NOTE — NURSING
MD notified of Potassium of 3.0 and that patient is due to be discharged back to [facility]. Awaiting a response.

## 2025-03-26 NOTE — DISCHARGE SUMMARY
[facility]  Hospital Medicine  Discharge Summary      Patient Name: Ashley Calvillo  MRN: [id number]  Admission Date: 3/24/2025  Hospital Length of Stay: 2 days  Discharge Date and Time:  03/26/2025 10:40 AM  Attending Physician: Mohinder Camacho MD   Discharging Provider: Mohinder Camacho MD  Discharge Provider Team: Networked reference to record PCT   Primary Care Provider: Leeanna De Leon MD        HPI:   Ashley Calvillo is a 79 y.o. 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      79 years old female history of hypertension and CVA transferred from nursing home to our emergency room secondary to hypotension     This morning, the patient took her Norvasc, patient complain of generalized weakness, blood pressure was 70/50      No fever, no cough, no shortness for breath, no nausea      At emergency room, WBC 7, hemoglobin 9, BUN 19, creatinine 1.3, baseline creatinine 0.7, , lactic acid 2.5      Urinary shows acute UTI      Chest x-ray was unremarkable      ER doctor gave normal saline 30 mL/kg and blood pressure improved     The patient was admitted to the hospital for acute UTI and acute renal failure    * No surgery found *      Hospital Course:   3/25-no issue since admission; when seen on rounds this morning was lying comfortably in bed and had no complaints    3/26-she has continued to do well; when seen on rounds this morning was lying comfortably in bed and had no complaints; she has been working with PT; she will be discharged to SNF    Discharge diagnoses    Acute UTI   Urinalysis with positive nitrites and occasional bacteria but otherwise fairly  unremarkable; urinalysis did not reflex to culture; transitioned to oral cefdinir 300 mg b.i.d. for 5 days at discharge     Acute kidney injury  Renal indices trended down with hydration     History of hypertension   Medications held at admission due to hypotension; blood pressure is slowly improving; continue to hold amlodipine at discharge     History of CVA, dysphagia     Anemia   H&H has trended down slightly, likely due to hemodilution     Hypercholesterolemia  Continue statin     Hypothyroidism  Continue levothyroxine     Hypokalemia/hypomagnesemia  Supplemented    Physical exam  Constitution-well nourished, normally developed female in NAD  Eyes-PERRL, EOMI  HENT-normocephalic, atraumatic  Neck-supple  Respiratory-normal respirations  Heart-RRR  Abdomen-soft, nontender, nondistended  Genitourinary-deferred  Musculoskeletal-no joint abnormalities, normal ROM throughout  Skin-warm, dry; no rashes  Neurologic-alert and oriented x3    Consults:     Final Active Diagnoses:    Diagnosis Date Noted POA    PRINCIPAL PROBLEM:  Urinary tract infection without hematuria [N39.0] 03/24/2025 Yes      Problems Resolved During this Admission:      Discharged Condition: stable    Disposition: Skilled Nursing Facility    Follow Up:   Follow-up Information       Leeanna De Leon MD Follow up in 1 week(s).    Specialty: Family Medicine  Contact information:  [address]  [facility] [address] [phone]                           Patient Instructions:      Diet Cardiac     Activity as tolerated     Medications:  Reconciled Home Medications:      Medication List        START taking these medications      cefdinir 300 MG capsule  Commonly known as: OMNICEF  Take 1 capsule (300 mg total) by mouth 2 (two) times daily. for 5 days            CHANGE how you take these medications      clopidogreL 75 mg tablet  Commonly known as: PLAVIX  Take 1 tablet (75 mg total) by mouth once daily. Q.h.s.  What  changed:   when to take this  additional instructions     polyethylene glycol 17 gram/dose powder  Commonly known as: GLYCOLAX  Take 17 g by mouth once daily.  What changed: when to take this            CONTINUE taking these medications      acetaminophen 325 MG tablet  Commonly known as: TYLENOL  Take 650 mg by mouth every 4 (four) hours as needed for Pain.     citalopram 40 MG tablet  Commonly known as: CeleXA  Take 40 mg by mouth every morning.     COLACE 100 MG capsule  Generic drug: docusate sodium  Take 100 mg by mouth every morning.     ipratropium 21 mcg (0.03 %) nasal spray  Commonly known as: ATROVENT  2 sprays by Each Nostril route 3 (three) times daily.     levothyroxine 100 MCG tablet  Commonly known as: SYNTHROID  Take 100 mcg by mouth before breakfast.     montelukast 10 mg tablet  Commonly known as: SINGULAIR  Take 10 mg by mouth every evening.     simvastatin 10 MG tablet  Commonly known as: ZOCOR  Take 10 mg by mouth every evening.     traMADoL 50 mg tablet  Commonly known as: ULTRAM  Take 50 mg by mouth every 24 hours as needed for Pain.            STOP taking these medications      amLODIPine 5 MG tablet  Commonly known as: NORVASC     cetirizine 10 MG tablet  Commonly known as: ZYRTEC     nystatin powder  Commonly known as: MYCOSTATIN              Significant Diagnostic Studies: Labs: CMP   Recent Labs   Lab 03/25/25  0322 03/26/25  0332    136   K 2.7* 3.0*   * 105   CO2 23 23   BUN 12.0 9.0*   CREATININE 0.87 0.82   CALCIUM 8.1* 8.1*   ALBUMIN 2.4*  --    BILITOT 0.5  --    ALKPHOS 75  --    AST 13  --    ALT 5  --     and CBC   Recent Labs   Lab 03/25/25  0322   WBC 6.08   HGB 8.1*   HCT 25.3*        Radiology:   CXR  Impression:  1. No active cardiopulmonary disease identified    Pending Diagnostic Studies:       None          Indwelling Lines/Drains at time of discharge:   Lines/Drains/Airways       Drain  Duration             Female External Urinary Catheter w/ Suction  03/24/25 1048 1 day                  Patient screened for social drivers of health:  Housing instability  Transportation needs  Utility difficulties  Interpersonal safety  ---no needs identified    Time spent on the discharge of patient: 40 minutes         Mohinder Camacho MD  Department of Hospital Medicine  [facility]

## 2025-03-26 NOTE — PLAN OF CARE
03/26/25 0922   Final Note   Assessment Type Final Discharge Note   Anticipated Discharge Disposition SNF   Post-Acute Status   Post-Acute Authorization Placement   Post-Acute Placement Status Set-up Complete/Auth obtained   Discharge Delays None known at this time     Dc back to SNF at [facility] today.

## 2025-03-26 NOTE — PLAN OF CARE
03/26/25 0922   Final Note   Assessment Type Final Discharge Note   Anticipated Discharge Disposition SNF   Post-Acute Status   Post-Acute Authorization Placement   Post-Acute Placement Status Set-up Complete/Auth obtained   Discharge Delays None known at this time

## 2025-03-26 NOTE — PLAN OF CARE
Problem: Infection  Goal: Absence of Infection Signs and Symptoms  Outcome: Progressing     Problem: Adult Inpatient Plan of Care  Goal: Plan of Care Review  Outcome: Progressing  Goal: Patient-Specific Goal (Individualized)  Outcome: Progressing  Goal: Absence of Hospital-Acquired Illness or Injury  Outcome: Progressing  Goal: Optimal Comfort and Wellbeing  Outcome: Progressing  Goal: Readiness for Transition of Care  Outcome: Progressing     Problem: Acute Kidney Injury/Impairment  Goal: Fluid and Electrolyte Balance  Outcome: Progressing  Goal: Improved Oral Intake  Outcome: Progressing  Goal: Effective Renal Function  Outcome: Progressing     Problem: Wound  Goal: Optimal Coping  Outcome: Progressing  Goal: Optimal Functional Ability  Outcome: Progressing  Goal: Absence of Infection Signs and Symptoms  Outcome: Progressing  Goal: Improved Oral Intake  Outcome: Progressing  Goal: Optimal Pain Control and Function  Outcome: Progressing  Goal: Skin Health and Integrity  Outcome: Progressing  Goal: Optimal Wound Healing  Outcome: Progressing     Problem: Skin Injury Risk Increased  Goal: Skin Health and Integrity  Outcome: Progressing     Problem: UTI (Urinary Tract Infection)  Goal: Improved Infection Symptoms  Outcome: Progressing     Problem: Adult Inpatient Plan of Care  Goal: Plan of Care Review  Outcome: Progressing     Problem: Acute Kidney Injury/Impairment  Goal: Fluid and Electrolyte Balance  Outcome: Progressing     Problem: Wound  Goal: Optimal Coping  Outcome: Progressing     Problem: Skin Injury Risk Increased  Goal: Skin Health and Integrity  Outcome: Progressing     Problem: UTI (Urinary Tract Infection)  Goal: Improved Infection Symptoms  Outcome: Progressing

## 2025-03-26 NOTE — PT/OT/SLP PROGRESS
Physical Therapy Treatment    Patient Name:  Ashley Calvillo   MRN:  [id number]    Recommendations:     Discharge Recommendations: Moderate Intensity Therapy (Would greatly benefit from a return to SNF)  Discharge Equipment Recommendations: to be determined by next level of care  Barriers to discharge:  inability to care for self    Assessment:     Ashley Calvillo is a 79 y.o. female admitted with a medical diagnosis of Urinary tract infection without hematuria.  She presents with the following impairments/functional limitations: weakness, impaired endurance, impaired self care skills, impaired functional mobility, gait instability, impaired balance, decreased safety awareness, decreased lower extremity function, decreased upper extremity function .    Assessment: Pt continues to exhibit gross overall weakness limiting her participation in her environment and making her a great candidate for a SNF. Pt progressed to mod A for supine>sit, continues to require max A for transfers, but was able to participate in ADLs this day. Pt remains a great candidate for a SNF. Will continue to progress mobility and independence as tolerated.    Rehab Prognosis: Good; patient would benefit from acute skilled PT services to address these deficits and reach maximum level of function.    Recent Surgery: * No surgery found *      Plan:     During this hospitalization, patient to be seen 5 x/week to address the identified rehab impairments via gait training, therapeutic activities, therapeutic exercises and progress toward the following goals:    Plan of Care Expires:       Subjective     Chief Complaint: weakness  Patient/Family Comments/goals: to get stronger  Pain/Comfort:  Pain Rating 1: 0/10      Objective:     Communicated with nurse prior to session.  Patient found HOB elevated with bed alarm, telemetry, peripheral IV, PureWick upon PT entry to room.     General Precautions: Standard, fall, contact  Orthopedic Precautions:  N/A  Braces: N/A  Respiratory Status: Room air     Functional Mobility:  Bed Mobility:     Supine to Sit: moderate assistance  Transfers:     Sit to Stand:  maximal assistance with no AD  Bed to Chair: maximal assistance with  no AD  using  Step Transfer  Adl's requiring set-up: facial hygiene, mouth wash, and hair brushing.        Patient left up in chair with all lines intact, call button in reach, chair alarm on, and nurse notified..    GOALS:   Multidisciplinary Problems       Physical Therapy Goals          Problem: Physical Therapy    Goal Priority Disciplines Outcome Interventions   Physical Therapy Goal     PT, PT/OT Progressing    Description: Goals to be met by: discharge     Patient will increase functional independence with mobility by performin. Supine to sit with MInimal Assistance  2. Sit to stand transfer with Minimal Assistance  3. Bed to chair transfer with Minimal Assistance using Rolling Walker                         DME Justifications:  No DME recommended requiring DME justifications    Time Tracking:     PT Received On: 25  PT Start Time: 1032     PT Stop Time: 1047  PT Total Time (min): 15 min     Billable Minutes: Therapeutic Activity 15    Treatment Type: Treatment  PT/PTA: PT           2025

## 2025-03-29 LAB
BACTERIA BLD CULT: NORMAL
BACTERIA BLD CULT: NORMAL

## 2025-04-15 ENCOUNTER — HOSPITAL ENCOUNTER (OUTPATIENT)
Dept: RADIOLOGY | Facility: HOSPITAL | Age: 80
Discharge: HOME | End: 2025-04-15
Attending: SURGERY
Payer: MEDICARE

## 2025-04-15 DIAGNOSIS — R03.1 NONSPECIFIC LOW BLOOD PRESSURE READING: ICD-10-CM

## 2025-04-15 PROCEDURE — 71046 X-RAY EXAM CHEST 2 VIEWS: CPT | Mod: TC

## 2025-04-28 NOTE — OP NOTE
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]  Volvulus [K56.2]   S/p Arabella's procedure July, 2024  Tonsillar cancer status post tonsillectomy with chemoradiation to follow in 2007  Hypertension  Depression  Hypercholesterolemia  Stroke 2016  Hypothyroidism    PROCEDURE -   Diagnostic laparoscopy  Exploratory laparotomy  Extensive lysis of adhesions requiring at least 2 hours to obtain adequate exposure of pelvis and appropriately identify the colostomy  Redundant colon entering to colostomy hernia sac - descending colon removed   Partial omentectomy  Right salpingoopherectomy, left oopherectomy  Small bowel resection with primary anastomosis  Reversal colostomy with colorectal anastomosis at 12 cm  Flexible sigmoidoscopy  Omental pedicle flap to anastomosis    SURGEON - Haydee Escamilla MD    ASSISTANT SURGEON - Mejia Rudd MD    ANESTHESIA - GETA with MATILDE block    FINDINGS -   Extensive adhesions requiring open procedure  Descending colon redundant into the parastomal hernia - descending colon resection carried out to allow healthy colon for anastomosis  Small bowel with several serosal tears - repaired.  One area of small bowel with significant mesenteric injury and direct wall injury related to lysis.  This area was removed.  Small bowel anastomosed with residual 150 cms from LOT and also intact ICV/right colon  Bleeding left ovary upon identification of left ureter - required oopherectomy.  Right ovary/fallopian tube kept falling into the pelvis and obscuring visualization requiring right salpingoopherectomy.  Right ureter identified/protected.  Rectal stump cleared of scar  tissue at distal most aspect - 45 powered blue stapler used to ligate/divide the distalmost aspect.  Suture was used to oversew this at the site of staple overlap.   After checking rectum for leak (no air leaking detected on scope with intact staple line), descending colon was anastomosed to the rectum with 28 EEA stapler, purple load.  The anastomosis demonstrated a full ring that was intact using flexible sigmoidoscopy, and there was no air leak.  Anastomosis rested at 12 cm.    Omentum from the left gastroepiploic arcade was dropped into the pelvis to overlay the anastomosis    SPECIMENS -   Descending colon  Partial omentectomy  Right salpingectomy  Left oopherectomy  Small bowel resection  Rectal stump    OPERATIVE REPORT -   Patient was brought to the OR, placed in supine position.  GETA was provided.  Medina catheter was placed under sterile condition.  Legs were placed in stirrups.  The abdomen was prepped and draped in sterile manner.  Access to the abdominal cavity was gained.  CO2 insufflation to 15 mmHg was achieved.  Additional ports were placed.  Multiple abdominal wall adhesions were encountered.  These were carefully lysed laparoscopically.  At 1 point within the lower abdomen towards the pelvis, there were some more dense adhesions between the small intestine and the abdominal wall.  This area of dense adhesions was not able to be so passed without injuring the small intestine.  As there was significant scarring further within the pelvic area and along the abdominal wall, decision was made to convert to an open procedure for the purpose of safety and bowel preservation.  Lower midline incision was created extending to above the umbilicus.  Subcutaneous tissue was dissected down to fascia, which was opened.  The abdominal cavity was entered.  Lysis of adhesions was carried out, .  This lysis of adhesions required at least 2 hours to obtain adequate exposure and release of the intestines.  The  colostomy was identified, and the true colostomy exit was isolated.  The mesentery here was ligated and divided with a sealing device.  The colostomy was ligated and divided with a stapler.  The small bowel in the colon were investigated carefully after formal lysis of adhesions.  Several serosal tears were identified.  These were repaired with Lembert sutures of 2-0 Vicryl.  There was 1 area of small intestine that had significant mesenteric injury and also direct wall injury related to lysis.  This would require removal for safety of healing.  This was saved to be done towards the latter part of the case.  Within the pelvis, a bleeding left ovary was identified during identification of the left ureter.  This required oophorectomy, ligating and dividing the IP ligament.  The right ureter was similarly identified.  The right ovary/fallopian tube, however, kept falling into the pelvis and obscuring visualization within the pelvis.  This required right salpingo-oophorectomy, ligating and dividing the IP ligament while protecting the ureter.  The rectal stump was identified.  This was cleared of scar tissue of the distal most aspect.  The rectum was mobilized by opening the associated peritoneal reflection.  A 45 powered blue load stapler was used to ligate and divide the distal most aspect of the rectal stump.  2-0 Vicryl suture was used to over-sew the rectal stump in a Lembert fashion at area of staple overlap.  The mesentery was otherwise ligated and divided with a sealing device and suture, as appropriate.    Attention was addressed to preparation for anastomosis.  The descending colon was quite redundant.  This area of the descending colon required resection as this was redundant within the peristomal hernia and was felt to be not satisfactory for a healthy anastomosis.  Mesentery was ligated and divided.  A stapler was used to ligate and divide the colon.  Specimen was submitted to pathology.  A pursestring  device was applied to the colon at the distal most aspect.  The staple line was removed.  Using sizers within the colon, a 28 EEA stapler, purple load, was chosen for anastomosis.  The rectal stump was checked for a leak by Dr. Mejia Rudd using an EGD scope.  The staple line was visibly intact.  The anastomosis was then created without issue.  Both rings were intact.  Flexible sigmoidoscopy demonstrated no air leak with the anastomosis resting at 12 cm.  The anastomosis was oversewn with 2-0 Vicryl suture in a simple, interrupted, Lembert fashion at the anterolateral aspects.    At this point, the small bowel was run yet again.  The area of injured small intestine was removed, transecting the proximal and distal aspects of the small intestine with a blue load FRANCOIS stapler.  The mesentery was ligated and divided with an EnSeal device.  Small bowel anastomosis was created between healthy limbs of bowel in 2 layers.  A posterior row of 2-0 Vicryl sutures was placed.  Enterotomies were created.  A 75 blue load stapler was used to create the anastomosis.  The common channel was closed with 2-0 Vicryl suture in a simple, inverted, interrupted manner.  An anterior row of Lembert sutures was placed.  The anastomosis was palpably patent.  There was residual 150 cm from the ligament of Treitz to the terminal ileum with an intact ileocecal valve/right colon.  The abdominal cavity was irrigated copiously.  Part of the omentum required resection previously.  The residual omentum along the left gastroepiploic arcade was fashioned in a manner that it would come into the pelvis to rest over the anastomosis without issue.    The colostomy was dissected circumferentially, eventually exposing fascia.  The colostomy was dissected free from the fascia and submitted to pathology.  Gloves and gowns were changed.  The fascia along the midline was closed with running Maxon.  The wound was irrigated.  The dermis was closed with 3-0  Vicryl suture in a simple, inverted, interrupted manner.  The fascia at the previous colostomy site was closed with Maxon in a figure-of-eight manner x3.  The wound was irrigated.  Hemostasis was ensured.  The dermis was closed with 3-0 Vicryl suture in a simple, inverted, interrupted manner with a Penrose left along the lateral most aspect.  Sterile dressings were applied.    The patient tolerated the procedure well without complication.

## 2025-07-12 ENCOUNTER — HOSPITAL ENCOUNTER (EMERGENCY)
Facility: HOSPITAL | Age: 80
Discharge: ANOTHER HOSPITAL | End: 2025-07-13
Attending: INTERNAL MEDICINE
Payer: MEDICARE

## 2025-07-12 DIAGNOSIS — J69.0 ASPIRATION PNEUMONIA OF RIGHT LOWER LOBE, UNSPECIFIED ASPIRATION PNEUMONIA TYPE: ICD-10-CM

## 2025-07-12 DIAGNOSIS — I21.4 NSTEMI (NON-ST ELEVATED MYOCARDIAL INFARCTION): ICD-10-CM

## 2025-07-12 DIAGNOSIS — R06.00 DYSPNEA: ICD-10-CM

## 2025-07-12 DIAGNOSIS — Z13.6 SCREENING FOR CARDIOVASCULAR CONDITION: ICD-10-CM

## 2025-07-12 DIAGNOSIS — A41.9 SEPSIS, DUE TO UNSPECIFIED ORGANISM, UNSPECIFIED WHETHER ACUTE ORGAN DYSFUNCTION PRESENT: Primary | ICD-10-CM

## 2025-07-12 DIAGNOSIS — R06.02 SOB (SHORTNESS OF BREATH): ICD-10-CM

## 2025-07-12 LAB
ALBUMIN SERPL-MCNC: 3.8 G/DL (ref 3.4–4.8)
ALBUMIN/GLOB SERPL: 1.1 RATIO (ref 1.1–2)
ALP SERPL-CCNC: 73 UNIT/L (ref 40–150)
ALT SERPL-CCNC: 5 UNIT/L (ref 0–55)
ANION GAP SERPL CALC-SCNC: 16 MEQ/L
AST SERPL-CCNC: 14 UNIT/L (ref 11–45)
BASOPHILS # BLD AUTO: 0.11 X10(3)/MCL
BASOPHILS NFR BLD AUTO: 0.5 %
BILIRUB SERPL-MCNC: 0.5 MG/DL
BNP BLD-MCNC: 296.4 PG/ML
BUN SERPL-MCNC: 30 MG/DL (ref 9.8–20.1)
CALCIUM SERPL-MCNC: 9.3 MG/DL (ref 8.4–10.2)
CHLORIDE SERPL-SCNC: 105 MMOL/L (ref 98–107)
CO2 SERPL-SCNC: 19 MMOL/L (ref 23–31)
CREAT SERPL-MCNC: 0.92 MG/DL (ref 0.55–1.02)
CREAT/UREA NIT SERPL: 33
EOSINOPHIL # BLD AUTO: 0.01 X10(3)/MCL (ref 0–0.9)
EOSINOPHIL NFR BLD AUTO: 0 %
ERYTHROCYTE [DISTWIDTH] IN BLOOD BY AUTOMATED COUNT: 16.1 % (ref 11.5–17)
FLUAV AG UPPER RESP QL IA.RAPID: NOT DETECTED
FLUBV AG UPPER RESP QL IA.RAPID: NOT DETECTED
GFR SERPLBLD CREATININE-BSD FMLA CKD-EPI: >60 ML/MIN/1.73/M2
GLOBULIN SER-MCNC: 3.5 GM/DL (ref 2.4–3.5)
GLUCOSE SERPL-MCNC: 157 MG/DL (ref 82–115)
HCT VFR BLD AUTO: 33.2 % (ref 37–47)
HGB BLD-MCNC: 9.9 G/DL (ref 12–16)
IMM GRANULOCYTES # BLD AUTO: 0.1 X10(3)/MCL (ref 0–0.04)
IMM GRANULOCYTES NFR BLD AUTO: 0.5 %
LACTATE SERPL-SCNC: 4.4 MMOL/L (ref 0.5–2.2)
LACTATE SERPL-SCNC: 6.1 MMOL/L (ref 0.5–2.2)
LYMPHOCYTES # BLD AUTO: 1.28 X10(3)/MCL (ref 0.6–4.6)
LYMPHOCYTES NFR BLD AUTO: 6.2 %
MCH RBC QN AUTO: 24.6 PG (ref 27–31)
MCHC RBC AUTO-ENTMCNC: 29.8 G/DL (ref 33–36)
MCV RBC AUTO: 82.4 FL (ref 80–94)
MONOCYTES # BLD AUTO: 0.87 X10(3)/MCL (ref 0.1–1.3)
MONOCYTES NFR BLD AUTO: 4.2 %
NEUTROPHILS # BLD AUTO: 18.25 X10(3)/MCL (ref 2.1–9.2)
NEUTROPHILS NFR BLD AUTO: 88.6 %
NRBC BLD AUTO-RTO: 0 %
PLATELET # BLD AUTO: 460 X10(3)/MCL (ref 130–400)
PMV BLD AUTO: 10.5 FL (ref 7.4–10.4)
POTASSIUM SERPL-SCNC: 4.2 MMOL/L (ref 3.5–5.1)
PROT SERPL-MCNC: 7.3 GM/DL (ref 5.8–7.6)
RBC # BLD AUTO: 4.03 X10(6)/MCL (ref 4.2–5.4)
RSV A 5' UTR RNA NPH QL NAA+PROBE: NOT DETECTED
SARS-COV-2 RNA RESP QL NAA+PROBE: NOT DETECTED
SODIUM SERPL-SCNC: 140 MMOL/L (ref 136–145)
TROPONIN I SERPL-MCNC: 0.48 NG/ML (ref 0–0.04)
TROPONIN I SERPL-MCNC: 1.43 NG/ML (ref 0–0.04)
WBC # BLD AUTO: 20.62 X10(3)/MCL (ref 4.5–11.5)

## 2025-07-12 PROCEDURE — 93010 ELECTROCARDIOGRAM REPORT: CPT | Mod: ,,, | Performed by: INTERNAL MEDICINE

## 2025-07-12 PROCEDURE — 63600175 PHARM REV CODE 636 W HCPCS: Performed by: EMERGENCY MEDICINE

## 2025-07-12 PROCEDURE — 63600175 PHARM REV CODE 636 W HCPCS: Performed by: INTERNAL MEDICINE

## 2025-07-12 PROCEDURE — 96365 THER/PROPH/DIAG IV INF INIT: CPT

## 2025-07-12 PROCEDURE — 83605 ASSAY OF LACTIC ACID: CPT | Performed by: EMERGENCY MEDICINE

## 2025-07-12 PROCEDURE — 85025 COMPLETE CBC W/AUTO DIFF WBC: CPT | Performed by: INTERNAL MEDICINE

## 2025-07-12 PROCEDURE — 83880 ASSAY OF NATRIURETIC PEPTIDE: CPT | Performed by: INTERNAL MEDICINE

## 2025-07-12 PROCEDURE — 25000003 PHARM REV CODE 250: Performed by: EMERGENCY MEDICINE

## 2025-07-12 PROCEDURE — 96375 TX/PRO/DX INJ NEW DRUG ADDON: CPT

## 2025-07-12 PROCEDURE — 80053 COMPREHEN METABOLIC PANEL: CPT | Performed by: INTERNAL MEDICINE

## 2025-07-12 PROCEDURE — 84484 ASSAY OF TROPONIN QUANT: CPT | Performed by: EMERGENCY MEDICINE

## 2025-07-12 PROCEDURE — 96372 THER/PROPH/DIAG INJ SC/IM: CPT | Performed by: EMERGENCY MEDICINE

## 2025-07-12 PROCEDURE — 87637 SARSCOV2&INF A&B&RSV AMP PRB: CPT | Performed by: INTERNAL MEDICINE

## 2025-07-12 PROCEDURE — 93005 ELECTROCARDIOGRAM TRACING: CPT

## 2025-07-12 PROCEDURE — 96374 THER/PROPH/DIAG INJ IV PUSH: CPT | Mod: 59

## 2025-07-12 PROCEDURE — 83605 ASSAY OF LACTIC ACID: CPT | Performed by: INTERNAL MEDICINE

## 2025-07-12 PROCEDURE — 84484 ASSAY OF TROPONIN QUANT: CPT | Performed by: INTERNAL MEDICINE

## 2025-07-12 PROCEDURE — 96361 HYDRATE IV INFUSION ADD-ON: CPT

## 2025-07-12 PROCEDURE — 87040 BLOOD CULTURE FOR BACTERIA: CPT | Performed by: INTERNAL MEDICINE

## 2025-07-12 PROCEDURE — 99285 EMERGENCY DEPT VISIT HI MDM: CPT | Mod: 25

## 2025-07-12 RX ORDER — PEDI NUTRITION,MILK BASED,IRON 0.03 G-0.6
LIQUID (ML) ORAL 2 TIMES DAILY
COMMUNITY
Start: 2025-03-22

## 2025-07-12 RX ORDER — ACETAMINOPHEN 500 MG
1000 TABLET ORAL
Status: DISCONTINUED | OUTPATIENT
Start: 2025-07-12 | End: 2025-07-12

## 2025-07-12 RX ORDER — CEFTRIAXONE 1 G/1
1 INJECTION, POWDER, FOR SOLUTION INTRAMUSCULAR; INTRAVENOUS
Status: DISCONTINUED | OUTPATIENT
Start: 2025-07-12 | End: 2025-07-13 | Stop reason: HOSPADM

## 2025-07-12 RX ORDER — ONDANSETRON HYDROCHLORIDE 2 MG/ML
4 INJECTION, SOLUTION INTRAVENOUS
Status: COMPLETED | OUTPATIENT
Start: 2025-07-12 | End: 2025-07-12

## 2025-07-12 RX ORDER — ENOXAPARIN SODIUM 100 MG/ML
1 INJECTION SUBCUTANEOUS
Status: COMPLETED | OUTPATIENT
Start: 2025-07-12 | End: 2025-07-12

## 2025-07-12 RX ORDER — ACETAMINOPHEN 650 MG/1
650 SUPPOSITORY RECTAL
Status: COMPLETED | OUTPATIENT
Start: 2025-07-12 | End: 2025-07-12

## 2025-07-12 RX ADMIN — SODIUM CHLORIDE, POTASSIUM CHLORIDE, SODIUM LACTATE AND CALCIUM CHLORIDE 1000 ML: 600; 310; 30; 20 INJECTION, SOLUTION INTRAVENOUS at 06:07

## 2025-07-12 RX ADMIN — SODIUM CHLORIDE, POTASSIUM CHLORIDE, SODIUM LACTATE AND CALCIUM CHLORIDE 1000 ML: 600; 310; 30; 20 INJECTION, SOLUTION INTRAVENOUS at 07:07

## 2025-07-12 RX ADMIN — AMPICILLIN SODIUM AND SULBACTAM SODIUM 3 G: 2; 1 INJECTION, POWDER, FOR SOLUTION INTRAMUSCULAR; INTRAVENOUS at 07:07

## 2025-07-12 RX ADMIN — ONDANSETRON 4 MG: 2 INJECTION INTRAMUSCULAR; INTRAVENOUS at 05:07

## 2025-07-12 RX ADMIN — ENOXAPARIN SODIUM 60 MG: 60 INJECTION SUBCUTANEOUS at 10:07

## 2025-07-12 RX ADMIN — CEFTRIAXONE SODIUM 1 G: 1 INJECTION, POWDER, FOR SOLUTION INTRAMUSCULAR; INTRAVENOUS at 06:07

## 2025-07-12 RX ADMIN — ACETAMINOPHEN 650 MG: 650 SUPPOSITORY RECTAL at 06:07

## 2025-07-12 NOTE — ED PROVIDER NOTES
Encounter Date: 7/12/2025  History from patient and medics     History     Chief Complaint   Patient presents with    Shortness of Breath     BIBA from NH    SOB post vomitting   suspected aspiration     HPI    Ashley Calvillo is 79 y.o. 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 arrives in ER with c/o Shortness of Breath (BIBA from NH    SOB post vomitting   suspected aspiration)    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    CVA (cerebral vascular accident)     Depression     Dysphagia     HTN (hypertension)     Hypothyroidism, unspecified     Iron deficiency anemia, unspecified     Mixed hyperlipidemia     Squamous cell carcinoma in situ     Tonsillar cancer     Urinary tract infection, site not specified      Past Surgical History:   Procedure Laterality Date    CLOSURE, COLOSTOMY N/A 2/24/2025    Procedure: CLOSURE, COLOSTOMY;  Surgeon: GENA Matute MD;  Location: [facility];  Service: General;  Laterality: N/A;  LAP ASSISSTED, CONVERTED TO OPEN AT 1400    COLECTOMY, SIGMOID N/A 7/28/2024    Procedure: COLECTOMY, SIGMOID;  Surgeon: GENA Matute MD;  Location: [facility] OR;  Service: General;  Laterality: N/A;    COLON RESECTION N/A 2/24/2025    Procedure: COLON RESECTION;  Surgeon: GENA Matute MD;  Location: [facility] OR;  Service: General;  Laterality: N/A;  SMALL BOWEL RESECTION    COLONOSCOPY, THROUGH STOMA, DIAGNOSTIC N/A 8/26/2024    Procedure: COLONOSCOPY, THROUGH STOMA, DIAGNOSTIC;   Surgeon: GENA Mattue MD;  Location: [facility] ENDO;  Service: Endoscopy;  Laterality: N/A;  DIVERTICULOSIS    COLONOSCOPY, THROUGH STOMA, DIAGNOSTIC N/A 11/1/2024    Procedure: COLONOSCOPY, THROUGH STOMA, DIAGNOSTIC;  Surgeon: GENA Matute MD;  Location: [facility] ENDO;  Service: Endoscopy;  Laterality: N/A;  POST OP: NORMAL COLONOSCOPY    COLONOSCOPY, WITH DIRECTED SUBMUCOSAL INJECTION N/A 11/1/2024    Procedure: COLONOSCOPY, WITH DIRECTED SUBMUCOSAL INJECTION;  Surgeon: GENA Matute MD;  Location: [facility] ENDO;  Service: Endoscopy;  Laterality: N/A;  TATTOO INK SPOT IN RECTUM AT 12 CM (PROCTOSCOPY)    COLONOSCOPY, WITH POLYPECTOMY USING SNARE N/A 8/26/2024    Procedure: COLONOSCOPY, WITH POLYPECTOMY USING SNARE;  Surgeon: GENA Matute MD;  Location: [facility] ENDO;  Service: Endoscopy;  Laterality: N/A;  A) TRANSVERSE COLON POLYP    COLOSTOMY N/A 7/28/2024    Procedure: CREATION, COLOSTOMY;  Surgeon: GENA Matute MD;  Location: [facility] OR;  Service: General;  Laterality: N/A;    FLEXIBLE SIGMOIDOSCOPY N/A 7/26/2024    Procedure: SIGMOIDOSCOPY, FLEXIBLE;  Surgeon: GENA Matute MD;  Location: [facility] ENDO;  Service: Endoscopy;  Laterality: N/A;  POST OP:DECOMPRESSION    FLEXIBLE SIGMOIDOSCOPY N/A 2/24/2025    Procedure: SIGMOIDOSCOPY, FLEXIBLE;  Surgeon: GENA Matute MD;  Location: [facility] OR;  Service: General;  Laterality: N/A;    HYSTERECTOMY      LAPAROSCOPIC LYSIS OF ADHESIONS N/A 2/24/2025    Procedure: LYSIS, ADHESIONS, LAPAROSCOPIC;  Surgeon: GENA Matute MD;  Location: [facility] OR;  Service: General;  Laterality: N/A;  LAPAROSCOPIC AND OPEN LYSIS OF EXTENSIVE ADHESIONS    LAPAROSCOPY N/A 7/28/2024    Procedure: LAPAROSCOPY;  Surgeon: GENA Matute MD;  Location: [facility] OR;  Service: General;  Laterality: N/A;  massively distended sigmoid colon was identified Converted to open art 1620    OMENTECTOMY N/A 2/24/2025    Procedure:  OMENTECTOMY;  Surgeon: GENA Matute MD;  Location: [facility] OR;  Service: General;  Laterality: N/A;  PARTIAL OMENTECTOMY, OMENTAL PEDICLE FLAP TO ANASTOMOSIS    OOPHORECTOMY Left 2/24/2025    Procedure: OOPHORECTOMY;  Surgeon: GENA Matute MD;  Location: [facility] OR;  Service: General;  Laterality: Left;    PROCTOSCOPY N/A 11/1/2024    Procedure: PROCTOSCOPY;  Surgeon: GENA Matute MD;  Location: [facility] ENDO;  Service: Endoscopy;  Laterality: N/A;  FLEXIBLE; EVALUATION OF RECTAL STUMP THROUGH RECTUM; NORMAL RECTUM    SALPINGOOPHORECTOMY Right 2/24/2025    Procedure: SALPINGO-OOPHORECTOMY;  Surgeon: GENA Matute MD;  Location: [facility] OR;  Service: General;  Laterality: Right;    TONSILLECTOMY      TOTAL SHOULDER ARTHROPLASTY       Family History   Problem Relation Name Age of Onset    Cancer Mother      Leukemia Mother      Stroke Mother      Alzheimer's disease Father      Cancer Sister      Leukemia Sister       Social History[1]  Review of Systems   Unable to perform ROS: Acuity of condition   Constitutional:  Positive for fever.   Respiratory:  Positive for shortness of breath.    Gastrointestinal:  Positive for nausea and vomiting.       Physical Exam     Initial Vitals [07/12/25 1736]   BP Pulse Resp Temp SpO2   (!) 142/85 (!) 119 (!) 36 (!) 101.7 °F (38.7 °C) (!) 89 %      MAP       --         Physical Exam    Nursing note and vitals reviewed.  Constitutional: She appears distressed.   Elderly lady, in mild respiratory distress   HENT:   Head: Atraumatic.   Eyes: EOM are normal.   Neck: Neck supple.   Cardiovascular:            Tachycardia   Pulmonary/Chest: She is in respiratory distress. She has rales.   Abdominal: Abdomen is soft. Bowel sounds are normal. She exhibits no distension. There is no abdominal tenderness. There is no rebound.   Musculoskeletal:         General: No edema.      Cervical back: Neck supple.     Neurological: She is alert and oriented to person,  place, and time. GCS score is 15. GCS eye subscore is 4. GCS verbal subscore is 5. GCS motor subscore is 6.   Skin: There is pallor.         ED Course   Critical Care    Date/Time: 7/12/2025 5:44 PM    Performed by: Albert Gonzalez MD  Authorized by: Albert Gonzalez MD  Direct patient critical care time: 22 minutes  Total critical care time (exclusive of procedural time) : 22 minutes  Critical care was necessary to treat or prevent imminent or life-threatening deterioration of the following conditions: respiratory failure.  Critical care was time spent personally by me on the following activities: development of treatment plan with patient or surrogate, evaluation of patient's response to treatment, examination of patient, obtaining history from patient or surrogate, ordering and performing treatments and interventions, ordering and review of laboratory studies, pulse oximetry, re-evaluation of patient's condition and review of old charts.        Orders Placed This Encounter    Critical Care    Blood culture #1 **CANNOT BE ORDERED STAT**    Blood culture #2 **CANNOT BE ORDERED STAT**    X-ray Chest AP Portable    Comprehensive metabolic panel    CBC auto differential    Troponin I    Brain natriuretic peptide    CBC with Differential    Lactic acid, plasma    Urinalysis, Reflex to Urine Culture Urine, Clean Catch    COVID/RSV/FLU A&B PCR    Troponin I    Lactic acid, plasma    Diet NPO    Vital signs    Cardiac Monitoring - Adult    ED Preference List Used to Initiate Sepsis Orders    Strict intake and output    Inpatient consult to Telemedicine-Card    Oxygen Continuous    Pulse Oximetry Continuous    Bipap Continuous    EKG 12-LEAD    EKG 12-lead    Insert saline lock    Saline lock IV    PFC Facilitated Request    ondansetron injection 4 mg    cefTRIAXone injection 1 g    acetaminophen suppository 650 mg    lactated ringers bolus 1,000 mL    ampicillin-sulbactam (UNASYN) 3 g in 0.9% NaCl 100 mL IVPB (MB+)     lactated ringers bolus 1,000 mL    enoxaparin injection 60 mg    Bed rest       Labs Reviewed   COMPREHENSIVE METABOLIC PANEL - Abnormal       Result Value    Sodium 140      Potassium 4.2      Chloride 105      CO2 19 (*)     Glucose 157 (*)     Blood Urea Nitrogen 30.0 (*)     Creatinine 0.92      Calcium 9.3      Protein Total 7.3      Albumin 3.8      Globulin 3.5      Albumin/Globulin Ratio 1.1      Bilirubin Total 0.5      ALP 73      ALT 5      AST 14      eGFR >60      Anion Gap 16.0      BUN/Creatinine Ratio 33     TROPONIN I - Abnormal    Troponin-I 0.485 (*)    B-TYPE NATRIURETIC PEPTIDE - Abnormal    Natriuretic Peptide 296.4 (*)    CBC WITH DIFFERENTIAL - Abnormal    WBC 20.62 (*)     RBC 4.03 (*)     Hgb 9.9 (*)     Hct 33.2 (*)     MCV 82.4      MCH 24.6 (*)     MCHC 29.8 (*)     RDW 16.1      Platelet 460 (*)     MPV 10.5 (*)     Neut % 88.6      Lymph % 6.2      Mono % 4.2      Eos % 0.0      Basophil % 0.5      Imm Grans % 0.5      Neut # 18.25 (*)     Lymph # 1.28      Mono # 0.87      Eos # 0.01      Baso # 0.11      Imm Gran # 0.10 (*)     NRBC% 0.0     LACTIC ACID, PLASMA - Abnormal    Lactic Acid Level 6.1 (*)    TROPONIN I - Abnormal    Troponin-I 1.429 (*)    LACTIC ACID, PLASMA - Abnormal    Lactic Acid Level 4.4 (*)    COVID/RSV/FLU A&B PCR - Normal    Influenza A PCR Not Detected      Influenza B PCR Not Detected      Respiratory Syncytial Virus PCR Not Detected      SARS-CoV-2 PCR Not Detected      Narrative:     The Xpert Xpress SARS-CoV-2/FLU/RSV plus is a rapid, multiplexed real-time PCR test intended for the simultaneous qualitative detection and differentiation of SARS-CoV-2, Influenza A, Influenza B, and respiratory syncytial virus (RSV) viral RNA in either nasopharyngeal swab or nasal swab specimens.         BLOOD CULTURE [facility]   BLOOD CULTURE [facility]   CBC W/ AUTO DIFFERENTIAL    Narrative:     The following orders were created for panel order CBC auto differential.  Procedure                                Abnormality         Status                     ---------                               -----------         ------                     CBC with Differential[[id number]]       Abnormal            Final result                 Please view results for these tests on the individual orders.   URINALYSIS, REFLEX TO URINE CULTURE     EKG Readings: (Independently Interpreted)   EKG:  Sinus tachycardia with a rate of 117 beats per minute.  Left axis deviation is present.  Much artifact is present.      Repeat EKG: Normal sinus rhythm with a rate of 90 beats per minute.  Left anterior fascicular block is present.  No ST segment or T-wave changes are present.  QT is prolonged at 565 prolonged at 565 milliseconds.     ECG Results              EKG 12-LEAD (Preliminary result)  Result time 07/12/25 17:44:16      Wet Read by Albert Gonzalez MD (07/12/25 17:44:16, [facility] - Emergency Dept, Emergency Medicine)    EKG: Independently reviewed and / or Interpreted by Albert Gonzalez MD. independently as Normal Sinus Rhythm, Rate 117, Sinus Tachycardia, Left Axis Deviation, Normal Intervals., No STEMI, she has artifact in the baseline, some ST-depression in lateral leads.                                    Imaging Results              X-ray Chest AP Portable (In process)                      Medications   cefTRIAXone injection 1 g (1 g Intravenous Given 7/12/25 1818)   ondansetron injection 4 mg (4 mg Intravenous Given 7/12/25 1744)   acetaminophen suppository 650 mg (650 mg Rectal Given 7/12/25 1804)   lactated ringers bolus 1,000 mL (0 mLs Intravenous Stopped 7/12/25 1920)   ampicillin-sulbactam (UNASYN) 3 g in 0.9% NaCl 100 mL IVPB (MB+) (0 g Intravenous Stopped 7/12/25 2017)   lactated ringers bolus 1,000 mL (0 mLs Intravenous Stopped 7/12/25 2015)   enoxaparin injection 60 mg (60 mg Subcutaneous Given 7/12/25 2220)     Medical Decision Making    Ashley Calvillo is 79 y.o. 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 arrives in ER with c/o Shortness of Breath (BIBA from NH    SOB post vomitting   suspected aspiration)    Apparently patient was in the nursing home, and according to them she choked on the food she threw up and then she started having shortness of breath.  They feel she might have aspirated.  When medics picked her up she was pale, diaphoretic, in respiratory distress, they gave her DuoNeb and Solu-Medrol 125 mg IV push on the way to the emergency room.  On arrival in the emergency room patient is still in distress, tachycardic, she has a rales bilaterally, she does not have any edema, also noticed that she has fever on arrival in the emergency room.    Patient does have some tremor, she is pale looking, I will go ahead and do a sepsis workup on her, I will put her on BiPAP, and will it is not her, before that.  We will cover for pneumonia    This is Dr. Cat and I assumed care at 6:00 p.m..  I examined the patient 3 times during her ED stay, most recently at 2230.  She is resting comfortably feels much better.  Her breathing is easy and unlabored and her oxygen saturation is normal.  She does have a rising troponin and, after Cardiology consultation, the plan was to transfer the patient for higher level of care.  She appears to have aspiration pneumonia with sepsis.  She has been treated with broad-spectrum antibiotics and a 30 mL/kg bolus with improvement of her symptoms.  Her tissue perfusion is normal.  The patient and her family understand and are in agreement with this plan of care.      Discussion with other physicians/healthcare providers:  I spoke with Leonardo landers (NP for  Cardiology) and the recommendation was for transfer and administration of Lovenox, 1 milligram/kilogram subcutaneously.  I spoke with Dr. Calvillo at [facility] at 2220 and the patient was accepted for transfer.    Decision to admit/transfer/discharge:  After my evaluation of the patient and interpretation of all relevant information, the decision has been made to transfer the patient.    After discussion with the patient/decision maker(s), the patient is full code.    I independently reviewed and interpreted any laboratory tests, radiographic images, EKGs, rhythm strips, and other diagnostic tests that were obtained during this Emergency Department visit.    Drug/medication management:  Parenteral controlled substances and other dangerous medications, if administered, can be found in the order section of this chart.  I reviewed the patient's home medications and made changes/adjustments as medically indicated.  Any new medications are documented under the prescription section of this chart.    Social determinants of health addressed during this ED visit:  The patient/caregiver knowledge deficit about today's condition and treatment plan was addressed by me through appropriate patient/caregiver education.      Procedures/plan for surgery:  None.    Critical care:  Time spent: 40 minutes.  The high probability of significant deterioration in the patient's condition required the highest level of my care and the need to intervene urgently.  I provided critical care services requiring my direct management to review nursing notes, previous records, orders, repeat evaluations, continuous assessment, ongoing care, and transfer to the admitting physician.  This time excludes time for separately billed procedures.    Amount and/or Complexity of Data Reviewed  Labs: ordered.  Radiology: ordered.    Risk  OTC drugs.  Prescription drug management.               ED Course as of 07/12/25 0937   Sat Jul 12, 2025   8656 Patient  has fever, she is hypoxic and tachycardic, we have putting her on BiPAP and will re-evaluate.  I will transfer her care over to Dr. Cat at 6:00 p.m.. [GQ]   1751 When we were getting ready to put her on BiPAP, she started feeling better after coughing up a lot, and her O2 sat came up to 100% on nasal cannula.  We are going to hold off on the BiPAP for the time being. [GQ]      ED Course User Index  [GQ] Albert Gonzalez MD                               Clinical Impression:  Final diagnoses:  [R06.00] Dyspnea  [Z13.6] Screening for cardiovascular condition  [R06.02] SOB (shortness of breath)  [A41.9] Sepsis, due to unspecified organism, unspecified whether acute organ dysfunction present (Primary)  [J69.0] Aspiration pneumonia of right lower lobe, unspecified aspiration pneumonia type  [I21.4] NSTEMI (non-ST elevated myocardial infarction)          ED Disposition Condition    Transfer to Another Facility Fair                      [1]   Social History  Tobacco Use    Smoking status: Former     Types: Cigarettes    Smokeless tobacco: Never    Tobacco comments:     patient stated that she smokes on some days.    Substance Use Topics    Alcohol use: Never     Comment: wine, occasionally    Drug use: Never        Olvin Cat MD  07/13/25 0220

## 2025-07-13 VITALS
TEMPERATURE: 98 F | DIASTOLIC BLOOD PRESSURE: 68 MMHG | HEIGHT: 62 IN | SYSTOLIC BLOOD PRESSURE: 114 MMHG | OXYGEN SATURATION: 96 % | BODY MASS INDEX: 25.76 KG/M2 | WEIGHT: 140 LBS | RESPIRATION RATE: 19 BRPM | HEART RATE: 79 BPM

## 2025-07-13 LAB
OHS QRS DURATION: 84 MS
OHS QTC CALCULATION: 443 MS

## 2025-07-13 NOTE — CONSULTS
[facility] - Emergency Dept  Cardiology  Consult Note    Patient Name: Ashley Calvillo  MRN: [id number]  Admission Date: 7/12/2025  Hospital Length of Stay: 0 days  Code Status: Prior   Attending Provider: Olvin Cat MD   Consulting Provider: Leonardo Bhatt NP  Primary Care Physician: Leeanna De Leon MD  Principal Problem:<principal problem not specified>    Patient information was obtained from patient, past medical records, ER records, and primary team.     Consults  Subjective:     Chief Complaint:    Telecardiology Consult  [facility]  > Trop  Dr Cat   > 30 minutes including review of medical records, provider and patient interview     HPI:   79-year-old female unknown to our services.  She has a history of hypertension, CVA, depression, dysphagia, colostomy, hypothyroidism, iron deficiency anemia, dyslipidemia, and squamous cell carcinoma.    She presented to the ER with ongoing shortness of breath   Apparently they were feeding her she choked up on some food threw up then started having some shortness of breath.  There was some concerns for aspiration.  She was given DuoNeb and Solu-Medrol upon arrival to the emergency room she was still in respiratory distress to picnic and rales bilaterally.    Initial EKG was done had some baseline artifact there was no ST-elevation noted   Initial troponin was 0.4 then elevated to 1.4   Chest x-ray was negative.  White cell count was 20,000 and she has a fever of 101.  Sepsis is suspected.      Past Medical History:   Diagnosis Date    Arthritis     Colostomy in place 02/24/2025    CVA (cerebral vascular accident)     Depression     Dysphagia     HTN (hypertension)     Hypothyroidism, unspecified     Iron deficiency anemia, unspecified     Mixed hyperlipidemia     Squamous cell carcinoma in situ     Tonsillar cancer     Urinary tract infection, site not specified        Past Surgical History:   Procedure Laterality Date    CLOSURE, COLOSTOMY N/A  2/24/2025    Procedure: CLOSURE, COLOSTOMY;  Surgeon: GENA Matute MD;  Location: [facility] OR;  Service: General;  Laterality: N/A;  LAP ASSISSTED, CONVERTED TO OPEN AT 1400    COLECTOMY, SIGMOID N/A 7/28/2024    Procedure: COLECTOMY, SIGMOID;  Surgeon: GENA Matute MD;  Location: [facility] OR;  Service: General;  Laterality: N/A;    COLON RESECTION N/A 2/24/2025    Procedure: COLON RESECTION;  Surgeon: GENA Matute MD;  Location: [facility] OR;  Service: General;  Laterality: N/A;  SMALL BOWEL RESECTION    COLONOSCOPY, THROUGH STOMA, DIAGNOSTIC N/A 8/26/2024    Procedure: COLONOSCOPY, THROUGH STOMA, DIAGNOSTIC;  Surgeon: GENA Matute MD;  Location: [facility] ENDO;  Service: Endoscopy;  Laterality: N/A;  DIVERTICULOSIS    COLONOSCOPY, THROUGH STOMA, DIAGNOSTIC N/A 11/1/2024    Procedure: COLONOSCOPY, THROUGH STOMA, DIAGNOSTIC;  Surgeon: GENA Matute MD;  Location: [facility] ENDO;  Service: Endoscopy;  Laterality: N/A;  POST OP: NORMAL COLONOSCOPY    COLONOSCOPY, WITH DIRECTED SUBMUCOSAL INJECTION N/A 11/1/2024    Procedure: COLONOSCOPY, WITH DIRECTED SUBMUCOSAL INJECTION;  Surgeon: GENA Matute MD;  Location: [facility] ENDO;  Service: Endoscopy;  Laterality: N/A;  TATTOO INK SPOT IN RECTUM AT 12 CM (PROCTOSCOPY)    COLONOSCOPY, WITH POLYPECTOMY USING SNARE N/A 8/26/2024    Procedure: COLONOSCOPY, WITH POLYPECTOMY USING SNARE;  Surgeon: GENA Matute MD;  Location: [facility] ENDO;  Service: Endoscopy;  Laterality: N/A;  A) TRANSVERSE COLON POLYP    COLOSTOMY N/A 7/28/2024    Procedure: CREATION, COLOSTOMY;  Surgeon: GENA Matute MD;  Location: [facility] OR;  Service: General;  Laterality: N/A;    FLEXIBLE SIGMOIDOSCOPY N/A 7/26/2024    Procedure: SIGMOIDOSCOPY, FLEXIBLE;  Surgeon: GENA Matute MD;  Location: [facility];  Service: Endoscopy;  Laterality: N/A;  POST OP:DECOMPRESSION    FLEXIBLE SIGMOIDOSCOPY N/A 2/24/2025    Procedure: SIGMOIDOSCOPY,  FLEXIBLE;  Surgeon: GENA Matute MD;  Location: [facility] OR;  Service: General;  Laterality: N/A;    HYSTERECTOMY      LAPAROSCOPIC LYSIS OF ADHESIONS N/A 2/24/2025    Procedure: LYSIS, ADHESIONS, LAPAROSCOPIC;  Surgeon: GENA Matute MD;  Location: [facility] OR;  Service: General;  Laterality: N/A;  LAPAROSCOPIC AND OPEN LYSIS OF EXTENSIVE ADHESIONS    LAPAROSCOPY N/A 7/28/2024    Procedure: LAPAROSCOPY;  Surgeon: GENA Matute MD;  Location: [facility] OR;  Service: General;  Laterality: N/A;  massively distended sigmoid colon was identified Converted to open art 1620    OMENTECTOMY N/A 2/24/2025    Procedure: OMENTECTOMY;  Surgeon: GENA Matute MD;  Location: [facility];  Service: General;  Laterality: N/A;  PARTIAL OMENTECTOMY, OMENTAL PEDICLE FLAP TO ANASTOMOSIS    OOPHORECTOMY Left 2/24/2025    Procedure: OOPHORECTOMY;  Surgeon: GENA Matute MD;  Location: [facility];  Service: General;  Laterality: Left;    PROCTOSCOPY N/A 11/1/2024    Procedure: PROCTOSCOPY;  Surgeon: GENA Matute MD;  Location: [facility];  Service: Endoscopy;  Laterality: N/A;  FLEXIBLE; EVALUATION OF RECTAL STUMP THROUGH RECTUM; NORMAL RECTUM    SALPINGOOPHORECTOMY Right 2/24/2025    Procedure: SALPINGO-OOPHORECTOMY;  Surgeon: GENA Matute MD;  Location: [facility];  Service: General;  Laterality: Right;    TONSILLECTOMY      TOTAL SHOULDER ARTHROPLASTY         Review of patient's allergies indicates:   Allergen Reactions    Nsaids (non-steroidal anti-inflammatory drug)     Oxycodone     Oxycodone-aspirin        No current facility-administered medications on file prior to encounter.     Current Outpatient Medications on File Prior to Encounter   Medication Sig    acetaminophen (TYLENOL) 325 MG tablet Take 650 mg by mouth every 4 (four) hours as needed for Pain.    citalopram (CELEXA) 40 MG tablet Take 40 mg by mouth every morning.    clopidogreL (PLAVIX) 75 mg tablet Take 1 tablet  (75 mg total) by mouth once daily. Q.h.s. (Patient taking differently: Take 75 mg by mouth every evening.)    docusate sodium (COLACE) 100 MG capsule Take 100 mg by mouth every morning.    ipratropium (ATROVENT) 21 mcg (0.03 %) nasal spray 2 sprays by Each Nostril route 3 (three) times daily.    levothyroxine (SYNTHROID) 100 MCG tablet Take 100 mcg by mouth before breakfast.    magnesium aspart,citrate,oxide 400 mg magnesium Cap 1 capsule with a meal Orally Once a day; Duration: 30 days    montelukast (SINGULAIR) 10 mg tablet Take 10 mg by mouth every evening.    polyethylene glycol (GLYCOLAX) 17 gram/dose powder Take 17 g by mouth once daily. (Patient taking differently: Take 17 g by mouth Daily.)    protein hydrolysate,milk (LIQUID PROTEIN FORTIFIER) 1 gram-4 kcal/6 mL Liqd Take by mouth.    simvastatin (ZOCOR) 10 MG tablet Take 10 mg by mouth every evening.    traMADoL (ULTRAM) 50 mg tablet Take 50 mg by mouth every 24 hours as needed for Pain.     Family History       Problem Relation (Age of Onset)    Alzheimer's disease Father    Cancer Mother, Sister    Leukemia Mother, Sister    Stroke Mother          Tobacco Use    Smoking status: Former     Types: Cigarettes    Smokeless tobacco: Never    Tobacco comments:     patient stated that she smokes on some days.    Substance and Sexual Activity    Alcohol use: Never     Comment: wine, occasionally    Drug use: Never    Sexual activity: Not Currently     Review of Systems   Constitutional: Negative.   HENT: Negative.     Eyes: Negative.    Cardiovascular:  Negative for chest pain and dyspnea on exertion.   Respiratory:  Positive for cough and shortness of breath.    Endocrine: Negative.    Skin: Negative.    Musculoskeletal: Negative.    Gastrointestinal: Negative.    Genitourinary: Negative.    Psychiatric/Behavioral: Negative.       Objective:     Vital Signs (Most Recent):  Temp: (!) 101.7 °F (38.7 °C) (07/12/25 1736)  Pulse: 82 (07/12/25 2044)  Resp: (!) 24  (07/12/25 1845)  BP: 93/61 (07/12/25 2031)  SpO2: 97 % (07/12/25 2044) Vital Signs (24h Range):  Temp:  [101.7 °F (38.7 °C)] 101.7 °F (38.7 °C)  Pulse:  [] 82  Resp:  [20-36] 24  SpO2:  [84 %-100 %] 97 %  BP: ()/(47-85) 93/61     Weight: 63.5 kg (140 lb)  Body mass index is 25.61 kg/m².    SpO2: 97 %         Intake/Output Summary (Last 24 hours) at 7/12/2025 2146  Last data filed at 7/12/2025 2015  Gross per 24 hour   Intake 2000 ml   Output --   Net 2000 ml       Lines/Drains/Airways       Peripheral Intravenous Line  Duration             Peripheral IV Single Lumen 07/12/25 1721 22 G Anterior;Right Hand <1 day    Peripheral IV Single Lumen 07/12/25 1752 20 G Right Antecubital <1 day                    Physical Exam  Constitutional:       Appearance: Normal appearance.   HENT:      Head: Normocephalic and atraumatic.      Nose: Nose normal.      Mouth/Throat:      Mouth: Mucous membranes are moist.   Eyes:      Conjunctiva/sclera: Conjunctivae normal.   Cardiovascular:      Rate and Rhythm: Normal rate and regular rhythm.      Pulses: Normal pulses.      Heart sounds: Normal heart sounds.   Pulmonary:      Effort: Pulmonary effort is normal.      Breath sounds: Normal breath sounds.   Abdominal:      General: Bowel sounds are normal.   Skin:     General: Skin is warm and dry.   Neurological:      General: No focal deficit present.      Mental Status: She is alert and oriented to person, place, and time.   Psychiatric:         Mood and Affect: Mood normal.         Significant Labs:   Recent Lab Results         07/12/25 2000 07/12/25  1802 07/12/25  1801 07/12/25  1747        RSV Ag by Molecular Method       Not Detected       Influenza A, Molecular       Not Detected       Influenza B, Molecular       Not Detected       Albumin/Globulin Ratio     1.1         Albumin     3.8         ALP     73         ALT     5         Anion Gap     16.0         AST     14         Baso #     0.11         Basophil %      0.5         BILIRUBIN TOTAL     0.5         BNP   296.4           BUN     30.0         BUN/CREAT RATIO     33         Calcium     9.3         Chloride     105         CO2     19         Creatinine     0.92         eGFR     >60  Comment: Estimated GFR calculated using the CKD-EPI creatinine (2021) equation.         Eos #     0.01         Eos %     0.0         Globulin, Total     3.5         Glucose     157         Hematocrit     33.2         Hemoglobin     9.9         Immature Grans (Abs)     0.10         Immature Granulocytes     0.5         Lactic Acid Level 4.4  Comment: Critical Result called and verified by verbal readback to: Kassandra Crespo on 07/12/2025 at 20:45. Reported by [id number].     6.1  Comment: Critical Result called and verified by verbal readback to: Sana Linares on 07/12/2025 at 18:35. Reported by [id number].    A repeat order for Lactic Acid has been placed for collection in 3 hours.         Lymph #     1.28         LYMPH %     6.2         MCH     24.6         MCHC     29.8         MCV     82.4         Mono #     0.87         Mono %     4.2         MPV     10.5         Neut #     18.25         Neut %     88.6         nRBC     0.0         Platelet Count     460         Potassium     4.2         PROTEIN TOTAL     7.3         RBC     4.03         RDW     16.1         SARS-CoV2 (COVID-19) Qualitative PCR       Not Detected       Sodium     140         Troponin I 1.429     0.485         WBC     20.62                 Significant Imaging:   EKG: NSR, baseline artifact, no ST elevation, or ischemic changes noted  CXR: negative  Assessment and Plan:   Impression:   Shortness of breath with aspiration and probable sepsis  Troponin elevation home 0.4-1.4   -Probable type 2 leak from her respiratory distress   -No acute ST segment changes on EKG   History of hypertension   History of CVA   History dyslipidemia   History depression   History squamous cell carcinoma    Plan:  Probable sepsis with aspiration  -  Lactic elevated  No overt CP, she feel much better post vomiting, No SOB   Continue Plavix  Lovenox 1 mg/kg sq  Echocardiogram in am   Admit to Hospitalist and we will follow on consult     There are no hospital problems to display for this patient.      VTE Risk Mitigation (From admission, onward)      None            Thank you for your consult.     Leonardo Bhatt NP  Cardiology   [facility] - Emergency Dept

## 2025-07-18 LAB
BACTERIA BLD CULT: NORMAL
BACTERIA BLD CULT: NORMAL

## 2025-07-21 ENCOUNTER — HOSPITAL ENCOUNTER (EMERGENCY)
Facility: HOSPITAL | Age: 80
Discharge: HOME | End: 2025-07-22
Attending: EMERGENCY MEDICINE
Payer: MEDICARE

## 2025-07-21 DIAGNOSIS — R53.81 MALAISE: ICD-10-CM

## 2025-07-21 DIAGNOSIS — R53.1 GENERALIZED WEAKNESS: ICD-10-CM

## 2025-07-21 DIAGNOSIS — I95.9 TRANSIENT HYPOTENSION: ICD-10-CM

## 2025-07-21 DIAGNOSIS — E86.0 DEHYDRATION: Primary | ICD-10-CM

## 2025-07-21 LAB
ALBUMIN SERPL-MCNC: 3.3 G/DL (ref 3.4–4.8)
ALBUMIN/GLOB SERPL: 0.9 RATIO (ref 1.1–2)
ALP SERPL-CCNC: 68 UNIT/L (ref 40–150)
ALT SERPL-CCNC: 7 UNIT/L (ref 0–55)
ANION GAP SERPL CALC-SCNC: 10 MEQ/L
AST SERPL-CCNC: 12 UNIT/L (ref 11–45)
BASOPHILS # BLD AUTO: 0.04 X10(3)/MCL
BASOPHILS NFR BLD AUTO: 0.3 %
BILIRUB SERPL-MCNC: 0.3 MG/DL
BNP BLD-MCNC: 450.2 PG/ML
BUN SERPL-MCNC: 27 MG/DL (ref 9.8–20.1)
CALCIUM SERPL-MCNC: 9.4 MG/DL (ref 8.4–10.2)
CHLORIDE SERPL-SCNC: 101 MMOL/L (ref 98–107)
CO2 SERPL-SCNC: 28 MMOL/L (ref 23–31)
CREAT SERPL-MCNC: 1.18 MG/DL (ref 0.55–1.02)
CREAT/UREA NIT SERPL: 23
EOSINOPHIL # BLD AUTO: 0.22 X10(3)/MCL (ref 0–0.9)
EOSINOPHIL NFR BLD AUTO: 1.7 %
ERYTHROCYTE [DISTWIDTH] IN BLOOD BY AUTOMATED COUNT: 17.2 % (ref 11.5–17)
GFR SERPLBLD CREATININE-BSD FMLA CKD-EPI: 47 ML/MIN/1.73/M2
GLOBULIN SER-MCNC: 3.7 GM/DL (ref 2.4–3.5)
GLUCOSE SERPL-MCNC: 94 MG/DL (ref 82–115)
GROUP & RH: NORMAL
HCT VFR BLD AUTO: 31.6 % (ref 37–47)
HGB BLD-MCNC: 9.9 G/DL (ref 12–16)
IMM GRANULOCYTES # BLD AUTO: 0.18 X10(3)/MCL (ref 0–0.04)
IMM GRANULOCYTES NFR BLD AUTO: 1.4 %
INDIRECT COOMBS: NORMAL
LACTATE SERPL-SCNC: 1.9 MMOL/L (ref 0.5–2.2)
LIPASE SERPL-CCNC: 77 U/L
LYMPHOCYTES # BLD AUTO: 1.42 X10(3)/MCL (ref 0.6–4.6)
LYMPHOCYTES NFR BLD AUTO: 11.2 %
MAGNESIUM SERPL-MCNC: 2.2 MG/DL (ref 1.6–2.6)
MCH RBC QN AUTO: 24 PG (ref 27–31)
MCHC RBC AUTO-ENTMCNC: 31.3 G/DL (ref 33–36)
MCV RBC AUTO: 76.7 FL (ref 80–94)
MONOCYTES # BLD AUTO: 0.97 X10(3)/MCL (ref 0.1–1.3)
MONOCYTES NFR BLD AUTO: 7.7 %
NEUTROPHILS # BLD AUTO: 9.81 X10(3)/MCL (ref 2.1–9.2)
NEUTROPHILS NFR BLD AUTO: 77.7 %
NRBC BLD AUTO-RTO: 0 %
OHS QRS DURATION: 72 MS
OHS QTC CALCULATION: 522 MS
PLATELET # BLD AUTO: 488 X10(3)/MCL (ref 130–400)
PMV BLD AUTO: 9.6 FL (ref 7.4–10.4)
POCT GLUCOSE: 140 MG/DL (ref 70–110)
POTASSIUM SERPL-SCNC: 3.4 MMOL/L (ref 3.5–5.1)
PROT SERPL-MCNC: 7 GM/DL (ref 5.8–7.6)
RBC # BLD AUTO: 4.12 X10(6)/MCL (ref 4.2–5.4)
SODIUM SERPL-SCNC: 139 MMOL/L (ref 136–145)
SPECIMEN OUTDATE: NORMAL
TROPONIN I SERPL-MCNC: 0.05 NG/ML (ref 0–0.04)
WBC # BLD AUTO: 12.64 X10(3)/MCL (ref 4.5–11.5)

## 2025-07-21 PROCEDURE — 96360 HYDRATION IV INFUSION INIT: CPT

## 2025-07-21 PROCEDURE — 83735 ASSAY OF MAGNESIUM: CPT | Performed by: EMERGENCY MEDICINE

## 2025-07-21 PROCEDURE — 80053 COMPREHEN METABOLIC PANEL: CPT | Performed by: EMERGENCY MEDICINE

## 2025-07-21 PROCEDURE — 84484 ASSAY OF TROPONIN QUANT: CPT | Performed by: EMERGENCY MEDICINE

## 2025-07-21 PROCEDURE — 99285 EMERGENCY DEPT VISIT HI MDM: CPT | Mod: 25

## 2025-07-21 PROCEDURE — 85025 COMPLETE CBC W/AUTO DIFF WBC: CPT | Performed by: EMERGENCY MEDICINE

## 2025-07-21 PROCEDURE — 86901 BLOOD TYPING SEROLOGIC RH(D): CPT | Performed by: EMERGENCY MEDICINE

## 2025-07-21 PROCEDURE — 83880 ASSAY OF NATRIURETIC PEPTIDE: CPT | Performed by: EMERGENCY MEDICINE

## 2025-07-21 PROCEDURE — 93010 ELECTROCARDIOGRAM REPORT: CPT | Mod: ,,, | Performed by: STUDENT IN AN ORGANIZED HEALTH CARE EDUCATION/TRAINING PROGRAM

## 2025-07-21 PROCEDURE — 25000003 PHARM REV CODE 250: Performed by: EMERGENCY MEDICINE

## 2025-07-21 PROCEDURE — 93005 ELECTROCARDIOGRAM TRACING: CPT

## 2025-07-21 PROCEDURE — 83605 ASSAY OF LACTIC ACID: CPT | Performed by: EMERGENCY MEDICINE

## 2025-07-21 PROCEDURE — 83690 ASSAY OF LIPASE: CPT | Performed by: EMERGENCY MEDICINE

## 2025-07-21 PROCEDURE — 82962 GLUCOSE BLOOD TEST: CPT

## 2025-07-21 RX ORDER — METOPROLOL SUCCINATE 25 MG/1
25 TABLET, EXTENDED RELEASE ORAL DAILY
COMMUNITY

## 2025-07-21 RX ORDER — ALBUTEROL SULFATE 2.5 MG/.5ML
2.5 SOLUTION RESPIRATORY (INHALATION) EVERY 6 HOURS PRN
COMMUNITY

## 2025-07-21 RX ORDER — ROSUVASTATIN CALCIUM 20 MG/1
20 TABLET, COATED ORAL DAILY
COMMUNITY

## 2025-07-21 RX ORDER — FUROSEMIDE 20 MG/1
20 TABLET ORAL DAILY
COMMUNITY

## 2025-07-21 RX ADMIN — SODIUM CHLORIDE 500 ML: 9 INJECTION, SOLUTION INTRAVENOUS at 10:07

## 2025-07-22 VITALS
BODY MASS INDEX: 25.61 KG/M2 | RESPIRATION RATE: 14 BRPM | HEART RATE: 66 BPM | WEIGHT: 140 LBS | OXYGEN SATURATION: 96 % | TEMPERATURE: 98 F | SYSTOLIC BLOOD PRESSURE: 124 MMHG | DIASTOLIC BLOOD PRESSURE: 63 MMHG

## 2025-07-22 LAB
BACTERIA #/AREA URNS AUTO: ABNORMAL /HPF
BILIRUB UR QL STRIP.AUTO: NEGATIVE
CLARITY UR: CLEAR
COLOR UR AUTO: YELLOW
GLUCOSE UR QL STRIP: NEGATIVE
HGB UR QL STRIP: NEGATIVE
KETONES UR QL STRIP: ABNORMAL
LEUKOCYTE ESTERASE UR QL STRIP: NEGATIVE
MUCOUS THREADS URNS QL MICRO: ABNORMAL /LPF
NITRITE UR QL STRIP: NEGATIVE
PH UR STRIP: 6 [PH]
PROT UR QL STRIP: ABNORMAL
RBC #/AREA URNS AUTO: ABNORMAL /HPF
SP GR UR STRIP.AUTO: 1.02 (ref 1–1.03)
SQUAMOUS #/AREA URNS AUTO: ABNORMAL /HPF
TROPONIN I SERPL-MCNC: 0.04 NG/ML (ref 0–0.04)
URATE CRY UR QL COMP ASSIST: ABNORMAL
UROBILINOGEN UR STRIP-ACNC: 0.2
WBC #/AREA URNS AUTO: ABNORMAL /HPF

## 2025-07-22 PROCEDURE — 84484 ASSAY OF TROPONIN QUANT: CPT | Performed by: EMERGENCY MEDICINE

## 2025-07-22 PROCEDURE — 81003 URINALYSIS AUTO W/O SCOPE: CPT | Performed by: EMERGENCY MEDICINE

## 2025-07-22 NOTE — ED PROVIDER NOTES
Encounter Date: 7/21/2025       History     Chief Complaint   Patient presents with    Weakness     From [facility] with weakness x1 week that became worse today. NH reports hypotension prior to arrival.      This is a 79-year-old female with past medical history of hypertension, history of CVA, history of hyperlipidemia, presents emergency department with generalized weakness and low blood pressure.  Patient reports that she was recently hospitalized at [facility] for what sounds like aspiration pneumonia.  She said she choked on a piece of broccoli and was sent there for further evaluation.  On review of records it appears patient had elevated troponin, NSTEMI in the setting of hypoxia and aspiration pneumonia.  Patient says that she has recently been discharged from that hospital and gone back to the nursing facility.  It is reported today that her blood pressure was low.  No medications given prior to arrival.  Here blood pressure 117/ 57.  Patient denies any recent falls.  She denies any bleeding anywhere she denies any black stools or any tarry stools.  She is not have any vomiting of any blood.  She denies any urinary symptoms such as frequency urgency or burning.  She is not have any chest pain or any shortness of breath.  She says she is actually feeling better now that her blood pressure is better.        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    CVA (cerebral vascular accident)     Depression     Dysphagia     HTN (hypertension)     Hypothyroidism, unspecified     Iron deficiency anemia, unspecified     Mixed hyperlipidemia     Squamous cell carcinoma in situ     Tonsillar cancer     Urinary tract infection, site not specified      Past Surgical History:   Procedure Laterality Date    CLOSURE,  COLOSTOMY N/A 2/24/2025    Procedure: CLOSURE, COLOSTOMY;  Surgeon: GENA Matute MD;  Location: [facility] OR;  Service: General;  Laterality: N/A;  LAP ASSISSTED, CONVERTED TO OPEN AT 1400    COLECTOMY, SIGMOID N/A 7/28/2024    Procedure: COLECTOMY, SIGMOID;  Surgeon: GENA Matute MD;  Location: [facility] OR;  Service: General;  Laterality: N/A;    COLON RESECTION N/A 2/24/2025    Procedure: COLON RESECTION;  Surgeon: GENA Matute MD;  Location: [facility] OR;  Service: General;  Laterality: N/A;  SMALL BOWEL RESECTION    COLONOSCOPY, THROUGH STOMA, DIAGNOSTIC N/A 8/26/2024    Procedure: COLONOSCOPY, THROUGH STOMA, DIAGNOSTIC;  Surgeon: GENA Matute MD;  Location: [facility] ENDO;  Service: Endoscopy;  Laterality: N/A;  DIVERTICULOSIS    COLONOSCOPY, THROUGH STOMA, DIAGNOSTIC N/A 11/1/2024    Procedure: COLONOSCOPY, THROUGH STOMA, DIAGNOSTIC;  Surgeon: GENA Matute MD;  Location: [facility] ENDO;  Service: Endoscopy;  Laterality: N/A;  POST OP: NORMAL COLONOSCOPY    COLONOSCOPY, WITH DIRECTED SUBMUCOSAL INJECTION N/A 11/1/2024    Procedure: COLONOSCOPY, WITH DIRECTED SUBMUCOSAL INJECTION;  Surgeon: GENA Matute MD;  Location: [facility] ENDO;  Service: Endoscopy;  Laterality: N/A;  TATTOO INK SPOT IN RECTUM AT 12 CM (PROCTOSCOPY)    COLONOSCOPY, WITH POLYPECTOMY USING SNARE N/A 8/26/2024    Procedure: COLONOSCOPY, WITH POLYPECTOMY USING SNARE;  Surgeon: GENA Matute MD;  Location: [facility] ENDO;  Service: Endoscopy;  Laterality: N/A;  A) TRANSVERSE COLON POLYP    COLOSTOMY N/A 7/28/2024    Procedure: CREATION, COLOSTOMY;  Surgeon: GENA Matute MD;  Location: [facility] OR;  Service: General;  Laterality: N/A;    FLEXIBLE SIGMOIDOSCOPY N/A 7/26/2024    Procedure: SIGMOIDOSCOPY, FLEXIBLE;  Surgeon: GENA Matute MD;  Location: [facility];  Service: Endoscopy;  Laterality: N/A;  POST OP:DECOMPRESSION    FLEXIBLE SIGMOIDOSCOPY N/A 2/24/2025    Procedure:  SIGMOIDOSCOPY, FLEXIBLE;  Surgeon: GENA Matute MD;  Location: [facility] OR;  Service: General;  Laterality: N/A;    HYSTERECTOMY      LAPAROSCOPIC LYSIS OF ADHESIONS N/A 2/24/2025    Procedure: LYSIS, ADHESIONS, LAPAROSCOPIC;  Surgeon: GENA Matute MD;  Location: [facility] [place];  Service: General;  Laterality: N/A;  LAPAROSCOPIC AND OPEN LYSIS OF EXTENSIVE ADHESIONS    LAPAROSCOPY N/A 7/28/2024    Procedure: LAPAROSCOPY;  Surgeon: GENA Matute MD;  Location: [facility] [place];  Service: General;  Laterality: N/A;  massively distended sigmoid colon was identified Converted to open art 1620    OMENTECTOMY N/A 2/24/2025    Procedure: OMENTECTOMY;  Surgeon: GENA Matute MD;  Location: [facility] [place];  Service: General;  Laterality: N/A;  PARTIAL OMENTECTOMY, OMENTAL PEDICLE FLAP TO ANASTOMOSIS    OOPHORECTOMY Left 2/24/2025    Procedure: OOPHORECTOMY;  Surgeon: GENA Matute MD;  Location: [facility] [place];  Service: General;  Laterality: Left;    PROCTOSCOPY N/A 11/1/2024    Procedure: PROCTOSCOPY;  Surgeon: GENA Matute MD;  Location: [facility];  Service: Endoscopy;  Laterality: N/A;  FLEXIBLE; EVALUATION OF RECTAL STUMP THROUGH RECTUM; NORMAL RECTUM    SALPINGOOPHORECTOMY Right 2/24/2025    Procedure: SALPINGO-OOPHORECTOMY;  Surgeon: GENA Matute MD;  Location: [facility] [place];  Service: General;  Laterality: Right;    TONSILLECTOMY      TOTAL SHOULDER ARTHROPLASTY       Family History   Problem Relation Name Age of Onset    Cancer Mother      Leukemia Mother      Stroke Mother      Alzheimer's disease Father      Cancer Sister      Leukemia Sister       Social History[1]  Review of Systems   Constitutional:  Positive for fatigue. Negative for fever.   HENT:  Negative for sore throat.    Respiratory:  Negative for shortness of breath.    Cardiovascular:  Negative for chest pain and palpitations.   Gastrointestinal:  Negative for nausea and vomiting.   Genitourinary:  Negative for  dysuria.   Musculoskeletal:  Negative for back pain.   Skin:  Negative for rash.   Neurological:  Positive for weakness (generalized weakness). Negative for headaches.   Hematological:  Does not bruise/bleed easily.   All other systems reviewed and are negative.      Physical Exam     Initial Vitals [07/21/25 1932]   BP Pulse Resp Temp SpO2   (!) 117/57 67 16 98.1 °F (36.7 °C) 96 %      MAP       --         Physical Exam    Nursing note and vitals reviewed.  Constitutional: She appears well-developed and well-nourished. No distress.   HENT:   Head: Normocephalic and atraumatic. Mouth/Throat: No oropharyngeal exudate.   Eyes: Conjunctivae and EOM are normal. Pupils are equal, round, and reactive to light.   Neck: Neck supple. No tracheal deviation present.   Normal range of motion.  Cardiovascular:  Normal rate, regular rhythm, normal heart sounds and intact distal pulses.           No murmur heard.  Pulmonary/Chest: Breath sounds normal. No stridor. No respiratory distress. She has no wheezes. She has no rales.   Abdominal: Abdomen is soft. She exhibits no distension. There is no abdominal tenderness. There is no rebound.   Musculoskeletal:         General: No tenderness or edema. Normal range of motion.      Cervical back: Normal range of motion and neck supple.     Neurological: She is alert and oriented to person, place, and time. She has normal strength. No cranial nerve deficit or sensory deficit.   Speech is normal.  No facial droop.  There is 5/5 strength in upper and lower extremities bilaterally.  Normal finger-to-nose.  No pronator drift.   Skin: Skin is warm and dry. Capillary refill takes less than 2 seconds. No rash noted. No erythema. There is pallor.   Psychiatric: She has a normal mood and affect. Her behavior is normal. Judgment and thought content normal.         ED Course   Procedures  Labs Reviewed   COMPREHENSIVE METABOLIC PANEL - Abnormal       Result Value    Sodium 139      Potassium 3.4 (*)      Chloride 101      CO2 28      Glucose 94      Blood Urea Nitrogen 27.0 (*)     Creatinine 1.18 (*)     Calcium 9.4      Protein Total 7.0      Albumin 3.3 (*)     Globulin 3.7 (*)     Albumin/Globulin Ratio 0.9 (*)     Bilirubin Total 0.3      ALP 68      ALT 7      AST 12      eGFR 47      Anion Gap 10.0      BUN/Creatinine Ratio 23     LIPASE - Abnormal    Lipase Level 77 (*)    TROPONIN I - Abnormal    Troponin-I 0.051 (*)    B-TYPE NATRIURETIC PEPTIDE - Abnormal    Natriuretic Peptide 450.2 (*)    URINALYSIS, REFLEX TO URINE CULTURE - Abnormal    Color, UA Yellow      Appearance, UA Clear      Specific Gravity, UA 1.020      pH, UA 6.0      Protein, UA 1+ (*)     Glucose, UA Negative      Ketones, UA Trace (*)     Blood, UA Negative      Bilirubin, UA Negative      Urobilinogen, UA 0.2      Nitrites, UA Negative      Leukocyte Esterase, UA Negative     CBC WITH DIFFERENTIAL - Abnormal    WBC 12.64 (*)     RBC 4.12 (*)     Hgb 9.9 (*)     Hct 31.6 (*)     MCV 76.7 (*)     MCH 24.0 (*)     MCHC 31.3 (*)     RDW 17.2 (*)     Platelet 488 (*)     MPV 9.6      Neut % 77.7      Lymph % 11.2      Mono % 7.7      Eos % 1.7      Basophil % 0.3      Imm Grans % 1.4      Neut # 9.81 (*)     Lymph # 1.42      Mono # 0.97      Eos # 0.22      Baso # 0.04      Imm Gran # 0.18 (*)     NRBC% 0.0     URINALYSIS, MICROSCOPIC - Abnormal    Bacteria, UA None Seen      Mucous, UA Small (*)     Uric Acid Crystals, UA Few (*)     RBC, UA 0-2      WBC, UA None Seen      Squamous Epithelial Cells, UA Few (*)    POCT GLUCOSE - Abnormal    POCT Glucose 140 (*)    MAGNESIUM - Normal    Magnesium Level 2.20     LACTIC ACID, PLASMA - Normal    Lactic Acid Level 1.9     TROPONIN I - Normal    Troponin-I 0.045     CBC W/ AUTO DIFFERENTIAL    Narrative:     The following orders were created for panel order CBC auto differential.  Procedure                               Abnormality         Status                     ---------                                -----------         ------                     CBC with Differential[[id number]]       Abnormal            Final result                 Please view results for these tests on the individual orders.   TYPE & SCREEN    Group & Rh B POS      Indirect Anthony GEL NEG      Specimen Outdate 07/24/2025 23:59          ECG Results              EKG 12-lead (Final result)        Collection Time Result Time QRS Duration OHS QTC Calculation    07/21/25 20:15:16 07/21/25 21:54:09 72 522                     Final result by Interface, Lab In [facility] (07/21/25 21:54:17)                   Narrative:    Test Reason : R53.81,    Vent. Rate :  64 BPM     Atrial Rate :  64 BPM     P-R Int : 124 ms          QRS Dur :  72 ms      QT Int : 506 ms       P-R-T Axes : -11  31  69 degrees    QTcB Int : 522 ms    Normal sinus rhythm  ST and T wave abnormality, consider anterior ischemia  Abnormal ECG  Confirmed by Donovan Pinto ([id number]) on 7/21/2025 9:54:03 PM    Referred By: AAAREFERRAL SELF           Confirmed By: Donovan Pinto                                  Imaging Results              X-Ray Chest 1 View (Preliminary result)  Result time 07/21/25 20:52:24      Wet Read by Caio Hsieh DO (07/21/25 20:52:24, [facility] - Emergency Dept, Emergency Medicine)    No acute process                                     Medications   sodium chloride 0.9% bolus 500 mL 500 mL (0 mLs Intravenous Stopped 7/21/25 4214)     Medical Decision Making  Differential includes but not limited to dehydration, electrolyte abnormality, sepsis, urinary tract infection, pneumonia, anemia, symptomatic anemia,    Emergent evaluation of a 79-year-old female presents emergency department with generalized weakness and reported low blood pressure nursing home.  Patient has not had any low blood pressure here.  Patient actually has no complaints she says she feels fine.  She had a recent hospitalization for severe sepsis with NSTEMI which elevated  "troponin likely thought to be due to demand ischemia.  Patient recommended for dual antiplatelet therapy.  EKG here is changed from prior but likely evolving NSTEMI.  Pravin per cardiology consultation patient not candidate for any interventional therapy unless concern for acute ACS.  On today's emergency department presentation patient has no chest pain or shortness of breath.  I doubt any acute coronary syndrome today.  Troponin is trending down.  Patient does not have any urinary tract infection, white count is negative lactic acid is negative.  Patient hemoglobin and hematocrit is stable and unchanged.  She will be discharged back to nursing home.  She is feeling better after IV fluids.    A dictation software program was used for this note.  Please expect some simple typographical  errors in this note.     Amount and/or Complexity of Data Reviewed  External Data Reviewed: notes.     Details: This is from patient's recent discharge summary 10 days ago done at [facility]    In summary 79-year-old female patient with past medical history of HTN, CVA with residual dysphagia initially brought after she was noted to choke while eating broccoli followed by bouts of coughing" subjective complaint of difficulty breathing, at presentation found to be hypoxic to 88 %, hypotensive to 80 BP of 70s and tachycardic. Followed up with elevated troponin, chest x-ray with right lower lobe infiltrate, initially presented to [facility] but transferred to [facility] for cardiology service.     Patient admitted with following admission problem list  Sepsis  Acute hypoxic respiratory failure  Aspiration pneumonia  Elevated troponin  HTN  Hx of CVA with residual dysphagia  Microcytic anemia    During her admission patient has been receiving Zosyn and azithromycin and noted to have significant improvement, patient leukocytosis resolved, repeat troponin negative, patient weaned off oxygen. " Echocardiogram reviewed and read as EF around 45 to 50% with hypokinesis in the distribution of the left anterior descending allergy consulted and did not advise to inpatient workup.  Patient is evaluated by SLP and recommended patient to have soft bite-size diet as tolerated/thin liquid, patient remained hemodynamically stable and improved clinically and plan to be discharged home with an outpatient follow-up with PCP and cardiology. If patient develops another aspiration episode in the future we will likely to need alternative means of feeding.       KATRIN Aparicio  [facility]  [address] [place] [address]  Office [phone]  7/13/2025  8:08 AM CDT    Cosigned by Galindo Palm MD at 07/13/2025 11:14 AM CDT  Electronically signed by Leandra Calvillo ANP-C at 07/13/2025 8:23 AM CDT  Electronically signed by Galindo Palm MD at 07/13/2025 11:14 AM CDT    Associated attestation - Galindo Palm MD - 07/13/2025 11:14 AM CDT  Formatting of this note might be different from the original.  Patient was seen and examined in collaboration with Advanced Practice Provider, Leandra Calvillo. We have discussed the case, and I have reviewed the documentation and agree with it as written. My evaluation, physical examination, medical decision-making, and assessment/plan formulation were done independently and constitute a separate visit.    I agree with the findings in this note in addition to the following:    Patient is a 79 y.o. female who presented to ED with HTN, CVA, dysphagia, squamosu cell carcinoma admitted to [facility] from nursing home after chocking on brocolli wal eating lunch. Troponin trendd 485->1982, She denies chest pain. No known coronary arterty disease or previous events.    Again denies chest pain to me.    TTE with mildly depressed LVEF with hypokinesis in the LAD territory.    ECG with NSR old anteroseptal infarct no ST segment  deviation.    Presentation more consistent with demand ischemia than an acute coronary syndrome. She has a mildly depressed LVEF on transthoracic echocardiogram. She is extremely frail with multiple comorbidities and in the absence of edilma angina or active acute coronary syndrome, I do not think she would benefit prognostically or symptomatically with intervention and attendant risks and requirement for DAPT. I reviewed this with the patient. Recommend medical therapy.    Recommendations  NSAID sensitivity, start plavix monotherapy  High intensity statin  Low dose beta blocker  Troponin has peaked <2k; more consistent with demand ischemia given the asphyxia episode. Reserve invasive angiography for active angina or true ACS.    Galindo Palm MD  7/13/2025  11:09 AM CDT        Labs: ordered. Decision-making details documented in ED Course.  Radiology: ordered and independent interpretation performed.               ED Course as of 07/22/25 0129 Mon Jul 21, 2025 2016 EKG 8:15 p.m. normal sinus rhythm rate of 64, T-waves are inverted anteriorly.  Artifact present which limits EKG interpretation no STEMI.  Abnormal EKG.  EKG interpreted independently by me.  T-wave inversions are new when compared to EKG done 10 days ago. [JR]   2051 Hemoglobin(!): 9.9 [JR]   2051 Hematocrit(!): 31.6  Hemoglobin and hematocrit are stable [JR]   e Jul 22, 2025 0129 Patient reassess in she says she is feeling better.  She is updated on her test results.  I answered all her questions.  She is relieved and wants to go back to her facility to go to sleep. [JR]      ED Course User Index  [JR] Caio Hsieh DO                               Clinical Impression:  Final diagnoses:  [R53.81] Malaise  [R53.1] Generalized weakness          ED Disposition Condition    Discharge Stable          ED Prescriptions    None       Follow-up Information    None                [1]   Social History  Tobacco Use    Smoking status: Former     Types:  Cigarettes    Smokeless tobacco: Never    Tobacco comments:     patient stated that she smokes on some days.    Substance Use Topics    Alcohol use: Never     Comment: wine, occasionally    Drug use: Never        Caio Hsieh DO  07/22/25 0129

## 2025-07-22 NOTE — DISCHARGE INSTRUCTIONS
RETURN TO EMERGENCY DEPARTMENT WITHOUT FAIL, IF YOUR SYMPTOMS WORSEN, IF YOU GET NEW OR DIFFERENT SYMPTOMS, IF YOU ARE UNABLE TO FOLLOW UP AS DIRECTED, OR IF YOU HAVE ANY CONCERNS OR WORRIES.    Increase the amount of fluids your drinking.  Follow up with the primary care provider

## (undated) DEVICE — SUT MONOCRYL 4-0 PS-2

## (undated) DEVICE — GLOVE SENSICARE PI SURG 6.5

## (undated) DEVICE — SUT CTD VICRYL 0 VIL BR/CT

## (undated) DEVICE — ADHESIVE DERMABOND ADVANCED

## (undated) DEVICE — SEALER G2 ARTC TISS ENSEAL

## (undated) DEVICE — SUT SILK 0 STRANDS 30IN BLK

## (undated) DEVICE — DEVICE PURSE STRING 10 LONG

## (undated) DEVICE — RELOAD ECHELON FLEX BLU 60MM

## (undated) DEVICE — EVACUATOR SURG SUC BLBLF 100ML

## (undated) DEVICE — CUTTER PROXIMATE BLUE 75MM

## (undated) DEVICE — COUNTER NDL FOAM BLK 40 COUNT

## (undated) DEVICE — BLANKET WARMING UPPER BODY

## (undated) DEVICE — ELECTRODE BLADE INSULATED 1 IN

## (undated) DEVICE — SPONGE LAP 18X18 PREWASHED

## (undated) DEVICE — TROCAR ENDOPATH XCEL 5X100MM

## (undated) DEVICE — SET TUB INSUFFLATION SUC 20L

## (undated) DEVICE — HEMOSTAT SURGICEL 4X8IN

## (undated) DEVICE — SYR 10CC LUER LOCK

## (undated) DEVICE — GLOVE SIGNATURE ESSNTL LTX 6.5

## (undated) DEVICE — GLOVE PROTEXIS HYDROGEL SZ6.5

## (undated) DEVICE — DRESSING MEDIPORE CLTH 3.5X6IN

## (undated) DEVICE — COVER TIP CURVED SCISSORS XI

## (undated) DEVICE — STAPLER GIA HANDLE STD

## (undated) DEVICE — CLIP LIGATION MEDIUM CLIPS 1

## (undated) DEVICE — Device

## (undated) DEVICE — TROCAR KII SHLD BLDED 5X100MM

## (undated) DEVICE — ELECTRODE MEGADYNE L-WIRE 33CM

## (undated) DEVICE — SUT 3-0 VICRYL SH CR/8 18

## (undated) DEVICE — APPLIER LIGACLIP SM 9.38IN

## (undated) DEVICE — DRAPE COLUMN DAVINCI XI

## (undated) DEVICE — OBTURATOR 8MM BLADELESS

## (undated) DEVICE — GLOVE SIGNATURE ESSNTL LTX 7

## (undated) DEVICE — TUBING MEDI-VAC 20FT 0.29IN

## (undated) DEVICE — NDL PNEUMO INSUFFLATI 120MM

## (undated) DEVICE — TOWEL OR DISP STRL BLUE 4/PK

## (undated) DEVICE — COVER PROXIMA MAYO STAND

## (undated) DEVICE — GOWN ECLIPSE REINF LV4 TWL 2XL

## (undated) DEVICE — PAD PINK TRENDELENBURG POS XL

## (undated) DEVICE — NDL HYPO POLYPR STD 26G 1.5IN

## (undated) DEVICE — STAPLER SKIN ROTATING HEAD

## (undated) DEVICE — BINDER ABDOM 4PANEL 12IN LG/XL

## (undated) DEVICE — GOWN POLY REINF BRTH SLV XL

## (undated) DEVICE — TUBE NG SUMP PVC 16FR 48IN

## (undated) DEVICE — SUT GUT PL. 4-0 27 FS-2

## (undated) DEVICE — DRAPE ARM DAVINCI XI

## (undated) DEVICE — HEMOSTAT SURGICEL PWD 3G

## (undated) DEVICE — GLOVE SENSICARE PI GRN 7

## (undated) DEVICE — DRESSING TRANS 4X4 TEGADERM

## (undated) DEVICE — CUTTER PROXIMATE BLUE 55MM

## (undated) DEVICE — SNARE POLYP STD SNG 7FR

## (undated) DEVICE — ELECTRODE EXTENDED BLADE

## (undated) DEVICE — SOL CLEARIFY VISUALIZATION LAP

## (undated) DEVICE — MARKER ENDOSCOPIC SPOT EX

## (undated) DEVICE — SET BASIN 48X48IN 6000ML RING

## (undated) DEVICE — KIT PROBE COVER GEL 6X72IN

## (undated) DEVICE — GLOVE SENSICARE NEOPRENE 6.5

## (undated) DEVICE — DRAPE FLUID WARMER ORS 44X44IN

## (undated) DEVICE — RELOAD ECHELON ENDOPATH 45MM

## (undated) DEVICE — SYR BULB IRRIG ST 60 LF

## (undated) DEVICE — DRAPE UINDERBUT GRAD PCH

## (undated) DEVICE — SUT 4-0 VICRYL / SH

## (undated) DEVICE — KIT SURGICAL COLON .25 1.1OZ

## (undated) DEVICE — DRAPE LEGGINGS CUFF 33X51IN

## (undated) DEVICE — DRAIN PENRS SIL STRL .25X18IN

## (undated) DEVICE — SUT VICRYL PLUS 3-0 SH 18IN

## (undated) DEVICE — IRRIGATOR ENDOWRIST XI SUCTION

## (undated) DEVICE — PACK PROCEDURE TRENGUARD 450

## (undated) DEVICE — TRAP SUCTION POLYP

## (undated) DEVICE — SUT 0 MAXON GRN

## (undated) DEVICE — CORD BIPOLAR 12 FOOT

## (undated) DEVICE — RELOAD PROXIMATE CUT BLUE 75MM

## (undated) DEVICE — PENCIL SMOKE EVAC ROCKER 70MM

## (undated) DEVICE — TROCAR ENDO Z THREAD KII 5X100

## (undated) DEVICE — STAPLER ECHELON FLEX GST 60MM

## (undated) DEVICE — STAPLER ECHELON FLEX GST 45MM

## (undated) DEVICE — NDL INTERJECT CATH 25G 200CM

## (undated) DEVICE — SUT ETHILON 2-0 FS 18IN BLK

## (undated) DEVICE — PAD ELECTROSURGICAL SPL W/CORD

## (undated) DEVICE — RETAINER FISH GLSMN VISCERA LG

## (undated) DEVICE — TOWEL OR XRAY WHITE 17X26IN

## (undated) DEVICE — SOL IRRI STRL WATER 1000ML

## (undated) DEVICE — SUT 0 VICRYL / UR6 (J603)

## (undated) DEVICE — SUT 2-0 VICRYL / SH (J417)

## (undated) DEVICE — SLEEVE KII ADV FIX 5X100MM

## (undated) DEVICE — BLADE SURG CARBON STEEL SZ11

## (undated) DEVICE — DRAPE DEVON INSTRUMENT 10X16IN

## (undated) DEVICE — STAPLER INTERNAL EEA MED 28MM

## (undated) DEVICE — SPONGE GAUZE 16PLY 4X4

## (undated) DEVICE — SUT VICRYL+ 27 UR-6 VIOL

## (undated) DEVICE — SCISSOR 5MMX35CM DIRECT DRIVE

## (undated) DEVICE — GLOVE SIGNATURE ESSNTL LTX 7.5

## (undated) DEVICE — SUT SILK 0 SH 30IN BLK BR

## (undated) DEVICE — APPLICATOR CHLORAPREP ORN 26ML

## (undated) DEVICE — GOWN ECLIPSE REINF LVL4 TWL XL

## (undated) DEVICE — STAPLER INT PROX TX 60X3.5MM

## (undated) DEVICE — SUT VICRYL PLUS 2-0 SH 27IN

## (undated) DEVICE — SEAL UNIVERSAL 5MM-8MM XI

## (undated) DEVICE — GLOVE SENSICARE PI GRN 7.5

## (undated) DEVICE — SUT SILK 2.0 BLK 18

## (undated) DEVICE — HOLDER SCALPEL SURGICAL GOLD

## (undated) DEVICE — TRAY CATH FOL SIL DRN BAG 16FR

## (undated) DEVICE — NDL HYPO STD REG BVL 25GX1.5IN

## (undated) DEVICE — DISSECTOR EPIX LAPA 5MMX35CM

## (undated) DEVICE — KIT PREVENA INCISION MGMT 13CM

## (undated) DEVICE — SEALER VESSEL EXTEND